# Patient Record
Sex: FEMALE | Race: WHITE | Employment: UNEMPLOYED | URBAN - METROPOLITAN AREA
[De-identification: names, ages, dates, MRNs, and addresses within clinical notes are randomized per-mention and may not be internally consistent; named-entity substitution may affect disease eponyms.]

---

## 2017-01-03 ENCOUNTER — HOSPITAL ENCOUNTER (OUTPATIENT)
Age: 57
Discharge: HOME | End: 2017-01-03
Attending: FAMILY MEDICINE

## 2017-01-03 VITALS
DIASTOLIC BLOOD PRESSURE: 83 MMHG | OXYGEN SATURATION: 98 % | TEMPERATURE: 97 F | HEIGHT: 65 IN | WEIGHT: 293 LBS | BODY MASS INDEX: 48.82 KG/M2 | SYSTOLIC BLOOD PRESSURE: 143 MMHG | HEART RATE: 92 BPM | RESPIRATION RATE: 16 BRPM

## 2017-01-03 DIAGNOSIS — M23.92 INTERNAL DERANGEMENT OF LEFT KNEE: Primary | ICD-10-CM

## 2017-01-03 PROCEDURE — 99213 OFFICE O/P EST LOW 20 MIN: CPT

## 2017-01-03 PROCEDURE — 99203 OFFICE O/P NEW LOW 30 MIN: CPT

## 2017-01-03 RX ORDER — TRAMADOL HYDROCHLORIDE 50 MG/1
TABLET ORAL EVERY 4 HOURS PRN
Qty: 20 TABLET | Refills: 0 | Status: SHIPPED | OUTPATIENT
Start: 2017-01-03 | End: 2017-01-10

## 2017-01-04 NOTE — ED NOTES
Patient given discharge instructionsand prescriptions, verbalizes understanding. Patient instructed not to drive, drink alcohol, or participate in activities requiring full attention while taking narcotic containing medication, verbalizes understanding.  Pa

## 2017-01-04 NOTE — ED PROVIDER NOTES
Patient Seen in: [address]    History   Patient presents with:  Knee Pain    Stated Complaint: LT. KNEE PAIN    HPI Comments: Pt c/o left knee pain after bending over yesterday.   Pain is worse when trying to straighten t Alcohol Use: No                Review of Systems   Constitutional: Negative for fever and chills. HENT: Negative for rhinorrhea. Cardiovascular: Negative for palpitations. Musculoskeletal: Positive for joint swelling. Negative for back pain.    All o diagnosis)    Disposition:  Discharge    Follow-up:  Dioni Wen MD  [address] [facility] [address] [phone]    In 3 days        Medications Prescribed:  Current Discharge Medication List    START taking these medications    Susan

## 2017-01-04 NOTE — ED INITIAL ASSESSMENT (HPI)
Patient reports she was getting up from couch yesterday and began to have left anterior knee pain, slightly inferior to patella. Denies hearing pop. Patient reports staying in one position too long results in stiffness. Able to bear weight.  Reports long hi

## 2017-01-05 ENCOUNTER — OFFICE VISIT (OUTPATIENT)
Dept: FAMILY MEDICINE CLINIC | Facility: CLINIC | Age: 57
End: 2017-01-05

## 2017-01-05 ENCOUNTER — HOSPITAL ENCOUNTER (OUTPATIENT)
Dept: GENERAL RADIOLOGY | Age: 57
Discharge: HOME | End: 2017-01-05
Attending: FAMILY MEDICINE
Payer: COMMERCIAL

## 2017-01-05 ENCOUNTER — TELEPHONE (OUTPATIENT)
Dept: FAMILY MEDICINE CLINIC | Facility: CLINIC | Age: 57
End: 2017-01-05

## 2017-01-05 VITALS
DIASTOLIC BLOOD PRESSURE: 79 MMHG | HEART RATE: 76 BPM | BODY MASS INDEX: 48.23 KG/M2 | TEMPERATURE: 99 F | RESPIRATION RATE: 17 BRPM | WEIGHT: 293 LBS | HEIGHT: 65.51 IN | SYSTOLIC BLOOD PRESSURE: 131 MMHG

## 2017-01-05 DIAGNOSIS — M25.562 ACUTE PAIN OF LEFT KNEE: ICD-10-CM

## 2017-01-05 DIAGNOSIS — M25.562 ACUTE PAIN OF LEFT KNEE: Primary | ICD-10-CM

## 2017-01-05 PROCEDURE — 73562 X-RAY EXAM OF KNEE 3: CPT

## 2017-01-05 PROCEDURE — 99214 OFFICE O/P EST MOD 30 MIN: CPT | Performed by: FAMILY MEDICINE

## 2017-01-05 PROCEDURE — 99212 OFFICE O/P EST SF 10 MIN: CPT | Performed by: FAMILY MEDICINE

## 2017-01-05 NOTE — PROGRESS NOTES
HPI:    Patient ID: Deandre Tran is a 64year old female. HPI    Review of Systems         Current Outpatient Prescriptions:  TraMADol HCl 50 MG Oral Tab Take 1-2 tablets ( mg total) by mouth every 4 (four) hours as needed for Pain.  Disp: 20 t week.    No orders of the defined types were placed in this encounter.        Meds This Visit:  No prescriptions requested or ordered in this encounter    Imaging & Referrals:  ORTHOPEDIC - INTERNAL  MRI KNEE, LEFT (BSG=[id number])       GP#[id number]

## 2017-01-05 NOTE — TELEPHONE ENCOUNTER
Spoke to pt, she was in the IC on Tuesday and was told to schedule f/u with PCP and was also advised to have an MRI of her left knee to rule out a torn meniscus.  Pt has been using ice, elevation and tramadol for pain, states that swelling is pretty much go

## 2017-01-05 NOTE — TELEPHONE ENCOUNTER
Patient seen in urgent care. She was given pain medication. She was to call PCP to get an order for MRI of knee. They thinks it is a torn Mensicus. Patient hoping to have test done today.    Also she would like to know if she needs apt today with Dr Brionna Arita

## 2017-01-10 ENCOUNTER — OFFICE VISIT (OUTPATIENT)
Dept: FAMILY MEDICINE CLINIC | Facility: CLINIC | Age: 57
End: 2017-01-10

## 2017-01-10 VITALS
WEIGHT: 293 LBS | SYSTOLIC BLOOD PRESSURE: 126 MMHG | HEART RATE: 76 BPM | DIASTOLIC BLOOD PRESSURE: 84 MMHG | BODY MASS INDEX: 48.23 KG/M2 | TEMPERATURE: 99 F | RESPIRATION RATE: 17 BRPM | HEIGHT: 65.51 IN

## 2017-01-10 DIAGNOSIS — M79.662 PAIN OF LEFT CALF: ICD-10-CM

## 2017-01-10 DIAGNOSIS — M25.562 ACUTE PAIN OF LEFT KNEE: Primary | ICD-10-CM

## 2017-01-10 PROCEDURE — 99213 OFFICE O/P EST LOW 20 MIN: CPT | Performed by: FAMILY MEDICINE

## 2017-01-10 PROCEDURE — 99212 OFFICE O/P EST SF 10 MIN: CPT | Performed by: FAMILY MEDICINE

## 2017-01-10 NOTE — PROGRESS NOTES
HPI:    Patient ID: Phillip Waldron is a 64year old female. Knee Pain   The pain is present in the left knee. The current episode started 1 to 4 weeks ago. The problem occurs daily. The problem has been gradually improving. The pain is moderate.  Assoc knee.  On exam knee with decreased extension and flexion with pain at extremes. No focal tenderness. Stable ×4. Posterior calf pain but no ankle edema. X-ray showed effusion, mild degenerative changes.     Plan to check Doppler with calf tenderness to r

## 2017-01-11 ENCOUNTER — HOSPITAL ENCOUNTER (OUTPATIENT)
Dept: ULTRASOUND IMAGING | Facility: HOSPITAL | Age: 57
Discharge: HOME | End: 2017-01-11
Attending: FAMILY MEDICINE
Payer: COMMERCIAL

## 2017-01-11 DIAGNOSIS — M25.562 ACUTE PAIN OF LEFT KNEE: ICD-10-CM

## 2017-01-11 PROCEDURE — 93971 EXTREMITY STUDY: CPT

## 2017-01-18 ENCOUNTER — HOSPITAL ENCOUNTER (OUTPATIENT)
Dept: MRI IMAGING | Age: 57
Discharge: HOME | End: 2017-01-18
Attending: FAMILY MEDICINE
Payer: COMMERCIAL

## 2017-01-18 ENCOUNTER — HOSPITAL ENCOUNTER (OUTPATIENT)
Dept: MRI IMAGING | Facility: HOSPITAL | Age: 57
Discharge: HOME | End: 2017-01-18
Attending: FAMILY MEDICINE
Payer: COMMERCIAL

## 2017-01-18 ENCOUNTER — TELEPHONE (OUTPATIENT)
Dept: FAMILY MEDICINE CLINIC | Facility: CLINIC | Age: 57
End: 2017-01-18

## 2017-01-18 DIAGNOSIS — M25.562 ACUTE PAIN OF LEFT KNEE: ICD-10-CM

## 2017-01-18 PROCEDURE — 73721 MRI JNT OF LWR EXTRE W/O DYE: CPT

## 2017-01-18 NOTE — TELEPHONE ENCOUNTER
Short term disability forms faxed to Dr. Morgan Valentin at the [facility] office 1/17/17. Forms faxed & originals sent through inter-office mail to [employer].

## 2017-01-20 ENCOUNTER — OFFICE VISIT (OUTPATIENT)
Dept: ORTHOPEDICS CLINIC | Facility: CLINIC | Age: 57
End: 2017-01-20

## 2017-01-20 DIAGNOSIS — M17.12 PRIMARY OSTEOARTHRITIS OF LEFT KNEE: Primary | ICD-10-CM

## 2017-01-20 PROCEDURE — 99243 OFF/OP CNSLTJ NEW/EST LOW 30: CPT | Performed by: ORTHOPAEDIC SURGERY

## 2017-01-20 PROCEDURE — 99212 OFFICE O/P EST SF 10 MIN: CPT | Performed by: ORTHOPAEDIC SURGERY

## 2017-01-20 RX ORDER — DOXEPIN HYDROCHLORIDE 50 MG/1
1 CAPSULE ORAL DAILY
COMMUNITY
End: 2020-06-17 | Stop reason: ALTCHOICE

## 2017-01-20 NOTE — PROGRESS NOTES
1/20/2017  Snow Yadi  66/1960  64year old   female  Katerin Dugan MD    HPI:   Patient presents with:  Consult: Left knee pain -- Onset 01/02/17 and states she \"got up wrong\" from sitting down. Rates pain 1/10.  Denies numbness and tingling Subclinical hypothyroidism 2010     subclinical hypothyroidism, primary.   3/2010 trial of Synthroid, no improvement sx, d/c'd   • Back problem      previous disc problem   • Essential hypertension    • Diabetes [facility])           Past Surgical History    TUBA to this visit and reveals tricompartmental articular cartilage narrowing, and ACL tear, and medial lateral meniscus tear. There is an anterior medial ganglion cyst about the proximal tibia and a posterior medial ganglion cyst about the distal femur.     AS

## 2017-01-24 ENCOUNTER — OFFICE VISIT (OUTPATIENT)
Dept: FAMILY MEDICINE CLINIC | Facility: CLINIC | Age: 57
End: 2017-01-24

## 2017-01-24 VITALS
DIASTOLIC BLOOD PRESSURE: 69 MMHG | TEMPERATURE: 99 F | WEIGHT: 293 LBS | BODY MASS INDEX: 48.23 KG/M2 | SYSTOLIC BLOOD PRESSURE: 106 MMHG | RESPIRATION RATE: 17 BRPM | HEIGHT: 65.51 IN | HEART RATE: 84 BPM

## 2017-01-24 DIAGNOSIS — M25.562 ACUTE PAIN OF LEFT KNEE: Primary | ICD-10-CM

## 2017-01-24 PROCEDURE — 99212 OFFICE O/P EST SF 10 MIN: CPT | Performed by: FAMILY MEDICINE

## 2017-01-24 PROCEDURE — 99213 OFFICE O/P EST LOW 20 MIN: CPT | Performed by: FAMILY MEDICINE

## 2017-01-24 NOTE — PROGRESS NOTES
HPI:    Patient ID: Shahida Webb is a 64year old female. Leg Pain   The pain is present in the left knee. The current episode started more than 1 month ago. The problem has been gradually improving.  Associated symptoms include a limited range of mo placed in this encounter.        Meds This Visit:  No prescriptions requested or ordered in this encounter    Imaging & Referrals:  None       DX#[id number]

## 2017-01-25 ENCOUNTER — TELEPHONE (OUTPATIENT)
Dept: FAMILY MEDICINE CLINIC | Facility: CLINIC | Age: 57
End: 2017-01-25

## 2017-01-26 ENCOUNTER — OFFICE VISIT (OUTPATIENT)
Dept: PHYSICAL THERAPY | Age: 57
End: 2017-01-26
Attending: ORTHOPAEDIC SURGERY
Payer: COMMERCIAL

## 2017-01-26 NOTE — TELEPHONE ENCOUNTER
Pt calling back, states forms are due today 1/26/2017- Requesting a call back to advise if they are ready .

## 2017-01-26 NOTE — TELEPHONE ENCOUNTER
Spoke with pt today and informed her that we only received [facility] paperwork and a note to return to work. Pt said she will verify with her job and call back. Pt voiced understanding.

## 2017-01-31 ENCOUNTER — APPOINTMENT (OUTPATIENT)
Dept: PHYSICAL THERAPY | Age: 57
End: 2017-01-31
Attending: ORTHOPAEDIC SURGERY
Payer: COMMERCIAL

## 2017-02-07 ENCOUNTER — OFFICE VISIT (OUTPATIENT)
Dept: PHYSICAL THERAPY | Age: 57
End: 2017-02-07
Attending: ORTHOPAEDIC SURGERY
Payer: COMMERCIAL

## 2017-02-07 DIAGNOSIS — M17.12 PRIMARY OSTEOARTHRITIS OF LEFT KNEE: Primary | ICD-10-CM

## 2017-02-07 PROCEDURE — 97110 THERAPEUTIC EXERCISES: CPT

## 2017-02-07 PROCEDURE — 97161 PT EVAL LOW COMPLEX 20 MIN: CPT

## 2017-02-08 NOTE — PROGRESS NOTES
LOWER EXTREMITY EVALUATION:   Referring Physician: Dr. Marcus Shin  Diagnosis: Primary osteoarthritis of left knee (M17.12)  Date of Onset: January 2, 2017 Date of Service: 2/7/2017     PATIENT SUMMARY   The patient is a 65 y/o female who presented with an a OBJECTIVE:     Gait: antalgic pattern with a decrease in step length and WB through LLE  Palpation: TTP medial joint line as well as superior and inferior patellar region (quadricep tendon)  Sensation: WNL    Accessory motion: hypo 2/6 sup/inf patella mobi patient was advised of these findings, precautions, and treatment options and has agreed to actively participate in planning and for this course of care.     Thank you for your referral. Please co-sign or sign and return this letter via fax as soon as possi

## 2017-02-09 ENCOUNTER — OFFICE VISIT (OUTPATIENT)
Dept: PHYSICAL THERAPY | Age: 57
End: 2017-02-09
Attending: ORTHOPAEDIC SURGERY
Payer: COMMERCIAL

## 2017-02-09 DIAGNOSIS — M17.12 PRIMARY OSTEOARTHRITIS OF LEFT KNEE: Primary | ICD-10-CM

## 2017-02-09 PROCEDURE — 97110 THERAPEUTIC EXERCISES: CPT

## 2017-02-10 NOTE — PROGRESS NOTES
DX: Primary osteoarthritis of left knee (M17.12)  Authorized # of Visits:  2/8         Next MD visit: none scheduled  Fall Risk: standard         Precautions: n/a           Medication Changes since last visit?: No    Subjective: Pt reports an improvement i

## 2017-02-14 ENCOUNTER — OFFICE VISIT (OUTPATIENT)
Dept: PHYSICAL THERAPY | Age: 57
End: 2017-02-14
Attending: ORTHOPAEDIC SURGERY
Payer: COMMERCIAL

## 2017-02-14 DIAGNOSIS — M17.12 PRIMARY OSTEOARTHRITIS OF LEFT KNEE: Primary | ICD-10-CM

## 2017-02-14 PROCEDURE — 97110 THERAPEUTIC EXERCISES: CPT

## 2017-02-15 NOTE — PROGRESS NOTES
DX: Primary osteoarthritis of left knee (M17.12)  Authorized # of Visits:  3/8         Next MD visit: none scheduled  Fall Risk: standard         Precautions: n/a           Medication Changes since last visit?: No    Subjective: Pt denies knee pain.  Notes

## 2017-02-16 ENCOUNTER — OFFICE VISIT (OUTPATIENT)
Dept: PHYSICAL THERAPY | Age: 57
End: 2017-02-16
Attending: ORTHOPAEDIC SURGERY
Payer: COMMERCIAL

## 2017-02-16 DIAGNOSIS — M17.12 PRIMARY OSTEOARTHRITIS OF LEFT KNEE: Primary | ICD-10-CM

## 2017-02-16 PROCEDURE — 97110 THERAPEUTIC EXERCISES: CPT

## 2017-02-17 NOTE — PROGRESS NOTES
DX: Primary osteoarthritis of left knee (M17.12)  Authorized # of Visits:  4/8         Next MD visit: none scheduled  Fall Risk: standard         Precautions: n/a           Medication Changes since last visit?: No    Subjective: Pt reports having a sharp p stability, proprioception, and functional mobility including gait     Skilled Services: manual therapy; HEP progression ; PREs     Charges: there ex 3       Total Timed Treatment: 42 min  Total Treatment Time: 45 min

## 2017-02-21 ENCOUNTER — APPOINTMENT (OUTPATIENT)
Dept: PHYSICAL THERAPY | Age: 57
End: 2017-02-21
Attending: ORTHOPAEDIC SURGERY
Payer: COMMERCIAL

## 2017-02-23 ENCOUNTER — OFFICE VISIT (OUTPATIENT)
Dept: PHYSICAL THERAPY | Age: 57
End: 2017-02-23
Attending: ORTHOPAEDIC SURGERY
Payer: COMMERCIAL

## 2017-02-23 DIAGNOSIS — M17.12 PRIMARY OSTEOARTHRITIS OF LEFT KNEE: Primary | ICD-10-CM

## 2017-02-23 PROCEDURE — 97110 THERAPEUTIC EXERCISES: CPT

## 2017-02-24 NOTE — PROGRESS NOTES
DX: Primary osteoarthritis of left knee (M17.12)  Authorized # of Visits:  5/8         Next MD visit: none scheduled  Fall Risk: standard         Precautions: n/a           Medication Changes since last visit?: No    Subjective: Pt reports feeling better. progression ; PREs     Charges: there ex 3       Total Timed Treatment: 42 min  Total Treatment Time: 45 min

## 2017-02-25 ENCOUNTER — LAB ENCOUNTER (OUTPATIENT)
Dept: LAB | Facility: HOSPITAL | Age: 57
End: 2017-02-25
Attending: FAMILY MEDICINE
Payer: COMMERCIAL

## 2017-02-25 ENCOUNTER — PATIENT MESSAGE (OUTPATIENT)
Dept: FAMILY MEDICINE CLINIC | Facility: CLINIC | Age: 57
End: 2017-02-25

## 2017-02-25 DIAGNOSIS — R79.9 ABNORMAL BLOOD CHEMISTRY: Primary | ICD-10-CM

## 2017-02-25 LAB — HBA1C MFR BLD: 7.4 % (ref 4–6)

## 2017-02-25 PROCEDURE — 36415 COLL VENOUS BLD VENIPUNCTURE: CPT

## 2017-02-25 PROCEDURE — 83036 HEMOGLOBIN GLYCOSYLATED A1C: CPT

## 2017-02-27 NOTE — TELEPHONE ENCOUNTER
From: Eriberto Frausto  To: Naomy Bender MD  Sent: 2/25/2017 9:26 AM CST  Subject: Other    Hi, I need the paperwork to take the A1C test today. Hope take is possible.  Thanks, Sandee Calvillo

## 2017-02-28 ENCOUNTER — OFFICE VISIT (OUTPATIENT)
Dept: FAMILY MEDICINE CLINIC | Facility: CLINIC | Age: 57
End: 2017-02-28

## 2017-02-28 VITALS
WEIGHT: 293 LBS | DIASTOLIC BLOOD PRESSURE: 86 MMHG | TEMPERATURE: 98 F | BODY MASS INDEX: 48.23 KG/M2 | SYSTOLIC BLOOD PRESSURE: 128 MMHG | HEART RATE: 85 BPM | RESPIRATION RATE: 17 BRPM | HEIGHT: 65.51 IN

## 2017-02-28 DIAGNOSIS — Z01.419 ENCOUNTER FOR GYNECOLOGICAL EXAMINATION WITHOUT ABNORMAL FINDING: Primary | ICD-10-CM

## 2017-02-28 DIAGNOSIS — E11.9 TYPE 2 DIABETES MELLITUS WITHOUT COMPLICATION, WITHOUT LONG-TERM CURRENT USE OF INSULIN (HCC): ICD-10-CM

## 2017-02-28 DIAGNOSIS — Z86.010 HISTORY OF COLON POLYPS: ICD-10-CM

## 2017-02-28 DIAGNOSIS — Z12.31 ENCOUNTER FOR SCREENING MAMMOGRAM FOR BREAST CANCER: ICD-10-CM

## 2017-02-28 DIAGNOSIS — E66.01 MORBID OBESITY DUE TO EXCESS CALORIES (HCC): ICD-10-CM

## 2017-02-28 PROCEDURE — 99213 OFFICE O/P EST LOW 20 MIN: CPT | Performed by: FAMILY MEDICINE

## 2017-02-28 PROCEDURE — 99396 PREV VISIT EST AGE 40-64: CPT | Performed by: FAMILY MEDICINE

## 2017-02-28 RX ORDER — GLIMEPIRIDE 2 MG/1
2 TABLET ORAL
Qty: 90 TABLET | Refills: 3 | Status: SHIPPED | OUTPATIENT
Start: 2017-02-28 | End: 2018-02-13

## 2017-03-01 NOTE — PROGRESS NOTES
HPI:    Patient ID: Phillip Waldron is a 64year old female. Diabetes  She presents for her follow-up diabetic visit. She has type 2 diabetes mellitus. There are no hypoglycemic associated symptoms.  Pertinent negatives for diabetes include no blurred v well-nourished. Neck: No thyromegaly present. Cardiovascular: Normal rate, regular rhythm and normal heart sounds. No murmur heard. Pulmonary/Chest: Effort normal and breath sounds normal.   Breast exam normal   Abdominal: Soft.  She exhibits no mas NEEDLE MINI U/F) 31G X 5 MM Does not apply Misc 100 each 2      Sig: Use with Victoza as dir daily           Imaging & Referrals:  [facility] SCREENING BILAT (IJM=[id number])       XM#[id number]

## 2017-03-02 ENCOUNTER — APPOINTMENT (OUTPATIENT)
Dept: PHYSICAL THERAPY | Age: 57
End: 2017-03-02
Attending: ORTHOPAEDIC SURGERY
Payer: COMMERCIAL

## 2017-03-06 LAB — HPV I/H RISK 1 DNA SPEC QL NAA+PROBE: NEGATIVE

## 2017-03-07 ENCOUNTER — OFFICE VISIT (OUTPATIENT)
Dept: PHYSICAL THERAPY | Age: 57
End: 2017-03-07
Attending: ORTHOPAEDIC SURGERY
Payer: COMMERCIAL

## 2017-03-07 PROCEDURE — 97110 THERAPEUTIC EXERCISES: CPT

## 2017-03-08 NOTE — PROGRESS NOTES
DX: Primary osteoarthritis of left knee (M17.12)  Authorized # of Visits:  6/8         Next MD visit: none scheduled  Fall Risk: standard         Precautions: n/a           Medication Changes since last visit?: No    Subjective: Pt reports overall feeling sit<>stand test within normal for age/gender without UE assist necessary to ease transitions at her work station     Plan: progress LLE flexibility, strength, stability, proprioception, and functional mobility including gait     Skilled Services: manual th

## 2017-03-14 ENCOUNTER — OFFICE VISIT (OUTPATIENT)
Dept: PHYSICAL THERAPY | Age: 57
End: 2017-03-14
Attending: ORTHOPAEDIC SURGERY
Payer: COMMERCIAL

## 2017-03-14 PROCEDURE — 97110 THERAPEUTIC EXERCISES: CPT

## 2017-03-16 ENCOUNTER — APPOINTMENT (OUTPATIENT)
Dept: PHYSICAL THERAPY | Age: 57
End: 2017-03-16
Attending: ORTHOPAEDIC SURGERY
Payer: COMMERCIAL

## 2017-03-21 ENCOUNTER — OFFICE VISIT (OUTPATIENT)
Dept: PHYSICAL THERAPY | Age: 57
End: 2017-03-21
Attending: ORTHOPAEDIC SURGERY
Payer: COMMERCIAL

## 2017-03-21 PROCEDURE — 97110 THERAPEUTIC EXERCISES: CPT

## 2017-03-25 ENCOUNTER — HOSPITAL ENCOUNTER (OUTPATIENT)
Dept: MAMMOGRAPHY | Facility: HOSPITAL | Age: 57
Discharge: HOME | End: 2017-03-25
Attending: FAMILY MEDICINE
Payer: COMMERCIAL

## 2017-03-25 DIAGNOSIS — Z12.31 ENCOUNTER FOR SCREENING MAMMOGRAM FOR BREAST CANCER: ICD-10-CM

## 2017-03-25 PROCEDURE — 77067 SCR MAMMO BI INCL CAD: CPT

## 2017-04-17 ENCOUNTER — TELEPHONE (OUTPATIENT)
Dept: GASTROENTEROLOGY | Facility: CLINIC | Age: 57
End: 2017-04-17

## 2017-06-02 ENCOUNTER — LAB ENCOUNTER (OUTPATIENT)
Dept: LAB | Facility: HOSPITAL | Age: 57
End: 2017-06-02
Attending: FAMILY MEDICINE
Payer: COMMERCIAL

## 2017-06-02 DIAGNOSIS — E11.9 DIABETES MELLITUS (HCC): Primary | ICD-10-CM

## 2017-06-02 PROCEDURE — 36415 COLL VENOUS BLD VENIPUNCTURE: CPT

## 2017-06-02 PROCEDURE — 83036 HEMOGLOBIN GLYCOSYLATED A1C: CPT

## 2017-06-06 ENCOUNTER — OFFICE VISIT (OUTPATIENT)
Dept: FAMILY MEDICINE CLINIC | Facility: CLINIC | Age: 57
End: 2017-06-06

## 2017-06-06 VITALS
HEART RATE: 76 BPM | TEMPERATURE: 99 F | SYSTOLIC BLOOD PRESSURE: 118 MMHG | BODY MASS INDEX: 48.23 KG/M2 | DIASTOLIC BLOOD PRESSURE: 79 MMHG | HEIGHT: 65.51 IN | RESPIRATION RATE: 18 BRPM | WEIGHT: 293 LBS

## 2017-06-06 DIAGNOSIS — E11.9 TYPE 2 DIABETES MELLITUS WITHOUT COMPLICATION, WITHOUT LONG-TERM CURRENT USE OF INSULIN (HCC): Primary | ICD-10-CM

## 2017-06-06 DIAGNOSIS — S89.92XD LEFT KNEE INJURY, SUBSEQUENT ENCOUNTER: ICD-10-CM

## 2017-06-06 DIAGNOSIS — E66.01 MORBID OBESITY DUE TO EXCESS CALORIES (HCC): ICD-10-CM

## 2017-06-06 DIAGNOSIS — Z86.010 HISTORY OF COLON POLYPS: ICD-10-CM

## 2017-06-06 PROCEDURE — 99212 OFFICE O/P EST SF 10 MIN: CPT | Performed by: FAMILY MEDICINE

## 2017-06-06 PROCEDURE — 99214 OFFICE O/P EST MOD 30 MIN: CPT | Performed by: FAMILY MEDICINE

## 2017-06-06 RX ORDER — LISINOPRIL AND HYDROCHLOROTHIAZIDE 12.5; 1 MG/1; MG/1
1 TABLET ORAL
Qty: 90 TABLET | Refills: 1 | Status: SHIPPED | OUTPATIENT
Start: 2017-06-06 | End: 2018-02-06

## 2017-06-06 RX ORDER — METFORMIN HYDROCHLORIDE 750 MG/1
TABLET, EXTENDED RELEASE ORAL
Qty: 180 TABLET | Refills: 1 | Status: SHIPPED | OUTPATIENT
Start: 2017-06-06 | End: 2018-02-06

## 2017-06-07 ENCOUNTER — TELEPHONE (OUTPATIENT)
Dept: FAMILY MEDICINE CLINIC | Facility: CLINIC | Age: 57
End: 2017-06-07

## 2017-06-07 NOTE — TELEPHONE ENCOUNTER
Caryn vazquez requesting clarification on quantity is the Dr requesting boxes of pens to be dispensed, is this of 1 month supply or 3 months.        Current Outpatient Prescriptions:  Exenatide ER (BYDUREON) 2 MG Subcutaneous Pen-injector Inject

## 2017-06-07 NOTE — PROGRESS NOTES
HPI:    Patient ID: Snow Grullon is a 64year old female. Diabetes  She presents for her follow-up diabetic visit. She has type 2 diabetes mellitus. There are no hypoglycemic associated symptoms.  Pertinent negatives for diabetes include no blurred v equal, round, and reactive to light. Neck: Neck supple. No JVD present. Cardiovascular: Normal rate, regular rhythm and normal heart sounds. No murmur heard.   Pulmonary/Chest: Effort normal and breath sounds normal.   Lymphadenopathy:     She has no Imaging & Referrals:  ORTHOPEDIC - INTERNAL  EVALUATE & TREAT, GASTRO (INTERNAL)       PJ#[id number]

## 2017-06-07 NOTE — TELEPHONE ENCOUNTER
Spoke with Lincoln Aj pharmacist at [facility] and clarified quantity per Dr Brenna Carlin message below.

## 2017-06-14 ENCOUNTER — TELEPHONE (OUTPATIENT)
Dept: GASTROENTEROLOGY | Facility: CLINIC | Age: 57
End: 2017-06-14

## 2017-06-14 NOTE — TELEPHONE ENCOUNTER
Last Procedure:   5/17/2016  Last Diagnosis:  History of multiple colon adenomas  Recalled for (years):  1 year  Sedation used previously:  MAC  Last Prep Used (if known):   Suprep  Quality of prep (if known):  Prep was good  Anticoagulants/Diabetic Meds:

## 2017-06-16 NOTE — TELEPHONE ENCOUNTER
Dr. Moe Morales,     Please advise on the message below regarding orders on how to hold/take Bydureon and DM oral medications prior to pt's c-scope, thank you.

## 2017-06-16 NOTE — TELEPHONE ENCOUNTER
I have reviewed the 5/17/16 c-scope performed by Dr. Cathrine Cranker. There were 3 polyps were retrieved during this procedure.   Fragments of hyperplastic polyp with focal features of sessile serrated adenoma were seen into and hyperplastic polyp of the transverse

## 2017-06-16 NOTE — TELEPHONE ENCOUNTER
Please let patient know she should continue metformin and Bydureon. Recommend hold glimepiride the day before and the day of appointment.

## 2017-06-20 NOTE — TELEPHONE ENCOUNTER
OK to change to Colyte split dose. I believe the patient was RXed Suprep last year, but I do not mind changing the prep as long as it is split dose.

## 2017-06-20 NOTE — TELEPHONE ENCOUNTER
Khalif Roberts called [address] and they state the copay for the suprep is $74.65 with the pt's insurance.  I also provided them with the savings voucher to see if it would bring down her cost, but they were unable to combine it with any other discount

## 2017-06-26 NOTE — TELEPHONE ENCOUNTER
[facility], please call at: [phone] between 8:30am-5:00pm, thank you.

## 2017-06-27 NOTE — TELEPHONE ENCOUNTER
Scheduled for:  Colonoscopy 34514  Provider Name:  Dr. Travis Olivo  Date:  8/10/17  Location:  [facility]  Sedation:  MAC  Time:  0900 (pt is aware that [facility] will call the day before to confirm arrival time)   Prep:  Suprep, mailed 6/27/17 with codes  Meds/Allergies Re

## 2017-06-30 ENCOUNTER — HOSPITAL ENCOUNTER (OUTPATIENT)
Dept: ULTRASOUND IMAGING | Facility: HOSPITAL | Age: 57
Discharge: HOME | End: 2017-06-30
Attending: ORTHOPAEDIC SURGERY
Payer: COMMERCIAL

## 2017-06-30 ENCOUNTER — OFFICE VISIT (OUTPATIENT)
Dept: ORTHOPEDICS CLINIC | Facility: CLINIC | Age: 57
End: 2017-06-30

## 2017-06-30 DIAGNOSIS — M79.662 PAIN OF LEFT CALF: ICD-10-CM

## 2017-06-30 DIAGNOSIS — S86.112A GASTROCNEMIUS MUSCLE STRAIN, LEFT, INITIAL ENCOUNTER: Primary | ICD-10-CM

## 2017-06-30 PROCEDURE — 93971 EXTREMITY STUDY: CPT | Performed by: ORTHOPAEDIC SURGERY

## 2017-06-30 PROCEDURE — 99214 OFFICE O/P EST MOD 30 MIN: CPT | Performed by: ORTHOPAEDIC SURGERY

## 2017-06-30 PROCEDURE — 99212 OFFICE O/P EST SF 10 MIN: CPT | Performed by: ORTHOPAEDIC SURGERY

## 2017-06-30 NOTE — PROGRESS NOTES
6/30/2017  Lilly Rosenberg  66/1960  64year old   female  Dwight Sniclair MD    HPI:   Patient presents with:  Knee Pain: left -- Finished PT in March 2017. States pain is worse now and has left calf pain. States calf pain began 03/2017.  States left Therefore, the patient would benefit from obtaining an ultrasound of the left calf to rule out a deep vein thrombosis.   If this test is negative, the patient will be perform a course of physical therapy for the left calf and follow-up in 4 weeks for repeat

## 2017-07-03 ENCOUNTER — TELEPHONE (OUTPATIENT)
Dept: ORTHOPEDICS CLINIC | Facility: CLINIC | Age: 57
End: 2017-07-03

## 2017-07-03 NOTE — TELEPHONE ENCOUNTER
Pt stts that insurance is asking for 5 digit code to authorize PT visits - please advise - thank you.

## 2017-07-03 NOTE — TELEPHONE ENCOUNTER
Called [facility] and spoke to Kacie who states they probably want the cpt 20875 code and the initial visit cpt code 06817.

## 2017-08-03 ENCOUNTER — OFFICE VISIT (OUTPATIENT)
Dept: PHYSICAL THERAPY | Age: 57
End: 2017-08-03
Attending: ORTHOPAEDIC SURGERY
Payer: COMMERCIAL

## 2017-08-03 DIAGNOSIS — S86.112A GASTROCNEMIUS MUSCLE STRAIN, LEFT, INITIAL ENCOUNTER: ICD-10-CM

## 2017-08-03 PROCEDURE — 97162 PT EVAL MOD COMPLEX 30 MIN: CPT

## 2017-08-03 PROCEDURE — 97110 THERAPEUTIC EXERCISES: CPT

## 2017-08-03 NOTE — PROGRESS NOTES
LOWER EXTREMITY EVALUATION:   Referring Physician: Dr. Yadi Mederos  Diagnosis: Gastrocnemius muscle strain, left, initial encounter (W92.064X)      Date of Onset: Jan 2, 2017; March 2017 Date of Service: 8/3/2017     PATIENT SUMMARY   The patient is a 63 y/o Observation: Increase swelling L LLE  Gait: antalgic pattern- bilateral hip ER with loss of terminal knee extension; minimal L hip circumduction to compensate loss L knee flexion     AROM:  L knee ROM -8 deg to 97 deg  R knee ROM -5 deg 119 deg      Acce participate in planning and for this course of care. Thank you for your referral. Please co-sign or sign and return this letter via fax as soon as possible to [phone].  If you have any questions, please contact me at Dept: [phone]    Sincerely,

## 2017-08-08 ENCOUNTER — OFFICE VISIT (OUTPATIENT)
Dept: PHYSICAL THERAPY | Age: 57
End: 2017-08-08
Attending: ORTHOPAEDIC SURGERY
Payer: COMMERCIAL

## 2017-08-08 DIAGNOSIS — S86.112A GASTROCNEMIUS MUSCLE STRAIN, LEFT, INITIAL ENCOUNTER: ICD-10-CM

## 2017-08-08 PROCEDURE — 97110 THERAPEUTIC EXERCISES: CPT

## 2017-08-08 NOTE — PROGRESS NOTES
Diagnosis:  Gastrocnemius muscle strain, left, initial encounter (D87.364Y)      Authorized # of Visits:  2         Next MD visit: none scheduled  Fall Risk: standard         Precautions: n/a           Medication Changes since last visit?: No  Subjective:

## 2017-08-09 ENCOUNTER — TELEPHONE (OUTPATIENT)
Dept: FAMILY MEDICINE CLINIC | Facility: CLINIC | Age: 57
End: 2017-08-09

## 2017-08-09 ENCOUNTER — TELEPHONE (OUTPATIENT)
Dept: GASTROENTEROLOGY | Facility: CLINIC | Age: 57
End: 2017-08-09

## 2017-08-09 NOTE — TELEPHONE ENCOUNTER
Pt transferred from phone room . Has colonoscopy with Dr Anish Jones tomorrow and did not receive instructions. Per Gladis's documentation, these were mailed June 27 on day she was scheduled (see 6/14 encounter). Pt is on clear liquids.  We reviewed the entire

## 2017-08-09 NOTE — TELEPHONE ENCOUNTER
Please inform patient I discussed with endocrinology. These labs are common after using Bydureon. It is not a reason to stop the medication. They do resolve eventually, sometimes 2-3 months.   I recommend she continue the Bydureon if it is not causing th

## 2017-08-09 NOTE — TELEPHONE ENCOUNTER
Spoke to pt. Relayed Dr. Anish Young message as shown below. Pt verbalized understanding of whole message with no further questions or concerns at this time.

## 2017-08-09 NOTE — TELEPHONE ENCOUNTER
Patient Bydureon once a week and is causing little hard  lumps in her abdomin which are not going away. She continues to have the lumps from 2 months ago. She is asking is she can go back to the [facility] for she did not get lumps.

## 2017-08-09 NOTE — TELEPHONE ENCOUNTER
Pt is calling state that she stop taking medication Bydureon pt state that she is have big lump on her stomach that's not going away requesting to speak with a RN

## 2017-08-10 ENCOUNTER — LAB REQUISITION (OUTPATIENT)
Dept: LAB | Facility: HOSPITAL | Age: 57
End: 2017-08-10
Payer: COMMERCIAL

## 2017-08-10 DIAGNOSIS — Z01.89 ENCOUNTER FOR OTHER SPECIFIED SPECIAL EXAMINATIONS: ICD-10-CM

## 2017-08-10 PROCEDURE — 88305 TISSUE EXAM BY PATHOLOGIST: CPT | Performed by: INTERNAL MEDICINE

## 2017-08-17 ENCOUNTER — OFFICE VISIT (OUTPATIENT)
Dept: PHYSICAL THERAPY | Age: 57
End: 2017-08-17
Attending: ORTHOPAEDIC SURGERY
Payer: COMMERCIAL

## 2017-08-17 DIAGNOSIS — S86.112A GASTROCNEMIUS MUSCLE STRAIN, LEFT, INITIAL ENCOUNTER: ICD-10-CM

## 2017-08-17 PROCEDURE — 97110 THERAPEUTIC EXERCISES: CPT

## 2017-08-17 NOTE — PROGRESS NOTES
Diagnosis:  Gastrocnemius muscle strain, left, initial encounter (X78.453R)      Authorized # of Visits:  3         Next MD visit: none scheduled  Fall Risk: standard         Precautions: n/a           Medication Changes since last visit?: No  Subjective:

## 2017-08-22 ENCOUNTER — OFFICE VISIT (OUTPATIENT)
Dept: PHYSICAL THERAPY | Age: 57
End: 2017-08-22
Attending: ORTHOPAEDIC SURGERY
Payer: COMMERCIAL

## 2017-08-22 DIAGNOSIS — S86.112A GASTROCNEMIUS MUSCLE STRAIN, LEFT, INITIAL ENCOUNTER: ICD-10-CM

## 2017-08-22 PROCEDURE — 97110 THERAPEUTIC EXERCISES: CPT

## 2017-08-22 NOTE — PROGRESS NOTES
Diagnosis:  Gastrocnemius muscle strain, left, initial encounter (E24.944R)      Authorized # of Visits: 4         Next MD visit: none scheduled  Fall Risk: standard         Precautions: n/a           Medication Changes since last visit?: No  Subjective: P

## 2017-08-24 ENCOUNTER — OFFICE VISIT (OUTPATIENT)
Dept: PHYSICAL THERAPY | Age: 57
End: 2017-08-24
Attending: ORTHOPAEDIC SURGERY
Payer: COMMERCIAL

## 2017-08-24 DIAGNOSIS — S86.112A GASTROCNEMIUS MUSCLE STRAIN, LEFT, INITIAL ENCOUNTER: ICD-10-CM

## 2017-08-24 PROCEDURE — 97110 THERAPEUTIC EXERCISES: CPT

## 2017-08-24 NOTE — PROGRESS NOTES
Diagnosis:  Gastrocnemius muscle strain, left, initial encounter (D82.086D)      Authorized # of Visits: 5         Next MD visit: none scheduled  Fall Risk: standard         Precautions: n/a           Medication Changes since last visit?: No  Subjective: P

## 2017-08-29 ENCOUNTER — OFFICE VISIT (OUTPATIENT)
Dept: PHYSICAL THERAPY | Age: 57
End: 2017-08-29
Attending: ORTHOPAEDIC SURGERY
Payer: COMMERCIAL

## 2017-08-29 DIAGNOSIS — S86.112A GASTROCNEMIUS MUSCLE STRAIN, LEFT, INITIAL ENCOUNTER: ICD-10-CM

## 2017-08-29 PROCEDURE — 97110 THERAPEUTIC EXERCISES: CPT

## 2017-08-29 NOTE — PROGRESS NOTES
Diagnosis:  Gastrocnemius muscle strain, left, initial encounter (W28.941I)      Authorized # of Visits: 6         Next MD visit: none scheduled  Fall Risk: standard         Precautions: n/a           Medication Changes since last visit?: No  Subjective: P

## 2017-08-31 ENCOUNTER — OFFICE VISIT (OUTPATIENT)
Dept: PHYSICAL THERAPY | Age: 57
End: 2017-08-31
Attending: ORTHOPAEDIC SURGERY
Payer: COMMERCIAL

## 2017-08-31 DIAGNOSIS — S86.112A GASTROCNEMIUS MUSCLE STRAIN, LEFT, INITIAL ENCOUNTER: ICD-10-CM

## 2017-08-31 PROCEDURE — 97110 THERAPEUTIC EXERCISES: CPT

## 2017-08-31 NOTE — PROGRESS NOTES
Diagnosis:  Gastrocnemius muscle strain, left, initial encounter (X09.901I)      Authorized # of Visits: 7         Next MD visit: none scheduled  Fall Risk: standard         Precautions: n/a           Medication Changes since last visit?: No  Subjective: P Time: 32 min

## 2017-09-05 ENCOUNTER — TELEPHONE (OUTPATIENT)
Dept: GASTROENTEROLOGY | Facility: CLINIC | Age: 57
End: 2017-09-05

## 2017-09-05 ENCOUNTER — OFFICE VISIT (OUTPATIENT)
Dept: PHYSICAL THERAPY | Age: 57
End: 2017-09-05
Attending: ORTHOPAEDIC SURGERY
Payer: COMMERCIAL

## 2017-09-05 PROCEDURE — 97110 THERAPEUTIC EXERCISES: CPT

## 2017-09-05 NOTE — PROGRESS NOTES
Diagnosis:  Gastrocnemius muscle strain, left, initial encounter (M18.779D)      Authorized # of Visits: 8         Next MD visit: (as needed)  Fall Risk: standard         Precautions: n/a           Medication Changes since last visit?: No  Subjective: Pt r progressive HEP necessary to manage symptoms during ADLs (i.e. don/dof shoes)--1/2 MET    2) The patient will demonstrate 2/3 to 1 grade increase in L hip flexion strength necessary to negotiate stairs with 1 UE for support, PAS <2/10-MET     3) The patien

## 2017-09-06 NOTE — TELEPHONE ENCOUNTER
Message   Received: Today   Message Contents   Delma Angelucci, MD  [facility] Clinical Staff             GI RNs - 1.  Please print and mail this letter to patient; 2. Recall for colonoscopy exam in 2 years      Results letter mailed to patient and co

## 2017-11-10 ENCOUNTER — LAB ENCOUNTER (OUTPATIENT)
Dept: LAB | Facility: HOSPITAL | Age: 57
End: 2017-11-10
Attending: FAMILY MEDICINE
Payer: COMMERCIAL

## 2017-11-10 DIAGNOSIS — E11.9 DIABETES MELLITUS (HCC): Primary | ICD-10-CM

## 2017-11-10 PROCEDURE — 83036 HEMOGLOBIN GLYCOSYLATED A1C: CPT

## 2017-11-10 PROCEDURE — 36415 COLL VENOUS BLD VENIPUNCTURE: CPT

## 2018-02-06 RX ORDER — LISINOPRIL AND HYDROCHLOROTHIAZIDE 12.5; 1 MG/1; MG/1
TABLET ORAL
Qty: 90 TABLET | Refills: 1 | Status: SHIPPED | OUTPATIENT
Start: 2018-02-06 | End: 2018-10-27

## 2018-02-06 RX ORDER — METFORMIN HYDROCHLORIDE 750 MG/1
TABLET, EXTENDED RELEASE ORAL
Qty: 180 TABLET | Refills: 1 | Status: SHIPPED | OUTPATIENT
Start: 2018-02-06 | End: 2018-10-27

## 2018-02-07 NOTE — TELEPHONE ENCOUNTER
Failed protocol, please advise. Medications pended for approval  LOV:6/6/17 Last RX refill:6/6/17  Last bllod works was done 11/12/16.   Hypertensive Medications  Protocol Criteria:  · Appointment scheduled in the past 6 months or in the next 3 months  · B months  · Creatinine in the past 12 months  · Creatinine result < 1.5   Recent Outpatient Visits            5 months ago     [address], [address] [facility]    Office Visit    5 months ago Gastrocnemius muscle strain, left, initi

## 2018-02-13 ENCOUNTER — OFFICE VISIT (OUTPATIENT)
Dept: FAMILY MEDICINE CLINIC | Facility: CLINIC | Age: 58
End: 2018-02-13

## 2018-02-13 VITALS
SYSTOLIC BLOOD PRESSURE: 115 MMHG | BODY MASS INDEX: 48.23 KG/M2 | RESPIRATION RATE: 16 BRPM | DIASTOLIC BLOOD PRESSURE: 79 MMHG | TEMPERATURE: 98 F | HEART RATE: 68 BPM | WEIGHT: 293 LBS | HEIGHT: 65.51 IN

## 2018-02-13 DIAGNOSIS — I10 ESSENTIAL HYPERTENSION WITH GOAL BLOOD PRESSURE LESS THAN 140/90: ICD-10-CM

## 2018-02-13 DIAGNOSIS — E66.01 MORBID OBESITY DUE TO EXCESS CALORIES (HCC): ICD-10-CM

## 2018-02-13 DIAGNOSIS — E11.9 TYPE 2 DIABETES MELLITUS WITHOUT COMPLICATION, WITHOUT LONG-TERM CURRENT USE OF INSULIN (HCC): Primary | ICD-10-CM

## 2018-02-13 DIAGNOSIS — L65.9 ALOPECIA: ICD-10-CM

## 2018-02-13 PROCEDURE — 99214 OFFICE O/P EST MOD 30 MIN: CPT | Performed by: FAMILY MEDICINE

## 2018-02-13 PROCEDURE — 99212 OFFICE O/P EST SF 10 MIN: CPT | Performed by: FAMILY MEDICINE

## 2018-02-13 RX ORDER — GLIMEPIRIDE 2 MG/1
2 TABLET ORAL
Qty: 90 TABLET | Refills: 3 | Status: SHIPPED | OUTPATIENT
Start: 2018-02-13 | End: 2018-02-15 | Stop reason: RX

## 2018-02-14 ENCOUNTER — TELEPHONE (OUTPATIENT)
Dept: FAMILY MEDICINE CLINIC | Facility: CLINIC | Age: 58
End: 2018-02-14

## 2018-02-14 LAB
CREATININE, RANDOM URINE: 155 MG/DL (ref 20–320)
MICROALBUMIN/CREATININE RATIO, RANDOM URINE: 2 MCG/MG CREAT
MICROALBUMIN: 0.3 MG/DL

## 2018-02-14 NOTE — TELEPHONE ENCOUNTER
Pharmacy stts Rx Glimepiride 2 MG which is on back order for 3 weeks, pharmacy wanted to know if we can offer 4MG and pt can break in half? Pt is at pharmacy now.  Please advise         Current Outpatient Prescriptions:  glimepiride 2 MG Oral Tab Take 1 tab

## 2018-02-14 NOTE — PROGRESS NOTES
HPI:    Patient ID: Truman Cadena is a 62year old female. Diabetes   She presents for her follow-up diabetic visit. She has type 2 diabetes mellitus. Her disease course has been stable. There are no hypoglycemic associated symptoms.  Pertinent negati apply Kit test T. I.D Disp:  Rfl:      Allergies:No Known Allergies   PHYSICAL EXAM:   Physical Exam   Constitutional: She is oriented to person, place, and time. She appears well-developed and well-nourished.    HENT:   Mouth/Throat: Oropharynx is clear and

## 2018-02-15 RX ORDER — GLIMEPIRIDE 4 MG/1
2 TABLET ORAL
Qty: 90 TABLET | Refills: 0 | OUTPATIENT
Start: 2018-02-15 | End: 2018-10-30 | Stop reason: ALTCHOICE

## 2018-02-15 NOTE — TELEPHONE ENCOUNTER
Spoke with pharmacist and Dr Felix Munoz message given. Pharmacist asking for new order Glimepiride 4 mg pt to take half for total 2 mg daily. Order sent.

## 2018-02-15 NOTE — TELEPHONE ENCOUNTER
Dr. Leyla Yadav, please advise if you approve glimepiride 4mg tablets, take 1/2 tab daily before breakfast.     Please reply to faith: JEREMIAS Valencia

## 2018-02-17 ENCOUNTER — APPOINTMENT (OUTPATIENT)
Dept: LAB | Facility: HOSPITAL | Age: 58
End: 2018-02-17
Attending: FAMILY MEDICINE
Payer: COMMERCIAL

## 2018-02-17 LAB
ALBUMIN SERPL BCP-MCNC: 3.3 G/DL (ref 3.5–4.8)
ALBUMIN/GLOB SERPL: 0.9 {RATIO} (ref 1–2)
ALP SERPL-CCNC: 35 U/L (ref 32–100)
ALT SERPL-CCNC: 26 U/L (ref 14–54)
ANION GAP SERPL CALC-SCNC: 7 MMOL/L (ref 0–18)
AST SERPL-CCNC: 21 U/L (ref 15–41)
BILIRUB SERPL-MCNC: 0.6 MG/DL (ref 0.3–1.2)
BUN SERPL-MCNC: 12 MG/DL (ref 8–20)
BUN/CREAT SERPL: 20 (ref 10–20)
CALCIUM SERPL-MCNC: 8.7 MG/DL (ref 8.5–10.5)
CHLORIDE SERPL-SCNC: 106 MMOL/L (ref 95–110)
CHOLEST SERPL-MCNC: 161 MG/DL (ref 110–200)
CO2 SERPL-SCNC: 27 MMOL/L (ref 22–32)
CREAT SERPL-MCNC: 0.6 MG/DL (ref 0.5–1.5)
FERRITIN SERPL IA-MCNC: 160 NG/ML (ref 11–307)
GLOBULIN PLAS-MCNC: 3.5 G/DL (ref 2.5–3.7)
GLUCOSE SERPL-MCNC: 149 MG/DL (ref 70–99)
HBA1C MFR BLD: 6.7 % (ref 4–6)
HDLC SERPL-MCNC: 45 MG/DL
LDLC SERPL CALC-MCNC: 100 MG/DL (ref 0–99)
NONHDLC SERPL-MCNC: 116 MG/DL
OSMOLALITY UR CALC.SUM OF ELEC: 293 MOSM/KG (ref 275–295)
PATIENT FASTING: YES
POTASSIUM SERPL-SCNC: 3.7 MMOL/L (ref 3.3–5.1)
PROT SERPL-MCNC: 6.8 G/DL (ref 5.9–8.4)
SODIUM SERPL-SCNC: 140 MMOL/L (ref 136–144)
TRIGL SERPL-MCNC: 80 MG/DL (ref 1–149)
TSH SERPL-ACNC: 3.46 UIU/ML (ref 0.45–5.33)

## 2018-02-17 PROCEDURE — 82728 ASSAY OF FERRITIN: CPT | Performed by: FAMILY MEDICINE

## 2018-02-17 PROCEDURE — 36415 COLL VENOUS BLD VENIPUNCTURE: CPT | Performed by: FAMILY MEDICINE

## 2018-02-17 PROCEDURE — 80061 LIPID PANEL: CPT | Performed by: FAMILY MEDICINE

## 2018-02-17 PROCEDURE — 84443 ASSAY THYROID STIM HORMONE: CPT | Performed by: FAMILY MEDICINE

## 2018-02-17 PROCEDURE — 83036 HEMOGLOBIN GLYCOSYLATED A1C: CPT | Performed by: FAMILY MEDICINE

## 2018-02-17 PROCEDURE — 86803 HEPATITIS C AB TEST: CPT | Performed by: FAMILY MEDICINE

## 2018-02-17 PROCEDURE — 80053 COMPREHEN METABOLIC PANEL: CPT | Performed by: FAMILY MEDICINE

## 2018-02-19 LAB — HCV AB SERPL QL IA: NONREACTIVE

## 2018-02-20 RX ORDER — ATORVASTATIN CALCIUM 10 MG/1
10 TABLET, FILM COATED ORAL NIGHTLY
Qty: 90 TABLET | Refills: 1 | Status: SHIPPED | OUTPATIENT
Start: 2018-02-20 | End: 2018-10-30

## 2018-02-24 ENCOUNTER — TELEPHONE (OUTPATIENT)
Dept: FAMILY MEDICINE CLINIC | Facility: CLINIC | Age: 58
End: 2018-02-24

## 2018-02-24 DIAGNOSIS — E11.9 DIABETES MELLITUS WITHOUT COMPLICATION (HCC): Primary | ICD-10-CM

## 2018-02-24 NOTE — TELEPHONE ENCOUNTER
Pt called back & was informed of lab result & MD recommendation, pt stated understanding.   Pt will f/u with [facility] regarding her meds & she will f/u with Dr Kevin Guidry in 6 mos & she will repeat labs then prior ov.   future lab order placed        No fu

## 2018-02-24 NOTE — PROGRESS NOTES
Pt called back & was informed of lab result & MD recommendation, pt stated understanding. Pt will f/u with [facility] regarding her meds & she will f/u with Dr Shahzad Santana in 6 mos & she will repeat labs then prior ov.   future lab order placed.   See TE 2-2

## 2018-02-24 NOTE — TELEPHONE ENCOUNTER
My chart message  Notes Recorded by Shireen Phalen, RN on 2/24/2018 at 9:56 AM CST  Your cholesterol is at the upper end of the goal for diabetes.  We could consider starting medication for cholesterol at this time.  I would recommend Lipitor 10 mg daily.

## 2018-02-24 NOTE — TELEPHONE ENCOUNTER
----- Message from Omkar Montgomery MD sent at 2/20/2018  9:18 AM CST -----  Nurses–please see my chart message regarding starting atorvastatin. Please confirm patient received message.   Check with patient, let her know at low dose side effects are unlikely

## 2018-10-29 RX ORDER — METFORMIN HYDROCHLORIDE 750 MG/1
TABLET, EXTENDED RELEASE ORAL
Qty: 180 TABLET | Refills: 3 | Status: SHIPPED | OUTPATIENT
Start: 2018-10-29 | End: 2019-03-19

## 2018-10-29 RX ORDER — LISINOPRIL AND HYDROCHLOROTHIAZIDE 12.5; 1 MG/1; MG/1
TABLET ORAL
Qty: 90 TABLET | Refills: 3 | Status: SHIPPED | OUTPATIENT
Start: 2018-10-29 | End: 2019-03-19

## 2018-10-30 ENCOUNTER — OFFICE VISIT (OUTPATIENT)
Dept: FAMILY MEDICINE CLINIC | Facility: CLINIC | Age: 58
End: 2018-10-30
Payer: COMMERCIAL

## 2018-10-30 VITALS
RESPIRATION RATE: 18 BRPM | TEMPERATURE: 98 F | HEIGHT: 65.5 IN | WEIGHT: 293 LBS | DIASTOLIC BLOOD PRESSURE: 78 MMHG | SYSTOLIC BLOOD PRESSURE: 120 MMHG | BODY MASS INDEX: 48.23 KG/M2 | HEART RATE: 82 BPM

## 2018-10-30 DIAGNOSIS — E78.5 HYPERLIPIDEMIA, UNSPECIFIED HYPERLIPIDEMIA TYPE: ICD-10-CM

## 2018-10-30 DIAGNOSIS — I10 ESSENTIAL HYPERTENSION: ICD-10-CM

## 2018-10-30 DIAGNOSIS — R19.5 LOOSE STOOLS: ICD-10-CM

## 2018-10-30 DIAGNOSIS — E66.01 MORBID OBESITY DUE TO EXCESS CALORIES (HCC): ICD-10-CM

## 2018-10-30 DIAGNOSIS — E11.9 TYPE 2 DIABETES MELLITUS WITHOUT COMPLICATION, WITHOUT LONG-TERM CURRENT USE OF INSULIN (HCC): Primary | ICD-10-CM

## 2018-10-30 PROCEDURE — 90686 IIV4 VACC NO PRSV 0.5 ML IM: CPT | Performed by: FAMILY MEDICINE

## 2018-10-30 PROCEDURE — 99214 OFFICE O/P EST MOD 30 MIN: CPT | Performed by: FAMILY MEDICINE

## 2018-10-30 PROCEDURE — 99212 OFFICE O/P EST SF 10 MIN: CPT | Performed by: FAMILY MEDICINE

## 2018-10-30 PROCEDURE — 90471 IMMUNIZATION ADMIN: CPT | Performed by: FAMILY MEDICINE

## 2018-10-30 RX ORDER — ATORVASTATIN CALCIUM 10 MG/1
10 TABLET, FILM COATED ORAL NIGHTLY
Qty: 90 TABLET | Refills: 3 | Status: SHIPPED | OUTPATIENT
Start: 2018-10-30 | End: 2019-03-19

## 2018-10-31 NOTE — PROGRESS NOTES
HPI:    Patient ID: George Peraza is a 62year old female. Diarrhea    This is a recurrent problem. The current episode started 1 to 4 weeks ago. The problem occurs 2 to 4 times per day. The problem has been waxing and waning.  The patient states that Glucose Monitoring Suppl ([address]   [address]) W/DEVICE Does not apply Kit test T. I.D Disp:  Rfl:      Allergies:No Known Allergies   PHYSICAL EXAM:   Physical Exam   Constitutional: She is oriented to person, place, and time.  She appears well-developed metformin.     Orders Placed This Encounter      Flulaval 0.5 ml 6 mon and older Quad single dose PF (56150)      Meds This Visit:  Requested Prescriptions     Signed Prescriptions Disp Refills   • atorvastatin 10 MG Oral Tab 90 tablet 3     Sig: Take 1 tab

## 2018-11-11 ENCOUNTER — APPOINTMENT (OUTPATIENT)
Dept: LAB | Facility: HOSPITAL | Age: 58
End: 2018-11-11
Attending: FAMILY MEDICINE
Payer: COMMERCIAL

## 2018-11-11 DIAGNOSIS — E11.9 DIABETES MELLITUS WITHOUT COMPLICATION (HCC): ICD-10-CM

## 2018-11-11 PROCEDURE — 36415 COLL VENOUS BLD VENIPUNCTURE: CPT

## 2018-11-11 PROCEDURE — 83036 HEMOGLOBIN GLYCOSYLATED A1C: CPT

## 2018-11-11 PROCEDURE — 80061 LIPID PANEL: CPT

## 2018-11-11 PROCEDURE — 80053 COMPREHEN METABOLIC PANEL: CPT

## 2019-01-29 ENCOUNTER — TELEPHONE (OUTPATIENT)
Dept: FAMILY MEDICINE CLINIC | Facility: CLINIC | Age: 59
End: 2019-01-29

## 2019-01-29 NOTE — TELEPHONE ENCOUNTER
Called [facility] Shell to verify she was still taking Lisinopril. Received a letter from [employer].  Pt verified she is still taking Lisinopril

## 2019-02-21 ENCOUNTER — NURSE TRIAGE (OUTPATIENT)
Dept: OTHER | Age: 59
End: 2019-02-21

## 2019-02-21 NOTE — TELEPHONE ENCOUNTER
Action Requested: Summary for Provider     []  Critical Lab, Recommendations Needed  [] Need Additional Advice  []   FYI    []   Need Orders  [] Need Medications Sent to Pharmacy  []  Other     SUMMARY: Per pt sore throat, fever high 101, sneezing, and whi

## 2019-02-22 ENCOUNTER — OFFICE VISIT (OUTPATIENT)
Dept: FAMILY MEDICINE CLINIC | Facility: CLINIC | Age: 59
End: 2019-02-22
Payer: COMMERCIAL

## 2019-02-22 VITALS
RESPIRATION RATE: 18 BRPM | DIASTOLIC BLOOD PRESSURE: 84 MMHG | SYSTOLIC BLOOD PRESSURE: 142 MMHG | HEART RATE: 81 BPM | TEMPERATURE: 98 F | BODY MASS INDEX: 51 KG/M2 | WEIGHT: 293 LBS

## 2019-02-22 DIAGNOSIS — J02.9 PHARYNGITIS, UNSPECIFIED ETIOLOGY: Primary | ICD-10-CM

## 2019-02-22 LAB
CONTROL LINE PRESENT WITH A CLEAR BACKGROUND (YES/NO): YES YES/NO
KIT LOT #: NORMAL NUMERIC
STREP GRP A CUL-SCR: NEGATIVE

## 2019-02-22 PROCEDURE — 99212 OFFICE O/P EST SF 10 MIN: CPT | Performed by: FAMILY MEDICINE

## 2019-02-22 PROCEDURE — 99213 OFFICE O/P EST LOW 20 MIN: CPT | Performed by: FAMILY MEDICINE

## 2019-02-22 PROCEDURE — 87880 STREP A ASSAY W/OPTIC: CPT | Performed by: FAMILY MEDICINE

## 2019-02-22 RX ORDER — PENICILLIN V POTASSIUM 500 MG/1
500 TABLET ORAL 3 TIMES DAILY
Qty: 30 TABLET | Refills: 0 | Status: SHIPPED | OUTPATIENT
Start: 2019-02-22 | End: 2019-02-26

## 2019-02-22 NOTE — PROGRESS NOTES
HPI:    Patient ID: Rafiq Tello is a 62year old female. Sore Throat    This is a new problem. The current episode started in the past 7 days. The problem has been waxing and waning. Neither side of throat is experiencing more pain than the other. Rfl:      Allergies:No Known Allergies   PHYSICAL EXAM:   Physical Exam   Constitutional: She appears well-developed and well-nourished. HENT:   Right Ear: External ear normal.   Left Ear: External ear normal.   Pharynx with tonsils 1+ enlarged, exudate.

## 2019-02-25 ENCOUNTER — TELEPHONE (OUTPATIENT)
Dept: OTHER | Age: 59
End: 2019-02-25

## 2019-02-25 ENCOUNTER — NURSE TRIAGE (OUTPATIENT)
Dept: OTHER | Age: 59
End: 2019-02-25

## 2019-02-25 NOTE — TELEPHONE ENCOUNTER
Result Notes for GRP A STREP CULT, THROAT     Notes recorded by Benny Traore MD on 2/25/2019 at 10:59 AM CST  I am not certain the patient checks Mychart, so please let her know strep positive, complete antibiotic. Patient notified of lab results.  P

## 2019-02-25 NOTE — TELEPHONE ENCOUNTER
Action Requested: Summary for Provider     []  Critical Lab, Recommendations Needed  [x] Need Additional Advice  []   FYI    []   Need Orders  [] Need Medications Sent to Pharmacy  []  Other     SUMMARY: Patient calling with complaint of vomiting that is t

## 2019-02-25 NOTE — TELEPHONE ENCOUNTER
Advised patient of Dr. Andie Moya note. Patient verbalized understanding and declined appointment for today because does not have a ride. Spouse does not get out of work till 3pm, so would not be able to get to office till 4pm today.  Appointment made for jeff

## 2019-02-25 NOTE — TELEPHONE ENCOUNTER
She should be better with penicillin. Recommend making acute visit for follow-up.   Okay to add on at 3 PM.

## 2019-02-26 ENCOUNTER — OFFICE VISIT (OUTPATIENT)
Dept: FAMILY MEDICINE CLINIC | Facility: CLINIC | Age: 59
End: 2019-02-26
Payer: COMMERCIAL

## 2019-02-26 VITALS
BODY MASS INDEX: 51 KG/M2 | DIASTOLIC BLOOD PRESSURE: 78 MMHG | TEMPERATURE: 98 F | WEIGHT: 293 LBS | RESPIRATION RATE: 18 BRPM | SYSTOLIC BLOOD PRESSURE: 122 MMHG | HEART RATE: 80 BPM

## 2019-02-26 DIAGNOSIS — J01.90 ACUTE SINUSITIS, RECURRENCE NOT SPECIFIED, UNSPECIFIED LOCATION: Primary | ICD-10-CM

## 2019-02-26 PROCEDURE — 99212 OFFICE O/P EST SF 10 MIN: CPT | Performed by: FAMILY MEDICINE

## 2019-02-26 PROCEDURE — 99213 OFFICE O/P EST LOW 20 MIN: CPT | Performed by: FAMILY MEDICINE

## 2019-02-26 RX ORDER — AMOXICILLIN AND CLAVULANATE POTASSIUM 875; 125 MG/1; MG/1
1 TABLET, FILM COATED ORAL 2 TIMES DAILY
Qty: 20 TABLET | Refills: 0 | Status: SHIPPED | OUTPATIENT
Start: 2019-02-26 | End: 2019-03-08

## 2019-02-27 NOTE — PROGRESS NOTES
HPI:    Patient ID: Renetta Bryant is a 62year old female. Vomiting    This is a recurrent problem. The current episode started in the past 7 days. The problem occurs 2 to 4 times per day. The problem has been resolved. There has been no fever.  Assoc normal.   Left Ear: External ear normal.   Nose: Mucosal edema and rhinorrhea present. Right sinus exhibits no maxillary sinus tenderness and no frontal sinus tenderness. Left sinus exhibits no maxillary sinus tenderness and no frontal sinus tenderness.

## 2019-03-09 ENCOUNTER — APPOINTMENT (OUTPATIENT)
Dept: LAB | Facility: HOSPITAL | Age: 59
End: 2019-03-09
Attending: FAMILY MEDICINE
Payer: COMMERCIAL

## 2019-03-09 PROCEDURE — 83036 HEMOGLOBIN GLYCOSYLATED A1C: CPT | Performed by: FAMILY MEDICINE

## 2019-03-09 PROCEDURE — 36415 COLL VENOUS BLD VENIPUNCTURE: CPT | Performed by: FAMILY MEDICINE

## 2019-03-19 ENCOUNTER — OFFICE VISIT (OUTPATIENT)
Dept: FAMILY MEDICINE CLINIC | Facility: CLINIC | Age: 59
End: 2019-03-19
Payer: COMMERCIAL

## 2019-03-19 VITALS
WEIGHT: 293 LBS | BODY MASS INDEX: 51 KG/M2 | SYSTOLIC BLOOD PRESSURE: 132 MMHG | HEART RATE: 80 BPM | TEMPERATURE: 97 F | DIASTOLIC BLOOD PRESSURE: 84 MMHG | RESPIRATION RATE: 18 BRPM

## 2019-03-19 DIAGNOSIS — E11.9 TYPE 2 DIABETES MELLITUS WITHOUT COMPLICATION, WITHOUT LONG-TERM CURRENT USE OF INSULIN (HCC): Primary | ICD-10-CM

## 2019-03-19 PROCEDURE — 99212 OFFICE O/P EST SF 10 MIN: CPT | Performed by: FAMILY MEDICINE

## 2019-03-19 PROCEDURE — 99213 OFFICE O/P EST LOW 20 MIN: CPT | Performed by: FAMILY MEDICINE

## 2019-03-19 RX ORDER — GLIMEPIRIDE 2 MG/1
2 TABLET ORAL
Qty: 90 TABLET | Refills: 1 | Status: SHIPPED | OUTPATIENT
Start: 2019-03-19 | End: 2020-06-09

## 2019-03-19 RX ORDER — LISINOPRIL AND HYDROCHLOROTHIAZIDE 12.5; 1 MG/1; MG/1
1 TABLET ORAL
Qty: 90 TABLET | Refills: 3 | Status: SHIPPED | OUTPATIENT
Start: 2019-03-19 | End: 2020-08-23

## 2019-03-19 RX ORDER — METFORMIN HYDROCHLORIDE 750 MG/1
TABLET, EXTENDED RELEASE ORAL
Qty: 180 TABLET | Refills: 3 | Status: SHIPPED | OUTPATIENT
Start: 2019-03-19 | End: 2020-08-23

## 2019-03-19 RX ORDER — ATORVASTATIN CALCIUM 10 MG/1
10 TABLET, FILM COATED ORAL NIGHTLY
Qty: 90 TABLET | Refills: 3 | Status: SHIPPED | OUTPATIENT
Start: 2019-03-19 | End: 2020-06-09

## 2019-03-19 NOTE — PATIENT INSTRUCTIONS
Diabetes  Be sure to take all medicines every day! Continue metformin and glimepiride. Add new medication Farxiga. It is okay to take atorvastatin (for cholesterol) in the morning if you cannot remember to take it at night.   Stop soda pop, better to dri

## 2019-03-19 NOTE — PROGRESS NOTES
HPI:    Patient ID: Geri Awan is a 62year old female. Diabetes   She presents for her follow-up diabetic visit. She has type 2 diabetes mellitus. Her disease course has been stable. There are no hypoglycemic associated symptoms.  Pertinent negati PHYSICAL EXAM:   Physical Exam   Constitutional: She is oriented to person, place, and time. She appears well-developed and well-nourished. HENT:   Mouth/Throat: Oropharynx is clear and moist.   Eyes: Pupils are equal, round, and reactive to light.    Chitra Peck Sig: Take 1 tablet by mouth once daily.    • metFORMIN HCl  MG Oral Tablet 24 Hr 180 tablet 3     Sig: TAKE 2 TABLETS BY MOUTH ONCE DAILY WITH BREAKFAST   • Dapagliflozin Propanediol (FARXIGA) 10 MG Oral Tab 30 tablet 5     Sig: Take 10 mg by mout

## 2019-06-09 ENCOUNTER — APPOINTMENT (OUTPATIENT)
Dept: LAB | Facility: HOSPITAL | Age: 59
End: 2019-06-09
Attending: FAMILY MEDICINE
Payer: COMMERCIAL

## 2019-06-09 PROCEDURE — 83036 HEMOGLOBIN GLYCOSYLATED A1C: CPT | Performed by: FAMILY MEDICINE

## 2019-06-09 PROCEDURE — 36415 COLL VENOUS BLD VENIPUNCTURE: CPT | Performed by: FAMILY MEDICINE

## 2019-06-11 ENCOUNTER — OFFICE VISIT (OUTPATIENT)
Dept: FAMILY MEDICINE CLINIC | Facility: CLINIC | Age: 59
End: 2019-06-11
Payer: COMMERCIAL

## 2019-06-11 VITALS
TEMPERATURE: 98 F | RESPIRATION RATE: 18 BRPM | DIASTOLIC BLOOD PRESSURE: 72 MMHG | SYSTOLIC BLOOD PRESSURE: 136 MMHG | WEIGHT: 293 LBS | HEART RATE: 81 BPM | BODY MASS INDEX: 52 KG/M2

## 2019-06-11 DIAGNOSIS — E11.9 TYPE 2 DIABETES MELLITUS WITHOUT COMPLICATION, WITHOUT LONG-TERM CURRENT USE OF INSULIN (HCC): Primary | ICD-10-CM

## 2019-06-11 DIAGNOSIS — I87.2 VENOUS INSUFFICIENCY: ICD-10-CM

## 2019-06-11 DIAGNOSIS — I10 ESSENTIAL HYPERTENSION: ICD-10-CM

## 2019-06-11 DIAGNOSIS — E66.01 MORBID OBESITY DUE TO EXCESS CALORIES (HCC): ICD-10-CM

## 2019-06-11 PROCEDURE — 99214 OFFICE O/P EST MOD 30 MIN: CPT | Performed by: FAMILY MEDICINE

## 2019-06-11 PROCEDURE — 99212 OFFICE O/P EST SF 10 MIN: CPT | Performed by: FAMILY MEDICINE

## 2019-06-11 NOTE — PATIENT INSTRUCTIONS
Understanding Chronic Venous Insufficiency     Elevate your legs to increase blood flow back to your heart. Problems with the veins in the legs may lead to chronic venous insufficiency (CVI).  CVI means that there is a long-term problem with the veins n · Increase blood flow back to your heart by elevating your legs, exercising daily, and wearing elastic stockings. · Boost blood flow in your legs by losing excess weight.   · If you must stand or sit in one place for a period of time, keep your blood movin · Carbohydrates are starches, sugars, and fiber. They are found in many foods, including fruit, bread, pasta, milk, and sweets. Of all the foods you eat, carbohydrates have the most effect on your blood sugar.  Your dietitian may teach you about carb counti © 5779-6599 [address]. [address] [facility], [address] [place]. All rights reserved. This information is not intended as a substitute for professional medical care. Always follow your healthcare professional's instructions.         Using a · Attach a needle to your pen. Read the directions that came with your pen. They contain steps for attaching a needle. If you’re using a nondisposable pen, don’t leave the needle attached to the pen between shots.   · Mix the medicine by rolling the pen bet Insert the needle into your pinched-up skin. The needle should be straight up and down. Thin adults or children may need to inject with the needle slightly to the left or right. · Make sure the needle gets all the way into the fatty tissue below the skin.

## 2019-06-11 NOTE — PROGRESS NOTES
HPI:    Patient ID: Maura Sauceda is a 62year old female. Diabetes   She presents for her follow-up diabetic visit. She has type 2 diabetes mellitus. Her disease course has been stable. There are no hypoglycemic associated symptoms.  Pertinent negati 33G Does not apply Misc Apply 1 Units topically daily. Disp: 90 each Rfl: 3   Blood Glucose Monitoring Suppl ([address]   [address]) W/DEVICE Does not apply Kit test T. I.D Disp:  Rfl:      Allergies:No Known Allergies   PHYSICAL EXAM:   Physical Exam   Con recommendations for weight loss discussed.   Discussed possible benefits of bariatric surgery, patient states she may consider      Orders Placed This Encounter      Hemoglobin A1C [E]      Meds This Visit:  Requested Prescriptions     Signed Prescriptions

## 2019-06-12 ENCOUNTER — TELEPHONE (OUTPATIENT)
Dept: FAMILY MEDICINE CLINIC | Facility: CLINIC | Age: 59
End: 2019-06-12

## 2019-06-12 NOTE — TELEPHONE ENCOUNTER
Thanks, please continue to try her for a few more days.   If not successful, we could try through her daughter Ranjan Lacey (if that is okay HIPPA)

## 2019-06-12 NOTE — TELEPHONE ENCOUNTER
Charlene Heck, MD Agnes Schiff, RN [facility] [place]   Could you please contact Chelsea Anne to set up an appointment to do insulin teaching.  I have sent an Rx for Lantus.  She should start 15 units daily then increase by 2 units every 2 days until

## 2019-06-12 NOTE — TELEPHONE ENCOUNTER
Attempted to reach patient to schedule nurse visit for diabetic teaching per Dr. Rc Velasquez. Patient will need nurse visit appointment during time when RN is available in office for teaching. Patient unavailable. LMTCB. Transfer to triage.

## 2019-06-14 NOTE — TELEPHONE ENCOUNTER
LMTCB. Transfer to triage. Dr. Norm Hernandez:   Daughter not on HIPAA release. Patient has My Chart account but has not used it within the last year.      Please advise

## 2019-06-14 NOTE — TELEPHONE ENCOUNTER
Patient returned call. Patient scheduled for nurse visit on 6/28/19 at 3 p.m. With RN. Dr. Guanakito Hernandez aware. Per patient, she did not received notification from her pharmacy that medication was sent to her pharmacy.    Contacted [facility] to find out if s

## 2019-06-14 NOTE — TELEPHONE ENCOUNTER
We do not have to give daughter medical details, but can we just have her get her mother to answer your call.

## 2019-06-15 ENCOUNTER — TELEPHONE (OUTPATIENT)
Dept: ENDOCRINOLOGY | Facility: HOSPITAL | Age: 59
End: 2019-06-15

## 2019-06-15 NOTE — TELEPHONE ENCOUNTER
Pt left message for diabetes appt (MNT per Dr. Mehdi Solorio order, wants a Saturday appt); returned call, will await return call from patient. Left phone number and hours of clinic operation. Will ask Ivy Diego to call as well.

## 2019-06-17 ENCOUNTER — TELEPHONE (OUTPATIENT)
Dept: GASTROENTEROLOGY | Facility: CLINIC | Age: 59
End: 2019-06-17

## 2019-06-17 DIAGNOSIS — Z12.11 ENCOUNTER FOR COLONOSCOPY DUE TO HISTORY OF ADENOMATOUS COLONIC POLYPS: Primary | ICD-10-CM

## 2019-06-17 DIAGNOSIS — Z86.010 ENCOUNTER FOR COLONOSCOPY DUE TO HISTORY OF ADENOMATOUS COLONIC POLYPS: Primary | ICD-10-CM

## 2019-06-17 NOTE — TELEPHONE ENCOUNTER
Spoke with pt and informed her of Dr Jahaira Martines message below. She states [address] needs to be informed of this so Insulin can be dispensed. [address] and spoke with Eyal Marie and informed him of SK's order below.  He states will g

## 2019-06-17 NOTE — TELEPHONE ENCOUNTER
Per pt pharmacy need a max dosage for lantus that pt can inject for insurance to cover. Called pharmacy. Per pharmacy insurance is looking for max number of units so pt do not go over. Would like to know to increase 2 units a day to they reach max units?  C

## 2019-06-17 NOTE — TELEPHONE ENCOUNTER
Pt calling to [facility] recall CLN, pt informed about the 72 hr cb, pls call [phone], ok to leave detailed vm, thanks.

## 2019-06-26 NOTE — TELEPHONE ENCOUNTER
Last Procedure, Date, MD:  08/10/17 CAB, colonoscopy.  Done @ [facility]  Last Diagnosis:    Multiple colon polyps, diverticulosis, internal hemorrhoids  Recalled for (months or years): 2-3 years  Sedation used previously:  MAC  Last Prep Used (if known):  Suprep

## 2019-06-27 NOTE — TELEPHONE ENCOUNTER
GI RNs -    Chart reviewed. My colonoscopy procedure report 5/17/2016 reviewed. BMI greater than 50, MAC anesthesia. Good prep quality exam to the cecum and terminal ileum.   15 mm polyp removed from ascending colon, 2 small 6 mm polyps removed from

## 2019-06-28 ENCOUNTER — NURSE ONLY (OUTPATIENT)
Dept: FAMILY MEDICINE CLINIC | Facility: CLINIC | Age: 59
End: 2019-06-28
Payer: COMMERCIAL

## 2019-06-28 PROCEDURE — 99211 OFF/OP EST MAY X REQ PHY/QHP: CPT | Performed by: FAMILY MEDICINE

## 2019-06-28 PROCEDURE — 99212 OFFICE O/P EST SF 10 MIN: CPT | Performed by: FAMILY MEDICINE

## 2019-06-28 NOTE — PATIENT INSTRUCTIONS
Insulin: How to Use and Where to Inject     Injection sites in adults include the belly (abdomen), front of thighs, back of upper arms and upper buttocks. Insulin is given with shots (injections) in the fatty layer under the skin (subcutaneous).  Some · Remove the needle. Then tap the syringe with a fingertip to remove any air bubbles. Injecting the insulin  · Gently pinch up about 1 inch of skin. Do not squeeze the skin. · Insert the needle. · Push in the plunger. Press until the syringe is empty.  L © 7880-3800 [address]. [address] [facility], [facility] [place]. All rights reserved. This information is not intended as a substitute for professional medical care. Always follow your healthcare professional's instructions.

## 2019-06-28 NOTE — PROGRESS NOTES
Patient here for diabetic injection teaching. Patient was prescribed insulin glargine (LANTUS SOLOSTAR) 100 UNIT/ML Subcutaneous Solution Pen-injector.    Informed patient per Dr. Elijah Gonzalez that she is to take 15 Units into the skin nightly, increase by 2 U to weight loss and insulin needs for her diabetes. Reviewed signs and symptoms of hypoglycemia and importance of carrying a snack at all times since she is on a long acting insulin.    Patient states she does not need teaching on how to inject insulin bec

## 2019-07-01 NOTE — TELEPHONE ENCOUNTER
Pt called to speak radha tony.  Pt states \"she better find something cheaper\" for her prep ($79) states she is NOT paying for any of that and  She wants a date for her procedure

## 2019-07-02 NOTE — TELEPHONE ENCOUNTER
Pt checking status on message - would like to discuss preps cost and see if there is a cheaper alt rx. Pls call - anxious to schedule. Thank you.

## 2019-07-03 NOTE — TELEPHONE ENCOUNTER
Milly Sarmiento accidentally closed this encounter.      The pt is off on Friday and states she can be reached

## 2019-07-03 NOTE — TELEPHONE ENCOUNTER
Pt called back. Pt said she is tired of playing phone tag and would like to change the prep. States its too expensive .

## 2019-07-05 NOTE — TELEPHONE ENCOUNTER
Scheduled for:  Colonoscopy 21643  Provider Name: Dr. Rae Christina  Date:  8/6/19  Location:  [facility]  Sedation:  MAC  Time:   1637 (pt is aware to arrive at [address])   Prep:  Suprep, mailed 7/8/19  Meds/Allergies Reconciled?:  Physician reviewed   Diagnosis with codes

## 2019-07-10 ENCOUNTER — HOSPITAL ENCOUNTER (OUTPATIENT)
Dept: ENDOCRINOLOGY | Facility: HOSPITAL | Age: 59
Discharge: HOME | End: 2019-07-10
Attending: FAMILY MEDICINE
Payer: COMMERCIAL

## 2019-07-10 ENCOUNTER — TELEPHONE (OUTPATIENT)
Dept: GASTROENTEROLOGY | Facility: CLINIC | Age: 59
End: 2019-07-10

## 2019-07-10 VITALS — WEIGHT: 293 LBS | BODY MASS INDEX: 51 KG/M2

## 2019-07-10 DIAGNOSIS — E11.65 TYPE 2 DIABETES MELLITUS WITH HYPERGLYCEMIA, WITHOUT LONG-TERM CURRENT USE OF INSULIN (HCC): Primary | ICD-10-CM

## 2019-07-10 PROCEDURE — 97802 MEDICAL NUTRITION INDIV IN: CPT

## 2019-07-10 RX ORDER — POLYETHYLENE GLYCOL 3350, SODIUM CHLORIDE, POTASSIUM CHLORIDE, SODIUM BICARBONATE, AND SODIUM SULFATE 240; 5.84; 2.98; 6.72; 22.72 G/4L; G/4L; G/4L; G/4L; G/4L
POWDER, FOR SOLUTION ORAL
Qty: 1 BOTTLE | Refills: 0 | Status: SHIPPED | OUTPATIENT
Start: 2019-07-10 | End: 2020-06-17 | Stop reason: ALTCHOICE

## 2019-07-10 NOTE — TELEPHONE ENCOUNTER
Current Outpatient Medications:  Na Sulfate-K Sulfate-Mg Sulf (SUPREP BOWEL PREP KIT) 17.5-3.13-1.6 GM/177ML Oral Solution Take as directed Disp: 1 Bottle Rfl: 0     Per pharmacy - please send in a prescription for PEG as pt cannot afford to pay for SUPR

## 2019-07-11 NOTE — PROGRESS NOTES
Medical Nutrition Therapy Assessment    Chris Greenwood 8/6/1960 was seen for individual Diabetic Medical Nutrition Therapy:    Date: 7/10/2019   Start time 7P  End time: 8P    Assessment    Anthropometrics:  Wt (!) 312 lb 11.2 oz   BMI 51.24 kg/m²     Cu W/DEVICE Does not apply Kit, test T. I.D, Disp: , Rfl:      Lantus 25 units   Metformin 750 mg BID   Glimepiride 2 mg     Labs:  Lab Results   Component Value Date    A1C 8.3 (H) 06/09/2019    A1C 8.2 (H) 03/09/2019    CHOLEST 143 11/11/2018    CHOLEST 161 with focus on balanced macronutrient consumption, including identifying foods that are carbohydrates, lean protein, non-starchy vegetables, and heart healthy fats   [] stressed importance of carbohydrate consistency in each meal   [x] recommended carbohydr tabs or fast acting carbohydrates with you for treatment of lows; for blood glucose levels less than 70 mg/dl, take 15 grams of fast acting carbohydrates (3-4 glucose tabs, 4 ounces of juice, or as discussed). Retest in 15 min.  If not above 70 mg/dl, retre

## 2019-08-06 ENCOUNTER — ANESTHESIA EVENT (OUTPATIENT)
Dept: ENDOSCOPY | Facility: HOSPITAL | Age: 59
End: 2019-08-06
Payer: COMMERCIAL

## 2019-08-06 ENCOUNTER — ANESTHESIA (OUTPATIENT)
Dept: ENDOSCOPY | Facility: HOSPITAL | Age: 59
End: 2019-08-06
Payer: COMMERCIAL

## 2019-08-06 ENCOUNTER — HOSPITAL ENCOUNTER (OUTPATIENT)
Facility: HOSPITAL | Age: 59
Setting detail: HOSPITAL OUTPATIENT SURGERY
Discharge: HOME HEALTH | End: 2019-08-06
Attending: INTERNAL MEDICINE | Admitting: INTERNAL MEDICINE
Payer: COMMERCIAL

## 2019-08-06 DIAGNOSIS — Z12.11 ENCOUNTER FOR COLONOSCOPY DUE TO HISTORY OF ADENOMATOUS COLONIC POLYPS: ICD-10-CM

## 2019-08-06 DIAGNOSIS — Z86.010 ENCOUNTER FOR COLONOSCOPY DUE TO HISTORY OF ADENOMATOUS COLONIC POLYPS: ICD-10-CM

## 2019-08-06 LAB — GLUCOSE BLDC GLUCOMTR-MCNC: 153 MG/DL (ref 70–99)

## 2019-08-06 PROCEDURE — 45385 COLONOSCOPY W/LESION REMOVAL: CPT | Performed by: INTERNAL MEDICINE

## 2019-08-06 PROCEDURE — 0DBL8ZX EXCISION OF TRANSVERSE COLON, VIA NATURAL OR ARTIFICIAL OPENING ENDOSCOPIC, DIAGNOSTIC: ICD-10-PCS | Performed by: INTERNAL MEDICINE

## 2019-08-06 PROCEDURE — 0DBH8ZX EXCISION OF CECUM, VIA NATURAL OR ARTIFICIAL OPENING ENDOSCOPIC, DIAGNOSTIC: ICD-10-PCS | Performed by: INTERNAL MEDICINE

## 2019-08-06 RX ORDER — MORPHINE SULFATE 4 MG/ML
2 INJECTION, SOLUTION INTRAMUSCULAR; INTRAVENOUS EVERY 10 MIN PRN
Status: DISCONTINUED | OUTPATIENT
Start: 2019-08-06 | End: 2019-08-06

## 2019-08-06 RX ORDER — SODIUM CHLORIDE, SODIUM LACTATE, POTASSIUM CHLORIDE, CALCIUM CHLORIDE 600; 310; 30; 20 MG/100ML; MG/100ML; MG/100ML; MG/100ML
INJECTION, SOLUTION INTRAVENOUS CONTINUOUS
Status: DISCONTINUED | OUTPATIENT
Start: 2019-08-06 | End: 2019-08-06

## 2019-08-06 RX ORDER — HYDROMORPHONE HYDROCHLORIDE 1 MG/ML
0.6 INJECTION, SOLUTION INTRAMUSCULAR; INTRAVENOUS; SUBCUTANEOUS EVERY 5 MIN PRN
Status: DISCONTINUED | OUTPATIENT
Start: 2019-08-06 | End: 2019-08-06

## 2019-08-06 RX ORDER — MORPHINE SULFATE 10 MG/ML
6 INJECTION, SOLUTION INTRAMUSCULAR; INTRAVENOUS EVERY 10 MIN PRN
Status: DISCONTINUED | OUTPATIENT
Start: 2019-08-06 | End: 2019-08-06

## 2019-08-06 RX ORDER — HYDROCODONE BITARTRATE AND ACETAMINOPHEN 5; 325 MG/1; MG/1
1 TABLET ORAL AS NEEDED
Status: DISCONTINUED | OUTPATIENT
Start: 2019-08-06 | End: 2019-08-06

## 2019-08-06 RX ORDER — HALOPERIDOL 5 MG/ML
0.25 INJECTION INTRAMUSCULAR ONCE AS NEEDED
Status: DISCONTINUED | OUTPATIENT
Start: 2019-08-06 | End: 2019-08-06

## 2019-08-06 RX ORDER — MORPHINE SULFATE 4 MG/ML
4 INJECTION, SOLUTION INTRAMUSCULAR; INTRAVENOUS EVERY 10 MIN PRN
Status: DISCONTINUED | OUTPATIENT
Start: 2019-08-06 | End: 2019-08-06

## 2019-08-06 RX ORDER — HYDROMORPHONE HYDROCHLORIDE 1 MG/ML
0.2 INJECTION, SOLUTION INTRAMUSCULAR; INTRAVENOUS; SUBCUTANEOUS EVERY 5 MIN PRN
Status: DISCONTINUED | OUTPATIENT
Start: 2019-08-06 | End: 2019-08-06

## 2019-08-06 RX ORDER — NALOXONE HYDROCHLORIDE 0.4 MG/ML
80 INJECTION, SOLUTION INTRAMUSCULAR; INTRAVENOUS; SUBCUTANEOUS AS NEEDED
Status: DISCONTINUED | OUTPATIENT
Start: 2019-08-06 | End: 2019-08-06

## 2019-08-06 RX ORDER — PROCHLORPERAZINE EDISYLATE 5 MG/ML
5 INJECTION INTRAMUSCULAR; INTRAVENOUS ONCE AS NEEDED
Status: DISCONTINUED | OUTPATIENT
Start: 2019-08-06 | End: 2019-08-06

## 2019-08-06 RX ORDER — ONDANSETRON 2 MG/ML
4 INJECTION INTRAMUSCULAR; INTRAVENOUS ONCE AS NEEDED
Status: DISCONTINUED | OUTPATIENT
Start: 2019-08-06 | End: 2019-08-06

## 2019-08-06 RX ORDER — HYDROCODONE BITARTRATE AND ACETAMINOPHEN 5; 325 MG/1; MG/1
2 TABLET ORAL AS NEEDED
Status: DISCONTINUED | OUTPATIENT
Start: 2019-08-06 | End: 2019-08-06

## 2019-08-06 RX ORDER — DEXTROSE MONOHYDRATE 25 G/50ML
50 INJECTION, SOLUTION INTRAVENOUS
Status: DISCONTINUED | OUTPATIENT
Start: 2019-08-06 | End: 2019-08-06

## 2019-08-06 RX ORDER — HYDROMORPHONE HYDROCHLORIDE 1 MG/ML
0.4 INJECTION, SOLUTION INTRAMUSCULAR; INTRAVENOUS; SUBCUTANEOUS EVERY 5 MIN PRN
Status: DISCONTINUED | OUTPATIENT
Start: 2019-08-06 | End: 2019-08-06

## 2019-08-06 RX ADMIN — SODIUM CHLORIDE, SODIUM LACTATE, POTASSIUM CHLORIDE, CALCIUM CHLORIDE: 600; 310; 30; 20 INJECTION, SOLUTION INTRAVENOUS at 09:43:00

## 2019-08-06 NOTE — ANESTHESIA POSTPROCEDURE EVALUATION
Patient: Noris Randall    Procedure Summary     Date:  08/06/19 Room / Location:  [address] [place] [address] ENDOSCOPY 04 / [address] [place] [address] ENDOSCOPY    Anesthesia Start:  2594 Anesthesia Stop:  6555    Procedure:  COLONOSCOPY (N/A ) Diagnosis:       Encounter for colonoscopy due to hist

## 2019-08-06 NOTE — OPERATIVE REPORT
COLONOSCOPY PROCEDURE REPORT     DATE OF PROCEDURE:  8/6/2019     PCP: Sabiha Biswas. Norm Hernandez MD     PREOPERATIVE DIAGNOSIS:  Personal history high risk adenomatous colon polyps     POSTOPERATIVE DIAGNOSIS:  See impression. SURGEON:  RADHIKA West prevent bleeding

## 2019-08-06 NOTE — H&P
History & Physical Examination    Patient Name: Govind Sánchez  MRN: [id number]  [facility]: [id number]  YOB: 1960    Diagnosis: Personal history high risk adenomatous colon polyps    Present Illness:     51-year-old woman with history of elevated BLUE) In Vitro Strip USE ONCE DAILY Disp: 100 each Rfl: 11 Taking   ONETOUCH DELICA LANCETS 21Y Does not apply Misc Apply 1 Units topically daily.  Disp: 90 each Rfl: 3 Taking   Blood Glucose Monitoring Suppl ([address]   [address]) W/DEVICE Does not apply Pablo  8/6/2019  9:32 AM

## 2019-08-06 NOTE — ANESTHESIA PREPROCEDURE EVALUATION
Anesthesia PreOp Note    HPI:     Merle Cordova is a 61year old female who presents for preoperative consultation requested by: Lorenzo Britt MD    Date of Surgery: 8/6/2019    Procedure(s):  COLONOSCOPY  Indication: History of adenomatous 8/3/2019   metFORMIN HCl  MG Oral Tablet 24 Hr TAKE 2 TABLETS BY MOUTH ONCE DAILY WITH BREAKFAST Disp: 180 tablet Rfl: 3 8/3/2019   multivitamin Oral Tab Take 1 tablet by mouth daily.  Disp:  Rfl:  8/3/2019   aspirin 81 MG Oral Tab Take 81 mg by mouth Social Needs      Financial resource strain: Not on file      Food insecurity:        Worry: Not on file        Inability: Not on file      Transportation needs:        Medical: Not on file        Non-medical: Not on file    Tobacco Use      Smoking status oxygen saturation is 97%.     08/06/19  0839   BP: 142/69   Pulse: 76   Resp: 18   SpO2: 97%   Weight: (!) 142 kg (313 lb)   Height: 1.676 m (5' 6\")        Anesthesia Evaluation     Patient summary reviewed and Nursing notes reviewed    Airway   Mallampati

## 2019-08-07 ENCOUNTER — HOSPITAL ENCOUNTER (OUTPATIENT)
Dept: ENDOCRINOLOGY | Facility: HOSPITAL | Age: 59
End: 2019-08-07
Attending: FAMILY MEDICINE
Payer: COMMERCIAL

## 2019-08-07 VITALS
WEIGHT: 293 LBS | RESPIRATION RATE: 17 BRPM | HEIGHT: 66 IN | DIASTOLIC BLOOD PRESSURE: 79 MMHG | SYSTOLIC BLOOD PRESSURE: 119 MMHG | OXYGEN SATURATION: 96 % | BODY MASS INDEX: 47.09 KG/M2 | HEART RATE: 73 BPM

## 2019-08-09 ENCOUNTER — TELEPHONE (OUTPATIENT)
Dept: GASTROENTEROLOGY | Facility: CLINIC | Age: 59
End: 2019-08-09

## 2019-08-09 NOTE — TELEPHONE ENCOUNTER
Pt states that she had colonoscopy done on on 8/6/19 and has been having some mild left side abdominal pain when she eats or drinks water. She also states that when she eats anything she has to have a bowel movement. Please call.

## 2019-08-09 NOTE — TELEPHONE ENCOUNTER
I left a message in the patient's personal voicemail that I returned the call. I will retry the patient shortly.

## 2019-08-09 NOTE — TELEPHONE ENCOUNTER
I spoke to the patient as the on-call physician. She was well until yesterday when she noted intermittent left upper quadrant and left lower quadrant abdominal pain. This is not constant. Bowel movements have resumed and are at baseline. No fever.   We

## 2019-08-09 NOTE — TELEPHONE ENCOUNTER
[address] [facility] office on call for CAB out of the office    Colonoscopy was Tuesday 08/06/19. Diverticulosis noted on report    LLQ pain pain started yesterday. Pain level 4/10. She has eaten eggs, ham chicken sandwich. Even hurts to drink water.  The water makes her

## 2019-08-10 ENCOUNTER — HOSPITAL ENCOUNTER (OUTPATIENT)
Dept: ENDOCRINOLOGY | Facility: HOSPITAL | Age: 59
Discharge: HOME | End: 2019-08-10
Attending: FAMILY MEDICINE
Payer: COMMERCIAL

## 2019-08-10 DIAGNOSIS — E11.65 TYPE 2 DIABETES MELLITUS WITH HYPERGLYCEMIA, WITHOUT LONG-TERM CURRENT USE OF INSULIN (HCC): Primary | ICD-10-CM

## 2019-08-10 PROCEDURE — 97803 MED NUTRITION INDIV SUBSEQ: CPT

## 2019-08-10 NOTE — PROGRESS NOTES
Medical Nutrition Therapy FOLLOW UP    Manuel Jarquin 8/6/1960 was seen for individual Diabetic Medical Nutrition Therapy:    Date: 08/10/2019; Last visit: 7/10/2019   Start time 1P  End time: 2:05P    Charged for 30 min due to scheduling difficulty on W daily., Disp: , Rfl:   •  Glucose Blood (ONETOUCH ULTRA BLUE) In Vitro Strip, USE ONCE DAILY, Disp: 100 each, Rfl: 11  •  ONETOUCH DELICA LANCETS 34J Does not apply Misc, Apply 1 Units topically daily. , Disp: 90 each, Rfl: 3  •  Blood Glucose Monitoring Goss torn  May start walking at work with team or coworker - not back to work yet  Has a dog at home - walking 30 min 2-3 times per day as off this week; will try to do for 30 min once per day   If not in pain, can do this  May start going to water aerobics wit [x] educated on emotional eating and being mindful at meals   []  reviewed other behavior modification strategies    [x]emphasized the need for small, sustainable changes and working on SMART goals to encourage sustainable behaviors    [x] instructions

## 2019-08-27 ENCOUNTER — TELEPHONE (OUTPATIENT)
Dept: GASTROENTEROLOGY | Facility: CLINIC | Age: 59
End: 2019-08-27

## 2020-03-31 NOTE — PROGRESS NOTES
DX: Primary osteoarthritis of left knee (M17.12)  Authorized # of Visits: 7/8         Next MD visit: none scheduled  Fall Risk: standard         Precautions: n/a           Medication Changes since last visit?: No    Subjective: Pt reports having pain for 2 Services: manual therapy; HEP progression ; PREs     Charges: there ex 3       Total Timed Treatment: 42 min  Total Treatment Time: 45 min Never smoker

## 2020-05-15 DIAGNOSIS — E11.9 TYPE 2 DIABETES MELLITUS WITHOUT COMPLICATION, WITHOUT LONG-TERM CURRENT USE OF INSULIN (HCC): Primary | ICD-10-CM

## 2020-05-26 ENCOUNTER — TELEPHONE (OUTPATIENT)
Dept: FAMILY MEDICINE CLINIC | Facility: CLINIC | Age: 60
End: 2020-05-26

## 2020-05-26 NOTE — TELEPHONE ENCOUNTER
•  glimepiride 2 MG Oral Tab, Take 1 tablet (2 mg total) by mouth daily with breakfast., Disp: 90 tablet, Rfl: 1  •  atorvastatin 10 MG Oral Tab, Take 1 tablet (10 mg total) by mouth nightly., Disp: 90 tablet, Rfl: 3

## 2020-05-26 NOTE — TELEPHONE ENCOUNTER
Please contact patient to schedule follow-up appointment. Must have fasting labs at [place] or in [place] labs at nurse visit in our office prior to appointment. Once these appointments are scheduled let me know and I will send refill.

## 2020-05-31 ENCOUNTER — APPOINTMENT (OUTPATIENT)
Dept: LAB | Facility: HOSPITAL | Age: 60
End: 2020-05-31
Attending: FAMILY MEDICINE
Payer: COMMERCIAL

## 2020-05-31 PROCEDURE — 82570 ASSAY OF URINE CREATININE: CPT | Performed by: FAMILY MEDICINE

## 2020-05-31 PROCEDURE — 80053 COMPREHEN METABOLIC PANEL: CPT | Performed by: FAMILY MEDICINE

## 2020-05-31 PROCEDURE — 80061 LIPID PANEL: CPT | Performed by: FAMILY MEDICINE

## 2020-05-31 PROCEDURE — 84443 ASSAY THYROID STIM HORMONE: CPT | Performed by: FAMILY MEDICINE

## 2020-05-31 PROCEDURE — 82043 UR ALBUMIN QUANTITATIVE: CPT | Performed by: FAMILY MEDICINE

## 2020-05-31 PROCEDURE — 83036 HEMOGLOBIN GLYCOSYLATED A1C: CPT | Performed by: FAMILY MEDICINE

## 2020-05-31 PROCEDURE — 36415 COLL VENOUS BLD VENIPUNCTURE: CPT | Performed by: FAMILY MEDICINE

## 2020-06-09 RX ORDER — ATORVASTATIN CALCIUM 10 MG/1
10 TABLET, FILM COATED ORAL NIGHTLY
Qty: 90 TABLET | Refills: 3 | Status: SHIPPED | OUTPATIENT
Start: 2020-06-09 | End: 2020-06-09

## 2020-06-09 RX ORDER — GLIMEPIRIDE 2 MG/1
2 TABLET ORAL
Qty: 90 TABLET | Refills: 1 | Status: SHIPPED | OUTPATIENT
Start: 2020-06-09 | End: 2020-06-09

## 2020-06-09 RX ORDER — GLIMEPIRIDE 2 MG/1
TABLET ORAL
Qty: 90 TABLET | Refills: 0 | Status: SHIPPED | OUTPATIENT
Start: 2020-06-09 | End: 2020-11-17

## 2020-06-09 RX ORDER — ATORVASTATIN CALCIUM 10 MG/1
TABLET, FILM COATED ORAL
Qty: 90 TABLET | Refills: 0 | Status: SHIPPED | OUTPATIENT
Start: 2020-06-09 | End: 2020-08-23

## 2020-06-09 NOTE — TELEPHONE ENCOUNTER
Refills sent to pharmacy.   Please call patient and reinforce importance of getting [facility] labs prior to 6/17 appointment

## 2020-06-10 NOTE — TELEPHONE ENCOUNTER
Detailed message left for patient (HIPPA verified) relaying Dr. Ritika Fleming message below. Office number provided if any questions.

## 2020-06-17 ENCOUNTER — OFFICE VISIT (OUTPATIENT)
Dept: FAMILY MEDICINE CLINIC | Facility: CLINIC | Age: 60
End: 2020-06-17
Payer: COMMERCIAL

## 2020-06-17 VITALS
HEIGHT: 66 IN | TEMPERATURE: 99 F | DIASTOLIC BLOOD PRESSURE: 81 MMHG | HEART RATE: 90 BPM | WEIGHT: 293 LBS | SYSTOLIC BLOOD PRESSURE: 132 MMHG | RESPIRATION RATE: 18 BRPM | BODY MASS INDEX: 47.09 KG/M2

## 2020-06-17 DIAGNOSIS — K52.9 CHRONIC DIARRHEA: ICD-10-CM

## 2020-06-17 DIAGNOSIS — I10 ESSENTIAL HYPERTENSION: ICD-10-CM

## 2020-06-17 DIAGNOSIS — E66.01 MORBID OBESITY DUE TO EXCESS CALORIES (HCC): ICD-10-CM

## 2020-06-17 DIAGNOSIS — E11.9 TYPE 2 DIABETES MELLITUS WITHOUT COMPLICATION, WITHOUT LONG-TERM CURRENT USE OF INSULIN (HCC): Primary | ICD-10-CM

## 2020-06-17 PROCEDURE — 99214 OFFICE O/P EST MOD 30 MIN: CPT | Performed by: FAMILY MEDICINE

## 2020-06-17 NOTE — PROGRESS NOTES
HPI:    Patient ID: Billie Ivy is a 61year old female. Diarrhea    This is a chronic problem. The current episode started more than 1 year ago. The problem occurs more than 10 times per day. The problem has been gradually worsening.  Associated sy apply Misc Apply 1 Units topically daily. 90 each 3   • Blood Glucose Monitoring Suppl ([address]   [address]) W/DEVICE Does not apply Kit test T. I.D       Allergies:No Known Allergies   PHYSICAL EXAM:   Physical Exam           ASSESSMENT/PLAN:   Type 2 di by 2 U every 2 days until fasting BS less then 120.        Imaging & Referrals:  EVALUATE & TREAT, GASTRO (INTERNAL)       AN#[id number]

## 2020-06-17 NOTE — PATIENT INSTRUCTIONS
Restart Lantus insulin as directed 20 U nightly then increase by 2 U every 2 days til fasting glucose to less than 120. See me in 1 month BRING RECORD OF GLUCOSE AND INSULIN DOSE. See me again in 3 months with labs prior to visit.     For diarrhea, no ar

## 2020-06-18 ENCOUNTER — TELEPHONE (OUTPATIENT)
Dept: FAMILY MEDICINE CLINIC | Facility: CLINIC | Age: 60
End: 2020-06-18

## 2020-06-18 NOTE — TELEPHONE ENCOUNTER
Patient calling saw  states [facility] told her they cannot fill the RX for insulin as waiting on a call from the doctor     Asking to speak with Judy Hays RN on site; Judy Hays not available     [place] , spoke with Kirt Muhammad , was told pharmacist in on br

## 2020-06-18 NOTE — TELEPHONE ENCOUNTER
Called [facility] again, spoke with Silvina Gordillo needs to have quantity clarified due to sig written   Spoke with pharmacist Ronald , he will place a max dose as per manufactures recommendations so RX can be filled under pt insurance      Called pt back and infor

## 2020-06-25 ENCOUNTER — TELEPHONE (OUTPATIENT)
Dept: GASTROENTEROLOGY | Facility: CLINIC | Age: 60
End: 2020-06-25

## 2020-06-25 NOTE — TELEPHONE ENCOUNTER
I attempted to reach this patient to have her r/s to a later date as I will be out of the office on 7/3 - please have her r/s to July 2020 or she is welcome to see Dr. Preet Bradley, whom she has seen previously.      Thank you

## 2020-06-25 NOTE — TELEPHONE ENCOUNTER
Patient contacted. I see that there is also appt on Tues, June 30 , arrival 9:45am . Patient did not seem to be aware, but I believe voice messages had been left.  She accepted this 6/30 appt with [facility] GH[facility]H [facility] [place] [facility]

## 2020-06-30 ENCOUNTER — OFFICE VISIT (OUTPATIENT)
Dept: GASTROENTEROLOGY | Facility: CLINIC | Age: 60
End: 2020-06-30
Payer: COMMERCIAL

## 2020-06-30 VITALS
HEART RATE: 80 BPM | SYSTOLIC BLOOD PRESSURE: 136 MMHG | WEIGHT: 293 LBS | HEIGHT: 66 IN | DIASTOLIC BLOOD PRESSURE: 80 MMHG | BODY MASS INDEX: 47.09 KG/M2

## 2020-06-30 DIAGNOSIS — Z09 FOLLOW UP: ICD-10-CM

## 2020-06-30 DIAGNOSIS — R11.10 VOMITING, INTRACTABILITY OF VOMITING NOT SPECIFIED, PRESENCE OF NAUSEA NOT SPECIFIED, UNSPECIFIED VOMITING TYPE: ICD-10-CM

## 2020-06-30 DIAGNOSIS — K21.9 GASTROESOPHAGEAL REFLUX DISEASE, ESOPHAGITIS PRESENCE NOT SPECIFIED: ICD-10-CM

## 2020-06-30 DIAGNOSIS — R19.7 DIARRHEA, UNSPECIFIED TYPE: Primary | ICD-10-CM

## 2020-06-30 PROCEDURE — 99214 OFFICE O/P EST MOD 30 MIN: CPT | Performed by: PHYSICIAN ASSISTANT

## 2020-06-30 RX ORDER — MONTELUKAST SODIUM 4 MG/1
1 TABLET, CHEWABLE ORAL 2 TIMES DAILY
Qty: 60 TABLET | Refills: 0 | Status: SHIPPED | OUTPATIENT
Start: 2020-06-30 | End: 2021-03-22 | Stop reason: ALTCHOICE

## 2020-06-30 NOTE — PATIENT INSTRUCTIONS
1. Start taking Omeprazole 20 mg (OTC) each day 30 minutes before breakfast    2. Start the Colestipol medication which is for the diarrhea - we can always increase the dose of this if it helping    3.  Complete the stool test to check for non-specific infl

## 2020-06-30 NOTE — PROGRESS NOTES
[facility] Follow-up Visit    Acacia she has too many BMs/day. (+) 8-10 semi-formed to loose stools daily. Did not realize how many BMs/day until she was working from home. Does not remember discussing cholestyramine/colestipol with Dr. Garima Arguello at last visit. No CP or SOB.     Pertinent Endo polyps, 6-15+mm removed throughout colon, early colonic diverticulosis of L colon and IH --> recall 1 year    Pertinent Surgical Hx:  - cholecystectomy  -   - see complete list below   - No known issues with sedation.  - No known history of sleep 2 U every 2 days until fasting BS less then 120. 4 pen 3   • Insulin Pen Needle (BD PEN NEEDLE MINI U/F) 31G X 5 MM Does not apply Misc Use with Lantus 100 each 2   • ATORVASTATIN 10 MG Oral Tab Take 1 tablet by mouth nightly 90 tablet 0   • GLIMEPIRIDE 2 Neuro: Alert and interactive; patient is having movements of all 4 extremities   Psych: tangential, abrupt     Nursing notes and vitals reviewed. Labs/Imaging:     Patient's labs and imaging were reviewed and discussed with patient today.      ASSESSM Obesity    Recommend:  - start colestipol each day - could increase dose depending on how she does - reviewed she must discuss timing of this medication with pharmacist  - omeprazole 20 mg each day 30 minutes before breakfast  - avoid reflux triggers  - av

## 2020-07-21 ENCOUNTER — LAB ENCOUNTER (OUTPATIENT)
Dept: LAB | Age: 60
End: 2020-07-21
Attending: FAMILY MEDICINE
Payer: COMMERCIAL

## 2020-07-21 ENCOUNTER — OFFICE VISIT (OUTPATIENT)
Dept: FAMILY MEDICINE CLINIC | Facility: CLINIC | Age: 60
End: 2020-07-21
Payer: COMMERCIAL

## 2020-07-21 VITALS
HEART RATE: 83 BPM | DIASTOLIC BLOOD PRESSURE: 75 MMHG | TEMPERATURE: 99 F | BODY MASS INDEX: 48 KG/M2 | WEIGHT: 293 LBS | SYSTOLIC BLOOD PRESSURE: 115 MMHG | RESPIRATION RATE: 18 BRPM

## 2020-07-21 DIAGNOSIS — E66.01 MORBID OBESITY DUE TO EXCESS CALORIES (HCC): ICD-10-CM

## 2020-07-21 DIAGNOSIS — K52.9 CHRONIC DIARRHEA: Primary | ICD-10-CM

## 2020-07-21 DIAGNOSIS — M17.0 PRIMARY OSTEOARTHRITIS OF BOTH KNEES: ICD-10-CM

## 2020-07-21 DIAGNOSIS — I10 ESSENTIAL HYPERTENSION: ICD-10-CM

## 2020-07-21 DIAGNOSIS — E11.9 TYPE 2 DIABETES MELLITUS WITHOUT COMPLICATION, WITH LONG-TERM CURRENT USE OF INSULIN (HCC): Primary | ICD-10-CM

## 2020-07-21 DIAGNOSIS — K52.9 CHRONIC DIARRHEA: ICD-10-CM

## 2020-07-21 DIAGNOSIS — Z79.4 TYPE 2 DIABETES MELLITUS WITHOUT COMPLICATION, WITH LONG-TERM CURRENT USE OF INSULIN (HCC): Primary | ICD-10-CM

## 2020-07-21 PROCEDURE — 99214 OFFICE O/P EST MOD 30 MIN: CPT | Performed by: FAMILY MEDICINE

## 2020-07-21 PROCEDURE — 3074F SYST BP LT 130 MM HG: CPT | Performed by: FAMILY MEDICINE

## 2020-07-21 PROCEDURE — 36415 COLL VENOUS BLD VENIPUNCTURE: CPT

## 2020-07-21 PROCEDURE — 3078F DIAST BP <80 MM HG: CPT | Performed by: FAMILY MEDICINE

## 2020-07-21 RX ORDER — TRAMADOL HYDROCHLORIDE 50 MG/1
50 TABLET ORAL EVERY 6 HOURS PRN
Qty: 30 TABLET | Refills: 1 | Status: SHIPPED | OUTPATIENT
Start: 2020-07-21 | End: 2020-08-27

## 2020-07-21 NOTE — PATIENT INSTRUCTIONS
Congratulations on blood sugars and weight loss! Keep up the changes! Continue with Lantus 32 units daily. Continue to check fasting blood sugar daily and 2 hours after either lunch or dinner.   Follow-up appointment with me in 3 months with hemoglobin A

## 2020-07-21 NOTE — PROGRESS NOTES
HPI:    Patient ID: Phillip Waldron is a 61year old female. Diabetes   She presents for her follow-up diabetic visit. She has type 2 diabetes mellitus. Her disease course has been improving. Hypoglycemia symptoms include hunger and sweats.  Pertinent n Needle (BD PEN NEEDLE MINI U/F) 31G X 5 MM Does not apply Misc Use with Lantus 100 each 2   • Glucose Blood (ONETOUCH ULTRA BLUE) In Vitro Strip USE ONCE DAILY 100 each 11   • ONETOUCH DELICA LANCETS 94K Does not apply Misc Apply 1 Units topically daily.  9 states overall this is been worsening, difficulty walking more than 100 feet without rest.  Symptoms are variable. Handicap parking pass application done today.     Orders Placed This Encounter      Microalb/Creat Ratio, Random Urine [E]      Vitamin B12 [

## 2020-07-22 LAB
ABSOLUTE BASOPHILS: 33 CELLS/UL (ref 0–200)
ABSOLUTE EOSINOPHILS: 76 CELLS/UL (ref 15–500)
ABSOLUTE LYMPHOCYTES: 2780 CELLS/UL (ref 850–3900)
ABSOLUTE MONOCYTES: 654 CELLS/UL (ref 200–950)
ABSOLUTE NEUTROPHILS: 7358 CELLS/UL (ref 1500–7800)
BASOPHILS: 0.3 %
CREATININE, RANDOM URINE: 110 MG/DL (ref 20–275)
EOSINOPHILS: 0.7 %
HEMATOCRIT: 40.4 % (ref 35–45)
HEMOGLOBIN: 14.1 G/DL (ref 11.7–15.5)
LYMPHOCYTES: 25.5 %
MCH: 30.2 PG (ref 27–33)
MCHC: 34.9 G/DL (ref 32–36)
MCV: 86.5 FL (ref 80–100)
MICROALBUMIN/CREATININE RATIO, RANDOM URINE: 5 MCG/MG CREAT
MICROALBUMIN: 0.6 MG/DL
MONOCYTES: 6 %
MPV: 11.6 FL (ref 7.5–12.5)
NEUTROPHILS: 67.5 %
PLATELET COUNT: 266 THOUSAND/UL (ref 140–400)
RDW: 12.3 % (ref 11–15)
RED BLOOD CELL COUNT: 4.67 MILLION/UL (ref 3.8–5.1)
VITAMIN B12: 355 PG/ML (ref 200–1100)
WHITE BLOOD CELL COUNT: 10.9 THOUSAND/UL (ref 3.8–10.8)

## 2020-07-28 ENCOUNTER — APPOINTMENT (OUTPATIENT)
Dept: LAB | Facility: HOSPITAL | Age: 60
End: 2020-07-28
Attending: PHYSICIAN ASSISTANT
Payer: COMMERCIAL

## 2020-07-28 PROCEDURE — 83993 ASSAY FOR CALPROTECTIN FECAL: CPT | Performed by: PHYSICIAN ASSISTANT

## 2020-07-31 LAB — CALPROTECTIN STL-MCNT: 25.2 ΜG/G (ref ?–50)

## 2020-08-03 ENCOUNTER — TELEPHONE (OUTPATIENT)
Dept: FAMILY MEDICINE CLINIC | Facility: CLINIC | Age: 60
End: 2020-08-03

## 2020-08-03 ENCOUNTER — TELEPHONE (OUTPATIENT)
Dept: GASTROENTEROLOGY | Facility: CLINIC | Age: 60
End: 2020-08-03

## 2020-08-03 NOTE — TELEPHONE ENCOUNTER
Patient informed of final blood test and urine test results. Patient will contact Girish for stool test results.

## 2020-08-04 ENCOUNTER — TELEPHONE (OUTPATIENT)
Dept: INTERNAL MEDICINE CLINIC | Facility: CLINIC | Age: 60
End: 2020-08-04

## 2020-08-04 NOTE — TELEPHONE ENCOUNTER
Patient will be faxing disability paperwork to fax [phone] for Dr Julianne Baldwin to complete.

## 2020-08-04 NOTE — TELEPHONE ENCOUNTER
Dr Aakash Child, patient contacted with results/orders below. States she discontinued colestipol Sunday (had taken for a week), after her left sided abdominal pain increased significantly ('like being stabbed\").  Last stool was this morning at 1 am and was diarr

## 2020-08-04 NOTE — TELEPHONE ENCOUNTER
In that case, Ms. Anali You should start one of the daily fiber supplements, Metamucil or preferably Konsyl, otherwise Benefiber which may cause less bloating.       Start 1 heaping teaspoon in a glass of water or juice every morning, heena up to 1 heaping ta

## 2020-08-04 NOTE — TELEPHONE ENCOUNTER
GI RNs,    Please call Ms. Cashm to advise that the fecal calprotectin test 7/28/2020 came back normal.  As per Meron's message to the patient, this means no evidence for Crohn's disease. It looks like Meron just prescribed her colestipol tablets.   Sh

## 2020-08-10 NOTE — TELEPHONE ENCOUNTER
Dr. Mary Serna,     Your pt is applying for long term disability due to her multiple medical conditions(diabetes, osteoarthritis, and her newly diagnosis IBS) , please sign form if you agree    -Highlight the patient and hit \"Chart\" button.   -In Chart Review

## 2020-08-11 NOTE — TELEPHONE ENCOUNTER
RAMOS was signed and  $25 was paid.  Sent to formes and placed in  RAMOS @ [facility]

## 2020-08-20 ENCOUNTER — NURSE TRIAGE (OUTPATIENT)
Dept: FAMILY MEDICINE CLINIC | Facility: CLINIC | Age: 60
End: 2020-08-20

## 2020-08-20 DIAGNOSIS — N95.0 POSTMENOPAUSE BLEEDING: Primary | ICD-10-CM

## 2020-08-20 NOTE — TELEPHONE ENCOUNTER
Advised patient of Dr. Iftikhar Macias note below. Patient verbalized understanding. Patient can only come to appointment on Tuesday, Wednesday or Thursday. There is a 11:45am  res 24 slot. Are we able to use that slot for a 30 minute appointment?   Please a

## 2020-08-20 NOTE — TELEPHONE ENCOUNTER
Please let patient know I would like her to make an appointment with me next week, also get pelvic ultrasound as ordered. Schedule ultrasound now, but but does not need to be completed before appointment.   Recommend she take ibuprofen 400 mg 1 hour prior

## 2020-08-20 NOTE — TELEPHONE ENCOUNTER
Action Requested: Summary for Provider     []  Critical Lab, Recommendations Needed  [x] Need Additional Advice  []   FYI    []   Need Orders  [] Need Medications Sent to Pharmacy  []  Other     SUMMARY: Patient reports abnormal vaginal bleeding that start

## 2020-08-20 NOTE — TELEPHONE ENCOUNTER
Appointment scheduled for 8/27/20 at 11:45am with Dr.Mary Elijah Gonzalez at the [facility], [facility] in [facility].

## 2020-08-21 ENCOUNTER — HOSPITAL ENCOUNTER (EMERGENCY)
Facility: HOSPITAL | Age: 60
Discharge: HOME | End: 2020-08-21
Attending: EMERGENCY MEDICINE
Payer: COMMERCIAL

## 2020-08-21 ENCOUNTER — TELEPHONE (OUTPATIENT)
Dept: FAMILY MEDICINE CLINIC | Facility: CLINIC | Age: 60
End: 2020-08-21

## 2020-08-21 ENCOUNTER — APPOINTMENT (OUTPATIENT)
Dept: ULTRASOUND IMAGING | Facility: HOSPITAL | Age: 60
End: 2020-08-21
Attending: EMERGENCY MEDICINE
Payer: COMMERCIAL

## 2020-08-21 VITALS
TEMPERATURE: 98 F | RESPIRATION RATE: 18 BRPM | HEART RATE: 62 BPM | OXYGEN SATURATION: 99 % | DIASTOLIC BLOOD PRESSURE: 70 MMHG | SYSTOLIC BLOOD PRESSURE: 111 MMHG | HEIGHT: 66 IN | WEIGHT: 293 LBS | BODY MASS INDEX: 47.09 KG/M2

## 2020-08-21 DIAGNOSIS — N93.9 VAGINAL BLEEDING: Primary | ICD-10-CM

## 2020-08-21 LAB
ANION GAP SERPL CALC-SCNC: 10 MMOL/L (ref 0–18)
BACTERIA UR QL AUTO: NEGATIVE /HPF
BASOPHILS # BLD AUTO: 0.06 X10(3) UL (ref 0–0.2)
BASOPHILS NFR BLD AUTO: 0.5 %
BILIRUB UR QL: NEGATIVE
BUN BLD-MCNC: 11 MG/DL (ref 7–18)
BUN/CREAT SERPL: 15.9 (ref 10–20)
CALCIUM BLD-MCNC: 9.6 MG/DL (ref 8.5–10.1)
CHLORIDE SERPL-SCNC: 104 MMOL/L (ref 98–112)
CO2 SERPL-SCNC: 23 MMOL/L (ref 21–32)
COLOR UR: YELLOW
CREAT BLD-MCNC: 0.69 MG/DL (ref 0.55–1.02)
DEPRECATED RDW RBC AUTO: 40.7 FL (ref 35.1–46.3)
EOSINOPHIL # BLD AUTO: 0.06 X10(3) UL (ref 0–0.7)
EOSINOPHIL NFR BLD AUTO: 0.5 %
ERYTHROCYTE [DISTWIDTH] IN BLOOD BY AUTOMATED COUNT: 12.5 % (ref 11–15)
GLUCOSE BLD-MCNC: 134 MG/DL (ref 70–99)
GLUCOSE UR-MCNC: NEGATIVE MG/DL
HCT VFR BLD AUTO: 42.3 % (ref 35–48)
HGB BLD-MCNC: 14.1 G/DL (ref 12–16)
HYALINE CASTS #/AREA URNS AUTO: 4 /LPF
IMM GRANULOCYTES # BLD AUTO: 0.06 X10(3) UL (ref 0–1)
IMM GRANULOCYTES NFR BLD: 0.5 %
KETONES UR-MCNC: 20 MG/DL
LYMPHOCYTES # BLD AUTO: 2.26 X10(3) UL (ref 1–4)
LYMPHOCYTES NFR BLD AUTO: 17.9 %
MCH RBC QN AUTO: 29.6 PG (ref 26–34)
MCHC RBC AUTO-ENTMCNC: 33.3 G/DL (ref 31–37)
MCV RBC AUTO: 88.7 FL (ref 80–100)
MONOCYTES # BLD AUTO: 0.52 X10(3) UL (ref 0.1–1)
MONOCYTES NFR BLD AUTO: 4.1 %
NEUTROPHILS # BLD AUTO: 9.7 X10 (3) UL (ref 1.5–7.7)
NEUTROPHILS # BLD AUTO: 9.7 X10(3) UL (ref 1.5–7.7)
NEUTROPHILS NFR BLD AUTO: 76.5 %
NITRITE UR QL STRIP.AUTO: NEGATIVE
OSMOLALITY SERPL CALC.SUM OF ELEC: 285 MOSM/KG (ref 275–295)
PH UR: 5 [PH] (ref 5–8)
PLATELET # BLD AUTO: 299 10(3)UL (ref 150–450)
POTASSIUM SERPL-SCNC: 3.7 MMOL/L (ref 3.5–5.1)
PROT UR-MCNC: 30 MG/DL
RBC # BLD AUTO: 4.77 X10(6)UL (ref 3.8–5.3)
RBC #/AREA URNS AUTO: 2065 /HPF
SODIUM SERPL-SCNC: 137 MMOL/L (ref 136–145)
SP GR UR STRIP: 1.03 (ref 1–1.03)
UROBILINOGEN UR STRIP-ACNC: <2
WBC # BLD AUTO: 12.7 X10(3) UL (ref 4–11)
WBC #/AREA URNS AUTO: 8 /HPF

## 2020-08-21 PROCEDURE — 85025 COMPLETE CBC W/AUTO DIFF WBC: CPT | Performed by: EMERGENCY MEDICINE

## 2020-08-21 PROCEDURE — 96374 THER/PROPH/DIAG INJ IV PUSH: CPT

## 2020-08-21 PROCEDURE — 81001 URINALYSIS AUTO W/SCOPE: CPT | Performed by: EMERGENCY MEDICINE

## 2020-08-21 PROCEDURE — 80048 BASIC METABOLIC PNL TOTAL CA: CPT | Performed by: EMERGENCY MEDICINE

## 2020-08-21 PROCEDURE — 87086 URINE CULTURE/COLONY COUNT: CPT | Performed by: EMERGENCY MEDICINE

## 2020-08-21 PROCEDURE — 76830 TRANSVAGINAL US NON-OB: CPT | Performed by: EMERGENCY MEDICINE

## 2020-08-21 PROCEDURE — 99284 EMERGENCY DEPT VISIT MOD MDM: CPT

## 2020-08-21 PROCEDURE — 96375 TX/PRO/DX INJ NEW DRUG ADDON: CPT

## 2020-08-21 PROCEDURE — 76856 US EXAM PELVIC COMPLETE: CPT | Performed by: EMERGENCY MEDICINE

## 2020-08-21 RX ORDER — KETOROLAC TROMETHAMINE 15 MG/ML
15 INJECTION, SOLUTION INTRAMUSCULAR; INTRAVENOUS ONCE
Status: COMPLETED | OUTPATIENT
Start: 2020-08-21 | End: 2020-08-21

## 2020-08-21 RX ORDER — MORPHINE SULFATE 4 MG/ML
4 INJECTION, SOLUTION INTRAMUSCULAR; INTRAVENOUS ONCE
Status: COMPLETED | OUTPATIENT
Start: 2020-08-21 | End: 2020-08-21

## 2020-08-21 RX ORDER — ONDANSETRON 2 MG/ML
4 INJECTION INTRAMUSCULAR; INTRAVENOUS ONCE
Status: COMPLETED | OUTPATIENT
Start: 2020-08-21 | End: 2020-08-21

## 2020-08-21 RX ORDER — HYDROCODONE BITARTRATE AND ACETAMINOPHEN 5; 325 MG/1; MG/1
1-2 TABLET ORAL EVERY 6 HOURS PRN
Qty: 10 TABLET | Refills: 0 | Status: SHIPPED | OUTPATIENT
Start: 2020-08-21 | End: 2020-08-28

## 2020-08-21 NOTE — TELEPHONE ENCOUNTER
Spoke with pt,  verified, she stated she called yesterday and she can't tolerate the pain any more. Pt c/o lower abdominal pain from right to left x 2 days with rate 10/10, she stated she is not bleeding that much  Pt tried Tylenol its not helping. Guadalupe Riedel

## 2020-08-21 NOTE — ED INITIAL ASSESSMENT (HPI)
Patient complains of abd pain and vaginal bleeding with clots since two nights ago, also complains of nausea

## 2020-08-21 NOTE — ED NOTES
Pt understands AVS. No acute distress. Verbalizes pain improved.  Daughter and patient understand plan to follow up and results of ultrasound

## 2020-08-21 NOTE — ED PROVIDER NOTES
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 The cervix was closed with a small amount of blood and clot coming from the os. The uterus was enlarged with a palpable mass and non tender. The adnexa had no masses and no tenderness. The exam was performed with a chaperone.   Ne follow-up in the office next week for endometrial biopsy.               Disposition and Plan     Clinical Impression:  Vaginal bleeding  (primary encounter diagnosis)    Disposition:  Discharge  8/21/2020  4:46 pm    Follow-up:  Vivien Holloway MD  [address]

## 2020-08-22 NOTE — TELEPHONE ENCOUNTER
Patient called back to let  know she went to the ER and was discharged home.   Patient will keep appointment for Thursday 8/27/20 at 11:45 am.

## 2020-08-23 RX ORDER — ATORVASTATIN CALCIUM 10 MG/1
10 TABLET, FILM COATED ORAL NIGHTLY
Qty: 30 TABLET | Refills: 5 | Status: SHIPPED | OUTPATIENT
Start: 2020-08-23 | End: 2021-02-22

## 2020-08-23 RX ORDER — LISINOPRIL AND HYDROCHLOROTHIAZIDE 12.5; 1 MG/1; MG/1
1 TABLET ORAL
Qty: 30 TABLET | Refills: 5 | Status: SHIPPED | OUTPATIENT
Start: 2020-08-23 | End: 2021-02-22

## 2020-08-23 RX ORDER — ATORVASTATIN CALCIUM 10 MG/1
TABLET, FILM COATED ORAL
Qty: 30 TABLET | Refills: 0 | Status: SHIPPED | OUTPATIENT
Start: 2020-08-23 | End: 2020-08-23

## 2020-08-23 RX ORDER — METFORMIN HYDROCHLORIDE 750 MG/1
1500 TABLET, EXTENDED RELEASE ORAL
Qty: 60 TABLET | Refills: 5 | Status: SHIPPED | OUTPATIENT
Start: 2020-08-23 | End: 2021-02-22

## 2020-08-23 RX ORDER — METFORMIN HYDROCHLORIDE 750 MG/1
TABLET, EXTENDED RELEASE ORAL
Qty: 60 TABLET | Refills: 0 | Status: SHIPPED | OUTPATIENT
Start: 2020-08-23 | End: 2020-08-23

## 2020-08-27 ENCOUNTER — OFFICE VISIT (OUTPATIENT)
Dept: FAMILY MEDICINE CLINIC | Facility: CLINIC | Age: 60
End: 2020-08-27
Payer: COMMERCIAL

## 2020-08-27 VITALS
WEIGHT: 293 LBS | TEMPERATURE: 99 F | SYSTOLIC BLOOD PRESSURE: 110 MMHG | DIASTOLIC BLOOD PRESSURE: 78 MMHG | HEIGHT: 65.5 IN | HEART RATE: 84 BPM | BODY MASS INDEX: 48.23 KG/M2

## 2020-08-27 DIAGNOSIS — N95.0 POSTMENOPAUSAL BLEEDING: Primary | ICD-10-CM

## 2020-08-27 DIAGNOSIS — Z79.4 TYPE 2 DIABETES MELLITUS WITHOUT COMPLICATION, WITH LONG-TERM CURRENT USE OF INSULIN (HCC): ICD-10-CM

## 2020-08-27 DIAGNOSIS — R93.89 ENDOMETRIAL THICKENING ON ULTRASOUND: ICD-10-CM

## 2020-08-27 DIAGNOSIS — E11.9 TYPE 2 DIABETES MELLITUS WITHOUT COMPLICATION, WITH LONG-TERM CURRENT USE OF INSULIN (HCC): ICD-10-CM

## 2020-08-27 PROCEDURE — 58100 BIOPSY OF UTERUS LINING: CPT | Performed by: FAMILY MEDICINE

## 2020-08-27 PROCEDURE — 99213 OFFICE O/P EST LOW 20 MIN: CPT | Performed by: FAMILY MEDICINE

## 2020-08-27 PROCEDURE — 3078F DIAST BP <80 MM HG: CPT | Performed by: FAMILY MEDICINE

## 2020-08-27 PROCEDURE — 3008F BODY MASS INDEX DOCD: CPT | Performed by: FAMILY MEDICINE

## 2020-08-27 PROCEDURE — 3074F SYST BP LT 130 MM HG: CPT | Performed by: FAMILY MEDICINE

## 2020-08-27 NOTE — PROGRESS NOTES
HPI:    Patient ID: Alonso Tracey is a 61year old female. Patient presents for evaluation postmenopausal bleeding. Review of Systems   Genitourinary: Positive for pelvic pain and vaginal bleeding. Negative for vaginal discharge.             Wilfredo Hoyos well-nourished. Cardiovascular: Normal rate and regular rhythm. Abdominal: Soft. Bowel sounds are normal.   Genitourinary:    Genitourinary Comments: Vagina normal, dark blood present. Cervix with small clot present.                 ASSESSMENT/PLAN:

## 2020-08-27 NOTE — PROCEDURES
Discussed indications and risks. Informed consent obtained. Speculum placed, cervix cleaned with iodine. Aseptic technique Pipelle introduced through the cervix using countertraction with tenaculum. Endometrial sampling with aspiration.   Pathology sent

## 2020-08-31 ENCOUNTER — PATIENT MESSAGE (OUTPATIENT)
Dept: FAMILY MEDICINE CLINIC | Facility: CLINIC | Age: 60
End: 2020-08-31

## 2020-08-31 DIAGNOSIS — N95.0 POSTMENOPAUSAL BLEEDING: Primary | ICD-10-CM

## 2020-08-31 DIAGNOSIS — R93.89 ENDOMETRIAL THICKENING ON ULTRASOUND: ICD-10-CM

## 2020-09-02 ENCOUNTER — PATIENT MESSAGE (OUTPATIENT)
Dept: FAMILY MEDICINE CLINIC | Facility: CLINIC | Age: 60
End: 2020-09-02

## 2020-09-02 ENCOUNTER — OFFICE VISIT (OUTPATIENT)
Dept: OBGYN CLINIC | Facility: CLINIC | Age: 60
End: 2020-09-02
Payer: COMMERCIAL

## 2020-09-02 VITALS
HEIGHT: 65.5 IN | BODY MASS INDEX: 48.23 KG/M2 | WEIGHT: 293 LBS | SYSTOLIC BLOOD PRESSURE: 124 MMHG | DIASTOLIC BLOOD PRESSURE: 74 MMHG

## 2020-09-02 DIAGNOSIS — N95.0 PMB (POSTMENOPAUSAL BLEEDING): Primary | ICD-10-CM

## 2020-09-02 PROCEDURE — 3078F DIAST BP <80 MM HG: CPT | Performed by: OBSTETRICS & GYNECOLOGY

## 2020-09-02 PROCEDURE — 3074F SYST BP LT 130 MM HG: CPT | Performed by: OBSTETRICS & GYNECOLOGY

## 2020-09-02 PROCEDURE — 3008F BODY MASS INDEX DOCD: CPT | Performed by: OBSTETRICS & GYNECOLOGY

## 2020-09-02 NOTE — TELEPHONE ENCOUNTER
From: Noris Randall  To: Niki Cordova MD  Sent: 9/2/2020 8:59 AM CDT  Subject: Non-Urgent Medical Question    Hi, I have an appointment with Efrain Dumont Sept 2 at the [facility] at 3:40. What is this follow up visit for?  Is it for an exam or w

## 2020-09-02 NOTE — PROGRESS NOTES
HPI:    Patient ID: Renetta Bryant is a 61year old female. Patient referred by PCP due to PMB. Patient had EMBx but only endocervical tissue obtained. Reviewed pelvic U/S with patient. Patient states she is not prepared for an EMBx today.   Prefers Vitro Strip USE ONCE DAILY 100 each 11   • ONETOUCH DELICA LANCETS 61A Does not apply Misc Apply 1 Units topically daily. 90 each 3   • Blood Glucose Monitoring Suppl ([address]   [address]) W/DEVICE Does not apply Kit test T. I.D       Allergies:No Known A

## 2020-09-06 NOTE — TELEPHONE ENCOUNTER
pls advise, thanks in advance. See mychart 9-2-2.       Requested Prescriptions     Pending Prescriptions Disp Refills   • insulin glargine (LANTUS SOLOSTAR) 100 UNIT/ML Subcutaneous Solution Pen-injector 3 pen 3     Si units at night       Last [facility]

## 2020-09-10 ENCOUNTER — OFFICE VISIT (OUTPATIENT)
Dept: OBGYN CLINIC | Facility: CLINIC | Age: 60
End: 2020-09-10
Payer: COMMERCIAL

## 2020-09-10 VITALS — DIASTOLIC BLOOD PRESSURE: 78 MMHG | WEIGHT: 293 LBS | SYSTOLIC BLOOD PRESSURE: 140 MMHG | BODY MASS INDEX: 49 KG/M2

## 2020-09-10 DIAGNOSIS — N95.0 POSTMENOPAUSAL BLEEDING: Primary | ICD-10-CM

## 2020-09-10 PROCEDURE — 58100 BIOPSY OF UTERUS LINING: CPT | Performed by: OBSTETRICS & GYNECOLOGY

## 2020-09-10 PROCEDURE — 3078F DIAST BP <80 MM HG: CPT | Performed by: OBSTETRICS & GYNECOLOGY

## 2020-09-10 PROCEDURE — 3077F SYST BP >= 140 MM HG: CPT | Performed by: OBSTETRICS & GYNECOLOGY

## 2020-09-11 ENCOUNTER — PATIENT MESSAGE (OUTPATIENT)
Dept: FAMILY MEDICINE CLINIC | Facility: CLINIC | Age: 60
End: 2020-09-11

## 2020-09-12 ENCOUNTER — PATIENT MESSAGE (OUTPATIENT)
Dept: FAMILY MEDICINE CLINIC | Facility: CLINIC | Age: 60
End: 2020-09-12

## 2020-09-12 NOTE — TELEPHONE ENCOUNTER
Antenna message sent. From: Tom Virgen  To: Niki Morales MD  Sent: 9/11/2020  5:48 PM CDT  Subject: Test Results Question    Hi Dr Moe Morales what does this result mean?

## 2020-09-12 NOTE — TELEPHONE ENCOUNTER
Dr Pozo=requesting for your opinion. See below and advise. From: Zuleima Baldwin  To: Niki Tierney MD  Sent: 9/12/2020 12:36 PM CDT  Subject: Test Results Question    Dr Isreal Tierney please let me know what Dr Jagruti Zarate test results mean.  I know Ahsan Clayton

## 2020-10-05 ENCOUNTER — PATIENT MESSAGE (OUTPATIENT)
Dept: FAMILY MEDICINE CLINIC | Facility: CLINIC | Age: 60
End: 2020-10-05

## 2020-10-05 NOTE — TELEPHONE ENCOUNTER
From: Lilly Rosenberg  To: Niki Blanchard MD  Sent: 10/5/2020 12:09 PM CDT  Subject: Referral Request    Hi Dr Shadi Blanchard, did the fewest for the [facility] test go to the hospital? Need to know so I can go this weekend for the test. Thanks, Jossy Cuevas

## 2020-10-06 NOTE — TELEPHONE ENCOUNTER
[employer] message sent. CSS=please call and assist to update insurance information and transfer back to triage.   Per Insurance tab=none but when you look at MEDIA under scanned insurance card 8/27/20=there is different insurance card and NOT sure if this is

## 2020-10-06 NOTE — TELEPHONE ENCOUNTER
[facility] lab and spoke with Rajesh Topete, this nurse verified if the blood test ordered keep showing QUEST even though this nurse choose [place] lab=where does the patient  go? States that patient can go to [place] lab and they can use the existing order e

## 2020-10-07 NOTE — TELEPHONE ENCOUNTER
From   Sivakumar Sommer RN To   [facility] and Delivered   10/6/2020  8:55 PM   Last Read in [address]   10/7/2020  9:26 AM by Yeyo Rob

## 2020-10-07 NOTE — TELEPHONE ENCOUNTER
Pt sent a message seeking a new order, informed pt of previous nurse's message below, this RN tried to pend a new order as well, quest is stated on the [place] lab order for A1c pended today (same as existing order so RN did not place new order), pt advis

## 2020-10-09 ENCOUNTER — PATIENT MESSAGE (OUTPATIENT)
Dept: FAMILY MEDICINE CLINIC | Facility: CLINIC | Age: 60
End: 2020-10-09

## 2020-10-09 NOTE — TELEPHONE ENCOUNTER
From: Manuel Jarquin  To: Niki Fish MD  Sent: 10/9/2020 9:39 AM CDT  Subject: Non-Urgent Medical Question    Hi, I am just wondering if you received the insulin from [employer] yet?  I was approved for the insulin and said it would ship to your

## 2020-10-09 NOTE — TELEPHONE ENCOUNTER
Patient calling checking on the status of her insulin that was supposed to be delivered to [facility] office     Called [facility] ,spoke with Susana Reilly , insulin has been received , pt informed of office hours, pt states will  insulin  on Saturday

## 2020-10-14 ENCOUNTER — APPOINTMENT (OUTPATIENT)
Dept: LAB | Facility: HOSPITAL | Age: 60
End: 2020-10-14
Attending: FAMILY MEDICINE
Payer: COMMERCIAL

## 2020-10-14 PROCEDURE — 83036 HEMOGLOBIN GLYCOSYLATED A1C: CPT | Performed by: FAMILY MEDICINE

## 2020-10-14 PROCEDURE — 36415 COLL VENOUS BLD VENIPUNCTURE: CPT | Performed by: FAMILY MEDICINE

## 2020-10-20 ENCOUNTER — OFFICE VISIT (OUTPATIENT)
Dept: FAMILY MEDICINE CLINIC | Facility: CLINIC | Age: 60
End: 2020-10-20
Payer: COMMERCIAL

## 2020-10-20 VITALS
WEIGHT: 293 LBS | BODY MASS INDEX: 48.23 KG/M2 | RESPIRATION RATE: 18 BRPM | TEMPERATURE: 98 F | HEART RATE: 76 BPM | SYSTOLIC BLOOD PRESSURE: 124 MMHG | DIASTOLIC BLOOD PRESSURE: 75 MMHG | HEIGHT: 65.5 IN

## 2020-10-20 DIAGNOSIS — E66.01 MORBID OBESITY (HCC): ICD-10-CM

## 2020-10-20 DIAGNOSIS — I10 ESSENTIAL HYPERTENSION: ICD-10-CM

## 2020-10-20 DIAGNOSIS — K52.9 CHRONIC DIARRHEA: ICD-10-CM

## 2020-10-20 DIAGNOSIS — E11.9 TYPE 2 DIABETES MELLITUS WITHOUT COMPLICATION, WITH LONG-TERM CURRENT USE OF INSULIN (HCC): Primary | ICD-10-CM

## 2020-10-20 DIAGNOSIS — Z12.31 ENCOUNTER FOR SCREENING MAMMOGRAM FOR BREAST CANCER: ICD-10-CM

## 2020-10-20 DIAGNOSIS — M17.0 BILATERAL PRIMARY OSTEOARTHRITIS OF KNEE: ICD-10-CM

## 2020-10-20 DIAGNOSIS — Z79.4 TYPE 2 DIABETES MELLITUS WITHOUT COMPLICATION, WITH LONG-TERM CURRENT USE OF INSULIN (HCC): Primary | ICD-10-CM

## 2020-10-20 PROCEDURE — 3078F DIAST BP <80 MM HG: CPT | Performed by: FAMILY MEDICINE

## 2020-10-20 PROCEDURE — 3074F SYST BP LT 130 MM HG: CPT | Performed by: FAMILY MEDICINE

## 2020-10-20 PROCEDURE — 3008F BODY MASS INDEX DOCD: CPT | Performed by: FAMILY MEDICINE

## 2020-10-20 PROCEDURE — 90686 IIV4 VACC NO PRSV 0.5 ML IM: CPT | Performed by: FAMILY MEDICINE

## 2020-10-20 PROCEDURE — 99214 OFFICE O/P EST MOD 30 MIN: CPT | Performed by: FAMILY MEDICINE

## 2020-10-20 PROCEDURE — 90471 IMMUNIZATION ADMIN: CPT | Performed by: FAMILY MEDICINE

## 2020-10-20 RX ORDER — PEPPERMINT OIL 90 MG
2 CAPSULE, DELAYED, AND EXTENDED RELEASE ORAL DAILY
Qty: 60 CAPSULE | Refills: 2 | Status: SHIPPED | OUTPATIENT
Start: 2020-10-20 | End: 2021-03-22 | Stop reason: ALTCHOICE

## 2020-10-20 NOTE — PROGRESS NOTES
HPI:    Patient ID: Molina Manriquez is a 61year old female. Diabetes  She presents for her follow-up diabetic visit. She has type 2 diabetes mellitus. Her disease course has been improving. Hypoglycemia symptoms include hunger and sweats.  Pertinent ne Does not apply Misc Apply 1 Units topically daily. 90 each 3   • Blood Glucose Monitoring Suppl ([address]   [address]) W/DEVICE Does not apply Kit test T. I.D       Allergies:No Known Allergies   PHYSICAL EXAM:   Physical Exam   Constitutional: She is orie negative. No recurrence of bleeding.     Orders Placed This Encounter      Comp Metabolic Panel (14)      Hemoglobin A1C      Lipid Panel      Microalb/Creat Ratio, Random Urine      Assay, Thyroid Stim Hormone      A flu <65 Flulaval 0.5 ml 6 mon and olde

## 2020-11-17 RX ORDER — GLIMEPIRIDE 2 MG/1
TABLET ORAL
Qty: 90 TABLET | Refills: 0 | Status: SHIPPED | OUTPATIENT
Start: 2020-11-17 | End: 2021-02-22

## 2020-12-04 ENCOUNTER — PATIENT MESSAGE (OUTPATIENT)
Dept: FAMILY MEDICINE CLINIC | Facility: CLINIC | Age: 60
End: 2020-12-04

## 2020-12-04 RX ORDER — INSULIN GLARGINE 100 [IU]/ML
INJECTION, SOLUTION SUBCUTANEOUS
Qty: 3 PEN | Refills: 3 | Status: SHIPPED | OUTPATIENT
Start: 2020-12-04 | End: 2020-12-08

## 2020-12-04 NOTE — TELEPHONE ENCOUNTER
Please look into this. For other patients with drug assistance programs I have done 3-month supply with 3 refills (as I did for this patient), and just refill it once a year. There is another printed Rx on my desk in case needed.

## 2020-12-08 RX ORDER — INSULIN GLARGINE 100 [IU]/ML
INJECTION, SOLUTION SUBCUTANEOUS
Qty: 3 PEN | Refills: 3 | Status: SHIPPED | OUTPATIENT
Start: 2020-12-08 | End: 2021-03-23

## 2021-02-05 NOTE — TELEPHONE ENCOUNTER
Please respond to pool: JEREMIAS PANTOJA OPO LPN/CMA--see message below, and other messages, thank you      To: JEREMIAS Ventura      From: Eriberto Frausto      Created: 2/5/2021 9:15 AM        *-*-*This message has not been handled. *-*-*     Here is [employer] fa

## 2021-02-05 NOTE — TELEPHONE ENCOUNTER
Patient calling and wants to talk to Rolando, informed that this nurse can assists her , states to fax the documents/forma at [phone]. Office staff=please fax the needed document and call patient.

## 2021-02-05 NOTE — TELEPHONE ENCOUNTER
Reviewed notes below, send formation as requested as long as we have appropriate consent which may already have been obtained. Let patient know.

## 2021-02-05 NOTE — TELEPHONE ENCOUNTER
Spoke with [facility]. Notified her that we cannot fax anything since it;s not secure.  She will get us a fax number or  the results

## 2021-02-22 RX ORDER — LISINOPRIL AND HYDROCHLOROTHIAZIDE 12.5; 1 MG/1; MG/1
1 TABLET ORAL
Qty: 30 TABLET | Refills: 0 | Status: SHIPPED | OUTPATIENT
Start: 2021-02-22 | End: 2021-03-22

## 2021-02-22 RX ORDER — METFORMIN HYDROCHLORIDE 750 MG/1
TABLET, EXTENDED RELEASE ORAL
Qty: 60 TABLET | Refills: 0 | Status: SHIPPED | OUTPATIENT
Start: 2021-02-22 | End: 2021-03-23

## 2021-02-22 RX ORDER — ATORVASTATIN CALCIUM 10 MG/1
TABLET, FILM COATED ORAL
Qty: 30 TABLET | Refills: 0 | Status: SHIPPED | OUTPATIENT
Start: 2021-02-22 | End: 2021-03-23

## 2021-02-22 RX ORDER — GLIMEPIRIDE 2 MG/1
TABLET ORAL
Qty: 90 TABLET | Refills: 0 | Status: SHIPPED | OUTPATIENT
Start: 2021-02-22 | End: 2021-03-23

## 2021-03-06 ENCOUNTER — NURSE TRIAGE (OUTPATIENT)
Dept: FAMILY MEDICINE CLINIC | Facility: CLINIC | Age: 61
End: 2021-03-06

## 2021-03-06 NOTE — TELEPHONE ENCOUNTER
Action Requested: Summary for Provider     []  Critical Lab, Recommendations Needed  [] Need Additional Advice  []   FYI    []   Need Orders  [] Need Medications Sent to Pharmacy  []  Other     SUMMARY: patient sent my chart message copied below.  She's bee

## 2021-03-06 NOTE — TELEPHONE ENCOUNTER
RN pls call pt and triage, thanks. Signed           From: Shahida Webb  To: Niki Reeder MD  Sent: 3/6/2021  9:05 AM CST  Subject: Non-Urgent Medical Question    Hi Dr Birgit Reeder I have a boil on my fat roll by my pubic hair on the right side.  It

## 2021-03-08 ENCOUNTER — OFFICE VISIT (OUTPATIENT)
Dept: FAMILY MEDICINE CLINIC | Facility: CLINIC | Age: 61
End: 2021-03-08
Payer: MEDICAID

## 2021-03-08 VITALS
BODY MASS INDEX: 48.23 KG/M2 | DIASTOLIC BLOOD PRESSURE: 79 MMHG | TEMPERATURE: 98 F | SYSTOLIC BLOOD PRESSURE: 114 MMHG | RESPIRATION RATE: 18 BRPM | WEIGHT: 293 LBS | HEIGHT: 65.5 IN | HEART RATE: 74 BPM

## 2021-03-08 DIAGNOSIS — L03.311 CELLULITIS OF ABDOMINAL WALL: Primary | ICD-10-CM

## 2021-03-08 PROCEDURE — 3008F BODY MASS INDEX DOCD: CPT | Performed by: FAMILY MEDICINE

## 2021-03-08 PROCEDURE — 99213 OFFICE O/P EST LOW 20 MIN: CPT | Performed by: FAMILY MEDICINE

## 2021-03-08 PROCEDURE — 3078F DIAST BP <80 MM HG: CPT | Performed by: FAMILY MEDICINE

## 2021-03-08 PROCEDURE — 3074F SYST BP LT 130 MM HG: CPT | Performed by: FAMILY MEDICINE

## 2021-03-08 RX ORDER — SULFAMETHOXAZOLE AND TRIMETHOPRIM 800; 160 MG/1; MG/1
1 TABLET ORAL 2 TIMES DAILY
Qty: 14 TABLET | Refills: 0 | Status: SHIPPED | OUTPATIENT
Start: 2021-03-08 | End: 2021-03-15

## 2021-03-08 NOTE — PROGRESS NOTES
HPI/Subjective:   Patient ID: Manuel Jarquin is a 61year old female. Skin  This is a new problem. The current episode started in the past 7 days. The problem has been gradually worsening. Pertinent negatives include no fever.  Nothing aggravates the s Allergies    Objective:   Physical Exam  Constitutional:       General: She is not in acute distress. Appearance: Normal appearance. Pulmonary:      Effort: Pulmonary effort is normal. No respiratory distress.    Abdominal:      Comments: Right lower

## 2021-03-18 ENCOUNTER — LAB ENCOUNTER (OUTPATIENT)
Dept: LAB | Facility: HOSPITAL | Age: 61
End: 2021-03-18
Attending: FAMILY MEDICINE
Payer: MEDICAID

## 2021-03-18 DIAGNOSIS — R79.89 ELEVATED TSH: Primary | ICD-10-CM

## 2021-03-18 LAB
ALBUMIN SERPL-MCNC: 3.3 G/DL (ref 3.4–5)
ALBUMIN/GLOB SERPL: 0.8 {RATIO} (ref 1–2)
ALP LIVER SERPL-CCNC: 54 U/L
ALT SERPL-CCNC: 19 U/L
ANION GAP SERPL CALC-SCNC: 6 MMOL/L (ref 0–18)
AST SERPL-CCNC: 12 U/L (ref 15–37)
BILIRUB SERPL-MCNC: 0.4 MG/DL (ref 0.1–2)
BUN BLD-MCNC: 15 MG/DL (ref 7–18)
BUN/CREAT SERPL: 22.1 (ref 10–20)
CALCIUM BLD-MCNC: 8.8 MG/DL (ref 8.5–10.1)
CHLORIDE SERPL-SCNC: 107 MMOL/L (ref 98–112)
CHOLEST SMN-MCNC: 142 MG/DL (ref ?–200)
CO2 SERPL-SCNC: 28 MMOL/L (ref 21–32)
CREAT BLD-MCNC: 0.68 MG/DL
CREAT UR-SCNC: 157 MG/DL
EST. AVERAGE GLUCOSE BLD GHB EST-MCNC: 131 MG/DL (ref 68–126)
GLOBULIN PLAS-MCNC: 4.2 G/DL (ref 2.8–4.4)
GLUCOSE BLD-MCNC: 106 MG/DL (ref 70–99)
HBA1C MFR BLD HPLC: 6.2 % (ref ?–5.7)
HDLC SERPL-MCNC: 59 MG/DL (ref 40–59)
LDLC SERPL CALC-MCNC: 67 MG/DL (ref ?–100)
M PROTEIN MFR SERPL ELPH: 7.5 G/DL (ref 6.4–8.2)
MICROALBUMIN UR-MCNC: 1.13 MG/DL
MICROALBUMIN/CREAT 24H UR-RTO: 7.2 UG/MG (ref ?–30)
NONHDLC SERPL-MCNC: 83 MG/DL (ref ?–130)
OSMOLALITY SERPL CALC.SUM OF ELEC: 293 MOSM/KG (ref 275–295)
PATIENT FASTING Y/N/NP: YES
PATIENT FASTING Y/N/NP: YES
POTASSIUM SERPL-SCNC: 4.2 MMOL/L (ref 3.5–5.1)
SODIUM SERPL-SCNC: 141 MMOL/L (ref 136–145)
TRIGL SERPL-MCNC: 80 MG/DL (ref 30–149)
TSI SER-ACNC: 4.01 MIU/ML (ref 0.36–3.74)
VLDLC SERPL CALC-MCNC: 16 MG/DL (ref 0–30)

## 2021-03-18 PROCEDURE — 80061 LIPID PANEL: CPT | Performed by: FAMILY MEDICINE

## 2021-03-18 PROCEDURE — 82570 ASSAY OF URINE CREATININE: CPT | Performed by: FAMILY MEDICINE

## 2021-03-18 PROCEDURE — 80053 COMPREHEN METABOLIC PANEL: CPT | Performed by: FAMILY MEDICINE

## 2021-03-18 PROCEDURE — 83036 HEMOGLOBIN GLYCOSYLATED A1C: CPT | Performed by: FAMILY MEDICINE

## 2021-03-18 PROCEDURE — 36415 COLL VENOUS BLD VENIPUNCTURE: CPT | Performed by: FAMILY MEDICINE

## 2021-03-18 PROCEDURE — 3061F NEG MICROALBUMINURIA REV: CPT | Performed by: FAMILY MEDICINE

## 2021-03-18 PROCEDURE — 3044F HG A1C LEVEL LT 7.0%: CPT | Performed by: FAMILY MEDICINE

## 2021-03-18 PROCEDURE — 82043 UR ALBUMIN QUANTITATIVE: CPT | Performed by: FAMILY MEDICINE

## 2021-03-18 PROCEDURE — 84443 ASSAY THYROID STIM HORMONE: CPT | Performed by: FAMILY MEDICINE

## 2021-03-20 DIAGNOSIS — Z23 NEED FOR VACCINATION: ICD-10-CM

## 2021-03-22 ENCOUNTER — OFFICE VISIT (OUTPATIENT)
Dept: FAMILY MEDICINE CLINIC | Facility: CLINIC | Age: 61
End: 2021-03-22
Payer: MEDICAID

## 2021-03-22 ENCOUNTER — LAB ENCOUNTER (OUTPATIENT)
Dept: LAB | Age: 61
End: 2021-03-22
Attending: FAMILY MEDICINE
Payer: MEDICAID

## 2021-03-22 VITALS
HEART RATE: 77 BPM | TEMPERATURE: 98 F | SYSTOLIC BLOOD PRESSURE: 122 MMHG | RESPIRATION RATE: 18 BRPM | DIASTOLIC BLOOD PRESSURE: 66 MMHG | HEIGHT: 65.5 IN | WEIGHT: 293 LBS | BODY MASS INDEX: 48.23 KG/M2

## 2021-03-22 DIAGNOSIS — Z01.419 ENCOUNTER FOR GYNECOLOGICAL EXAMINATION WITHOUT ABNORMAL FINDING: Primary | ICD-10-CM

## 2021-03-22 DIAGNOSIS — E11.9 TYPE 2 DIABETES MELLITUS WITHOUT COMPLICATION, WITH LONG-TERM CURRENT USE OF INSULIN (HCC): ICD-10-CM

## 2021-03-22 DIAGNOSIS — E66.01 MORBID OBESITY (HCC): ICD-10-CM

## 2021-03-22 DIAGNOSIS — I10 ESSENTIAL HYPERTENSION: ICD-10-CM

## 2021-03-22 DIAGNOSIS — K52.9 CHRONIC DIARRHEA: ICD-10-CM

## 2021-03-22 DIAGNOSIS — R79.89 ABNORMAL TSH: ICD-10-CM

## 2021-03-22 DIAGNOSIS — I87.2 VENOUS STASIS DERMATITIS OF BOTH LOWER EXTREMITIES: ICD-10-CM

## 2021-03-22 DIAGNOSIS — Z79.4 TYPE 2 DIABETES MELLITUS WITHOUT COMPLICATION, WITH LONG-TERM CURRENT USE OF INSULIN (HCC): ICD-10-CM

## 2021-03-22 LAB
T4 FREE SERPL-MCNC: 0.8 NG/DL (ref 0.8–1.7)
TSI SER-ACNC: 3.93 MIU/ML (ref 0.36–3.74)

## 2021-03-22 PROCEDURE — 99396 PREV VISIT EST AGE 40-64: CPT | Performed by: FAMILY MEDICINE

## 2021-03-22 PROCEDURE — 3074F SYST BP LT 130 MM HG: CPT | Performed by: FAMILY MEDICINE

## 2021-03-22 PROCEDURE — 84439 ASSAY OF FREE THYROXINE: CPT

## 2021-03-22 PROCEDURE — 36415 COLL VENOUS BLD VENIPUNCTURE: CPT

## 2021-03-22 PROCEDURE — 3078F DIAST BP <80 MM HG: CPT | Performed by: FAMILY MEDICINE

## 2021-03-22 PROCEDURE — 3008F BODY MASS INDEX DOCD: CPT | Performed by: FAMILY MEDICINE

## 2021-03-22 PROCEDURE — 84443 ASSAY THYROID STIM HORMONE: CPT

## 2021-03-22 RX ORDER — ZOSTER VACCINE RECOMBINANT, ADJUVANTED 50 MCG/0.5
1 KIT INTRAMUSCULAR ONCE
Qty: 1 EACH | Refills: 1 | Status: SHIPPED | OUTPATIENT
Start: 2021-03-22 | End: 2021-03-22

## 2021-03-22 RX ORDER — LISINOPRIL AND HYDROCHLOROTHIAZIDE 12.5; 1 MG/1; MG/1
1 TABLET ORAL
Qty: 30 TABLET | Refills: 11 | Status: SHIPPED | OUTPATIENT
Start: 2021-03-22 | End: 2022-03-27

## 2021-03-22 NOTE — PROGRESS NOTES
HPI/Subjective:   Patient ID: Blilie Ivy is a 61year old female. HPI    History/Other:   Review of Systems   Constitutional: Positive for fatigue. Respiratory: Negative. Cardiovascular: Negative. Gastrointestinal: Positive for diarrhea. Effort: Pulmonary effort is normal.      Breath sounds: Normal breath sounds. Comments: Breast–normal appearance, no masses  Abdominal:      Palpations: Abdomen is soft. There is no mass. Tenderness: There is no abdominal tenderness.    Raoul Brumfield ThinPrep PAP with HPV Reflex Request      Meds This Visit:  Requested Prescriptions     Signed Prescriptions Disp Refills   • Zoster Vac Recomb Adjuvanted (SHINGRIX) 50 MCG/0.5ML Intramuscular Recon Susp 1 each 1     Sig: Inject 1 Dose into the muscle one

## 2021-03-23 PROBLEM — E03.8 SUBCLINICAL HYPOTHYROIDISM: Status: ACTIVE | Noted: 2021-03-23

## 2021-03-23 RX ORDER — LEVOTHYROXINE SODIUM 0.07 MG/1
75 TABLET ORAL
Qty: 90 TABLET | Refills: 0 | Status: SHIPPED | OUTPATIENT
Start: 2021-03-23 | End: 2021-07-07

## 2021-03-26 LAB — HPV I/H RISK 1 DNA SPEC QL NAA+PROBE: NEGATIVE

## 2021-03-29 ENCOUNTER — HOSPITAL ENCOUNTER (OUTPATIENT)
Dept: MAMMOGRAPHY | Facility: HOSPITAL | Age: 61
Discharge: HOME | End: 2021-03-29
Attending: FAMILY MEDICINE
Payer: MEDICAID

## 2021-03-29 DIAGNOSIS — Z12.31 ENCOUNTER FOR SCREENING MAMMOGRAM FOR BREAST CANCER: ICD-10-CM

## 2021-03-29 PROCEDURE — 77063 BREAST TOMOSYNTHESIS BI: CPT | Performed by: FAMILY MEDICINE

## 2021-03-29 PROCEDURE — 77067 SCR MAMMO BI INCL CAD: CPT | Performed by: FAMILY MEDICINE

## 2021-03-31 ENCOUNTER — IMMUNIZATION (OUTPATIENT)
Dept: LAB | Facility: HOSPITAL | Age: 61
End: 2021-03-31
Attending: HOSPITALIST
Payer: MEDICAID

## 2021-03-31 DIAGNOSIS — Z23 NEED FOR VACCINATION: Primary | ICD-10-CM

## 2021-03-31 PROCEDURE — 0011A SARSCOV2 VAC 100MCG/0.5ML IM: CPT

## 2021-04-02 ENCOUNTER — TELEPHONE (OUTPATIENT)
Dept: FAMILY MEDICINE CLINIC | Facility: CLINIC | Age: 61
End: 2021-04-02

## 2021-04-02 NOTE — TELEPHONE ENCOUNTER
Patient states already had her diabetic eye exam on 3/27 at [address] [facility] in [place], [place]

## 2021-04-07 ENCOUNTER — PATIENT MESSAGE (OUTPATIENT)
Dept: GASTROENTEROLOGY | Facility: CLINIC | Age: 61
End: 2021-04-07

## 2021-04-07 NOTE — TELEPHONE ENCOUNTER
I spoke to the pt and I made her an earlier appt.  She now has an appt in May    Date, time and location verified    Future Appointments   Date Time Provider Fara Pino   4/28/2021 12:10 PM Rob Vazquez

## 2021-04-07 NOTE — TELEPHONE ENCOUNTER
From: Rebeca Lacey  To: Anders Scott MD  Sent: 4/7/2021 8:36 AM CDT  Subject: Non-Urgent Medical Question    Hi Dr. Saucedo Listen I have an appointment with you on August 16th. This is the earliest appointment I could get.  This is for my stomach/

## 2021-04-15 ENCOUNTER — PATIENT MESSAGE (OUTPATIENT)
Dept: FAMILY MEDICINE CLINIC | Facility: CLINIC | Age: 61
End: 2021-04-15

## 2021-04-15 NOTE — TELEPHONE ENCOUNTER
From: Oscar Clay  To: Niki Hammond MD  Sent: 4/15/2021 2:16 PM CDT  Subject: Non-Urgent Medical Question    Hi Dr Jacqueline Hammond, I longoria e the letter from the eye doctor for you. What us your fax number so I can send it to you or do you want me to mail it?

## 2021-04-28 ENCOUNTER — IMMUNIZATION (OUTPATIENT)
Dept: LAB | Facility: HOSPITAL | Age: 61
End: 2021-04-28
Attending: EMERGENCY MEDICINE
Payer: MEDICAID

## 2021-04-28 DIAGNOSIS — Z23 NEED FOR VACCINATION: Primary | ICD-10-CM

## 2021-04-28 PROCEDURE — 0012A SARSCOV2 VAC 100MCG/0.5ML IM: CPT

## 2021-04-29 ENCOUNTER — NURSE TRIAGE (OUTPATIENT)
Dept: FAMILY MEDICINE CLINIC | Facility: CLINIC | Age: 61
End: 2021-04-29

## 2021-04-29 RX ORDER — AMOXICILLIN 875 MG/1
875 TABLET, COATED ORAL 2 TIMES DAILY
Qty: 20 TABLET | Refills: 0 | Status: SHIPPED | OUTPATIENT
Start: 2021-04-29 | End: 2021-05-09

## 2021-04-29 NOTE — TELEPHONE ENCOUNTER
Action Requested: Summary for Provider     []  Critical Lab, Recommendations Needed  [] Need Additional Advice  []   FYI    []   Need Orders  [] Need Medications Sent to Pharmacy  []  Other     SUMMARY: sore throat 3 days, today she began to experience fev

## 2021-04-29 NOTE — TELEPHONE ENCOUNTER
Please let patient know we cannot order strep screen to be done at the lab. However if she did have close contact with strep and now his symptoms, it is reasonable to go ahead and treat without testing. Rx sent to pharmacy.   If she would prefer testing l

## 2021-04-29 NOTE — TELEPHONE ENCOUNTER
1st call attempt. Left Voicemail to call back our office. Phone number provided with office hours. Doctor kinetic Message sent with details below.

## 2021-05-07 ENCOUNTER — OFFICE VISIT (OUTPATIENT)
Dept: GASTROENTEROLOGY | Facility: CLINIC | Age: 61
End: 2021-05-07
Payer: MEDICAID

## 2021-05-07 VITALS
BODY MASS INDEX: 48.23 KG/M2 | HEIGHT: 65.5 IN | SYSTOLIC BLOOD PRESSURE: 121 MMHG | DIASTOLIC BLOOD PRESSURE: 75 MMHG | HEART RATE: 80 BPM | WEIGHT: 293 LBS

## 2021-05-07 DIAGNOSIS — R15.9 FULL INCONTINENCE OF FECES: ICD-10-CM

## 2021-05-07 DIAGNOSIS — R19.7 DIARRHEA, UNSPECIFIED TYPE: Primary | ICD-10-CM

## 2021-05-07 DIAGNOSIS — R10.30 LOWER ABDOMINAL PAIN: ICD-10-CM

## 2021-05-07 DIAGNOSIS — Z86.010 HISTORY OF ADENOMATOUS POLYP OF COLON: ICD-10-CM

## 2021-05-07 DIAGNOSIS — K52.9 CHRONIC DIARRHEA: ICD-10-CM

## 2021-05-07 PROCEDURE — 3008F BODY MASS INDEX DOCD: CPT | Performed by: INTERNAL MEDICINE

## 2021-05-07 PROCEDURE — 3078F DIAST BP <80 MM HG: CPT | Performed by: INTERNAL MEDICINE

## 2021-05-07 PROCEDURE — 99214 OFFICE O/P EST MOD 30 MIN: CPT | Performed by: INTERNAL MEDICINE

## 2021-05-07 PROCEDURE — 3074F SYST BP LT 130 MM HG: CPT | Performed by: INTERNAL MEDICINE

## 2021-05-07 NOTE — PROGRESS NOTES
HPI:    Patient ID: Merle Cordova returns today for follow-up. She has received both Covid vaccination doses, second dose last week.     Though we have previously discussed long history of postprandial urgency, loose stools since her 1986 cholecystect bathroom access even with showing that card. As this diarrhea has worsened, she is applying for disability now.     Ms. Toy Perez carries a bag with her everywhere she goes of Depend undergarments, change of clothes, wipes to clean herself up if she suffers dine out, eat out of the home. No blood with stools or diarrhea. She denies trials of previous diarrhea medications including evaluation with EGD examination and lower GI enema x-ray series. Lower GI x-ray was at [facility] in the early 1990s.   This was a ileocecal   valve. The quality of the prep was good. Retroflexion was performed in   the rectum.      COLONOSCOPY FINDINGS:  The cecum, ileocecal valve, terminal ileum   were entirely normal.  In the mid ascending colon, a large sessile   polyp measuring a year, or as per   colon-polyp pathology results. D:    05/17/2016 9:58 A  T:    05/17/2016 10:54 A  ATTENDING:  Tayler Rodriguez MD    ======================    Colonoscopy report of August 2017 is scanned in under the Media tab    ===== colon.  · Small internal hemorrhoids.     RECOMMENDATIONS:  · High fiber diet.   · Follow-up above colon polyp pathology results  · Repeat colonoscopy examination in 3 years  · All siblings to undergo screening colonoscopy examination  · No aspirin or NSAID Pupils are equal, round, and reactive to light. Cardiovascular:      Rate and Rhythm: Normal rate. Pulmonary:      Effort: Pulmonary effort is normal.   Abdominal:      General: There is no distension. Palpations: Abdomen is soft.       Tenderness: or if we repeat exam sooner. Recent fecal calprotectin was normal on 7/28/2020 assay.   · Controlled only by fasting  · Some relief from Pepto Bismol tablets or liquid  · Today I recommended further work-up with stool studies: Repeat fecal calprotectin, pa

## 2021-05-12 ENCOUNTER — TELEPHONE (OUTPATIENT)
Dept: GASTROENTEROLOGY | Facility: CLINIC | Age: 61
End: 2021-05-12

## 2021-05-12 NOTE — TELEPHONE ENCOUNTER
Angela Quintana (Alfred: [id number])    Your information has been submitted to [employer]. [employer] is reviewing the PA request and you will receive an electronic response.  You may check for the updated outcome later by reopening this request. The standard

## 2021-05-12 NOTE — TELEPHONE ENCOUNTER
Current Outpatient Medications   Medication Sig Dispense Refill   • rifaximin 550 MG Oral Tab Take 1 tablet (550 mg total) by mouth 3 (three) times daily for 14 days.  42 tablet 3

## 2021-05-13 ENCOUNTER — PATIENT MESSAGE (OUTPATIENT)
Dept: GASTROENTEROLOGY | Facility: CLINIC | Age: 61
End: 2021-05-13

## 2021-05-13 NOTE — TELEPHONE ENCOUNTER
Received approval from [address] [place] [address] for pt's Xifaxan.     Approved 02/21/2021-05/12/2022    Case Number : [id number]    Letter sent to scanning

## 2021-05-13 NOTE — TELEPHONE ENCOUNTER
I called [facility] and there is a \"0\" co-pay for the pt.  They will let her know she can  the medication

## 2021-05-14 ENCOUNTER — PATIENT MESSAGE (OUTPATIENT)
Dept: GASTROENTEROLOGY | Facility: CLINIC | Age: 61
End: 2021-05-14

## 2021-05-14 ENCOUNTER — TELEPHONE (OUTPATIENT)
Dept: CASE MANAGEMENT | Age: 61
End: 2021-05-14

## 2021-05-14 DIAGNOSIS — K52.9 CHRONIC DIARRHEA: Primary | ICD-10-CM

## 2021-05-14 DIAGNOSIS — R15.9 FULL INCONTINENCE OF FECES: ICD-10-CM

## 2021-05-14 NOTE — TELEPHONE ENCOUNTER
GI RNs–    Please call Aristides Yuan and advise that her insurance has denied the CT scan. I have no control over that. Her insurance states that they need more reason for us to do a CT looking for Crohn's disease which was my question.   They are requiring eith

## 2021-05-14 NOTE — TELEPHONE ENCOUNTER
Patient contacted,  verified and message from Dr. Camila Pittman given. Patient states she will get stool test done next week. Patient voiced understanding.

## 2021-05-14 NOTE — TELEPHONE ENCOUNTER
Patient's [employer] message changed to a telephone encounter and sent to Dr. Anish Jones. CT was cancelled per patient. This encounter closed.

## 2021-05-14 NOTE — TELEPHONE ENCOUNTER
From: Trang Mendez  To: Zen Romero MD  Sent: 5/13/2021 6:59 PM CDT  Subject: Non-Urgent Medical Question    Hi, I just wanted to let you know the prescription for sent to [facility] is on backorder, refaxinim.  They are saying for 1 to 2. bus

## 2021-05-14 NOTE — TELEPHONE ENCOUNTER
Pt. States that she was told that she is not able to get CT Scan done, as pts insur. Has denied it, and find out if the dr office will appeal decision? . Pt. Wants to know what should she do? Pt wants to know if she should cancel Monday's appt. For CT Scan?

## 2021-05-14 NOTE — TELEPHONE ENCOUNTER
Hi Dr. Anish Jones,    The CT you ordered for Torsten Wilson has been denied by her insurance. Below is the denial determination.     Thank you  Jimmy Mercer

## 2021-05-15 ENCOUNTER — LAB ENCOUNTER (OUTPATIENT)
Dept: LAB | Facility: HOSPITAL | Age: 61
End: 2021-05-15
Attending: INTERNAL MEDICINE
Payer: MEDICAID

## 2021-05-15 DIAGNOSIS — R19.7 DIARRHEA, UNSPECIFIED TYPE: ICD-10-CM

## 2021-05-15 PROCEDURE — 83993 ASSAY FOR CALPROTECTIN FECAL: CPT

## 2021-05-15 PROCEDURE — 82656 EL-1 FECAL QUAL/SEMIQ: CPT

## 2021-05-15 PROCEDURE — 87329 GIARDIA AG IA: CPT

## 2021-05-15 PROCEDURE — 87493 C DIFF AMPLIFIED PROBE: CPT

## 2021-05-15 PROCEDURE — 87272 CRYPTOSPORIDIUM AG IF: CPT

## 2021-05-16 ENCOUNTER — PATIENT MESSAGE (OUTPATIENT)
Dept: GASTROENTEROLOGY | Facility: CLINIC | Age: 61
End: 2021-05-16

## 2021-05-17 ENCOUNTER — TELEPHONE (OUTPATIENT)
Dept: GASTROENTEROLOGY | Facility: CLINIC | Age: 61
End: 2021-05-17

## 2021-05-17 ENCOUNTER — PATIENT MESSAGE (OUTPATIENT)
Dept: GASTROENTEROLOGY | Facility: CLINIC | Age: 61
End: 2021-05-17

## 2021-05-17 NOTE — TELEPHONE ENCOUNTER
From: Truman Cadena  To: Vicki Mcallister MD  Sent: 5/17/2021 10:37 AM CDT  Subject: Prescription Question    Hi, how far apart do I need to take the xifaxan and my diabetes medicines?  Thanks, Tari Casas

## 2021-05-17 NOTE — TELEPHONE ENCOUNTER
From: Bear Li  To: Garfield Cruz MD  Sent: 5/16/2021 5:49 PM CDT  Subject: Test Results Question    I have a question about CALPROTECTIN, FECAL resulted on 5/16/21 at 4:51 PM. What does this mean actually?  Thanks, Selene Saunders

## 2021-05-17 NOTE — TELEPHONE ENCOUNTER
See TE from 05/17/21 I will SWITCH the dose or number of times a day I take the medications listed below when I get home from the hospital:  None

## 2021-05-17 NOTE — TELEPHONE ENCOUNTER
Pt inquiring about test results    I did let her know her stool samples were normal.    I let her know the pancreatic elastase was still in process    She was wondering if you wanted her to get the CBC & C-reactive protein now or if she can wait until she

## 2021-05-18 NOTE — TELEPHONE ENCOUNTER
Thanks Ling Knutson. CBC and CRP can wait for her next blood draw. I do not think there is any need to separate the rifaximin from her other medications.

## 2021-05-19 ENCOUNTER — PATIENT MESSAGE (OUTPATIENT)
Dept: GASTROENTEROLOGY | Facility: CLINIC | Age: 61
End: 2021-05-19

## 2021-05-19 NOTE — TELEPHONE ENCOUNTER
From: Oscar Clay  To: Royal Ras MD  Sent: 5/19/2021 11:10 AM CDT  Subject: Test Results Question    I have a question about PANCREATIC ELASTASE, FECAL resulted on 5/19/21 at 9:49 AM. What does this mean normal not normal what? ? Thanks

## 2021-05-31 ENCOUNTER — TELEPHONE (OUTPATIENT)
Dept: GASTROENTEROLOGY | Facility: CLINIC | Age: 61
End: 2021-05-31

## 2021-06-01 ENCOUNTER — PATIENT MESSAGE (OUTPATIENT)
Dept: FAMILY MEDICINE CLINIC | Facility: CLINIC | Age: 61
End: 2021-06-01

## 2021-06-01 ENCOUNTER — TELEPHONE (OUTPATIENT)
Dept: FAMILY MEDICINE CLINIC | Facility: CLINIC | Age: 61
End: 2021-06-01

## 2021-06-01 DIAGNOSIS — N95.0 POSTMENOPAUSAL BLEEDING: Primary | ICD-10-CM

## 2021-06-01 NOTE — TELEPHONE ENCOUNTER
Please let patient know definitely should make follow-up appointment with Dr. Rocio Love, she saw him 9/2020 for postmenopausal bleeding. Referral done.

## 2021-06-01 NOTE — TELEPHONE ENCOUNTER
From: Apiaryt Query  To: Niki Serna MD  Sent: 6/1/2021 8:56 AM CDT  Subject: Other    Hi Dr. Mary Serna, On May 30th I had vaginal bleeding. It was only 1 genevieve with mucous and blood. I also had spotting once before this.  Do I need to take estrogen for

## 2021-06-01 NOTE — TELEPHONE ENCOUNTER
I spoke to Yahir Dale again and we scheduled a f/u appt.     Date time and location verified with the pt    Future Appointments   Date Time Provider Fara Odette   8/6/2021 10:00 AM Orest Habermann, MD [facility]ERR[facility]

## 2021-06-01 NOTE — TELEPHONE ENCOUNTER
Pt informed of Dr. Seamus Alcantara message. Pt states she is not comfortable seeing Dr. Tyo Valdivia again, and doesn't understand why she needs to see him. Per Pt, Dr. Toy Valdivia only did one procedure and took samples.  Per Pt, he wouldn't know what a women's body is

## 2021-06-01 NOTE — TELEPHONE ENCOUNTER
Thank you Huyen Chanel, this is great news. Could you schedule her a follow-up with me maybe 2 months?

## 2021-06-01 NOTE — TELEPHONE ENCOUNTER
Patient requesting recommendation, reports over the weekend had an episode of bleeding and mucous when she went to the bathroom. Reports more mucous than blood. Also had occasional spotting before this.  Believes this to be related to menopause but is unsur

## 2021-06-01 NOTE — TELEPHONE ENCOUNTER
Please call patient regarding MyChart message copied here. [employer] message response sent in original MyChart encounter, per department process.  Of note, per chart review, patient had [employer] vaccinations on 4/28/21 & 3/31/21.   ----- Message from Eliot

## 2021-06-01 NOTE — TELEPHONE ENCOUNTER
GI RNs,    Please call Ms. Chapis to advise that the recent stool samples of 5/15/2021 checked out really well. There was no sign of abnormal inflammation. She tested negative for the C. difficile and Giardia infections.     A test for pancreas insuf 90

## 2021-06-01 NOTE — TELEPHONE ENCOUNTER
Dr Simon Dimas,    I spoke to Yahir Dale and I went over the stool test results with her. She verbalizes understanding. She states she already has cut back on sugar, sweeteners and fruit.  She agrees that it does aggravate her chronic diarrhea    See TE from 05/1

## 2021-06-01 NOTE — TELEPHONE ENCOUNTER
Patient advised of new referral info, agreed to schedule follow up.  Info sent to patient's mychart per request.

## 2021-07-01 ENCOUNTER — OFFICE VISIT (OUTPATIENT)
Dept: OBGYN CLINIC | Facility: CLINIC | Age: 61
End: 2021-07-01
Payer: MEDICAID

## 2021-07-01 VITALS
WEIGHT: 293 LBS | HEIGHT: 65.5 IN | SYSTOLIC BLOOD PRESSURE: 138 MMHG | BODY MASS INDEX: 48.23 KG/M2 | DIASTOLIC BLOOD PRESSURE: 80 MMHG

## 2021-07-01 DIAGNOSIS — N84.1 CERVICAL POLYP: Primary | ICD-10-CM

## 2021-07-01 DIAGNOSIS — N95.0 POSTMENOPAUSAL BLEEDING: ICD-10-CM

## 2021-07-01 PROCEDURE — 3079F DIAST BP 80-89 MM HG: CPT | Performed by: OBSTETRICS & GYNECOLOGY

## 2021-07-01 PROCEDURE — 3008F BODY MASS INDEX DOCD: CPT | Performed by: OBSTETRICS & GYNECOLOGY

## 2021-07-01 PROCEDURE — 3075F SYST BP GE 130 - 139MM HG: CPT | Performed by: OBSTETRICS & GYNECOLOGY

## 2021-07-01 PROCEDURE — 99205 OFFICE O/P NEW HI 60 MIN: CPT | Performed by: OBSTETRICS & GYNECOLOGY

## 2021-07-01 NOTE — PROGRESS NOTES
NEW GYN H&P     2021  10:11 AM    Patient presents with:  New Patient: new pt here for PMB   . HPI: Patient is a 61year old  LMP age 48-48 referred for evaluation of persistent episodes of PMB that began 2020.  Had EMB at that time showing Lisinopril-hydroCHLOROthiazide 10-12.5 MG Oral Tab Take 1 tablet by mouth once daily. 30 tablet 11   • mupirocin (BACTROBAN) 2 % External Ointment Apply to affected area TID 22 g 0   • Multiple Vitamins-Minerals (EQ VISION FORMULA 50+ OR) Take by mouth.       Spouse name: Not on file      Number of children: Not on file      Years of education: Not on file      Highest education level: Not on file    Tobacco Use      Smoking status: Former Smoker        Packs/day: 0.50        Years: 2.00        Pa

## 2021-07-06 DIAGNOSIS — N95.0 POSTMENOPAUSAL BLEEDING: Primary | ICD-10-CM

## 2021-07-06 DIAGNOSIS — N84.1 CERVICAL POLYP: ICD-10-CM

## 2021-07-07 ENCOUNTER — NURSE TRIAGE (OUTPATIENT)
Dept: FAMILY MEDICINE CLINIC | Facility: CLINIC | Age: 61
End: 2021-07-07

## 2021-07-07 ENCOUNTER — PATIENT MESSAGE (OUTPATIENT)
Dept: FAMILY MEDICINE CLINIC | Facility: CLINIC | Age: 61
End: 2021-07-07

## 2021-07-07 DIAGNOSIS — E03.9 HYPOTHYROIDISM, UNSPECIFIED TYPE: Primary | ICD-10-CM

## 2021-07-07 RX ORDER — LEVOTHYROXINE SODIUM 0.07 MG/1
75 TABLET ORAL
Qty: 90 TABLET | Refills: 0 | Status: SHIPPED | OUTPATIENT
Start: 2021-07-07 | End: 2021-07-08

## 2021-07-07 NOTE — TELEPHONE ENCOUNTER
Called patient in regards to MyChart message below   Reports for the past week her left wrist is \" sore\"  Middle finger, thumb are a bit tender to touch, feel stiff  at times numbness and they are small \"cysts\"   Discomfort increases in her thumb towar

## 2021-07-07 NOTE — TELEPHONE ENCOUNTER
From: Shahida Webb  To: Niki Reeder MD  Sent: 7/7/2021 10:51 AM CDT  Subject: Prescription Question    Hi Dr. Birgit Reeder I am almost finished with the levothyroxin. Do I need to make an appointment? What do I need to do next?  Thanks, Merline Evangelist

## 2021-07-07 NOTE — TELEPHONE ENCOUNTER
----- Message from [place] Brendan Nation sent at 7/7/2021  2:38 PM CDT -----  Regarding: Other  Contact: [phone]  Hi Dr Moe Morales I am having trouble with my left hand. Middle finger, index and thumb are numb and the waist hurts under my thumb.  Thanks Elizabeth Castellanos

## 2021-07-08 ENCOUNTER — ULTRASOUND ENCOUNTER (OUTPATIENT)
Dept: OBGYN CLINIC | Facility: CLINIC | Age: 61
End: 2021-07-08
Payer: MEDICAID

## 2021-07-08 DIAGNOSIS — N84.1 CERVICAL POLYP: ICD-10-CM

## 2021-07-08 DIAGNOSIS — N95.0 POSTMENOPAUSAL BLEEDING: ICD-10-CM

## 2021-07-08 PROCEDURE — 76856 US EXAM PELVIC COMPLETE: CPT | Performed by: OBSTETRICS & GYNECOLOGY

## 2021-07-08 PROCEDURE — 76830 TRANSVAGINAL US NON-OB: CPT | Performed by: OBSTETRICS & GYNECOLOGY

## 2021-07-08 RX ORDER — LEVOTHYROXINE SODIUM 0.07 MG/1
75 TABLET ORAL
Qty: 90 TABLET | Refills: 0 | Status: SHIPPED | OUTPATIENT
Start: 2021-07-08 | End: 2021-07-10

## 2021-07-09 ENCOUNTER — OFFICE VISIT (OUTPATIENT)
Dept: FAMILY MEDICINE CLINIC | Facility: CLINIC | Age: 61
End: 2021-07-09
Payer: MEDICAID

## 2021-07-09 ENCOUNTER — PATIENT MESSAGE (OUTPATIENT)
Dept: OBGYN CLINIC | Facility: CLINIC | Age: 61
End: 2021-07-09

## 2021-07-09 ENCOUNTER — LAB ENCOUNTER (OUTPATIENT)
Dept: LAB | Age: 61
End: 2021-07-09
Attending: FAMILY MEDICINE
Payer: MEDICAID

## 2021-07-09 VITALS
WEIGHT: 293 LBS | SYSTOLIC BLOOD PRESSURE: 132 MMHG | DIASTOLIC BLOOD PRESSURE: 79 MMHG | BODY MASS INDEX: 48.23 KG/M2 | HEART RATE: 87 BPM | HEIGHT: 65.5 IN | RESPIRATION RATE: 18 BRPM | TEMPERATURE: 98 F

## 2021-07-09 DIAGNOSIS — E03.8 SUBCLINICAL HYPOTHYROIDISM: ICD-10-CM

## 2021-07-09 DIAGNOSIS — G56.02 CARPAL TUNNEL SYNDROME OF LEFT WRIST: Primary | ICD-10-CM

## 2021-07-09 DIAGNOSIS — E03.9 HYPOTHYROIDISM, UNSPECIFIED TYPE: ICD-10-CM

## 2021-07-09 LAB — TSI SER-ACNC: 1.62 MIU/ML (ref 0.36–3.74)

## 2021-07-09 PROCEDURE — 99213 OFFICE O/P EST LOW 20 MIN: CPT | Performed by: FAMILY MEDICINE

## 2021-07-09 PROCEDURE — 36415 COLL VENOUS BLD VENIPUNCTURE: CPT

## 2021-07-09 PROCEDURE — 3075F SYST BP GE 130 - 139MM HG: CPT | Performed by: FAMILY MEDICINE

## 2021-07-09 PROCEDURE — 3078F DIAST BP <80 MM HG: CPT | Performed by: FAMILY MEDICINE

## 2021-07-09 PROCEDURE — 3008F BODY MASS INDEX DOCD: CPT | Performed by: FAMILY MEDICINE

## 2021-07-09 PROCEDURE — 84443 ASSAY THYROID STIM HORMONE: CPT

## 2021-07-09 RX ORDER — COVID-19 ANTIGEN TEST
220 KIT MISCELLANEOUS 2 TIMES DAILY PRN
Qty: 60 CAPSULE | Refills: 0 | COMMUNITY
Start: 2021-07-09 | End: 2021-09-07

## 2021-07-09 NOTE — TELEPHONE ENCOUNTER
Donato Page MD  to Yashmadelinus Clayton 1:43 PM  Ms. Janette Hiro Andrew is currently out of town. I am an OB/Gyn who works with her. The ultrasound that was performed on 7/8 shows that the lining of your uterus is thickened.  The l

## 2021-07-09 NOTE — PROGRESS NOTES
HPI/Subjective:   Patient ID: Maura Sauceda is a 61year old female. Patient presents for numbness as below. Numbness  Associated symptoms include numbness. History/Other:   Review of Systems   Neurological: Positive for numbness.      Onel Natarajan alert and oriented to person, place, and time. Assessment & Plan:   Carpal tunnel syndrome of left wrist  (primary encounter diagnosis)–patient presents with numbness and tingling of left hand first, second and third fingers.   Some discomfort anter

## 2021-07-09 NOTE — PATIENT INSTRUCTIONS
Understanding Carpal Tunnel Syndrome    The carpal tunnel is a narrow space inside the wrist. It is ringed by bone and a band of tough tissue called the transverse carpal ligament.  A major nerve called the median nerve runs from the forearm into the hand wrist. It helps shrink tissues inside the carpal tunnel. This relieves symptoms for a time. · Pain medicines. You may take over-the-counter or prescription medicines to help reduce swelling and relieve symptoms. · Surgery.  If the condition doesn’t respon

## 2021-07-10 ENCOUNTER — PATIENT MESSAGE (OUTPATIENT)
Dept: FAMILY MEDICINE CLINIC | Facility: CLINIC | Age: 61
End: 2021-07-10

## 2021-07-10 RX ORDER — LEVOTHYROXINE SODIUM 0.07 MG/1
75 TABLET ORAL
Qty: 90 TABLET | Refills: 3 | Status: SHIPPED | OUTPATIENT
Start: 2021-07-10

## 2021-07-10 RX ORDER — LEVOTHYROXINE SODIUM 0.07 MG/1
75 TABLET ORAL
Qty: 90 TABLET | Refills: 3 | Status: SHIPPED | OUTPATIENT
Start: 2021-07-10 | End: 2021-07-10

## 2021-07-10 NOTE — TELEPHONE ENCOUNTER
From: Zuleima Baldwin  To: Niki Tierney MD  Sent: 7/10/2021 9:04 AM CDT  Subject: Test Results Question    What does this mean Thanks Ely

## 2021-07-11 NOTE — TELEPHONE ENCOUNTER
Please see other email encounter. Rx transferred to [facility]                From: Maura Sauceda  To: Niki Moore MD  Sent: 7/10/2021  4:33 PM CDT  Subject: Non-Urgent Medical Question    I do not have a mail in pharmacy.  I use [facility] in [address] [place] [address]

## 2021-07-11 NOTE — TELEPHONE ENCOUNTER
Patient Communication  Seen by patient Trang Mendez on 7/10/2021  4:31 PM CDT  Back to Top    Your thyroid hormone level is now normal.  I am glad you have been feeling a little less fatigued.  Lets continue current dose of thyroid medicine EVERY morni

## 2021-07-23 ENCOUNTER — TELEPHONE (OUTPATIENT)
Dept: FAMILY MEDICINE CLINIC | Facility: CLINIC | Age: 61
End: 2021-07-23

## 2021-07-23 NOTE — TELEPHONE ENCOUNTER
----- Message from Elaine Nation sent at 7/23/2021  8:14 AM CDT -----  Regarding: Other  Contact: [phone]  I just wanted you to know, yesterday I had a diabetic seizure. I started out sweating and clammy, shaky a little dizzy.  I drank some orange

## 2021-07-23 NOTE — TELEPHONE ENCOUNTER
Reviewed doctor's message as noted below. Pt state she ate a little later yesterday but didn't skip any meals. Pt states she is taking Lantus 32 units along with Metformin and Glimepiride.  Pt states she will reduce Lantus insulin by 6 units to 26 units, ni

## 2021-07-23 NOTE — TELEPHONE ENCOUNTER
Please find out if patient had skipped a meal when this occurred. Also confirm current dose of Lantus (32 units on record). If she had skipped a meal then recommend maintain regular mealtimes.   If she had not skipped a meal then recommend reducing dose o

## 2021-07-23 NOTE — TELEPHONE ENCOUNTER
----- Message from Silvano Nation sent at 7/23/2021  8:14 AM CDT -----  Regarding: Other  Contact: [phone]  I just wanted you to know, yesterday I had a diabetic seizure. I started out sweating and clammy, shaky a little dizzy.  I drank some orange

## 2021-07-27 ENCOUNTER — TELEPHONE (OUTPATIENT)
Dept: OBGYN CLINIC | Facility: CLINIC | Age: 61
End: 2021-07-27

## 2021-07-27 NOTE — PROGRESS NOTES
Released to [employer] and results viewed by pt. Next Appt:    With  OBSTETRICS/GYNECOLOGY Eleazar Yeboah MD)  08/04/2021 at 11:30 AM

## 2021-07-27 NOTE — TELEPHONE ENCOUNTER
No answer. LM on VM for patient to call back and discuss proceeding with scheduling HSC/D&C surgery for abnormal endometrial findings on USN.

## 2021-07-27 NOTE — TELEPHONE ENCOUNTER
Spoke with patient and clarified questions about ultrasound findings and treatment plan. Has appointment with me on 8/4/21 to discuss further and to schedule D&C. All questions answered and understanding expressed.

## 2021-08-04 ENCOUNTER — OFFICE VISIT (OUTPATIENT)
Dept: OBGYN CLINIC | Facility: CLINIC | Age: 61
End: 2021-08-04
Payer: MEDICAID

## 2021-08-04 VITALS
SYSTOLIC BLOOD PRESSURE: 134 MMHG | WEIGHT: 293 LBS | DIASTOLIC BLOOD PRESSURE: 74 MMHG | BODY MASS INDEX: 48.23 KG/M2 | HEIGHT: 65.5 IN

## 2021-08-04 DIAGNOSIS — N95.0 POSTMENOPAUSAL BLEEDING: ICD-10-CM

## 2021-08-04 DIAGNOSIS — N84.1 CERVICAL POLYP: Primary | ICD-10-CM

## 2021-08-04 PROCEDURE — 3078F DIAST BP <80 MM HG: CPT | Performed by: OBSTETRICS & GYNECOLOGY

## 2021-08-04 PROCEDURE — 3075F SYST BP GE 130 - 139MM HG: CPT | Performed by: OBSTETRICS & GYNECOLOGY

## 2021-08-04 PROCEDURE — 3008F BODY MASS INDEX DOCD: CPT | Performed by: OBSTETRICS & GYNECOLOGY

## 2021-08-04 PROCEDURE — 99213 OFFICE O/P EST LOW 20 MIN: CPT | Performed by: OBSTETRICS & GYNECOLOGY

## 2021-08-04 NOTE — PROGRESS NOTES
Michelle Bonilla is a 61year old female. HPI:   Patient presents with:   Follow - Up: pt here to discuss 07/08/2021 ultrasound results   Patient Question: pt has question about cervical polyp removal     Here to discuss pre-op planning for HSC/D&C for a Glucose Blood (ONETOUCH ULTRA BLUE) In Vitro Strip USE ONCE DAILY 100 each 11   • ONETOUCH DELICA LANCETS 91G Does not apply Misc Apply 1 Units topically daily.  90 each 3   • Blood Glucose Monitoring Suppl ([address]   [address]) W/DEVICE Does not apply Ki [phone]

## 2021-08-05 ENCOUNTER — TELEPHONE (OUTPATIENT)
Dept: OBGYN CLINIC | Facility: CLINIC | Age: 61
End: 2021-08-05

## 2021-08-05 PROBLEM — G56.00 CARPAL TUNNEL SYNDROME: Status: ACTIVE | Noted: 2021-08-05

## 2021-08-06 ENCOUNTER — OFFICE VISIT (OUTPATIENT)
Dept: GASTROENTEROLOGY | Facility: CLINIC | Age: 61
End: 2021-08-06
Payer: MEDICAID

## 2021-08-06 VITALS
WEIGHT: 293 LBS | HEIGHT: 65 IN | SYSTOLIC BLOOD PRESSURE: 122 MMHG | BODY MASS INDEX: 48.82 KG/M2 | HEART RATE: 73 BPM | DIASTOLIC BLOOD PRESSURE: 77 MMHG

## 2021-08-06 DIAGNOSIS — K21.9 GASTROESOPHAGEAL REFLUX DISEASE, UNSPECIFIED WHETHER ESOPHAGITIS PRESENT: ICD-10-CM

## 2021-08-06 DIAGNOSIS — R10.31 RLQ ABDOMINAL PAIN: ICD-10-CM

## 2021-08-06 DIAGNOSIS — K52.9 CHRONIC DIARRHEA: Primary | ICD-10-CM

## 2021-08-06 PROCEDURE — 3074F SYST BP LT 130 MM HG: CPT | Performed by: INTERNAL MEDICINE

## 2021-08-06 PROCEDURE — 99214 OFFICE O/P EST MOD 30 MIN: CPT | Performed by: INTERNAL MEDICINE

## 2021-08-06 PROCEDURE — 3078F DIAST BP <80 MM HG: CPT | Performed by: INTERNAL MEDICINE

## 2021-08-06 PROCEDURE — 3008F BODY MASS INDEX DOCD: CPT | Performed by: INTERNAL MEDICINE

## 2021-08-06 NOTE — PROGRESS NOTES
HPI:    Patient ID: Oscar Clay returns today for follow-up. Overall she is doing about the same. Today is her birthday! We did her previous colonoscopy examination in 2019 on her birthday as well.     Chronic diarrhea concern has been repeatedly vegetables, tomato sauces, oily, greasy meals or spicy meals.    ======================  Previous visit 5/7/2021:     Maura Sauceda returns today for follow-up. She has received both Covid vaccination doses, second dose last week.     Though we have p use the bathroom. She has the emergency bathroom access card in her wallet. She has been refused bathroom access even with showing that card. As this diarrhea has worsened, she is applying for disability now.     Ms. Izzy Tamayo carries a bag with her everyw pain or cramping with this postprandial urgency. Urgency is significant enough that she is afraid to dine out, eat out of the home. No blood with stools or diarrhea.     She denies trials of previous diarrhea medications including evaluation with EGD exam the terminal ileum. Cecum was confirmed   by landmarks, including appendiceal orifice, cecal strap, ileocecal   valve. The quality of the prep was good. Retroflexion was performed in   the rectum.      COLONOSCOPY FINDINGS:  The cecum, ileocecal valve, te bleeding. 2. Followup above colon polyp pathology results. 3. Repeat colonoscopy examination in one year, or as per   colon-polyp pathology results. D:    05/17/2016 9:58 A  T:    05/17/2016 10:54 A  ATTENDING:  Regino Gore MD polypectomy from the mid transverse colon, suctioned out. · Mild diverticulosis sigmoid colon. · Small internal hemorrhoids.     RECOMMENDATIONS:  · High fiber diet.   · Follow-up above colon polyp pathology results  · Repeat colonoscopy examination in 3 Exam           ASSESSMENT/PLAN:   No diagnosis found. CMP May 2, 2015 showed elevated blood glucose 130 and ALT 31. Hemoglobin A1c 6.4% October 17, 215      64year old woman with history of morbid obesity, hypertension, type 2 diabetes.   She was in · Controlled only by fasting  · Some relief from Pepto Bismol tablets or liquid  · Last time, I prescribed a round of rifaximin antibiotic on 5/7/2021. That did give partial relief from the chronic diarrhea.   She states that diarrhea decreased from 14 e types were placed in this encounter.       Meds This Visit:  Requested Prescriptions      No prescriptions requested or ordered in this encounter       Imaging & Referrals:  None     AUGUSTO#[id number]

## 2021-08-10 ENCOUNTER — TELEPHONE (OUTPATIENT)
Dept: OBGYN CLINIC | Facility: CLINIC | Age: 61
End: 2021-08-10

## 2021-08-10 NOTE — TELEPHONE ENCOUNTER
RN contacted pt. Pt reports first day of cycle today. Pt scheduled for sx 8/2421. RN reminded pt to avoid NSAID for 1 week prior to scheduled surgery. RN to route to EB. Pt denies any further concerns.

## 2021-08-10 NOTE — TELEPHONE ENCOUNTER
Patient said that she is 64 and just got her period today. She was told my Dr. Marques Robles to inform her when she had a period.

## 2021-08-15 PROBLEM — K21.9 GASTROESOPHAGEAL REFLUX DISEASE: Status: ACTIVE | Noted: 2021-08-15

## 2021-08-20 ENCOUNTER — OFFICE VISIT (OUTPATIENT)
Dept: FAMILY MEDICINE CLINIC | Facility: CLINIC | Age: 61
End: 2021-08-20
Payer: MEDICAID

## 2021-08-20 ENCOUNTER — PATIENT MESSAGE (OUTPATIENT)
Dept: GASTROENTEROLOGY | Facility: CLINIC | Age: 61
End: 2021-08-20

## 2021-08-20 ENCOUNTER — LAB ENCOUNTER (OUTPATIENT)
Dept: LAB | Age: 61
End: 2021-08-20
Attending: FAMILY MEDICINE
Payer: MEDICAID

## 2021-08-20 VITALS
HEIGHT: 65 IN | SYSTOLIC BLOOD PRESSURE: 132 MMHG | WEIGHT: 293 LBS | RESPIRATION RATE: 18 BRPM | DIASTOLIC BLOOD PRESSURE: 77 MMHG | HEART RATE: 111 BPM | BODY MASS INDEX: 48.82 KG/M2 | TEMPERATURE: 98 F

## 2021-08-20 DIAGNOSIS — Z01.818 PRE-OP EXAMINATION: Primary | ICD-10-CM

## 2021-08-20 DIAGNOSIS — N95.0 POSTMENOPAUSAL BLEEDING: ICD-10-CM

## 2021-08-20 DIAGNOSIS — K52.9 CHRONIC DIARRHEA: ICD-10-CM

## 2021-08-20 DIAGNOSIS — Z79.4 TYPE 2 DIABETES MELLITUS WITHOUT COMPLICATION, WITH LONG-TERM CURRENT USE OF INSULIN (HCC): ICD-10-CM

## 2021-08-20 DIAGNOSIS — Z01.818 PRE-OP EXAMINATION: ICD-10-CM

## 2021-08-20 DIAGNOSIS — E11.9 TYPE 2 DIABETES MELLITUS WITHOUT COMPLICATION, WITH LONG-TERM CURRENT USE OF INSULIN (HCC): ICD-10-CM

## 2021-08-20 DIAGNOSIS — R15.9 FULL INCONTINENCE OF FECES: ICD-10-CM

## 2021-08-20 DIAGNOSIS — I10 ESSENTIAL HYPERTENSION: ICD-10-CM

## 2021-08-20 DIAGNOSIS — Z01.818 PREOP TESTING: ICD-10-CM

## 2021-08-20 LAB
ALBUMIN SERPL-MCNC: 3.4 G/DL (ref 3.4–5)
ALBUMIN/GLOB SERPL: 0.7 {RATIO} (ref 1–2)
ALP LIVER SERPL-CCNC: 48 U/L
ALT SERPL-CCNC: 25 U/L
ANION GAP SERPL CALC-SCNC: 2 MMOL/L (ref 0–18)
AST SERPL-CCNC: 13 U/L (ref 15–37)
BASOPHILS # BLD AUTO: 0.06 X10(3) UL (ref 0–0.2)
BASOPHILS NFR BLD AUTO: 0.6 %
BILIRUB SERPL-MCNC: 0.4 MG/DL (ref 0.1–2)
BILIRUB UR QL: NEGATIVE
BUN BLD-MCNC: 13 MG/DL (ref 7–18)
BUN/CREAT SERPL: 17.8 (ref 10–20)
CALCIUM BLD-MCNC: 9 MG/DL (ref 8.5–10.1)
CHLORIDE SERPL-SCNC: 104 MMOL/L (ref 98–112)
CO2 SERPL-SCNC: 32 MMOL/L (ref 21–32)
COLOR UR: YELLOW
CREAT BLD-MCNC: 0.73 MG/DL
CRP SERPL-MCNC: 5.09 MG/DL (ref ?–0.3)
DEPRECATED RDW RBC AUTO: 44.1 FL (ref 35.1–46.3)
EOSINOPHIL # BLD AUTO: 0.11 X10(3) UL (ref 0–0.7)
EOSINOPHIL NFR BLD AUTO: 1.1 %
ERYTHROCYTE [DISTWIDTH] IN BLOOD BY AUTOMATED COUNT: 13.3 % (ref 11–15)
EST. AVERAGE GLUCOSE BLD GHB EST-MCNC: 137 MG/DL (ref 68–126)
GLOBULIN PLAS-MCNC: 4.6 G/DL (ref 2.8–4.4)
GLUCOSE BLD-MCNC: 132 MG/DL (ref 70–99)
GLUCOSE UR-MCNC: NEGATIVE MG/DL
HBA1C MFR BLD HPLC: 6.4 % (ref ?–5.7)
HCT VFR BLD AUTO: 41.4 %
HGB BLD-MCNC: 13.1 G/DL
HGB UR QL STRIP.AUTO: NEGATIVE
IMM GRANULOCYTES # BLD AUTO: 0.04 X10(3) UL (ref 0–1)
IMM GRANULOCYTES NFR BLD: 0.4 %
KETONES UR-MCNC: NEGATIVE MG/DL
LYMPHOCYTES # BLD AUTO: 2.72 X10(3) UL (ref 1–4)
LYMPHOCYTES NFR BLD AUTO: 27.5 %
M PROTEIN MFR SERPL ELPH: 8 G/DL (ref 6.4–8.2)
MCH RBC QN AUTO: 28.7 PG (ref 26–34)
MCHC RBC AUTO-ENTMCNC: 31.6 G/DL (ref 31–37)
MCV RBC AUTO: 90.6 FL
MONOCYTES # BLD AUTO: 0.66 X10(3) UL (ref 0.1–1)
MONOCYTES NFR BLD AUTO: 6.7 %
NEUTROPHILS # BLD AUTO: 6.29 X10 (3) UL (ref 1.5–7.7)
NEUTROPHILS # BLD AUTO: 6.29 X10(3) UL (ref 1.5–7.7)
NEUTROPHILS NFR BLD AUTO: 63.7 %
NITRITE UR QL STRIP.AUTO: NEGATIVE
OSMOLALITY SERPL CALC.SUM OF ELEC: 288 MOSM/KG (ref 275–295)
PH UR: 6 [PH] (ref 5–8)
PLATELET # BLD AUTO: 271 10(3)UL (ref 150–450)
POTASSIUM SERPL-SCNC: 3.8 MMOL/L (ref 3.5–5.1)
PROT UR-MCNC: NEGATIVE MG/DL
RBC # BLD AUTO: 4.57 X10(6)UL
SODIUM SERPL-SCNC: 138 MMOL/L (ref 136–145)
SP GR UR STRIP: 1.02 (ref 1–1.03)
UROBILINOGEN UR STRIP-ACNC: <2
WBC # BLD AUTO: 9.9 X10(3) UL (ref 4–11)

## 2021-08-20 PROCEDURE — 83036 HEMOGLOBIN GLYCOSYLATED A1C: CPT

## 2021-08-20 PROCEDURE — 85025 COMPLETE CBC W/AUTO DIFF WBC: CPT

## 2021-08-20 PROCEDURE — 81001 URINALYSIS AUTO W/SCOPE: CPT

## 2021-08-20 PROCEDURE — 3075F SYST BP GE 130 - 139MM HG: CPT | Performed by: FAMILY MEDICINE

## 2021-08-20 PROCEDURE — 3078F DIAST BP <80 MM HG: CPT | Performed by: FAMILY MEDICINE

## 2021-08-20 PROCEDURE — 99214 OFFICE O/P EST MOD 30 MIN: CPT | Performed by: FAMILY MEDICINE

## 2021-08-20 PROCEDURE — 86140 C-REACTIVE PROTEIN: CPT

## 2021-08-20 PROCEDURE — 80053 COMPREHEN METABOLIC PANEL: CPT

## 2021-08-20 PROCEDURE — 3008F BODY MASS INDEX DOCD: CPT | Performed by: FAMILY MEDICINE

## 2021-08-20 PROCEDURE — 3044F HG A1C LEVEL LT 7.0%: CPT | Performed by: FAMILY MEDICINE

## 2021-08-20 PROCEDURE — 93000 ELECTROCARDIOGRAM COMPLETE: CPT | Performed by: FAMILY MEDICINE

## 2021-08-20 PROCEDURE — 36415 COLL VENOUS BLD VENIPUNCTURE: CPT

## 2021-08-20 NOTE — TELEPHONE ENCOUNTER
From: Chris Greenwood  To: Edwige Wolfe MD  Sent: 8/20/2021 4:14 PM CDT  Subject: Other    Hi  today I had the blood test you requested back in May.  Thanks, Stacey Patel

## 2021-08-20 NOTE — PROGRESS NOTES
HPI/Subjective:   Patient ID: Molina Manriquez is a 64year old female. Patient presents for preoperative physical as below.     Patient Active Problem List:     Diabetes mellitus, type 2 ([address]     Hypertension     Obesity     Drusen     Myopia of both e ONCE DAILY 100 each 11   • ONETOUCH DELICA LANCETS 92V Does not apply Misc Apply 1 Units topically daily. 90 each 3   • Blood Glucose Monitoring Suppl ([address]   [address]) W/DEVICE Does not apply Kit test T. I.D       Allergies:No Known Allergies    Obje Visit:  Requested Prescriptions      No prescriptions requested or ordered in this encounter       Imaging & Referrals:  ELECTROCARDIOGRAM, COMPLETE

## 2021-08-20 NOTE — PATIENT INSTRUCTIONS
Do not take Lantus on the night before surgery. On the day before surgery take glimepiride and Metformin as usual.  Do not take glimepiride or Metformin the morning of surgery.   Can resume Metformin, glimepiride, and Lantus on the evening after surgery if

## 2021-08-21 ENCOUNTER — LAB ENCOUNTER (OUTPATIENT)
Dept: LAB | Facility: HOSPITAL | Age: 61
End: 2021-08-21
Attending: OBSTETRICS & GYNECOLOGY
Payer: MEDICAID

## 2021-08-21 DIAGNOSIS — Z01.818 PREOP TESTING: ICD-10-CM

## 2021-08-21 LAB — SARS-COV-2 RNA RESP QL NAA+PROBE: NOT DETECTED

## 2021-08-23 ENCOUNTER — ORDERS FOR ADMISSION (OUTPATIENT)
Dept: OBGYN CLINIC | Facility: CLINIC | Age: 61
End: 2021-08-23

## 2021-08-23 ENCOUNTER — TELEPHONE (OUTPATIENT)
Dept: OBGYN CLINIC | Facility: CLINIC | Age: 61
End: 2021-08-23

## 2021-08-23 NOTE — PAT NURSING NOTE
Lunette Alpers from Dr. Lalitha Ness office returned call. MD aware of UA results and ordered antibiotics for surgical procedure 8/24.

## 2021-08-23 NOTE — TELEPHONE ENCOUNTER
Call PreAdmission testing and informed it would be up to Dr Prince Porras if want culture urine. Informed EB and ordered Ancef in the OR to cover. Renzo Lorenzo at [facility] and informed. Verbalized understanding.

## 2021-08-24 ENCOUNTER — HOSPITAL ENCOUNTER (OUTPATIENT)
Facility: HOSPITAL | Age: 61
Setting detail: HOSPITAL OUTPATIENT SURGERY
Discharge: HOME | End: 2021-08-24
Attending: OBSTETRICS & GYNECOLOGY | Admitting: OBSTETRICS & GYNECOLOGY
Payer: MEDICAID

## 2021-08-24 ENCOUNTER — ANESTHESIA (OUTPATIENT)
Dept: SURGERY | Facility: HOSPITAL | Age: 61
End: 2021-08-24
Payer: MEDICAID

## 2021-08-24 ENCOUNTER — ANESTHESIA EVENT (OUTPATIENT)
Dept: SURGERY | Facility: HOSPITAL | Age: 61
End: 2021-08-24
Payer: MEDICAID

## 2021-08-24 VITALS
BODY MASS INDEX: 48.23 KG/M2 | RESPIRATION RATE: 16 BRPM | HEART RATE: 66 BPM | OXYGEN SATURATION: 95 % | HEIGHT: 65.5 IN | SYSTOLIC BLOOD PRESSURE: 123 MMHG | WEIGHT: 293 LBS | TEMPERATURE: 98 F | DIASTOLIC BLOOD PRESSURE: 64 MMHG

## 2021-08-24 DIAGNOSIS — Z01.818 PREOP TESTING: Primary | ICD-10-CM

## 2021-08-24 LAB
GLUCOSE BLDC GLUCOMTR-MCNC: 106 MG/DL (ref 70–99)
GLUCOSE BLDC GLUCOMTR-MCNC: 127 MG/DL (ref 70–99)

## 2021-08-24 PROCEDURE — 0UBC8ZX EXCISION OF CERVIX, VIA NATURAL OR ARTIFICIAL OPENING ENDOSCOPIC, DIAGNOSTIC: ICD-10-PCS | Performed by: OBSTETRICS & GYNECOLOGY

## 2021-08-24 PROCEDURE — 57500 BIOPSY OF CERVIX: CPT | Performed by: OBSTETRICS & GYNECOLOGY

## 2021-08-24 PROCEDURE — 58558 HYSTEROSCOPY BIOPSY: CPT | Performed by: OBSTETRICS & GYNECOLOGY

## 2021-08-24 PROCEDURE — 0UDB7ZX EXTRACTION OF ENDOMETRIUM, VIA NATURAL OR ARTIFICIAL OPENING, DIAGNOSTIC: ICD-10-PCS | Performed by: OBSTETRICS & GYNECOLOGY

## 2021-08-24 RX ORDER — MORPHINE SULFATE 4 MG/ML
4 INJECTION, SOLUTION INTRAMUSCULAR; INTRAVENOUS EVERY 10 MIN PRN
Status: DISCONTINUED | OUTPATIENT
Start: 2021-08-24 | End: 2021-08-24

## 2021-08-24 RX ORDER — ONDANSETRON 4 MG/1
4 TABLET, FILM COATED ORAL EVERY 8 HOURS PRN
Status: CANCELLED | OUTPATIENT
Start: 2021-08-24

## 2021-08-24 RX ORDER — ONDANSETRON 2 MG/ML
4 INJECTION INTRAMUSCULAR; INTRAVENOUS ONCE AS NEEDED
Status: DISCONTINUED | OUTPATIENT
Start: 2021-08-24 | End: 2021-08-24

## 2021-08-24 RX ORDER — HYDROMORPHONE HYDROCHLORIDE 1 MG/ML
0.6 INJECTION, SOLUTION INTRAMUSCULAR; INTRAVENOUS; SUBCUTANEOUS EVERY 5 MIN PRN
Status: DISCONTINUED | OUTPATIENT
Start: 2021-08-24 | End: 2021-08-24

## 2021-08-24 RX ORDER — NALOXONE HYDROCHLORIDE 0.4 MG/ML
80 INJECTION, SOLUTION INTRAMUSCULAR; INTRAVENOUS; SUBCUTANEOUS AS NEEDED
Status: DISCONTINUED | OUTPATIENT
Start: 2021-08-24 | End: 2021-08-24

## 2021-08-24 RX ORDER — SODIUM CHLORIDE, SODIUM LACTATE, POTASSIUM CHLORIDE, CALCIUM CHLORIDE 600; 310; 30; 20 MG/100ML; MG/100ML; MG/100ML; MG/100ML
INJECTION, SOLUTION INTRAVENOUS CONTINUOUS
Status: DISCONTINUED | OUTPATIENT
Start: 2021-08-24 | End: 2021-08-24

## 2021-08-24 RX ORDER — METOCLOPRAMIDE 10 MG/1
10 TABLET ORAL ONCE
Status: COMPLETED | OUTPATIENT
Start: 2021-08-24 | End: 2021-08-24

## 2021-08-24 RX ORDER — PROCHLORPERAZINE EDISYLATE 5 MG/ML
5 INJECTION INTRAMUSCULAR; INTRAVENOUS ONCE AS NEEDED
Status: DISCONTINUED | OUTPATIENT
Start: 2021-08-24 | End: 2021-08-24

## 2021-08-24 RX ORDER — FERRIC SUBSULFATE 20-22G/100
SOLUTION, NON-ORAL MISCELLANEOUS AS NEEDED
Status: DISCONTINUED | OUTPATIENT
Start: 2021-08-24 | End: 2021-08-24 | Stop reason: HOSPADM

## 2021-08-24 RX ORDER — ONDANSETRON 2 MG/ML
INJECTION INTRAMUSCULAR; INTRAVENOUS AS NEEDED
Status: DISCONTINUED | OUTPATIENT
Start: 2021-08-24 | End: 2021-08-24 | Stop reason: SURG

## 2021-08-24 RX ORDER — ONDANSETRON 2 MG/ML
4 INJECTION INTRAMUSCULAR; INTRAVENOUS EVERY 8 HOURS PRN
Status: CANCELLED | OUTPATIENT
Start: 2021-08-24

## 2021-08-24 RX ORDER — LIDOCAINE HYDROCHLORIDE 10 MG/ML
INJECTION, SOLUTION EPIDURAL; INFILTRATION; INTRACAUDAL; PERINEURAL AS NEEDED
Status: DISCONTINUED | OUTPATIENT
Start: 2021-08-24 | End: 2021-08-24 | Stop reason: SURG

## 2021-08-24 RX ORDER — MIDAZOLAM HYDROCHLORIDE 1 MG/ML
INJECTION INTRAMUSCULAR; INTRAVENOUS AS NEEDED
Status: DISCONTINUED | OUTPATIENT
Start: 2021-08-24 | End: 2021-08-24 | Stop reason: SURG

## 2021-08-24 RX ORDER — MORPHINE SULFATE 10 MG/ML
6 INJECTION, SOLUTION INTRAMUSCULAR; INTRAVENOUS EVERY 10 MIN PRN
Status: DISCONTINUED | OUTPATIENT
Start: 2021-08-24 | End: 2021-08-24

## 2021-08-24 RX ORDER — ACETAMINOPHEN 500 MG
1000 TABLET ORAL ONCE
Status: COMPLETED | OUTPATIENT
Start: 2021-08-24 | End: 2021-08-24

## 2021-08-24 RX ORDER — HYDROCODONE BITARTRATE AND ACETAMINOPHEN 5; 325 MG/1; MG/1
1 TABLET ORAL AS NEEDED
Status: DISCONTINUED | OUTPATIENT
Start: 2021-08-24 | End: 2021-08-24

## 2021-08-24 RX ORDER — HYDROMORPHONE HYDROCHLORIDE 1 MG/ML
0.2 INJECTION, SOLUTION INTRAMUSCULAR; INTRAVENOUS; SUBCUTANEOUS EVERY 5 MIN PRN
Status: DISCONTINUED | OUTPATIENT
Start: 2021-08-24 | End: 2021-08-24

## 2021-08-24 RX ORDER — HYDROMORPHONE HYDROCHLORIDE 1 MG/ML
0.4 INJECTION, SOLUTION INTRAMUSCULAR; INTRAVENOUS; SUBCUTANEOUS EVERY 5 MIN PRN
Status: DISCONTINUED | OUTPATIENT
Start: 2021-08-24 | End: 2021-08-24

## 2021-08-24 RX ORDER — KETOROLAC TROMETHAMINE 30 MG/ML
INJECTION, SOLUTION INTRAMUSCULAR; INTRAVENOUS AS NEEDED
Status: DISCONTINUED | OUTPATIENT
Start: 2021-08-24 | End: 2021-08-24 | Stop reason: SURG

## 2021-08-24 RX ORDER — FAMOTIDINE 20 MG/1
20 TABLET ORAL ONCE
Status: COMPLETED | OUTPATIENT
Start: 2021-08-24 | End: 2021-08-24

## 2021-08-24 RX ORDER — ROCURONIUM BROMIDE 10 MG/ML
INJECTION, SOLUTION INTRAVENOUS AS NEEDED
Status: DISCONTINUED | OUTPATIENT
Start: 2021-08-24 | End: 2021-08-24 | Stop reason: SURG

## 2021-08-24 RX ORDER — HYDROCODONE BITARTRATE AND ACETAMINOPHEN 5; 325 MG/1; MG/1
2 TABLET ORAL AS NEEDED
Status: DISCONTINUED | OUTPATIENT
Start: 2021-08-24 | End: 2021-08-24

## 2021-08-24 RX ORDER — DEXTROSE MONOHYDRATE 25 G/50ML
50 INJECTION, SOLUTION INTRAVENOUS
Status: DISCONTINUED | OUTPATIENT
Start: 2021-08-24 | End: 2021-08-24

## 2021-08-24 RX ORDER — HALOPERIDOL 5 MG/ML
0.25 INJECTION INTRAMUSCULAR ONCE AS NEEDED
Status: DISCONTINUED | OUTPATIENT
Start: 2021-08-24 | End: 2021-08-24

## 2021-08-24 RX ORDER — MORPHINE SULFATE 4 MG/ML
2 INJECTION, SOLUTION INTRAMUSCULAR; INTRAVENOUS EVERY 10 MIN PRN
Status: DISCONTINUED | OUTPATIENT
Start: 2021-08-24 | End: 2021-08-24

## 2021-08-24 RX ADMIN — KETOROLAC TROMETHAMINE 30 MG: 30 INJECTION, SOLUTION INTRAMUSCULAR; INTRAVENOUS at 11:50:00

## 2021-08-24 RX ADMIN — ONDANSETRON 4 MG: 2 INJECTION INTRAMUSCULAR; INTRAVENOUS at 11:28:00

## 2021-08-24 RX ADMIN — LIDOCAINE HYDROCHLORIDE 50 MG: 10 INJECTION, SOLUTION EPIDURAL; INFILTRATION; INTRACAUDAL; PERINEURAL at 11:23:00

## 2021-08-24 RX ADMIN — SODIUM CHLORIDE, SODIUM LACTATE, POTASSIUM CHLORIDE, CALCIUM CHLORIDE: 600; 310; 30; 20 INJECTION, SOLUTION INTRAVENOUS at 12:10:00

## 2021-08-24 RX ADMIN — MIDAZOLAM HYDROCHLORIDE 2 MG: 1 INJECTION INTRAMUSCULAR; INTRAVENOUS at 11:18:00

## 2021-08-24 RX ADMIN — ROCURONIUM BROMIDE 10 MG: 10 INJECTION, SOLUTION INTRAVENOUS at 11:23:00

## 2021-08-24 NOTE — ANESTHESIA POSTPROCEDURE EVALUATION
Patient: Phillip Rom    Procedure Summary     Date: 08/24/21 Room / Location: [address] [place] [address] MAIN OR 06 / [address] [place] [address] MAIN OR    Anesthesia Start: 1118 Anesthesia Stop: 1210    Procedure: cervical polypectomy; hysteroscopy, myosure, endometrial polypectomy,  dilation an

## 2021-08-24 NOTE — ANESTHESIA PROCEDURE NOTES
Airway  Date/Time: 8/24/2021 11:25 AM  Urgency: elective    Airway not difficult    General Information and Staff    Patient location during procedure: OR  Anesthesiologist: Bijan Smith MD  Resident/CRNA: Adela Barrett CRNA  Performed: CRNA

## 2021-08-24 NOTE — ANESTHESIA PREPROCEDURE EVALUATION
Anesthesia PreOp Note    HPI:     Maura Sauceda is a 64year old female who presents for preoperative consultation requested by: Gillian May MD    Date of Surgery: 8/24/2021    Procedure(s):  hysteroscopy, myosure, dilation and curettage, polypec impairment     glasses       Past Surgical History:   Procedure Laterality Date   •      • CHOLECYSTECTOMY     • COLONOSCOPY N/A 2019    Procedure: COLONOSCOPY;  Surgeon: Jaimie Khalil MD;  Location: [address] [place] [address] ENDOSCOPY   • ENDOMETRI USE ONCE DAILY, Disp: 100 each, Rfl: 11  ONETOUCH DELICA LANCETS 79P Does not apply Misc, Apply 1 Units topically daily. , Disp: 90 each, Rfl: 3  Blood Glucose Monitoring Suppl ([address]   [address]) W/DEVICE Does not apply Kit, test T. I.D, Disp: , Rfl: Asked        Exercise: Not Asked        Bike Helmet: Not Asked        Seat Belt: Not Asked        Self-Exams: Not Asked    Social History Narrative      Not on file    Social Determinants of Health  Financial Resource Strain:       Difficulty of Paying Erin TempSrc:  Oral   SpO2:  96%   Weight: (!) 142 kg (313 lb)    Height: 1.664 m (5' 5.5\")         Anesthesia Evaluation     Patient summary reviewed and Nursing notes reviewed    No history of anesthetic complications   Airway   Mallampati: I  TM distance:

## 2021-08-24 NOTE — OPERATIVE REPORT
[facility] POST ANESTHESIA CARE UNIT  Operative Note     Chris Greenwood Location: OR   [facility] [id number] N [id number]   Admission Date 8/24/2021 Operation Date 8/24/2021   Attending Physician Brigida Calixto MD Operating Physician Adeola Torres

## 2021-08-24 NOTE — H&P
Kayla Nation Patient Status:  [facility] Out[facility]    1960 MRN [id number]   Location [facility] PRE OP RECOVERY Attending Artemio Ching MD   Hosp Day # 0 PCP Blondie Romberg.  Danyell Madison, daily., Disp: , Rfl: , Past Week at Unknown time  Insulin Pen Needle (BD PEN NEEDLE MINI U/F) 31G X 5 MM Does not apply Misc, Use with Lantus, Disp: 100 each, Rfl: 2  Glucose Blood (ONETOUCH ULTRA BLUE) In Vitro Strip, USE ONCE DAILY, Disp: 100 each, Rfl: 1985        Years since quittin.6      Smokeless tobacco: Never Used    Alcohol use: No      Alcohol/week: 0.0 standard drinks       Review of Systems:     ROS:   A comprehensive 10 point ROS was completed.  All pertinent positives and negatives no

## 2021-08-25 ENCOUNTER — TELEPHONE (OUTPATIENT)
Dept: OBGYN CLINIC | Facility: CLINIC | Age: 61
End: 2021-08-25

## 2021-08-25 ENCOUNTER — PATIENT MESSAGE (OUTPATIENT)
Dept: OBGYN CLINIC | Facility: CLINIC | Age: 61
End: 2021-08-25

## 2021-08-25 NOTE — TELEPHONE ENCOUNTER
Talked to patient and discussed abnormal endometrial cells seen from [facility]  yesterday.  Requested patient to come and see me this week on 8/27/21 to discuss next step and recommended treatment and encouraged patient to bring family member to assist in [address]

## 2021-08-25 NOTE — TELEPHONE ENCOUNTER
Patient is calling for a 2 week follow-up appointment from a [facility] that was done yesterday.

## 2021-08-25 NOTE — TELEPHONE ENCOUNTER
Called pt and provided post-op visit for 09/07/21 with EB.  pt verbalized understanding and agrees with POC.

## 2021-08-25 NOTE — OPERATIVE REPORT
[facility]    PATIENT'S NAME: Mateo CHING   ATTENDING PHYSICIAN: Victor Hugo Sanchez MD   OPERATING PHYSICIAN: Victor Hugo Sanchez MD   PATIENT ACCOUNT#:   [id number]    LOCATION:  [address]  MEDICAL RECORD #:   [id number]       DATE OF device was placed and resection of the endometrial polypoid tissue, as well as the remainder of the large amount of endometrial tissue, was performed.   There was good visualization at the end of resection of tissue of both tubal ostia with minimal tissue r

## 2021-08-25 NOTE — TELEPHONE ENCOUNTER
This RN spoke with pt and pt ok to come in on 8/27/21 at [facility], supervisor will add pt. Pt verbalized understanding and agrees with POC.

## 2021-08-25 NOTE — TELEPHONE ENCOUNTER
Jimbo Connor RN     PJ    8/25/21 3:44 PM  Note     This RN spoke with pt and pt ok to come in on 8/27/21 at [facility], supervisor will add pt. Pt verbalized understanding and agrees with POC.           Pt had called today therefore encounter clos

## 2021-08-26 RX ORDER — GLIMEPIRIDE 2 MG/1
2 TABLET ORAL
Qty: 90 TABLET | Refills: 1 | Status: SHIPPED | OUTPATIENT
Start: 2021-08-26

## 2021-08-26 NOTE — TELEPHONE ENCOUNTER
Refill passed per [facility], [facility] protocol.   Requested Prescriptions   Pending Prescriptions Disp Refills    GLIMEPIRIDE 2 MG Oral Tab [Pharmacy Med Name: Glimepiride 2 MG Oral Tablet] 90 tablet 0     Sig: Take 1 tablet by mouth once daily with breakfast

## 2021-08-27 ENCOUNTER — OFFICE VISIT (OUTPATIENT)
Dept: OBGYN CLINIC | Facility: CLINIC | Age: 61
End: 2021-08-27
Payer: MEDICAID

## 2021-08-27 DIAGNOSIS — N95.0 POSTMENOPAUSAL BLEEDING: Primary | ICD-10-CM

## 2021-08-27 PROBLEM — N85.02 COMPLEX ENDOMETRIAL HYPERPLASIA WITH ATYPIA: Status: ACTIVE | Noted: 2021-08-27

## 2021-08-27 PROCEDURE — 99214 OFFICE O/P EST MOD 30 MIN: CPT | Performed by: OBSTETRICS & GYNECOLOGY

## 2021-08-27 NOTE — PROGRESS NOTES
Maura Sauceda is a 64year old female. HPI:   Patient presents with:  Surgical Followup: PROCEDURE:  Cervical polypectomy, hysteroscopy, MyoSure resection of endometrial polyp, and dilation and curettage.      Here with family to discuss surgical Path • ONETOUCH DELICA LANCETS 76F Does not apply Misc Apply 1 Units topically daily. 90 each 3   • Blood Glucose Monitoring Suppl ([address]   [address]) W/DEVICE Does not apply Kit test T. I.D         Allergies:  No Known Allergies    LMP 08/10/2021

## 2021-09-07 ENCOUNTER — OFFICE VISIT (OUTPATIENT)
Dept: OBGYN CLINIC | Facility: CLINIC | Age: 61
End: 2021-09-07
Payer: MEDICAID

## 2021-09-07 VITALS
BODY MASS INDEX: 48.82 KG/M2 | DIASTOLIC BLOOD PRESSURE: 72 MMHG | SYSTOLIC BLOOD PRESSURE: 124 MMHG | WEIGHT: 293 LBS | HEIGHT: 65 IN

## 2021-09-07 DIAGNOSIS — N85.02 COMPLEX ENDOMETRIAL HYPERPLASIA WITH ATYPIA: Primary | ICD-10-CM

## 2021-09-07 PROCEDURE — 3008F BODY MASS INDEX DOCD: CPT | Performed by: OBSTETRICS & GYNECOLOGY

## 2021-09-07 PROCEDURE — 99212 OFFICE O/P EST SF 10 MIN: CPT | Performed by: OBSTETRICS & GYNECOLOGY

## 2021-09-07 PROCEDURE — 3074F SYST BP LT 130 MM HG: CPT | Performed by: OBSTETRICS & GYNECOLOGY

## 2021-09-07 PROCEDURE — 3078F DIAST BP <80 MM HG: CPT | Performed by: OBSTETRICS & GYNECOLOGY

## 2021-09-07 NOTE — PROGRESS NOTES
Rafiq Tello is a 64year old female. HPI:   Patient presents with:  Surgical Followup: 8/24/2021 Cervical polypectomy, hysteroscopy by EB    Had surgical follow-up visit 8/27/21 currently without complaint.  Has Gyn/Onc appointment with Dr. Sharif CHING 65\"   Wt (!) 315 lb (142.9 kg)   LMP 08/10/2021   Breastfeeding No   BMI 52.42 kg/m²      ASSESSMENT/PLAN:   Assessment       1.  Complex endometrial hyperplasia with atypia  - Has 9/28/21 appointment with Leno Pena    Total time spent = 15 minut

## 2021-09-21 ENCOUNTER — PATIENT MESSAGE (OUTPATIENT)
Dept: FAMILY MEDICINE CLINIC | Facility: CLINIC | Age: 61
End: 2021-09-21

## 2021-09-21 DIAGNOSIS — R20.2 NUMBNESS AND TINGLING IN LEFT HAND: Primary | ICD-10-CM

## 2021-09-21 DIAGNOSIS — R20.0 NUMBNESS AND TINGLING IN LEFT HAND: Primary | ICD-10-CM

## 2021-09-21 NOTE — TELEPHONE ENCOUNTER
From: Eriberto Frausto  To: Niki Hernandez MD  Sent: 9/21/2021 3:35 PM CDT  Subject: Kylahkel Hernandez I have been wearing the hand brace for awhile an my left hand still tingles, and feels numb.  I have an appointment scheduled with arleen

## 2021-09-21 NOTE — TELEPHONE ENCOUNTER
Please see patient's message below and advise--pt schedule appt earlier today for 10/28/2021    Do you need to see her sooner?     Appointment Information   Name: Lachelle Johnson MRN: [id number]   Date: 10/28/2021 Status: Kervin   Appt Time: 10:30 AM Length:

## 2021-09-23 ENCOUNTER — PATIENT MESSAGE (OUTPATIENT)
Dept: GASTROENTEROLOGY | Facility: CLINIC | Age: 61
End: 2021-09-23

## 2021-09-23 ENCOUNTER — PATIENT MESSAGE (OUTPATIENT)
Dept: FAMILY MEDICINE CLINIC | Facility: CLINIC | Age: 61
End: 2021-09-23

## 2021-09-23 NOTE — TELEPHONE ENCOUNTER
Patient read Justinmind message on 9/23/21. Last read by Melita Aleman at 4:36 PM on 9/23/2021. This is a second request from patient and instructed  for a second time to call Hospital medical record department . From: Melita Aleman  To:  Mar

## 2021-09-23 NOTE — TELEPHONE ENCOUNTER
From: Molina Manriquez  To: Don Lombard, MD  Sent: 9/23/2021 9:23 AM CDT  Subject: Sridevi Child, is a copy of my medical records in the medical records office?  My disability  are requesting my records and so far have

## 2021-09-29 ENCOUNTER — TELEPHONE (OUTPATIENT)
Dept: FAMILY MEDICINE CLINIC | Facility: CLINIC | Age: 61
End: 2021-09-29

## 2021-09-29 ENCOUNTER — MED REC SCAN ONLY (OUTPATIENT)
Dept: OBGYN CLINIC | Facility: CLINIC | Age: 61
End: 2021-09-29

## 2021-09-29 NOTE — TELEPHONE ENCOUNTER
Patient called and advised she needs an appointment for Pre-Surgical Clearance. She is having a Hysterectomy with Dr. Johanna Alston out of [facility], on Thursday 10/21. Soonest available on 's schedule is 11/03.      Is there any way we can squeeze her into t

## 2021-10-02 ENCOUNTER — PATIENT MESSAGE (OUTPATIENT)
Dept: FAMILY MEDICINE CLINIC | Facility: CLINIC | Age: 61
End: 2021-10-02

## 2021-10-02 NOTE — TELEPHONE ENCOUNTER
From: Oscar Clay  To: Niki Hammond MD  Sent: 10/2/2021 10:30 AM CDT  Subject: Correne Bolus Dr. Jacqueline Hammond, I am almost out of Levothyroxin. Do I need this refilled?  Thanks Adarsh Horton

## 2021-10-07 ENCOUNTER — OFFICE VISIT (OUTPATIENT)
Dept: FAMILY MEDICINE CLINIC | Facility: CLINIC | Age: 61
End: 2021-10-07
Payer: MEDICAID

## 2021-10-07 ENCOUNTER — PATIENT MESSAGE (OUTPATIENT)
Dept: FAMILY MEDICINE CLINIC | Facility: CLINIC | Age: 61
End: 2021-10-07

## 2021-10-07 VITALS
WEIGHT: 293 LBS | DIASTOLIC BLOOD PRESSURE: 76 MMHG | HEIGHT: 65 IN | BODY MASS INDEX: 48.82 KG/M2 | RESPIRATION RATE: 18 BRPM | TEMPERATURE: 98 F | SYSTOLIC BLOOD PRESSURE: 117 MMHG | HEART RATE: 74 BPM

## 2021-10-07 DIAGNOSIS — E11.9 TYPE 2 DIABETES MELLITUS WITHOUT COMPLICATION, WITHOUT LONG-TERM CURRENT USE OF INSULIN (HCC): ICD-10-CM

## 2021-10-07 DIAGNOSIS — R10.13 EPIGASTRIC PAIN: ICD-10-CM

## 2021-10-07 DIAGNOSIS — E66.01 CLASS 3 SEVERE OBESITY DUE TO EXCESS CALORIES WITH SERIOUS COMORBIDITY AND BODY MASS INDEX (BMI) OF 50.0 TO 59.9 IN ADULT (HCC): ICD-10-CM

## 2021-10-07 DIAGNOSIS — I10 PRIMARY HYPERTENSION: ICD-10-CM

## 2021-10-07 DIAGNOSIS — Z01.818 PRE-OP EXAMINATION: Primary | ICD-10-CM

## 2021-10-07 DIAGNOSIS — N85.02 COMPLEX ENDOMETRIAL HYPERPLASIA WITH ATYPIA: ICD-10-CM

## 2021-10-07 PROCEDURE — 3078F DIAST BP <80 MM HG: CPT | Performed by: FAMILY MEDICINE

## 2021-10-07 PROCEDURE — 3008F BODY MASS INDEX DOCD: CPT | Performed by: FAMILY MEDICINE

## 2021-10-07 PROCEDURE — 93000 ELECTROCARDIOGRAM COMPLETE: CPT | Performed by: FAMILY MEDICINE

## 2021-10-07 PROCEDURE — 99214 OFFICE O/P EST MOD 30 MIN: CPT | Performed by: FAMILY MEDICINE

## 2021-10-07 PROCEDURE — 3074F SYST BP LT 130 MM HG: CPT | Performed by: FAMILY MEDICINE

## 2021-10-07 RX ORDER — PANTOPRAZOLE SODIUM 40 MG/1
40 TABLET, DELAYED RELEASE ORAL
Qty: 30 TABLET | Refills: 2 | Status: SHIPPED | OUTPATIENT
Start: 2021-10-07

## 2021-10-07 NOTE — PROGRESS NOTES
Subjective:   Patient ID: Melita Aleman is a 64year old female. Patient presents for preoperative physical as below.     Patient Active Problem List:     Diabetes mellitus, type 2 ([address]     Hypertension     Obesity     Drusen     Myopia of both eyes (EQ VISION FORMULA 50+ OR) Take by mouth. • aspirin 81 MG Oral Tab Take 81 mg by mouth daily.      • Insulin Pen Needle (BD PEN NEEDLE MINI U/F) 31G X 5 MM Does not apply Misc Use with Lantus 100 each 2   • Glucose Blood (ONETOUCH ULTRA BLUE) In Vitro S before surgery and Metformin and glimepiride morning of surgery. Primary hypertension–blood pressure controlled on current medication    Epigastric pain–patient complains of intermittent epigastric discomfort.   Exam benign, recent hemoglobin normal.  Goss

## 2021-10-08 ENCOUNTER — TELEPHONE (OUTPATIENT)
Dept: FAMILY MEDICINE CLINIC | Facility: CLINIC | Age: 61
End: 2021-10-08

## 2021-10-08 NOTE — TELEPHONE ENCOUNTER
Herberth from Dr. Ritter Kind office is requesting medical clearance and EKG tracing faxed over.     Fax # [phone]

## 2021-10-08 NOTE — TELEPHONE ENCOUNTER
From: Govind Sánchez  To: Niki Chaves MD  Sent: 10/7/2021 4:08 PM CDT  Subject: Question regarding Electrocardiogram complete    What does except for left axis deviation mean

## 2021-10-12 ENCOUNTER — ORDER TRANSCRIPTION (OUTPATIENT)
Dept: ADMINISTRATIVE | Facility: HOSPITAL | Age: 61
End: 2021-10-12

## 2021-10-12 DIAGNOSIS — R20.0 NUMBNESS AND TINGLING IN LEFT HAND: Primary | ICD-10-CM

## 2021-10-12 DIAGNOSIS — R20.2 NUMBNESS AND TINGLING IN LEFT HAND: Primary | ICD-10-CM

## 2021-11-04 ENCOUNTER — PATIENT MESSAGE (OUTPATIENT)
Dept: GASTROENTEROLOGY | Facility: CLINIC | Age: 61
End: 2021-11-04

## 2021-11-05 NOTE — TELEPHONE ENCOUNTER
From: Govind Sánchez  To: Blossom Crigler, MD  Sent: 11/4/2021 7:50 PM CDT  Subject: Colonoscopy    Hi Dr. Rosalinda Casillas I can now schedule the colonscope after Dec 13th. I will be cleared from the hysterectomy surgent then. The surgery was on Oct 21.  Elvia Mckeon

## 2021-11-05 NOTE — TELEPHONE ENCOUNTER
Dr. Nuno Aj is providing update below from previous discussion. Last seen in clinic 08/06/2021. crp elevation collected 08/20/2021 warranted planning for colonoscopy sooner than August 2022.      Please provide orders or advise on timeframe o

## 2021-11-07 RX ORDER — INSULIN GLARGINE 100 [IU]/ML
INJECTION, SOLUTION SUBCUTANEOUS
Qty: 15 ML | Refills: 2 | Status: SHIPPED | OUTPATIENT
Start: 2021-11-07

## 2021-11-07 RX ORDER — METFORMIN HYDROCHLORIDE 750 MG/1
1500 TABLET, EXTENDED RELEASE ORAL
Qty: 180 TABLET | Refills: 1 | Status: SHIPPED | OUTPATIENT
Start: 2021-11-07 | End: 2022-05-04

## 2021-11-07 NOTE — TELEPHONE ENCOUNTER
Refill passed per [place] [employer], [facility] protocol.      Requested Prescriptions   Pending Prescriptions Disp Refills    METFORMIN HCL  MG Oral Tablet 24 Hr [Pharmacy Med Name: metFORMIN HCl  MG Oral Tablet Extended Release 24 Hour] 180 tablet 0     Sig: TAKE 2 TABLETS BY MOUTH ONCE DAILY WITH BREAKFAST        Diabetes Medication Protocol Passed - 11/7/2021  3:06 PM        Passed - Last A1C < 7.5 and within past 6 months     Lab Results   Component Value Date    A1C 6.4 (H) 08/20/2021               Passed - Appointment in past 6 or next 3 months        Passed - GFR Non- > 50     Lab Results   Component Value Date    GFRNAA 80 08/20/2021                 Passed - GFR in the past 12 months                Recent Outpatient Visits              1 month ago Pre-op examination    Sherri Workman MD    Office Visit    2 months ago Complex endometrial hyperplasia with atypia    Craig Camacho MD    Office Visit    2 months ago Postmenopausal bleeding    [address] [place] [address], Compa Doan MD    Office Visit    2 months ago Pre-op examination    Sherri Workman MD    Office Visit    3 months ago Chronic diarrhea    Priya Mercado MD    Office Visit             Future Appointments         Provider Department Appt Notes    In 1 month Oh Ott MD [facility]Medicare

## 2021-11-07 NOTE — TELEPHONE ENCOUNTER
Refill passed per [facility], [facility] protocol    Requested Prescriptions   Pending Prescriptions Disp Refills    LANTUS SOLOSTAR 100 UNIT/ML Subcutaneous Solution Pen-injector [Pharmacy Med Name: Lantus SoloStar 100 UNIT/ML Subcutaneous Solution Pen-injector]

## 2021-11-13 ENCOUNTER — TELEPHONE (OUTPATIENT)
Dept: GASTROENTEROLOGY | Facility: CLINIC | Age: 61
End: 2021-11-13

## 2021-11-13 DIAGNOSIS — R79.82 ELEVATED C-REACTIVE PROTEIN (CRP): ICD-10-CM

## 2021-11-13 DIAGNOSIS — K52.9 CHRONIC DIARRHEA: Primary | ICD-10-CM

## 2021-11-13 RX ORDER — POLYETHYLENE GLYCOL 3350, SODIUM SULFATE ANHYDROUS, SODIUM BICARBONATE, SODIUM CHLORIDE, POTASSIUM CHLORIDE 236; 22.74; 6.74; 5.86; 2.97 G/4L; G/4L; G/4L; G/4L; G/4L
4 POWDER, FOR SOLUTION ORAL ONCE
Qty: 1 EACH | Refills: 0 | Status: SHIPPED | OUTPATIENT
Start: 2021-11-13 | End: 2021-11-13

## 2021-11-13 NOTE — TELEPHONE ENCOUNTER
Bobby Sinha MD routed conversation to Marisa Mayorga RN; Em Gi Clinical Staff; Twilla Bence Surg/Proc Scheduling 4 hours ago (3:37 AM)     Bobby Sinha MD 4 hours ago (3:29 AM)          See most recent office visit 8/6/2021 and me Sheyla Flores MD 9 days ago     [facility] Dr. Aakash Hitchcock I can now schedule the colonscope after Dec 13th. I will be cleared from the hysterectomy surgent then. The surgery was on Oct 21.  Thanks, Ely

## 2021-11-13 NOTE — TELEPHONE ENCOUNTER
GI Schedulers-    Please schedule the patient for procedure as per Dr. Shakeel Lundberg orders below, thank you!

## 2021-11-13 NOTE — TELEPHONE ENCOUNTER
See most recent office visit 8/6/2021 and messaging encounter of 8/21/2021. Recent endometrial cancer, elevated CRP 5.09 August 2021. Yes absolutely, let's get a colonoscopy exam scheduled. Urgent but not an emergency.     GI schedulers –    Please mary

## 2021-11-13 NOTE — TELEPHONE ENCOUNTER
Refer to TE from 11/13/2021 routed HP to GI Schedulers to contact patient to scheduled, pt informed via [email or web address].

## 2021-11-16 NOTE — TELEPHONE ENCOUNTER
Dr. Simon Powell, you have MAC available for this pt on 11/29/21 at 2:00 pm OR 12/13/21 at 1:00 pm at [address] [place] [address]. Please advise if either of these dates work & I will call the pt to get her scheduled.     Thank you! :)

## 2021-11-16 NOTE — TELEPHONE ENCOUNTER
11/29/2021 would be perfect with me. Otherwise if she cannot do that day, 12/13 would be fine too.   Thank you so much Della

## 2021-11-17 ENCOUNTER — TELEPHONE (OUTPATIENT)
Dept: GASTROENTEROLOGY | Facility: CLINIC | Age: 61
End: 2021-11-17

## 2021-11-17 NOTE — TELEPHONE ENCOUNTER
Not on Med List:    PEG-3350 Electrol Sol    Per pharmacy please approve for  GM or  Gavilyte-G (Brand for  GM).

## 2021-11-19 NOTE — TELEPHONE ENCOUNTER
Scheduled for:  Colonoscopy - [address]  Provider Name:  Dr. Guillermo Taylor  Date:  11/29/21  Location:  [facility]  Sedation:  MAC  Time:  2:00 pm (pt is aware to arrive at 1:00 pm)  Prep:  Golytely, Prep instructions were given to pt over the phone, pt verbalized [place]

## 2021-11-26 ENCOUNTER — LAB ENCOUNTER (OUTPATIENT)
Dept: LAB | Facility: HOSPITAL | Age: 61
End: 2021-11-26
Attending: INTERNAL MEDICINE
Payer: MEDICAID

## 2021-11-26 DIAGNOSIS — Z01.818 PRE-OP TESTING: ICD-10-CM

## 2021-11-29 ENCOUNTER — HOSPITAL ENCOUNTER (OUTPATIENT)
Facility: HOSPITAL | Age: 61
Setting detail: HOSPITAL OUTPATIENT SURGERY
Discharge: HOME | End: 2021-11-29
Attending: INTERNAL MEDICINE | Admitting: INTERNAL MEDICINE
Payer: MEDICAID

## 2021-11-29 ENCOUNTER — ANESTHESIA EVENT (OUTPATIENT)
Dept: ENDOSCOPY | Facility: HOSPITAL | Age: 61
End: 2021-11-29
Payer: MEDICAID

## 2021-11-29 ENCOUNTER — ANESTHESIA (OUTPATIENT)
Dept: ENDOSCOPY | Facility: HOSPITAL | Age: 61
End: 2021-11-29
Payer: MEDICAID

## 2021-11-29 VITALS
BODY MASS INDEX: 47.09 KG/M2 | HEART RATE: 71 BPM | RESPIRATION RATE: 16 BRPM | SYSTOLIC BLOOD PRESSURE: 123 MMHG | OXYGEN SATURATION: 99 % | TEMPERATURE: 97 F | DIASTOLIC BLOOD PRESSURE: 59 MMHG | WEIGHT: 293 LBS | HEIGHT: 66 IN

## 2021-11-29 DIAGNOSIS — R79.82 ELEVATED C-REACTIVE PROTEIN (CRP): ICD-10-CM

## 2021-11-29 DIAGNOSIS — K52.9 CHRONIC DIARRHEA: ICD-10-CM

## 2021-11-29 DIAGNOSIS — Z01.818 PRE-OP TESTING: Primary | ICD-10-CM

## 2021-11-29 PROCEDURE — 45380 COLONOSCOPY AND BIOPSY: CPT | Performed by: INTERNAL MEDICINE

## 2021-11-29 PROCEDURE — 0DBL8ZX EXCISION OF TRANSVERSE COLON, VIA NATURAL OR ARTIFICIAL OPENING ENDOSCOPIC, DIAGNOSTIC: ICD-10-PCS | Performed by: INTERNAL MEDICINE

## 2021-11-29 PROCEDURE — 45385 COLONOSCOPY W/LESION REMOVAL: CPT | Performed by: INTERNAL MEDICINE

## 2021-11-29 PROCEDURE — 0DBE8ZX EXCISION OF LARGE INTESTINE, VIA NATURAL OR ARTIFICIAL OPENING ENDOSCOPIC, DIAGNOSTIC: ICD-10-PCS | Performed by: INTERNAL MEDICINE

## 2021-11-29 RX ORDER — LIDOCAINE HYDROCHLORIDE 10 MG/ML
INJECTION, SOLUTION EPIDURAL; INFILTRATION; INTRACAUDAL; PERINEURAL AS NEEDED
Status: DISCONTINUED | OUTPATIENT
Start: 2021-11-29 | End: 2021-11-29 | Stop reason: SURG

## 2021-11-29 RX ORDER — MIDAZOLAM HYDROCHLORIDE 1 MG/ML
INJECTION INTRAMUSCULAR; INTRAVENOUS AS NEEDED
Status: DISCONTINUED | OUTPATIENT
Start: 2021-11-29 | End: 2021-11-29 | Stop reason: SURG

## 2021-11-29 RX ORDER — SODIUM CHLORIDE, SODIUM LACTATE, POTASSIUM CHLORIDE, CALCIUM CHLORIDE 600; 310; 30; 20 MG/100ML; MG/100ML; MG/100ML; MG/100ML
INJECTION, SOLUTION INTRAVENOUS CONTINUOUS
Status: DISCONTINUED | OUTPATIENT
Start: 2021-11-29 | End: 2021-11-29

## 2021-11-29 RX ADMIN — SODIUM CHLORIDE, SODIUM LACTATE, POTASSIUM CHLORIDE, CALCIUM CHLORIDE: 600; 310; 30; 20 INJECTION, SOLUTION INTRAVENOUS at 14:51:00

## 2021-11-29 RX ADMIN — MIDAZOLAM HYDROCHLORIDE 2 MG: 1 INJECTION INTRAMUSCULAR; INTRAVENOUS at 15:06:00

## 2021-11-29 RX ADMIN — LIDOCAINE HYDROCHLORIDE 50 MG: 10 INJECTION, SOLUTION EPIDURAL; INFILTRATION; INTRACAUDAL; PERINEURAL at 14:53:00

## 2021-11-29 RX ADMIN — SODIUM CHLORIDE, SODIUM LACTATE, POTASSIUM CHLORIDE, CALCIUM CHLORIDE: 600; 310; 30; 20 INJECTION, SOLUTION INTRAVENOUS at 15:41:00

## 2021-11-29 NOTE — OPERATIVE REPORT
COLONOSCOPY PROCEDURE REPORT     DATE OF PROCEDURE:  11/29/2021     PCP: Emery Dubois. Yonatan Carballo MD     PREOPERATIVE DIAGNOSIS:  Chronic diarrhea, abnormal CRP inflammatory marker     POSTOPERATIVE DIAGNOSIS:  See impression. SURGEON:  Marcus Belle diet.  · Follow-up above colon polyp pathology results. · Follow-up above random colonic mucosal biopsy results. · Repeat colonoscopy examination in 5 years.   · No aspirin or NSAID medications for next 5 days to prevent bleeding

## 2021-11-29 NOTE — H&P
History & Physical Examination    Patient Name: Truman Cadena  MRN: [id number]  [facility]: [id number]  YOB: 1960    Diagnosis: Chronic diarrhea, abnormal CRP inflammatory marker    Present Illness: 15-year-old woman with history of morbid obesity mouth daily. , Disp: , Rfl: , 11/23/2021  Glucose Blood (ONETOUCH ULTRA BLUE) In Vitro Strip, USE ONCE DAILY, Disp: 100 each, Rfl: 11  ONETOUCH DELICA LANCETS 50C Does not apply Misc, Apply 1 Units topically daily. , Disp: 90 each, Rfl: 3  Blood Glucose Rhianna 1985        Years since quittin.9      Smokeless tobacco: Never Used    Alcohol use: No      Alcohol/week: 0.0 standard drinks      SYSTEM Check if Review is Normal Check if Physical Exam is Normal If not normal, please explain:   MARIIA [ ] Tan Dominguez

## 2021-11-29 NOTE — ANESTHESIA PREPROCEDURE EVALUATION
Anesthesia PreOp Note    HPI:     Noris Randall is a 64year old female who presents for preoperative consultation requested by: Theola Krabbe, MD    Date of Surgery: 11/29/2021    Procedure(s):  COLONOSCOPY  Indication: Chronic diarrhea, Christelle • Visual impairment     glasses       Past Surgical History:   Procedure Laterality Date   •      • CHOLECYSTECTOMY     • COLONOSCOPY N/A 2019    Procedure: COLONOSCOPY;  Surgeon: Alisson Govea MD;  Location: [address] [place] [address] daily., Disp: , Rfl: , 11/23/2021  Glucose Blood (ONETOUCH ULTRA BLUE) In Vitro Strip, USE ONCE DAILY, Disp: 100 each, Rfl: 11  ONETOUCH DELICA LANCETS 67A Does not apply Misc, Apply 1 Units topically daily. , Disp: 90 each, Rfl: 3  Blood Glucose Monitoring Back Care: Not Asked        Exercise: Not Asked        Bike Helmet: Not Asked        Seat Belt: Not Asked        Self-Exams: Not Asked    Social History Narrative      Not on file    Social Determinants of Health  Financial Resource Strain: Not on file  Fo CRNA  11/29/2021 2:13 PM

## 2021-11-29 NOTE — ANESTHESIA POSTPROCEDURE EVALUATION
Patient: Bryson Segura    Procedure Summary     Date: 11/29/21 Room / Location: [facility] ENDOSCOPY 04 / [facility] ENDOSCOPY    Anesthesia Start: 0826 Anesthesia Stop: 1257    Procedure: COLONOSCOPY (N/A ) Diagnosis:       Chronic diarrhea      Elevated C-reactive

## 2021-12-05 ENCOUNTER — PATIENT MESSAGE (OUTPATIENT)
Dept: GASTROENTEROLOGY | Facility: CLINIC | Age: 61
End: 2021-12-05

## 2021-12-06 ENCOUNTER — TELEPHONE (OUTPATIENT)
Dept: GASTROENTEROLOGY | Facility: CLINIC | Age: 61
End: 2021-12-06

## 2021-12-06 NOTE — TELEPHONE ENCOUNTER
Patient indicates she scheduled CT on 12/16/2021 2:30 and requires new order since last order was declined. Please call at [phone],SAUL.

## 2021-12-06 NOTE — TELEPHONE ENCOUNTER
I spoke to [facility]. She is going to resubmit the PA for the CT Enterography and see if we can get it approved now.       Pt's phone went to a busy signal

## 2021-12-07 NOTE — TELEPHONE ENCOUNTER
From: Mauricio Lorenz  To: Jo Martinez MD  Sent: 12/5/2021 7:17 PM CST  Subject: Colonscope    All the diarrhea always hurts my left side. If my left side hurts it is run for a bathroom.  I start to feel warm and start sweating, if I have to w

## 2021-12-07 NOTE — TELEPHONE ENCOUNTER
Twila Mayer sent this message in response to your email    She does have her CT scan scheduled and Christal Matson in [facility] is trying to get it approved

## 2021-12-08 NOTE — TELEPHONE ENCOUNTER
CT scan was previously denied because of inadequate suspicion for abnormalities such as Crohn's disease. We then checked blood tests and Ms. Nation has a very high C-reactive protein which was one of their criteria. She also has low albumin on her labs.

## 2021-12-10 NOTE — TELEPHONE ENCOUNTER
Quang King,    I resent the CT to [employer]. It is pending authorization. I will let you know the determination.     Thanks  Tammy Vergara

## 2021-12-13 ENCOUNTER — PROCEDURE VISIT (OUTPATIENT)
Dept: NEUROLOGY | Facility: CLINIC | Age: 61
End: 2021-12-13
Payer: MEDICAID

## 2021-12-13 PROCEDURE — 95886 MUSC TEST DONE W/N TEST COMP: CPT | Performed by: OTHER

## 2021-12-13 PROCEDURE — 95910 NRV CNDJ TEST 7-8 STUDIES: CPT | Performed by: OTHER

## 2021-12-13 NOTE — PROCEDURES
[address]  Phone: [phone]  Fax: [phone] REPORT          Patient: Ana Haley Hand Dominance:  Ambidextrous   Patient ID: [id number] Referring  Triceps brachii, L. Biceps brachii, L. Abductor pollicis brevis. The study was abnormal in 1 muscle(s), with the following distribution:  • The L. Flexor carpi radialis had abnormal spontaneous activity. Conclusion: This is a normal study. palm Wrist 2.50 9.1 18.4        Ulnar palm Wrist 2.45 3.5 6.0        CSI     CSI 0.36       EMG Summary Table     Spontaneous MUAP Recruitment   Muscle Nerve Roots IA Fib PSW Fasc H.F. Comments Amp Dur. PPP Pattern   L.  Deltoid Axillary C5-C6 N None None N

## 2021-12-14 NOTE — TELEPHONE ENCOUNTER
Marvin Solorio called me back from [facility] and the CT scan scheduled for 12/16/21 is approved    I called the pt and she is aware she can proceed with her scheduled CT scan

## 2021-12-14 NOTE — TELEPHONE ENCOUNTER
I spoke to [facility]    She is going to see if the CT has been approved yet and call me back

## 2021-12-14 NOTE — TELEPHONE ENCOUNTER
CT scan is approved.     Future Appointments   Date Time Provider Fara Pino   12/16/2021  2:30  [facility]   4/11/2022 10:00 AM Nesha Shah MD [facility]

## 2021-12-16 ENCOUNTER — HOSPITAL ENCOUNTER (OUTPATIENT)
Dept: CT IMAGING | Facility: HOSPITAL | Age: 61
Discharge: HOME | End: 2021-12-16
Attending: INTERNAL MEDICINE
Payer: MEDICAID

## 2021-12-16 DIAGNOSIS — R10.30 LOWER ABDOMINAL PAIN: ICD-10-CM

## 2021-12-16 DIAGNOSIS — R19.7 DIARRHEA, UNSPECIFIED TYPE: ICD-10-CM

## 2021-12-16 PROCEDURE — 74177 CT ABD & PELVIS W/CONTRAST: CPT | Performed by: INTERNAL MEDICINE

## 2021-12-16 PROCEDURE — 82565 ASSAY OF CREATININE: CPT

## 2021-12-22 ENCOUNTER — TELEPHONE (OUTPATIENT)
Dept: GASTROENTEROLOGY | Facility: CLINIC | Age: 61
End: 2021-12-22

## 2021-12-30 ENCOUNTER — IMMUNIZATION (OUTPATIENT)
Dept: LAB | Facility: HOSPITAL | Age: 61
End: 2021-12-30
Attending: EMERGENCY MEDICINE
Payer: MEDICAID

## 2021-12-30 DIAGNOSIS — Z23 NEED FOR VACCINATION: Primary | ICD-10-CM

## 2021-12-30 PROCEDURE — 0064A SARSCOV2 VAC 50MCG/0.25ML IM: CPT | Performed by: NURSE PRACTITIONER

## 2022-01-07 ENCOUNTER — PATIENT MESSAGE (OUTPATIENT)
Dept: GASTROENTEROLOGY | Facility: CLINIC | Age: 62
End: 2022-01-07

## 2022-01-07 ENCOUNTER — LAB ENCOUNTER (OUTPATIENT)
Dept: LAB | Facility: HOSPITAL | Age: 62
End: 2022-01-07
Attending: INTERNAL MEDICINE
Payer: MEDICAID

## 2022-01-07 PROCEDURE — 86364 TISS TRNSGLTMNASE EA IG CLAS: CPT | Performed by: INTERNAL MEDICINE

## 2022-01-07 PROCEDURE — 83540 ASSAY OF IRON: CPT | Performed by: INTERNAL MEDICINE

## 2022-01-07 PROCEDURE — 82784 ASSAY IGA/IGD/IGG/IGM EACH: CPT | Performed by: INTERNAL MEDICINE

## 2022-01-07 PROCEDURE — 82040 ASSAY OF SERUM ALBUMIN: CPT | Performed by: INTERNAL MEDICINE

## 2022-01-07 PROCEDURE — 84466 ASSAY OF TRANSFERRIN: CPT | Performed by: INTERNAL MEDICINE

## 2022-01-07 PROCEDURE — 84443 ASSAY THYROID STIM HORMONE: CPT | Performed by: INTERNAL MEDICINE

## 2022-01-07 PROCEDURE — 86140 C-REACTIVE PROTEIN: CPT | Performed by: INTERNAL MEDICINE

## 2022-01-07 PROCEDURE — 82728 ASSAY OF FERRITIN: CPT | Performed by: INTERNAL MEDICINE

## 2022-01-07 PROCEDURE — 36415 COLL VENOUS BLD VENIPUNCTURE: CPT | Performed by: INTERNAL MEDICINE

## 2022-01-07 NOTE — TELEPHONE ENCOUNTER
Dr. Doug Hdz--    Patient is requesting your input on final lab results from today. Attached below for further advisement. Tissue Transglutaminase AB IGA is still in process. Thank you!      Component      Latest Ref Rng & Units 1/7/2022   Iron, S

## 2022-01-07 NOTE — TELEPHONE ENCOUNTER
From: Debbie Ash  To: Angelia Morrison MD  Sent: 1/7/2022 2:58 PM CST  Subject: Question regarding TSH W REFLEX TO FREE T4    Hi , what do these test results mean. Just took the test today.  Thanks Vielka Kohli

## 2022-01-11 ENCOUNTER — PATIENT MESSAGE (OUTPATIENT)
Dept: GASTROENTEROLOGY | Facility: CLINIC | Age: 62
End: 2022-01-11

## 2022-01-12 NOTE — TELEPHONE ENCOUNTER
From: Nba Melara  To: Krista Fuentes MD  Sent: 1/11/2022 8:43 PM CST  Subject: Blood Test    Hi  what does the Tissue Transglutaminase AB IGA test results mean.  Thanks, Prakash Waldron

## 2022-01-19 ENCOUNTER — HOSPITAL ENCOUNTER (OUTPATIENT)
Dept: MRI IMAGING | Facility: HOSPITAL | Age: 62
Discharge: HOME | End: 2022-01-19
Attending: INTERNAL MEDICINE
Payer: MEDICAID

## 2022-01-19 DIAGNOSIS — N28.9 RENAL LESION: ICD-10-CM

## 2022-01-19 PROCEDURE — A9575 INJ GADOTERATE MEGLUMI 0.1ML: HCPCS | Performed by: INTERNAL MEDICINE

## 2022-01-19 PROCEDURE — 74183 MRI ABD W/O CNTR FLWD CNTR: CPT | Performed by: INTERNAL MEDICINE

## 2022-01-27 ENCOUNTER — OFFICE VISIT (OUTPATIENT)
Dept: NEUROLOGY | Facility: CLINIC | Age: 62
End: 2022-01-27
Payer: MEDICAID

## 2022-01-27 ENCOUNTER — PATIENT MESSAGE (OUTPATIENT)
Dept: FAMILY MEDICINE CLINIC | Facility: CLINIC | Age: 62
End: 2022-01-27

## 2022-01-27 ENCOUNTER — LAB ENCOUNTER (OUTPATIENT)
Dept: LAB | Facility: HOSPITAL | Age: 62
End: 2022-01-27
Attending: Other
Payer: MEDICAID

## 2022-01-27 VITALS
BODY MASS INDEX: 48.23 KG/M2 | WEIGHT: 293 LBS | DIASTOLIC BLOOD PRESSURE: 78 MMHG | HEART RATE: 72 BPM | SYSTOLIC BLOOD PRESSURE: 128 MMHG | HEIGHT: 65.5 IN

## 2022-01-27 DIAGNOSIS — R20.0 NUMBNESS AND TINGLING IN BOTH HANDS: Primary | ICD-10-CM

## 2022-01-27 DIAGNOSIS — R20.2 NUMBNESS AND TINGLING IN BOTH HANDS: Primary | ICD-10-CM

## 2022-01-27 DIAGNOSIS — R79.89 ELEVATED TSH: ICD-10-CM

## 2022-01-27 DIAGNOSIS — R20.2 NUMBNESS AND TINGLING IN BOTH HANDS: ICD-10-CM

## 2022-01-27 DIAGNOSIS — G56.03 BILATERAL CARPAL TUNNEL SYNDROME: ICD-10-CM

## 2022-01-27 DIAGNOSIS — R20.0 NUMBNESS AND TINGLING IN BOTH HANDS: ICD-10-CM

## 2022-01-27 DIAGNOSIS — R20.0 BILATERAL HAND NUMBNESS: ICD-10-CM

## 2022-01-27 LAB
ALBUMIN SERPL-MCNC: 3.5 G/DL (ref 3.4–5)
ALBUMIN/GLOB SERPL: 0.9 {RATIO} (ref 1–2)
ALP LIVER SERPL-CCNC: 52 U/L
ALT SERPL-CCNC: 23 U/L
ANION GAP SERPL CALC-SCNC: 6 MMOL/L (ref 0–18)
AST SERPL-CCNC: 14 U/L (ref 15–37)
BILIRUB SERPL-MCNC: 0.5 MG/DL (ref 0.1–2)
BUN BLD-MCNC: 14 MG/DL (ref 7–18)
BUN/CREAT SERPL: 23.7 (ref 10–20)
CALCIUM BLD-MCNC: 9.3 MG/DL (ref 8.5–10.1)
CHLORIDE SERPL-SCNC: 105 MMOL/L (ref 98–112)
CHOLEST SERPL-MCNC: 135 MG/DL (ref ?–200)
CO2 SERPL-SCNC: 28 MMOL/L (ref 21–32)
CREAT BLD-MCNC: 0.59 MG/DL
CREAT UR-SCNC: 154 MG/DL
EST. AVERAGE GLUCOSE BLD GHB EST-MCNC: 128 MG/DL (ref 68–126)
FASTING PATIENT LIPID ANSWER: YES
FASTING STATUS PATIENT QL REPORTED: YES
GLOBULIN PLAS-MCNC: 3.8 G/DL (ref 2.8–4.4)
GLUCOSE BLD-MCNC: 82 MG/DL (ref 70–99)
HBA1C MFR BLD: 6.1 % (ref ?–5.7)
HDLC SERPL-MCNC: 54 MG/DL (ref 40–59)
LDLC SERPL CALC-MCNC: 64 MG/DL (ref ?–100)
MICROALBUMIN UR-MCNC: 0.66 MG/DL
MICROALBUMIN/CREAT 24H UR-RTO: 4.3 UG/MG (ref ?–30)
NONHDLC SERPL-MCNC: 81 MG/DL (ref ?–130)
OSMOLALITY SERPL CALC.SUM OF ELEC: 288 MOSM/KG (ref 275–295)
POTASSIUM SERPL-SCNC: 4.1 MMOL/L (ref 3.5–5.1)
PROT SERPL-MCNC: 7.3 G/DL (ref 6.4–8.2)
PROT UR-MCNC: 14.9 MG/DL
SODIUM SERPL-SCNC: 139 MMOL/L (ref 136–145)
T4 FREE SERPL-MCNC: 1.2 NG/DL (ref 0.8–1.7)
TRIGL SERPL-MCNC: 90 MG/DL (ref 30–149)
TSI SER-ACNC: 1.56 MIU/ML (ref 0.36–3.74)
VLDLC SERPL CALC-MCNC: 13 MG/DL (ref 0–30)

## 2022-01-27 PROCEDURE — 82570 ASSAY OF URINE CREATININE: CPT

## 2022-01-27 PROCEDURE — 83521 IG LIGHT CHAINS FREE EACH: CPT

## 2022-01-27 PROCEDURE — 3044F HG A1C LEVEL LT 7.0%: CPT | Performed by: FAMILY MEDICINE

## 2022-01-27 PROCEDURE — 86334 IMMUNOFIX E-PHORESIS SERUM: CPT

## 2022-01-27 PROCEDURE — 82043 UR ALBUMIN QUANTITATIVE: CPT

## 2022-01-27 PROCEDURE — 3061F NEG MICROALBUMINURIA REV: CPT | Performed by: FAMILY MEDICINE

## 2022-01-27 PROCEDURE — 84439 ASSAY OF FREE THYROXINE: CPT

## 2022-01-27 PROCEDURE — 83036 HEMOGLOBIN GLYCOSYLATED A1C: CPT

## 2022-01-27 PROCEDURE — 84443 ASSAY THYROID STIM HORMONE: CPT

## 2022-01-27 PROCEDURE — 3078F DIAST BP <80 MM HG: CPT | Performed by: OTHER

## 2022-01-27 PROCEDURE — 84166 PROTEIN E-PHORESIS/URINE/CSF: CPT

## 2022-01-27 PROCEDURE — 80061 LIPID PANEL: CPT

## 2022-01-27 PROCEDURE — 80053 COMPREHEN METABOLIC PANEL: CPT

## 2022-01-27 PROCEDURE — 99215 OFFICE O/P EST HI 40 MIN: CPT | Performed by: OTHER

## 2022-01-27 PROCEDURE — 3074F SYST BP LT 130 MM HG: CPT | Performed by: OTHER

## 2022-01-27 PROCEDURE — 36415 COLL VENOUS BLD VENIPUNCTURE: CPT

## 2022-01-27 PROCEDURE — 86335 IMMUNFIX E-PHORSIS/URINE/CSF: CPT

## 2022-01-27 PROCEDURE — 84165 PROTEIN E-PHORESIS SERUM: CPT

## 2022-01-27 PROCEDURE — 3008F BODY MASS INDEX DOCD: CPT | Performed by: OTHER

## 2022-01-27 RX ORDER — MELATONIN
325
COMMUNITY

## 2022-01-27 NOTE — PATIENT INSTRUCTIONS
Understanding Carpal Tunnel Syndrome  The carpal tunnel is a narrow space inside the wrist. It is ringed by bone and a band of tough tissue called the transverse carpal ligament.  A major nerve called the median nerve runs from the forearm into the hand medicine that helps reduce swelling. It is injected directly into the wrist. It helps shrink tissues inside the carpal tunnel. This relieves symptoms for a time. · Pain medicines.  You may take over-the-counter or prescription medicines to help reduce swel Keep your wrists straight when using a computer for a prolonged time. Try to take frequent breaks. When you sleep, don't have your wrist flexed. This means don't sleep all curled up on your side.  And don't put extra pressure on your wrist for long period

## 2022-01-27 NOTE — PROGRESS NOTES
Neurology Note    1/27/2022  8:49 AM    Felisha   64year old, female  8/6/1960  Chief Complaint: numbness in the fingertips  Patient presents today c/o numbness and tingling in bilateral hands, left > right.  Patient states that it has been going o systems reviewed and are negative.            Current Outpatient Medications:   •  ferrous sulfate 325 (65 FE) MG Oral Tab EC, Take 325 mg by mouth daily with breakfast., Disp: , Rfl:   •  insulin glargine (LANTUS SOLOSTAR) 100 UNIT/ML Subcutaneous Solution History of colon polyps     Chronic diarrhea     Postmenopausal bleeding     Venous stasis dermatitis of both lower extremities     Subclinical hypothyroidism     Cervical polyp     Carpal tunnel syndrome     Gastroesophageal reflux disease     Complex end side and 2+ on the left side.   Psychiatric:         Mood and Affect: Mood normal.         Speech: Speech normal.         Behavior: Behavior normal.         Neurologic Exam     Mental Status   Attention: normal.   Speech: speech is normal   Level of conscio it's cervical spondylosis with radiculopathy, however doubtful based on symptoms and exam. There is slight reduction in vibratory sensation in the toes, but intact pinprick.   Lastly, could consider MRI brain however very hard-pressed to attribute symptoms

## 2022-01-28 NOTE — TELEPHONE ENCOUNTER
Ivelisse Estrada RN 1/27/2022 5:56 PM CST        ----- Message -----  From: Nelda Salas RN  Sent: 1/27/2022 1:02 PM CST  To: Sandeep Hernandes RN  Subject: FW: Diabetes         ----- Message -----  From: Felisha Quezada  Sent: 1/27/2022 12:35 PM CST  T

## 2022-01-31 ENCOUNTER — PATIENT MESSAGE (OUTPATIENT)
Dept: NEUROLOGY | Facility: CLINIC | Age: 62
End: 2022-01-31

## 2022-01-31 LAB
ALBUMIN SERPL ELPH-MCNC: 3.8 G/DL (ref 3.75–5.21)
ALBUMIN/GLOB SERPL: 1.05 {RATIO} (ref 1–2)
ALPHA1 GLOB SERPL ELPH-MCNC: 0.36 G/DL (ref 0.19–0.46)
ALPHA2 GLOB SERPL ELPH-MCNC: 1.1 G/DL (ref 0.48–1.05)
B-GLOBULIN SERPL ELPH-MCNC: 1.03 G/DL (ref 0.68–1.23)
GAMMA GLOB SERPL ELPH-MCNC: 1.12 G/DL (ref 0.62–1.7)
KAPPA LC FREE SER-MCNC: 2.35 MG/DL (ref 0.33–1.94)
KAPPA LC FREE/LAMBDA FREE SER NEPH: 1.75 {RATIO} (ref 0.26–1.65)
LAMBDA LC FREE SERPL-MCNC: 1.34 MG/DL (ref 0.57–2.63)
PROT SERPL-MCNC: 7.4 G/DL (ref 6.4–8.2)

## 2022-01-31 NOTE — TELEPHONE ENCOUNTER
From: Little Nissen  To: Carlos Fonseca MD  Sent: 1/31/2022 4:14 PM CST  Subject: Question regarding SERUM MONOCLONAL PROTEIN STUDY    What does this actually mean.  Thanks Kandis Navarrete

## 2022-02-04 ENCOUNTER — OFFICE VISIT (OUTPATIENT)
Dept: HEMATOLOGY/ONCOLOGY | Facility: HOSPITAL | Age: 62
End: 2022-02-04
Attending: Other
Payer: MEDICAID

## 2022-02-04 VITALS
BODY MASS INDEX: 48.23 KG/M2 | WEIGHT: 293 LBS | DIASTOLIC BLOOD PRESSURE: 71 MMHG | HEIGHT: 65.5 IN | SYSTOLIC BLOOD PRESSURE: 138 MMHG | HEART RATE: 71 BPM | RESPIRATION RATE: 18 BRPM

## 2022-02-04 DIAGNOSIS — R76.8 ELEVATED SERUM IMMUNOGLOBULIN FREE LIGHT CHAIN LEVEL: ICD-10-CM

## 2022-02-04 LAB
BASOPHILS # BLD AUTO: 0.05 X10(3) UL (ref 0–0.2)
BASOPHILS NFR BLD AUTO: 0.6 %
DEPRECATED RDW RBC AUTO: 42.6 FL (ref 35.1–46.3)
EOSINOPHIL # BLD AUTO: 0.09 X10(3) UL (ref 0–0.7)
EOSINOPHIL NFR BLD AUTO: 1 %
ERYTHROCYTE [DISTWIDTH] IN BLOOD BY AUTOMATED COUNT: 13.1 % (ref 11–15)
HCT VFR BLD AUTO: 39.5 %
HGB BLD-MCNC: 12.6 G/DL
IMM GRANULOCYTES # BLD AUTO: 0.04 X10(3) UL (ref 0–1)
IMM GRANULOCYTES NFR BLD: 0.5 %
LYMPHOCYTES # BLD AUTO: 2.16 X10(3) UL (ref 1–4)
LYMPHOCYTES NFR BLD AUTO: 24.9 %
MCH RBC QN AUTO: 28.3 PG (ref 26–34)
MCHC RBC AUTO-ENTMCNC: 31.9 G/DL (ref 31–37)
MCV RBC AUTO: 88.8 FL
MONOCYTES # BLD AUTO: 0.52 X10(3) UL (ref 0.1–1)
MONOCYTES NFR BLD AUTO: 6 %
NEUTROPHILS # BLD AUTO: 5.82 X10 (3) UL (ref 1.5–7.7)
NEUTROPHILS # BLD AUTO: 5.82 X10(3) UL (ref 1.5–7.7)
NEUTROPHILS NFR BLD AUTO: 67 %
PLATELET # BLD AUTO: 252 10(3)UL (ref 150–450)
RBC # BLD AUTO: 4.45 X10(6)UL
WBC # BLD AUTO: 8.7 X10(3) UL (ref 4–11)

## 2022-02-04 PROCEDURE — 99244 OFF/OP CNSLTJ NEW/EST MOD 40: CPT | Performed by: INTERNAL MEDICINE

## 2022-02-04 RX ORDER — DIAPER,BRIEF,YOUTH,DISPOSABLE
EACH MISCELLANEOUS
COMMUNITY

## 2022-02-11 ENCOUNTER — HOSPITAL ENCOUNTER (OUTPATIENT)
Dept: GENERAL RADIOLOGY | Facility: HOSPITAL | Age: 62
Discharge: HOME | End: 2022-02-11
Attending: INTERNAL MEDICINE
Payer: MEDICAID

## 2022-02-11 DIAGNOSIS — R76.8 ELEVATED SERUM IMMUNOGLOBULIN FREE LIGHT CHAIN LEVEL: ICD-10-CM

## 2022-02-11 PROCEDURE — 77075 RADEX OSSEOUS SURVEY COMPL: CPT | Performed by: INTERNAL MEDICINE

## 2022-03-03 RX ORDER — GLIMEPIRIDE 2 MG/1
2 TABLET ORAL
Qty: 90 TABLET | Refills: 1 | Status: SHIPPED | OUTPATIENT
Start: 2022-03-03 | End: 2022-09-24

## 2022-03-03 RX ORDER — PANTOPRAZOLE SODIUM 40 MG/1
40 TABLET, DELAYED RELEASE ORAL
Qty: 90 TABLET | Refills: 1 | Status: SHIPPED | OUTPATIENT
Start: 2022-03-03 | End: 2023-01-10

## 2022-03-03 NOTE — TELEPHONE ENCOUNTER
Refill passed per [facility] protocol. Requested Prescriptions   Pending Prescriptions Disp Refills    GLIMEPIRIDE 2 MG Oral Tab [Pharmacy Med Name: Glimepiride 2 MG Oral Tablet] 90 tablet 0     Sig: Take 1 tablet by mouth once daily with breakfast        Diabetes Medication Protocol Passed - 3/3/2022 11:37 AM        Passed - Last A1C < 7.5 and within past 6 months     Lab Results   Component Value Date    A1C 6.1 (H) 01/27/2022               Passed - Appointment in past 6 or next 3 months        Passed - GFR Non- > 50     Lab Results   Component Value Date    GFRNAA 99 01/27/2022                 Passed - GFR in the past 12 months           PANTOPRAZOLE 40 MG Oral Tab EC [Pharmacy Med Name: Pantoprazole Sodium 40 MG Oral Tablet Delayed Release] 30 tablet 0     Sig: TAKE 1 TABLET BY MOUTH IN THE MORNING BEFORE BREAKFAST        Gastrointestional Medication Protocol Passed - 3/3/2022 11:37 AM        Passed - Appointment in past 12 or next 3 months            Recent Outpatient Visits              3 weeks ago Elevated serum immunoglobulin free light chain level    [facility] Hematology Oncology Rajendra Garces MD    Office Visit    1 month ago Numbness and tingling in both hands    [facility] Beto Milton MD    Office Visit    4 months ago Pre-op examination    [facility], [address], [facility] MD Imani    Office Visit    5 months ago Complex endometrial hyperplasia with atypia    Franklin Hernandez MD    Office Visit    6 months ago Postmenopausal bleeding    Franklin Hernandez MD    Office Visit          Future Appointments         Provider Department Appt Notes    In 4 weeks Devan Torres MD [facility], [address], [place] Diabetes    In 5 months [address] Hematology Oncology follow up visit. cl  6m

## 2022-03-16 ENCOUNTER — TELEPHONE (OUTPATIENT)
Dept: FAMILY MEDICINE CLINIC | Facility: CLINIC | Age: 62
End: 2022-03-16

## 2022-03-24 ENCOUNTER — TELEPHONE (OUTPATIENT)
Dept: PHYSICAL MEDICINE AND REHAB | Facility: CLINIC | Age: 62
End: 2022-03-24

## 2022-03-24 ENCOUNTER — OFFICE VISIT (OUTPATIENT)
Dept: NEUROLOGY | Facility: CLINIC | Age: 62
End: 2022-03-24
Payer: MEDICAID

## 2022-03-24 VITALS
HEIGHT: 65.5 IN | HEART RATE: 72 BPM | OXYGEN SATURATION: 98 % | BODY MASS INDEX: 48.23 KG/M2 | SYSTOLIC BLOOD PRESSURE: 132 MMHG | WEIGHT: 293 LBS | DIASTOLIC BLOOD PRESSURE: 86 MMHG | RESPIRATION RATE: 18 BRPM

## 2022-03-24 DIAGNOSIS — M54.12 CERVICAL RADICULITIS: ICD-10-CM

## 2022-03-24 DIAGNOSIS — G56.03 BILATERAL CARPAL TUNNEL SYNDROME: ICD-10-CM

## 2022-03-24 DIAGNOSIS — M50.30 DDD (DEGENERATIVE DISC DISEASE), CERVICAL: Primary | ICD-10-CM

## 2022-03-24 PROCEDURE — 3075F SYST BP GE 130 - 139MM HG: CPT | Performed by: OTHER

## 2022-03-24 PROCEDURE — 99214 OFFICE O/P EST MOD 30 MIN: CPT | Performed by: OTHER

## 2022-03-24 PROCEDURE — 3008F BODY MASS INDEX DOCD: CPT | Performed by: OTHER

## 2022-03-24 PROCEDURE — 3079F DIAST BP 80-89 MM HG: CPT | Performed by: OTHER

## 2022-03-24 NOTE — TELEPHONE ENCOUNTER
Initiated authorization for C-Spine MRI CPT [id number] to be done at [facility] with [address] online  Case #[id number].   Evpatriciare requested clinicals-clinicals faxed to [phone] once note is completed  Status: pending

## 2022-03-27 RX ORDER — LISINOPRIL AND HYDROCHLOROTHIAZIDE 12.5; 1 MG/1; MG/1
1 TABLET ORAL
Qty: 90 TABLET | Refills: 1 | Status: SHIPPED | OUTPATIENT
Start: 2022-03-27 | End: 2022-09-24

## 2022-03-27 NOTE — TELEPHONE ENCOUNTER
Refill passed per [facility] protocol. Requested Prescriptions   Pending Prescriptions Disp Refills    LISINOPRIL-HYDROCHLOROTHIAZIDE 10-12.5 MG Oral Tab [Pharmacy Med Name: Lisinopril-hydroCHLOROthiazide 10-12.5 MG Oral Tablet] 30 tablet 0     Sig: Take 1 tablet by mouth once daily. Hypertensive Medications Protocol Passed - 3/27/2022  2:33 PM        Passed - CMP or BMP in past 12 months        Passed - Appointment in past 6 or next 3 months        Passed - GFR Non- > 50     Lab Results   Component Value Date    GFRNAA 99 01/27/2022                     Recent Outpatient Visits              3 days ago DDD (degenerative disc disease), cervical    [facility] Bonifacio Fabian MD    Office Visit    1 month ago Elevated serum immunoglobulin free light chain level    [facility] Hematology Oncology Adamaris Alcantar MD    Office Visit    1 month ago Numbness and tingling in both hands    [facility] Bonifacio Fabian MD    Office Visit    5 months ago Pre-op examination    [facility], [address], Melanie Ramos MD    Office Visit    6 months ago Complex endometrial hyperplasia with atypia    [facility] brenton [facility]    Office Visit          Future Appointments         Provider Department Appt Notes    In 4 days Justice Ramos MD [facility], [address], [place] [facility]    In 1 week [facility] SYSTEM OF THE [address] for Health Everything    In 2 weeks Sherly Peters, PT [facility]    In 3 weeks Sherly Peters, [address] [facility]?   [employer] FHP    In 3 weeks Sherly Peters,  [facility]    In 1 month Sherly Peters,  [facility]    In 1 month Sherly Peters,  [facility]    In 1 month [facility] MRI RM2 (3T WIDE) [facility] AND [facility] MRI     [facility] [address] [facility]    In 1 month [facility]    In 1 month [facility]    In 4 months [address] Hematology Oncology follow up visit. cl  6m

## 2022-03-29 NOTE — TELEPHONE ENCOUNTER
Received Denial from [employer] for C-Spine MRI  Denial reason  Imaging requires six weeks of provider directed treatment to be completed. Supported  treatments include (but are not limited to) drugs for swelling or pain, an in office workout  (physical therapy), and/or oral or injected steroids. This must have been completed in  the past three months without improved symptoms. Contact (via office visit, phone,  email, or messaging) must occur after the treatment is completed. This has not been  met because: You have not completed six weeks of provider directed treatment. The provider directed treatment did not occur within the last three months. Symptoms must be the same or worse after treatment to support imaging. There was no contact with your provider after completing treatment. You are allotted an additional seven calendar days from the notification of adverse  determination to send one set of additional clinical or complete a [id number]  discussion with a Medical Director (by calling [phone], select Option 4). Reference number [id number] will need to be entered or provided.  This must be  completed within 7 calendar days from the date of this letter    Patient notified via [email or web address]

## 2022-03-31 ENCOUNTER — OFFICE VISIT (OUTPATIENT)
Dept: FAMILY MEDICINE CLINIC | Facility: CLINIC | Age: 62
End: 2022-03-31
Payer: MEDICAID

## 2022-03-31 VITALS
RESPIRATION RATE: 15 BRPM | HEIGHT: 65.5 IN | WEIGHT: 293 LBS | HEART RATE: 75 BPM | DIASTOLIC BLOOD PRESSURE: 76 MMHG | BODY MASS INDEX: 48.23 KG/M2 | SYSTOLIC BLOOD PRESSURE: 117 MMHG

## 2022-03-31 DIAGNOSIS — I10 PRIMARY HYPERTENSION: ICD-10-CM

## 2022-03-31 DIAGNOSIS — L82.1 SEBORRHEIC KERATOSIS: ICD-10-CM

## 2022-03-31 DIAGNOSIS — M50.90 CERVICAL DISC DISEASE: ICD-10-CM

## 2022-03-31 DIAGNOSIS — K58.0 IRRITABLE BOWEL SYNDROME WITH DIARRHEA: ICD-10-CM

## 2022-03-31 DIAGNOSIS — E11.9 TYPE 2 DIABETES MELLITUS WITHOUT COMPLICATION, WITHOUT LONG-TERM CURRENT USE OF INSULIN (HCC): ICD-10-CM

## 2022-03-31 DIAGNOSIS — Z00.00 ROUTINE PHYSICAL EXAMINATION: Primary | ICD-10-CM

## 2022-03-31 PROBLEM — N84.1 CERVICAL POLYP: Status: RESOLVED | Noted: 2021-07-01 | Resolved: 2022-03-31

## 2022-03-31 PROBLEM — N95.0 POSTMENOPAUSAL BLEEDING: Status: RESOLVED | Noted: 2020-08-27 | Resolved: 2022-03-31

## 2022-03-31 PROCEDURE — 3008F BODY MASS INDEX DOCD: CPT | Performed by: FAMILY MEDICINE

## 2022-03-31 PROCEDURE — 99396 PREV VISIT EST AGE 40-64: CPT | Performed by: FAMILY MEDICINE

## 2022-03-31 PROCEDURE — 3074F SYST BP LT 130 MM HG: CPT | Performed by: FAMILY MEDICINE

## 2022-03-31 PROCEDURE — 3078F DIAST BP <80 MM HG: CPT | Performed by: FAMILY MEDICINE

## 2022-03-31 RX ORDER — ZOSTER VACCINE RECOMBINANT, ADJUVANTED 50 MCG/0.5
1 KIT INTRAMUSCULAR ONCE
Qty: 1 EACH | Refills: 1 | Status: SHIPPED | OUTPATIENT
Start: 2022-03-31 | End: 2022-03-31

## 2022-04-06 ENCOUNTER — HOSPITAL ENCOUNTER (OUTPATIENT)
Dept: MAMMOGRAPHY | Facility: HOSPITAL | Age: 62
Discharge: HOME | End: 2022-04-06
Attending: FAMILY MEDICINE
Payer: MEDICAID

## 2022-04-06 DIAGNOSIS — Z12.31 SCREENING MAMMOGRAM FOR BREAST CANCER: ICD-10-CM

## 2022-04-06 PROCEDURE — 77063 BREAST TOMOSYNTHESIS BI: CPT | Performed by: FAMILY MEDICINE

## 2022-04-06 PROCEDURE — 77067 SCR MAMMO BI INCL CAD: CPT | Performed by: FAMILY MEDICINE

## 2022-04-06 RX ORDER — INSULIN GLARGINE 100 [IU]/ML
INJECTION, SOLUTION SUBCUTANEOUS
Qty: 15 ML | Refills: 2 | Status: SHIPPED | OUTPATIENT
Start: 2022-04-06

## 2022-04-06 NOTE — TELEPHONE ENCOUNTER
Refill passed per [facility] protocol. Requested Prescriptions   Pending Prescriptions Disp Refills    insulin glargine (LANTUS SOLOSTAR) 100 UNIT/ML Subcutaneous Solution Pen-injector 15 mL 2     Sig: INJECT 32 UNITS SUBCUTANEOUSLY AT NIGHT        Diabetes Medication Protocol Passed - 4/6/2022  9:19 AM        Passed - Last A1C < 7.5 and within past 6 months     Lab Results   Component Value Date    A1C 6.1 (H) 01/27/2022               Passed - Appointment in past 6 or next 3 months        Passed - GFR Non- > 50     Lab Results   Component Value Date    GFRNAA 99 01/27/2022                 Passed - GFR in the past 12 months            Recent Outpatient Visits              6 days ago Routine physical examination    [address] Prince Vazquez MD    Office Visit    1 week ago DDD (degenerative disc disease), cervical    [facility] Katharina Cruz MD    Office Visit    2 months ago Elevated serum immunoglobulin free light chain level    [facility] Hematology Oncology Refugio Wong MD    Office Visit    2 months ago Numbness and tingling in both hands    MEDICAL [facility] Katharina Cruz MD    Office Visit    6 months ago Pre-op examination    [facility], [email or web address], [address] Prince Vazquez MD    Office Visit          Future Appointments         Provider Department Appt Notes    Today [facility] THE [facility] [address] [facility] for Health qa jg Everything    In 1 week Sherly Peters, PT [facility]    In 1 week Sherly Peters, [address] [facility]?   [employer] FHP    In 2 weeks Sherly Peters,  [facility]    In 2 weeks Sherly Peters,  [facility]    In 3 weeks Sherly Peters,  [facility]    In 3 weeks Sherly Peters, [facility]    In 4 weeks Sherly Peters, [address] [facility]    In 1 month Sherly Peters, [address]    In 4 months [address] Hematology Oncology follow up visit. cl  6m

## 2022-04-06 NOTE — TELEPHONE ENCOUNTER
----- Message from Pat Nation sent at 4/5/2022 11:48 PM CDT -----  Regarding: Insulin  Hi Dr. Abdirahman Flores, I need a new prescription for the insulin.  Thanks, Lizeth Pinzon

## 2022-04-15 ENCOUNTER — OFFICE VISIT (OUTPATIENT)
Dept: PHYSICAL THERAPY | Facility: HOSPITAL | Age: 62
End: 2022-04-15
Attending: Other
Payer: MEDICAID

## 2022-04-18 PROCEDURE — 97162 PT EVAL MOD COMPLEX 30 MIN: CPT

## 2022-04-19 ENCOUNTER — OFFICE VISIT (OUTPATIENT)
Dept: PHYSICAL THERAPY | Facility: HOSPITAL | Age: 62
End: 2022-04-19
Attending: Other
Payer: MEDICAID

## 2022-04-19 NOTE — PROGRESS NOTES
Dx: DDD, Cervical radiculitis (M54.12)             Insurance (Authorized # of Visits):  9           Authorizing Physician: Dr. Namita Rubi  Next MD visit: none scheduled  Fall Risk: standard         Precautions: n/a             Subjective: Pt feels that everything she does with the L hand is in slow motion. She has noticed that the L pisiform area is sore to touch. Fingertips on the R hand are stiff with constant numbness in L hand that goes a few inches up L forearm. She was sore in L upper trap after rotating head to left during eval with this going away by the next day. Pain: No pain involved in this POC;     Objective: Negative Antonio bilaterally, increased tone bilaterally in SCM   Left UE symptoms: 10/10 beginning of session down to 7-8/10 following manual traction;   Right UE symptoms: 5/10 beginning of session down to a 3/10 following manual traction    Assessment: Lowered hand placement for manual cervical traction after pt reported pain at SP's with original hand placement. Pt found significant relief bilaterally in UE symptoms with manual cervical traction as noted with decreased stiffness and tingling. Included a number of interventions today to start addressing UE stiffness and decreased UE strength. Added stretches to HEP with plans to add strengthening interventions after next visit. Further assessment to be done next session to see if any postural dysfunction is contributing to current pt presentation. This treatment was provided by EMMA Govea under the direct and constant direction and supervision of a licensed therapist, who provided consultation regarding skilled judgements, treatment, and assessment of patient care.      Goals: (to be met in 8-12 visits)   -Pt will see an increase in R rotation cervical ROM to allow for increased function when turning over the shoulder to check traffic when switching lanes  -Pt will see a decrease in neurologic symptoms (shocking, electrical) as reported by the pt to show decreased nerve sensitivity/impingement  -Pt will see an increase in their QuickDash score by at least 11 points to show meaningful improvement and positive response to therapy  -Pt will demonstrate increased  strength to within 5 lbs of the R side to help pt keep from dropping items and to use that hand functionally  -Pt will show an overall improvement globally with strength bilaterally to increase function using UEs   -Pt will show compliance with HEP to allow for better overall recovery and increased independence with managing symptoms on their own    Plan: Assess lower cervical/upper thoracic joint mobility and DNF endurance. Progress strengthening. Incorporate stretching interventions for ant. shoulder/pec. Frequency / Duration: Patient will be seen for 1-2 x/week or a total of 8-12 visits over a 90 day period. Treatment will include: Manual Therapy, Mechanical Traction, Neuromuscular Re-education, Therapeutic Activities, Therapeutic Exercise and Home Exercise Program instruction  Date: 4/19/2022  TX#: 2/8 Date:                 TX#: 3/ Date:                 TX#: 4/ Date:                 TX#: 5/ Date:    Tx#: 6/   MT:  Cervical manual traction       TE:  Wrist flexion stretch x30 sec bilat  Wrist ext stretch x30 sec bilat  Wrist ext YTB x15 bilat  Wrist flex 2# x15 bilat  Shoulder ext RTB 2x15  Shoulder flex RTB 2x15  Rows RTB 2x15                     HEP:   4/19/2022: Wrist flexion and extension stretches  Charges: MT-1, TE-2       Total Timed Treatment: 45 min  Total Treatment Time: 45 min

## 2022-04-20 PROCEDURE — 97110 THERAPEUTIC EXERCISES: CPT

## 2022-04-20 PROCEDURE — 97140 MANUAL THERAPY 1/> REGIONS: CPT

## 2022-04-21 ENCOUNTER — OFFICE VISIT (OUTPATIENT)
Dept: PHYSICAL THERAPY | Facility: HOSPITAL | Age: 62
End: 2022-04-21
Attending: Other
Payer: MEDICAID

## 2022-04-21 PROCEDURE — 97110 THERAPEUTIC EXERCISES: CPT

## 2022-04-21 PROCEDURE — 97140 MANUAL THERAPY 1/> REGIONS: CPT

## 2022-04-21 NOTE — PROGRESS NOTES
Dx: DDD, Cervical radiculitis (M54.12)             Insurance (Authorized # of Visits):  9           Authorizing Physician: Dr. Beth Wilson MD visit: none scheduled  Fall Risk: standard         Precautions: n/a             Subjective: Pt reports a new onset of pain on the R side in upper trap area when rotating to the R but that the L side does not hurt anymore when rotatingto the L. The index finger and thumb on the R hand did not tingle for about a day following last session but do tingle a little now. Pt was able to buy a 3# weight and has been doing wrist flexion/xtension with the weight and stretches. She has noticed a little stiffness on the L hand just proximal to pisiform. She only dropped the weight one time on L when doing the exs. Pain: No pain involved in this POC     Objective: Tender SP's of hypomobile segments C5-C7 , C5 more than C6 more than C7    Left UE symptoms: 10/10 beginning of session down to 6/10 following manual traction   Right UE symptoms: 5-6/10 beginning of session down to a 4/10 following manual traction  Pt reports decreased stiffness on both hands but especially the R, with mainly pain still in 2nd and 3rd digits on the L hand. Tingling decreased as well, still present more on the L, with the R 4th/5th digits tingling more than the others. Mild lateral elbow pain on R with bicep curls, soreness in bicep on L following exercise, R shoulder AC joint pn on R with OH press but felt like it was loosening up as she did more reps. Assessment: Pt continues to respond well to manual traction with decreased stiffness/tingling in bilateral hands. Additional interventions included to strengthen UE musculature and utilize weights that pt has access to at home. HEP expanded to include these. Implement further intervention to address upper spine joint hypomobility and progress current POC as indicated.      This treatment was provided by EMMA Damian under the direct and constant direction and supervision of a licensed therapist, who provided consultation regarding skilled judgements, treatment, and assessment of patient care. Goals: (to be met in 8-12 visits)   -Pt will see an increase in R rotation cervical ROM to allow for increased function when turning over the shoulder to check traffic when switching lanes  -Pt will see a decrease in neurologic symptoms (shocking, electrical) as reported by the pt to show decreased nerve sensitivity/impingement  -Pt will see an increase in their QuickDash score by at least 11 points to show meaningful improvement and positive response to therapy  -Pt will demonstrate increased  strength to within 5 lbs of the R side to help pt keep from dropping items and to use that hand functionally  -Pt will show an overall improvement globally with strength bilaterally to increase function using UEs   -Pt will show compliance with HEP to allow for better overall recovery and increased independence with managing symptoms on their own    Plan: DNF endurance ULTT assessment. Progress strengthening. Incorporate stretching interventions for ant. shoulder/pec. Thoracic ext over chair? Frequency / Duration: Patient will be seen for 1-2 x/week or a total of 8-12 visits over a 90 day period. Treatment will include: Manual Therapy, Mechanical Traction, Neuromuscular Re-education, Therapeutic Activities, Therapeutic Exercise and Home Exercise Program instruction  Date: 4/19/2022  TX#: 2/8 Date: 4/21/2022                TX#: 3/8 Date:                 TX#: 4/ Date:                 TX#: 5/ Date:    Tx#: 6/   MT:  Cervical manual traction MT:  C-spine/T-spine joint mobility assessment  C6-T1 gr II CPA mobs and rotational oscillations  Cervical manual traction      TE:  Wrist flexion stretch x30 sec bilat  Wrist ext stretch x30 sec bilat  Wrist ext YTB x15 bilat  Wrist flex 2# x15 bilat  Shoulder ext RTB 2x15  Shoulder flex RTB 2x15  Rows RTB 2x15 TE:  Rows RTB 2x15  Shoulder ext RTB 2x15  Bicep curls 3# 2x10 bilat  OH shoulder press 3# 2x10 bilat                      HEP:   4/19/2022: Wrist flexion and extension stretches  4/21/2022: OH shoulder press and bicep curls w/ DB's  Charges: MT-2, TE-1       Total Timed Treatment: 45 min  Total Treatment Time: 45 min

## 2022-04-26 ENCOUNTER — OFFICE VISIT (OUTPATIENT)
Dept: PHYSICAL THERAPY | Facility: HOSPITAL | Age: 62
End: 2022-04-26
Attending: Other
Payer: MEDICAID

## 2022-04-26 PROCEDURE — 97140 MANUAL THERAPY 1/> REGIONS: CPT

## 2022-04-26 PROCEDURE — 97110 THERAPEUTIC EXERCISES: CPT

## 2022-04-26 RX ORDER — ATORVASTATIN CALCIUM 10 MG/1
10 TABLET, FILM COATED ORAL NIGHTLY
Qty: 90 TABLET | Refills: 1 | Status: SHIPPED | OUTPATIENT
Start: 2022-04-26 | End: 2022-11-08

## 2022-04-26 NOTE — PROGRESS NOTES
Dx: DDD, Cervical radiculitis (M54.12)             Insurance (Authorized # of Visits):  9           Authorizing Physician: Dr. David Wilson MD visit: none scheduled  Fall Risk: standard         Precautions: n/a             Subjective: Pt reports that the L hand tingles more now since starting therapy with the symptoms going higher up into the forearm. She dropped the 3# dumb bell when in the L hand twice when doing wrist flexion curls due to sharp pain in wrist with every repetition. She did not have the wrist pain the next day. She is doing her HEP 4x a day and has noticed less tingling in the R thumb/index finger. Pain: No pain involved in this POC     Objective:   Left UE symptoms: 10/10 beginning of session down to 6/10 following manual traction   Right UE symptoms: 5/10 beginning of session down to a 5/10 following manual traction  Still feeling tingling in the hands but they are not as stiff. Assessment: Pt found significant relief in the LUE today following traction but not with the RUE, though the RUE did come in with less symptoms today than last session. Incorporated additional shoulder strengthening interventions and anterior stretching. AC joint pain with pec doorway stretch more on R relieved by lowering hands. Pt shows increased function reporting they were able to carry a couple of grocery bags in the L hand that they had been unable to do prior to therapy. Pt educated on decreasing frequency of HEP and stopping exs that increase pain. Sent theraband home with pt and showed how to set up/use properly. This treatment was provided by EMMA Wong under the direct and constant direction and supervision of a licensed therapist, who provided consultation regarding skilled judgements, treatment, and assessment of patient care.      Goals: (to be met in 8-12 visits)   -Pt will see an increase in R rotation cervical ROM to allow for increased function when turning over the shoulder to check traffic when switching lanes  -Pt will see a decrease in neurologic symptoms (shocking, electrical) as reported by the pt to show decreased nerve sensitivity/impingement  -Pt will see an increase in their QuickDash score by at least 11 points to show meaningful improvement and positive response to therapy  -Pt will demonstrate increased  strength to within 5 lbs of the R side to help pt keep from dropping items and to use that hand functionally  -Pt will show an overall improvement globally with strength bilaterally to increase function using UEs   -Pt will show compliance with HEP to allow for better overall recovery and increased independence with managing symptoms on their own    Plan: ULTT assessment. Progress strengthening. Thoracic ext over chair? Frequency / Duration: Patient will be seen for 1-2 x/week or a total of 8-12 visits over a 90 day period. Treatment will include: Manual Therapy, Mechanical Traction, Neuromuscular Re-education, Therapeutic Activities, Therapeutic Exercise and Home Exercise Program instruction  Date: 4/19/2022  TX#: 2/7 Date: 4/21/2022                TX#: 3/7 Date: 4/26/2022                TX#: 4/7 Date:                 TX#: 5/ Date:    Tx#: 6/   MT:  Cervical manual traction MT:  C-spine/T-spine joint mobility assessment  C6-T1 gr II CPA mobs and rotational oscillations  Cervical manual traction MT:  Cervical manual traction       TE:  Wrist flexion stretch x30 sec bilat  Wrist ext stretch x30 sec bilat  Wrist ext YTB x15 bilat  Wrist flex 2# x15 bilat  Shoulder ext RTB 2x15  Shoulder flex RTB 2x15  Rows RTB 2x15 TE:  Rows RTB 2x15  Shoulder ext RTB 2x15  Bicep curls 3# 2x10 bilat  OH shoulder press 3# 2x10 bilat   TE:  Rows blue TB 2x15  Shoulder ext RTB 2x15    Shoulder flexion 3# 2x10    Pec doorway stretch 15x3\"  Shoulder horizontal ADD green TB x15    HEP review and theraband instruction                   HEP:   4/19/2022: Wrist flexion and extension stretches  4/21/2022: OH shoulder press and bicep curls w/ DB's  4/26/2022: Shoulder ext and horizontal ADD w/ TB  Charges: MT-1, TE-2       Total Timed Treatment: 45 min  Total Treatment Time: 45 min

## 2022-04-26 NOTE — TELEPHONE ENCOUNTER
Refill passed per [facility] protocol. Requested Prescriptions   Pending Prescriptions Disp Refills    ATORVASTATIN 10 MG Oral Tab [Pharmacy Med Name: Atorvastatin Calcium 10 MG Oral Tablet] 90 tablet 0     Sig: Take 1 tablet by mouth nightly        Cholesterol Medication Protocol Passed - 4/26/2022 12:55 PM        Passed - ALT in past 12 months        Passed - LDL in past 12 months        Passed - Last ALT < 80       Lab Results   Component Value Date    ALT 23 01/27/2022             Passed - Last LDL < 130     Lab Results   Component Value Date    LDL 64 01/27/2022               Passed - Appointment in past 12 or next 3 months                Recent Outpatient Visits              Today     [facility] Sherly Peters, PT    Office Visit    5 days ago     [address], PT    Office Visit    1 week ago     [address], PT    Office Visit    1 week ago     [address], PT    Office Visit    3 weeks ago Routine physical examination    [facility], [address], [place], Enrique Donaldson MD    Office Visit             Future Appointments         Provider Department [facility]t Notes    [facility] 7v Rosa Benitez 04/14/2022-06/13/2022 ([id number]); [facility]    [facility] 7v Rosa Benitez 04/14/2022-06/13/2022 ([id number]); [facility]    In 1 week Sherly Peters, [address] visit; Katerina Reid auth 04/14/2022-06/13/2022 ([id number]); [facility]    In 2 weeks Sherly Peters, [address]???; [facility] [address] [facility]    In 3 months Deep Fishman 19 Hematology Oncology follow up visit. cl  6m

## 2022-04-28 ENCOUNTER — OFFICE VISIT (OUTPATIENT)
Dept: PHYSICAL THERAPY | Facility: HOSPITAL | Age: 62
End: 2022-04-28
Attending: Other
Payer: MEDICAID

## 2022-04-28 PROCEDURE — 97110 THERAPEUTIC EXERCISES: CPT

## 2022-04-28 PROCEDURE — 97140 MANUAL THERAPY 1/> REGIONS: CPT

## 2022-04-28 NOTE — PROGRESS NOTES
Dx: DDD, Cervical radiculitis (M54.12)             Insurance (Authorized # of Visits):  9           Authorizing Physician: Dr. Mikie Chase  Next MD visit: none scheduled  Fall Risk: standard         Precautions: n/a             Subjective: Patient reports less tingling in both hands after the new exercises last visit, but it intensified after doing the exercises with the weights at home. Pain: No pain involved in this POC     Objective:     L cervical side bend at rest in supine     Assessment: L cervical side bend in resting posture may be a contributor to paresthesias reported in the L UE; initiated side glides to restore normal mobility to decrease compression in the L cervical spine. Added cervicothoracic mobility exercise with open books to improve rotation/extension ROM. Modified HEP, holding DB resisted exercises and adding towel traction to focus on improving neural mobility and decreasing compression. Goals: (to be met in 8-12 visits)   -Pt will see an increase in R rotation cervical ROM to allow for increased function when turning over the shoulder to check traffic when switching lanes  -Pt will see a decrease in neurologic symptoms (shocking, electrical) as reported by the pt to show decreased nerve sensitivity/impingement  -Pt will see an increase in their QuickDash score by at least 11 points to show meaningful improvement and positive response to therapy  -Pt will demonstrate increased  strength to within 5 lbs of the R side to help pt keep from dropping items and to use that hand functionally  -Pt will show an overall improvement globally with strength bilaterally to increase function using UEs   -Pt will show compliance with HEP to allow for better overall recovery and increased independence with managing symptoms on their own    Plan: Continue cervical mobility work; perform UAP assessment.   Frequency / Duration: Patient will be seen for 1-2 x/week or a total of 8-12 visits over a 90 day period. Treatment will include: Manual Therapy, Mechanical Traction, Neuromuscular Re-education, Therapeutic Activities, Therapeutic Exercise and Home Exercise Program instruction  Date: 4/19/2022  TX#: 2/7 Date: 4/21/2022                TX#: 3/7 Date: 4/26/2022                TX#: 4/7 Date: 4/28/2022                TX#: 5/7 Date:    Tx#: 6/   MT:  Cervical manual traction MT:  C-spine/T-spine joint mobility assessment  C6-T1 gr II CPA mobs and rotational oscillations  Cervical manual traction MT:  Cervical manual traction   MT:  Cervical manual traction  R cervical side glides Gr III    TE:  Wrist flexion stretch x30 sec bilat  Wrist ext stretch x30 sec bilat  Wrist ext YTB x15 bilat  Wrist flex 2# x15 bilat  Shoulder ext RTB 2x15  Shoulder flex RTB 2x15  Rows RTB 2x15 TE:  Rows RTB 2x15  Shoulder ext RTB 2x15  Bicep curls 3# 2x10 bilat  OH shoulder press 3# 2x10 bilat   TE:  Rows blue TB 2x15  Shoulder ext RTB 2x15    Shoulder flexion 3# 2x10    Pec doorway stretch 15x3\"  Shoulder horizontal ADD green TB x15    HEP review and theraband instruction TE:  Supine pec arsenio/min stretch x1 min ea  Open book 10s x 10  Rows blue TB 2x15  TB bilat ER w/scap retraction 2 x 15 red   Instruction: self cervical traction                  HEP:   4/19/2022: Wrist flexion and extension stretches  4/21/2022: OH shoulder press and bicep curls w/ DB's  4/26/2022: Shoulder ext and horizontal ADD w/ TB  Charges: MT-1, TE-2       Total Timed Treatment: 45 min  Total Treatment Time: 45 min

## 2022-05-03 ENCOUNTER — OFFICE VISIT (OUTPATIENT)
Dept: PHYSICAL THERAPY | Facility: HOSPITAL | Age: 62
End: 2022-05-03
Attending: Other
Payer: MEDICAID

## 2022-05-03 PROCEDURE — 97110 THERAPEUTIC EXERCISES: CPT

## 2022-05-03 PROCEDURE — 97140 MANUAL THERAPY 1/> REGIONS: CPT

## 2022-05-03 NOTE — PROGRESS NOTES
Dx: DDD, Cervical radiculitis (M54.12)             Insurance (Authorized # of Visits):  9           Authorizing Physician: Dr. Fan Be  Next MD visit: none scheduled  Fall Risk: standard         Precautions: n/a             Subjective: Patient reports she is holding off on doing exercises with weights with her L hand because it seems to make the numbness/tingling worse. She reports overall the symptoms are less constant than before starting therapy, but the intensity is the same. She doesn't find herself dropping things in her L hand as often. Pain: No pain involved in this POC     Objective:     Neutral cervical alignment at rest in supine   Decreases stiffness, numbness during UAP C6-7    Assessment: Good within-session response to UAP mobilization and STM intended to decrease compression of L sided neural structures. Initiated cardiovascular exercise to increase blood flow to enhance neural recovery. Relief from B UE symptoms during supine chin tucks; added this to HEP for additional distraction technique and advised patient to defer DB resisted exercise temporarily.     Goals: (to be met in 8-12 visits)   -Pt will see an increase in R rotation cervical ROM to allow for increased function when turning over the shoulder to check traffic when switching lanes  -Pt will see a decrease in neurologic symptoms (shocking, electrical) as reported by the pt to show decreased nerve sensitivity/impingement  -Pt will see an increase in their QuickDash score by at least 11 points to show meaningful improvement and positive response to therapy  -Pt will demonstrate increased  strength to within 5 lbs of the R side to help pt keep from dropping items and to use that hand functionally  -Pt will show an overall improvement globally with strength bilaterally to increase function using UEs   -Pt will show compliance with HEP to allow for better overall recovery and increased independence with managing symptoms on their own    Plan: Reassessment next visit. Frequency / Duration: Patient will be seen for 1-2 x/week or a total of 8-12 visits over a 90 day period.  Treatment will include: Manual Therapy, Mechanical Traction, Neuromuscular Re-education, Therapeutic Activities, Therapeutic Exercise and Home Exercise Program instruction  Date: 4/19/2022  TX#: 2/7 Date: 4/21/2022                TX#: 3/7 Date: 4/26/2022                TX#: 4/7 Date: 4/28/2022                TX#: 5/7 Date: 5/3/2022   Tx#: 6/7   MT:  Cervical manual traction MT:  C-spine/T-spine joint mobility assessment  C6-T1 gr II CPA mobs and rotational oscillations  Cervical manual traction MT:  Cervical manual traction   MT:  Cervical manual traction  R cervical side glides Gr III MT:  Cervical manual traction  STM L UT, scalenes  UAP C6-7 Gr II   TE:  Wrist flexion stretch x30 sec bilat  Wrist ext stretch x30 sec bilat  Wrist ext YTB x15 bilat  Wrist flex 2# x15 bilat  Shoulder ext RTB 2x15  Shoulder flex RTB 2x15  Rows RTB 2x15 TE:  Rows RTB 2x15  Shoulder ext RTB 2x15  Bicep curls 3# 2x10 bilat  OH shoulder press 3# 2x10 bilat   TE:  Rows blue TB 2x15  Shoulder ext RTB 2x15    Shoulder flexion 3# 2x10    Pec doorway stretch 15x3\"  Shoulder horizontal ADD green TB x15    HEP review and theraband instruction TE:  Supine pec arsenio/min stretch x1 min ea  Open book 10s x 10  Rows blue TB 2x15  TB bilat ER w/scap retraction 2 x 15 red   Instruction: self cervical traction TE:  Supine chin tuck 5s x 15  Rows blue TB 2x15  TB bilat ER w/scap retraction 2 x 15 red   NuStep L4 x6 min                   HEP:   4/19/2022: Wrist flexion and extension stretches  4/21/2022: OH shoulder press and bicep curls w/ DB's  4/26/2022: Shoulder ext and horizontal ADD w/ TB  Charges: MT-1, TE-2       Total Timed Treatment: 45 min  Total Treatment Time: 48 min

## 2022-05-04 ENCOUNTER — NURSE TRIAGE (OUTPATIENT)
Dept: FAMILY MEDICINE CLINIC | Facility: CLINIC | Age: 62
End: 2022-05-04

## 2022-05-04 ENCOUNTER — PATIENT MESSAGE (OUTPATIENT)
Dept: FAMILY MEDICINE CLINIC | Facility: CLINIC | Age: 62
End: 2022-05-04

## 2022-05-04 RX ORDER — METFORMIN HYDROCHLORIDE 750 MG/1
1500 TABLET, EXTENDED RELEASE ORAL
Qty: 180 TABLET | Refills: 1 | Status: SHIPPED | OUTPATIENT
Start: 2022-05-04 | End: 2022-09-29

## 2022-05-04 NOTE — TELEPHONE ENCOUNTER
Refill passed per [facility] protocol. Requested Prescriptions   Pending Prescriptions Disp Refills    METFORMIN  MG Oral Tablet 24 Hr [Pharmacy Med Name: metFORMIN HCl  MG Oral Tablet Extended Release 24 Hour] 180 tablet 0     Sig: TAKE 2 TABLETS BY MOUTH ONCE DAILY WITH BREAKFAST        Diabetes Medication Protocol Passed - 5/4/2022  1:23 PM        Passed - Last A1C < 7.5 and within past 6 months     Lab Results   Component Value Date    A1C 6.1 (H) 01/27/2022               Passed - Appointment in past 6 or next 3 months        Passed - GFR Non- > 50     Lab Results   Component Value Date    GFRNAA 99 01/27/2022                 Passed - GFR in the past 12 months               Future Appointments         Provider Department Appt Notes    Tomorrow Sherly Peters, [address] visit; 7v auth 04/14/2022-06/13/2022 ([id number]); [facility]    In 6 days Sherly Peters, [address]; [facility] [address] [facility]    In 3 months Deep Swann 19 Hematology Oncology follow up visit. cl  6m            Recent Outpatient Visits              [facility] Sherly Peters PT    Office Visit    6 days ago     [facility] Sherly Peters [place]    Office Visit    1 week ago     Sherly Solis PT    Office Visit    1 week ago     Sherly Solis PT    Office Visit    2 weeks ago     Sherly Solis [place]    Office Visit

## 2022-05-04 NOTE — TELEPHONE ENCOUNTER
----- Message from Keny Waldron RN sent at 5/4/2022  3:02 PM CDT -----  Regarding: FW: Kym      ----- Message -----  From: Keaton Blood  Sent: 5/4/2022   2:53 PM CDT  To: Kristen Rn Triage  Subject: Kym Jones do I need a shot, because I stepped on a staple? I did brake the skin, my foot was bleeding a little. Cleaned it with alcohol.

## 2022-05-04 NOTE — TELEPHONE ENCOUNTER
Last   TDAP 2/1/2016     First  Call attempt. Left Voicemail to call back our office. Office phone number provided with office hours.

## 2022-05-04 NOTE — TELEPHONE ENCOUNTER
From: Felisha Quezada  To: Niki Blanton MD  Sent: 5/4/2022 2:53 PM CDT  Subject: Staple    Hi Dr. Angie Blanton do I need a shot, because I stepped on a staple? I did brake the skin, my foot was bleeding a little. Cleaned it with alcohol.

## 2022-05-05 ENCOUNTER — OFFICE VISIT (OUTPATIENT)
Dept: PHYSICAL THERAPY | Facility: HOSPITAL | Age: 62
End: 2022-05-05
Attending: Other
Payer: MEDICAID

## 2022-05-05 ENCOUNTER — PATIENT MESSAGE (OUTPATIENT)
Dept: FAMILY MEDICINE CLINIC | Facility: CLINIC | Age: 62
End: 2022-05-05

## 2022-05-05 PROCEDURE — 97140 MANUAL THERAPY 1/> REGIONS: CPT

## 2022-05-05 PROCEDURE — 97110 THERAPEUTIC EXERCISES: CPT

## 2022-05-10 ENCOUNTER — OFFICE VISIT (OUTPATIENT)
Dept: PHYSICAL THERAPY | Facility: HOSPITAL | Age: 62
End: 2022-05-10
Attending: Other
Payer: MEDICAID

## 2022-05-10 PROCEDURE — 97110 THERAPEUTIC EXERCISES: CPT

## 2022-05-10 PROCEDURE — 97140 MANUAL THERAPY 1/> REGIONS: CPT

## 2022-05-13 ENCOUNTER — OFFICE VISIT (OUTPATIENT)
Dept: PHYSICAL THERAPY | Facility: HOSPITAL | Age: 62
End: 2022-05-13
Attending: Other
Payer: MEDICAID

## 2022-05-13 PROCEDURE — 97140 MANUAL THERAPY 1/> REGIONS: CPT

## 2022-05-13 PROCEDURE — 97110 THERAPEUTIC EXERCISES: CPT

## 2022-05-13 NOTE — LETTER
2/22/2019              Oscar Clay        [address]         [address] [place] [address]         To Whom It May Concern,    Oscar Clay is under my medical care and was seen in the office today. Please excuse any absence.     Sincerely, Analgesia was given

## 2022-05-18 ENCOUNTER — TELEPHONE (OUTPATIENT)
Dept: NEUROLOGY | Facility: CLINIC | Age: 62
End: 2022-05-18

## 2022-05-18 ENCOUNTER — OFFICE VISIT (OUTPATIENT)
Dept: NEUROLOGY | Facility: CLINIC | Age: 62
End: 2022-05-18
Payer: MEDICAID

## 2022-05-18 VITALS — SYSTOLIC BLOOD PRESSURE: 130 MMHG | DIASTOLIC BLOOD PRESSURE: 70 MMHG | HEART RATE: 77 BPM

## 2022-05-18 DIAGNOSIS — R20.2 PARESTHESIAS: Primary | ICD-10-CM

## 2022-05-18 DIAGNOSIS — R20.0 BILATERAL HAND NUMBNESS: ICD-10-CM

## 2022-05-18 PROCEDURE — 3078F DIAST BP <80 MM HG: CPT | Performed by: OTHER

## 2022-05-18 PROCEDURE — 99214 OFFICE O/P EST MOD 30 MIN: CPT | Performed by: OTHER

## 2022-05-18 PROCEDURE — 3075F SYST BP GE 130 - 139MM HG: CPT | Performed by: OTHER

## 2022-05-18 RX ORDER — GABAPENTIN 300 MG/1
300 CAPSULE ORAL NIGHTLY
Qty: 30 CAPSULE | Refills: 0 | Status: SHIPPED | OUTPATIENT
Start: 2022-05-18 | End: 2022-06-17

## 2022-05-18 NOTE — TELEPHONE ENCOUNTER
[employer] for authorization of approval for MRI C-spine wo cpt code 60047. Uploaded clinical notes.  Case Number: [id number] pending approval.

## 2022-05-19 ENCOUNTER — PATIENT MESSAGE (OUTPATIENT)
Dept: FAMILY MEDICINE CLINIC | Facility: CLINIC | Age: 62
End: 2022-05-19

## 2022-05-20 ENCOUNTER — TELEPHONE (OUTPATIENT)
Dept: PHYSICAL THERAPY | Facility: HOSPITAL | Age: 62
End: 2022-05-20

## 2022-05-20 NOTE — TELEPHONE ENCOUNTER
From: Damian Cuba  To: Niki Duncan MD  Sent: 5/19/2022 2:14 PM CDT  Subject: Medicine gabapentin    Edmond Matt wants me to try the medicine I listed. Do you think it would be okay? I read all the side affects and I am questioning this medicine.  You know my history so I would like to get your input on codyrudy Cooper

## 2022-06-09 ENCOUNTER — TELEPHONE (OUTPATIENT)
Dept: FAMILY MEDICINE CLINIC | Facility: CLINIC | Age: 62
End: 2022-06-09

## 2022-06-09 ENCOUNTER — PATIENT MESSAGE (OUTPATIENT)
Dept: FAMILY MEDICINE CLINIC | Facility: CLINIC | Age: 62
End: 2022-06-09

## 2022-06-09 LAB — AMB EXT COVID-19 RESULT: DETECTED

## 2022-06-10 ENCOUNTER — NURSE TRIAGE (OUTPATIENT)
Dept: FAMILY MEDICINE CLINIC | Facility: CLINIC | Age: 62
End: 2022-06-10

## 2022-06-10 NOTE — TELEPHONE ENCOUNTER
Please let patient know I recommend taking Paxlovid as directed. I do recommend she discontinue atorvastatin when she starts Paxlovid, restarts 2 days after completing Paxlovid. Let her know that this can significantly decrease the risk of hospitalization and severe COVID symptoms. Most common side effect metallic taste, nausea, headache.

## 2022-06-10 NOTE — TELEPHONE ENCOUNTER
Message # [phone]         2022 05:31p   [RUI]  To:  From:  REBEKAH Haji MD:  Phone#:  ----------------------------------------------------------------------  Seth Berumen, [phone],  60, HAS COVID, NEEDS TO KNOW WHAT CAN TAKE, HAS DIABETES, HIGH BLOOD PRESSURE Paged at number :  PAGE: [phone] at : QOO- 17:31

## 2022-06-10 NOTE — TELEPHONE ENCOUNTER
From: Howard Ortiz  To: Niki Mathews MD  Sent: 6/9/2022 5:26 PM CDT  Subject: Covid tested positive    Hi, with all the medicines I take what can I take for covid. I have a runny nose, fever 99.2, cough and sore throat. Just need to know what I can take with diabetes and high blood pressure.  Thanks Lucilla Boas

## 2022-06-10 NOTE — TELEPHONE ENCOUNTER
----- Message from Frank Gonzalez RN sent at 6/10/2022 11:28 AM CDT -----  Regarding: FW: Covid tested positive      ----- Message -----  From: Malinda Hirsch  Sent: 6/9/2022   5:26 PM CDT  To: Kristen Rn Triage  Subject: Covid tested positive                            Hi, with all the medicines I take what can I take for covid. I have a runny nose, fever 99.2, cough and sore throat. Just need to know what I can take with diabetes and high blood pressure.  Thanks Lizeth Pinzon

## 2022-06-10 NOTE — TELEPHONE ENCOUNTER
I spoke with the patient shortly after I was paged/text. Patient was given instruction as to what she can and should take in order to help protect her and move through the acute phase of her COVID infection. Patient understood all directions and agreed to comply with the recommendations.

## 2022-06-15 ENCOUNTER — PATIENT MESSAGE (OUTPATIENT)
Dept: FAMILY MEDICINE CLINIC | Facility: CLINIC | Age: 62
End: 2022-06-15

## 2022-06-16 NOTE — TELEPHONE ENCOUNTER
From: Little Nissen  To: Niki Cohn MD  Sent: 6/15/2022 1:54 PM CDT  Subject: Stye in left eye    Hi  what do I need to do to get rid of the state in my left eye that is developing? If it is no one rhing, it's another.  Thanks Kandis Navarrete

## 2022-06-23 ENCOUNTER — OFFICE VISIT (OUTPATIENT)
Dept: FAMILY MEDICINE CLINIC | Facility: CLINIC | Age: 62
End: 2022-06-23
Payer: MEDICAID

## 2022-06-23 ENCOUNTER — TELEPHONE (OUTPATIENT)
Dept: FAMILY MEDICINE CLINIC | Facility: CLINIC | Age: 62
End: 2022-06-23

## 2022-06-23 ENCOUNTER — OFFICE VISIT (OUTPATIENT)
Dept: PHYSICAL THERAPY | Facility: HOSPITAL | Age: 62
End: 2022-06-23
Attending: Other
Payer: MEDICAID

## 2022-06-23 VITALS
TEMPERATURE: 96 F | HEIGHT: 65.5 IN | RESPIRATION RATE: 20 BRPM | SYSTOLIC BLOOD PRESSURE: 126 MMHG | HEART RATE: 80 BPM | WEIGHT: 293 LBS | BODY MASS INDEX: 48.23 KG/M2 | DIASTOLIC BLOOD PRESSURE: 69 MMHG

## 2022-06-23 DIAGNOSIS — M72.2 PLANTAR FASCIITIS: ICD-10-CM

## 2022-06-23 DIAGNOSIS — E11.9 TYPE 2 DIABETES MELLITUS WITHOUT COMPLICATION, WITHOUT LONG-TERM CURRENT USE OF INSULIN (HCC): ICD-10-CM

## 2022-06-23 DIAGNOSIS — H00.015 HORDEOLUM EXTERNUM LEFT LOWER EYELID: Primary | ICD-10-CM

## 2022-06-23 PROCEDURE — 99213 OFFICE O/P EST LOW 20 MIN: CPT | Performed by: FAMILY MEDICINE

## 2022-06-23 PROCEDURE — 97140 MANUAL THERAPY 1/> REGIONS: CPT

## 2022-06-23 PROCEDURE — 3078F DIAST BP <80 MM HG: CPT | Performed by: FAMILY MEDICINE

## 2022-06-23 PROCEDURE — 97110 THERAPEUTIC EXERCISES: CPT

## 2022-06-23 PROCEDURE — 3008F BODY MASS INDEX DOCD: CPT | Performed by: FAMILY MEDICINE

## 2022-06-23 PROCEDURE — 3074F SYST BP LT 130 MM HG: CPT | Performed by: FAMILY MEDICINE

## 2022-06-23 RX ORDER — TOBRAMYCIN 3 MG/ML
1 SOLUTION/ DROPS OPHTHALMIC EVERY 4 HOURS
Qty: 5 ML | Refills: 0 | Status: SHIPPED | OUTPATIENT
Start: 2022-06-23 | End: 2022-06-30

## 2022-06-23 NOTE — TELEPHONE ENCOUNTER
Spoke to [employer] at [facility]. The medication is on backorder. Called Manuel, medication was in stock. Spoke with the patient. Would like prescription transferred to [facility] in [place]. Spoke with Aileen Edmond at [facility] in [place] to have prescriptione transferred. Called patient. Notified prescription will be ready in 2-3 hours at [facility] [address] ([phone]). Instructed to bring insurance card.

## 2022-06-27 ENCOUNTER — OFFICE VISIT (OUTPATIENT)
Dept: PHYSICAL THERAPY | Facility: HOSPITAL | Age: 62
End: 2022-06-27
Attending: Other
Payer: MEDICAID

## 2022-06-27 PROCEDURE — 97110 THERAPEUTIC EXERCISES: CPT

## 2022-06-27 PROCEDURE — 97140 MANUAL THERAPY 1/> REGIONS: CPT

## 2022-06-29 ENCOUNTER — HOSPITAL ENCOUNTER (OUTPATIENT)
Dept: MRI IMAGING | Facility: HOSPITAL | Age: 62
Discharge: HOME | End: 2022-06-29
Attending: Other
Payer: MEDICAID

## 2022-06-29 DIAGNOSIS — R20.2 PARESTHESIAS: ICD-10-CM

## 2022-06-29 DIAGNOSIS — R20.0 BILATERAL HAND NUMBNESS: ICD-10-CM

## 2022-06-29 PROCEDURE — 72141 MRI NECK SPINE W/O DYE: CPT | Performed by: OTHER

## 2022-06-30 ENCOUNTER — TELEPHONE (OUTPATIENT)
Dept: NEUROLOGY | Facility: CLINIC | Age: 62
End: 2022-06-30

## 2022-06-30 ENCOUNTER — DOCUMENTATION ONLY (OUTPATIENT)
Dept: NEUROLOGY | Facility: CLINIC | Age: 62
End: 2022-06-30

## 2022-06-30 DIAGNOSIS — G95.9 CERVICAL MYELOPATHY (HCC): Primary | ICD-10-CM

## 2022-06-30 NOTE — PROGRESS NOTES
Will call pt now with results. Attempted to call pt and call cut out twice. Spoke with Dr Liseth Cano who would like to see her in clinic, has availability Next Wednesday in [employer]. Left VM with patient.

## 2022-06-30 NOTE — TELEPHONE ENCOUNTER
Bette Jarquin MD  [address] [facility] Nurse  Can someone please reach out to patient that she needs to make appt with Dr. Lexie Knowles neurosurgery next Wednesday? I called three times and left voicemail. Dr. Tylor Lu looked at her MRI C spine. Spoke to patient and reviewed information above. She was understanding. Transferred call to Meghan Mason at [place] [address] to schedule appointment for next Wednesday with Dr. Tylor Lu.

## 2022-07-05 ENCOUNTER — OFFICE VISIT (OUTPATIENT)
Dept: PHYSICAL THERAPY | Facility: HOSPITAL | Age: 62
End: 2022-07-05
Attending: Other
Payer: MEDICAID

## 2022-07-05 PROCEDURE — 97140 MANUAL THERAPY 1/> REGIONS: CPT

## 2022-07-05 PROCEDURE — 97110 THERAPEUTIC EXERCISES: CPT

## 2022-07-06 ENCOUNTER — OFFICE VISIT (OUTPATIENT)
Dept: SURGERY | Facility: CLINIC | Age: 62
End: 2022-07-06
Payer: MEDICAID

## 2022-07-06 VITALS
BODY MASS INDEX: 48.23 KG/M2 | WEIGHT: 293 LBS | HEART RATE: 83 BPM | SYSTOLIC BLOOD PRESSURE: 136 MMHG | DIASTOLIC BLOOD PRESSURE: 70 MMHG | HEIGHT: 65.5 IN

## 2022-07-06 DIAGNOSIS — G95.9 CERVICAL MYELOPATHY (HCC): Primary | ICD-10-CM

## 2022-07-06 PROCEDURE — 3075F SYST BP GE 130 - 139MM HG: CPT | Performed by: NEUROLOGICAL SURGERY

## 2022-07-06 PROCEDURE — 3078F DIAST BP <80 MM HG: CPT | Performed by: NEUROLOGICAL SURGERY

## 2022-07-06 PROCEDURE — 99203 OFFICE O/P NEW LOW 30 MIN: CPT | Performed by: NEUROLOGICAL SURGERY

## 2022-07-06 PROCEDURE — 3008F BODY MASS INDEX DOCD: CPT | Performed by: NEUROLOGICAL SURGERY

## 2022-07-06 NOTE — PROGRESS NOTES
Neurosurgery staff    Patient was seen and examined. Please also see PA dancer's note for more information. She developed numbness and tingling in the left arm about 6 months ago. She also has some numbness and tingling in her right fingers. She acknowledges mild to moderate fine motor difficulties, including trouble opening bottles. She has been dropping objects. She feels that physical therapy helped with her strength, but not the numbness and tingling. Medical history is significant for type 2 diabetes and obesity. On examination, strength is 5/5. Reflexes are 1+ and symmetric. No Consuelo's or clonus. MRI of the cervical spine demonstrates C4-5 and C6-7 disc osteophyte complexes with mild to moderate spinal cord compression. There is some stenosis at C5-6 as well. Impression/plan:    1. Cervical spondylosis with stenosis and myelopathy. Obtain CT to rule out OPLL. Flexion-extension x-rays. Patient will return in several weeks, to discuss the possibility of surgical intervention. This may include corpectomy, versus posterior decompression and fusion.

## 2022-07-06 NOTE — PROGRESS NOTES
Patient is here for consult. She has chronic neck pain with n/t/w in bilateral hands. MRI completed on 6-29-22 showed C4-C7 disc protrusions and cord compression. EMG was normal.  She has been doing PT since April. PT is improving her strength.       Review of Systems:    Hand Dominance: right  General: no symptoms reported  Neuro: weakness  Head: no symptoms reported  Musculoskeletal: numbness and tingling  Cardiovascular: no symptoms reported  Gastrointestinal: no symptoms reported IBS  Genitourinary: no symptoms reported  Respiratory: no symptoms reported  Eyes: no symptoms reported  Skin: no symptoms reported  Mouth & throat: no symptoms reported  Neck: no symptoms reported  Nose: no symptoms reported  Psychiatric: no symptoms reported saddness

## 2022-07-07 ENCOUNTER — HOSPITAL ENCOUNTER (OUTPATIENT)
Dept: GENERAL RADIOLOGY | Facility: HOSPITAL | Age: 62
Discharge: HOME | End: 2022-07-07
Attending: PHYSICIAN ASSISTANT
Payer: MEDICAID

## 2022-07-07 ENCOUNTER — OFFICE VISIT (OUTPATIENT)
Dept: PHYSICAL THERAPY | Facility: HOSPITAL | Age: 62
End: 2022-07-07
Attending: Other
Payer: MEDICAID

## 2022-07-07 DIAGNOSIS — G95.9 CERVICAL MYELOPATHY (HCC): ICD-10-CM

## 2022-07-07 PROCEDURE — 97110 THERAPEUTIC EXERCISES: CPT

## 2022-07-07 PROCEDURE — 72052 X-RAY EXAM NECK SPINE 6/>VWS: CPT | Performed by: PHYSICIAN ASSISTANT

## 2022-07-07 PROCEDURE — 97140 MANUAL THERAPY 1/> REGIONS: CPT

## 2022-07-11 ENCOUNTER — OFFICE VISIT (OUTPATIENT)
Dept: PHYSICAL THERAPY | Facility: HOSPITAL | Age: 62
End: 2022-07-11
Attending: Other
Payer: MEDICAID

## 2022-07-11 PROCEDURE — 97140 MANUAL THERAPY 1/> REGIONS: CPT

## 2022-07-11 PROCEDURE — 97110 THERAPEUTIC EXERCISES: CPT

## 2022-07-13 ENCOUNTER — OFFICE VISIT (OUTPATIENT)
Dept: PHYSICAL THERAPY | Facility: HOSPITAL | Age: 62
End: 2022-07-13
Attending: Other
Payer: MEDICAID

## 2022-07-13 PROCEDURE — 97140 MANUAL THERAPY 1/> REGIONS: CPT

## 2022-07-13 PROCEDURE — 97110 THERAPEUTIC EXERCISES: CPT

## 2022-07-14 ENCOUNTER — HOSPITAL ENCOUNTER (OUTPATIENT)
Dept: CT IMAGING | Facility: HOSPITAL | Age: 62
Discharge: HOME | End: 2022-07-14
Attending: PHYSICIAN ASSISTANT
Payer: MEDICAID

## 2022-07-14 DIAGNOSIS — G95.9 CERVICAL MYELOPATHY (HCC): ICD-10-CM

## 2022-07-14 PROCEDURE — 72125 CT NECK SPINE W/O DYE: CPT | Performed by: PHYSICIAN ASSISTANT

## 2022-07-16 ENCOUNTER — LAB ENCOUNTER (OUTPATIENT)
Dept: LAB | Facility: HOSPITAL | Age: 62
End: 2022-07-16
Attending: FAMILY MEDICINE
Payer: MEDICAID

## 2022-07-16 DIAGNOSIS — E11.9 TYPE 2 DIABETES MELLITUS WITHOUT COMPLICATION, WITHOUT LONG-TERM CURRENT USE OF INSULIN (HCC): ICD-10-CM

## 2022-07-16 LAB
EST. AVERAGE GLUCOSE BLD GHB EST-MCNC: 134 MG/DL (ref 68–126)
HBA1C MFR BLD: 6.3 % (ref ?–5.7)

## 2022-07-16 PROCEDURE — 3044F HG A1C LEVEL LT 7.0%: CPT | Performed by: FAMILY MEDICINE

## 2022-07-16 PROCEDURE — 36415 COLL VENOUS BLD VENIPUNCTURE: CPT

## 2022-07-16 PROCEDURE — 83036 HEMOGLOBIN GLYCOSYLATED A1C: CPT

## 2022-07-19 ENCOUNTER — OFFICE VISIT (OUTPATIENT)
Dept: FAMILY MEDICINE CLINIC | Facility: CLINIC | Age: 62
End: 2022-07-19
Payer: MEDICAID

## 2022-07-19 VITALS
DIASTOLIC BLOOD PRESSURE: 60 MMHG | BODY MASS INDEX: 47.09 KG/M2 | OXYGEN SATURATION: 98 % | SYSTOLIC BLOOD PRESSURE: 124 MMHG | HEART RATE: 79 BPM | HEIGHT: 66 IN | TEMPERATURE: 98 F | WEIGHT: 293 LBS

## 2022-07-19 DIAGNOSIS — K58.0 IRRITABLE BOWEL SYNDROME WITH DIARRHEA: ICD-10-CM

## 2022-07-19 DIAGNOSIS — E11.9 TYPE 2 DIABETES MELLITUS WITHOUT COMPLICATION, WITHOUT LONG-TERM CURRENT USE OF INSULIN (HCC): Primary | ICD-10-CM

## 2022-07-19 DIAGNOSIS — G95.9 CERVICAL MYELOPATHY (HCC): ICD-10-CM

## 2022-07-19 PROCEDURE — 3008F BODY MASS INDEX DOCD: CPT | Performed by: FAMILY MEDICINE

## 2022-07-19 PROCEDURE — 99214 OFFICE O/P EST MOD 30 MIN: CPT | Performed by: FAMILY MEDICINE

## 2022-07-19 PROCEDURE — 3078F DIAST BP <80 MM HG: CPT | Performed by: FAMILY MEDICINE

## 2022-07-19 PROCEDURE — 3074F SYST BP LT 130 MM HG: CPT | Performed by: FAMILY MEDICINE

## 2022-07-21 ENCOUNTER — TELEPHONE (OUTPATIENT)
Dept: SURGERY | Facility: CLINIC | Age: 62
End: 2022-07-21

## 2022-07-21 NOTE — TELEPHONE ENCOUNTER
Patient needs to schedule fup appointment with Dr. Tonja Massey. To review MRI that was ordered and completed. Please help patient schedule presurgical appointment.

## 2022-08-03 ENCOUNTER — TELEPHONE (OUTPATIENT)
Dept: SURGERY | Facility: CLINIC | Age: 62
End: 2022-08-03

## 2022-08-03 ENCOUNTER — OFFICE VISIT (OUTPATIENT)
Dept: SURGERY | Facility: CLINIC | Age: 62
End: 2022-08-03
Payer: MEDICAID

## 2022-08-03 VITALS — SYSTOLIC BLOOD PRESSURE: 124 MMHG | HEART RATE: 70 BPM | DIASTOLIC BLOOD PRESSURE: 68 MMHG

## 2022-08-03 DIAGNOSIS — G95.9 CERVICAL MYELOPATHY (HCC): Primary | ICD-10-CM

## 2022-08-03 PROCEDURE — 3078F DIAST BP <80 MM HG: CPT | Performed by: NEUROLOGICAL SURGERY

## 2022-08-03 PROCEDURE — 3074F SYST BP LT 130 MM HG: CPT | Performed by: NEUROLOGICAL SURGERY

## 2022-08-03 PROCEDURE — 99214 OFFICE O/P EST MOD 30 MIN: CPT | Performed by: NEUROLOGICAL SURGERY

## 2022-08-03 NOTE — PROGRESS NOTES
Neurosurgery Clinic Visit  8/3/2022    Vidya Shirley PCP:  Ayala Sanderson. Christina Villeda MD    1960 MRN [id number]     HISTORY OF PRESENT ILLNESS:  Vidya Shirley is a(n) 64year old female who presents today for follow-up. She is accompanied by her daughter. We reviewed my last office note together. Her symptoms are unchanged. She has no new concerns. PHYSICAL EXAMINATION:  Vital Signs:  /68   Pulse 70   LMP 08/10/2021   Physical examination is unchanged. REVIEW OF STUDIES:    Flexion-extension x-rays demonstrate no evidence of instability. MRI scan was reviewed once again. This demonstrates multilevel spondylosis with spinal stenosis and cord compression, specifically at C4-5 and C6-7. 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 ASSESSMENT and PLAN:    1. Cervical spondylosis with spinal stenosis, spinal cord compression, and symptomatic myelopathy. We had a discussion regarding available treatment options. I have recommended anterior decompression and fusion. Specifically, this will include C5 and C6 corpectomy, with C4-7 anterior arthrodesis. I discussed the possibility of anterior cervical discectomy and fusion with the patient. Risks and benefits of this operation were discussed as well. Risks of the operation include, but are not limited to, anesthetic complications, pain, death, paralysis, need for more surgery, infection, failure to improve symptomatically, CSF leak, esophageal injury, tracheal injury, vascular injury, C5 palsy, instability, pseudoarthrosis, recurrent laryngeal nerve injury, etc.  The patient voices understanding, and wishes to proceed. We further discussed the use of bone graft. This may include local autograft, autograft from the iliac crest or other source, and/or structural or nonstructural allograft. I advised the patient what I anticipate using for this operation. Risks were discussed.     Patient will see Dr. Cary Mathews, her primary care physician, for clearance. We will plan to perform the operation at [facility] in late August or early September. Time spent on counseling/coordination of care:  30 Minutes    Total time spent with patient:  30 Minutes    VEGA Montaño MD  Medical Director, Neurological Surgery    8/3/2022  12:42 PM       This report was dictated using voice recognition technology. Errors in syntax or recognition may occur, and should not be construed to change the meaning of a sentence.

## 2022-08-03 NOTE — H&P (VIEW-ONLY)
Neurosurgery Clinic Visit  8/3/2022    Chepe Ferraro PCP:  Chanel Jiménez. Mary Sullivan MD    1960 MRN [id number]     HISTORY OF PRESENT ILLNESS:  Chepe Ferraro is a(n) 64year old female who presents today for follow-up. She is accompanied by her daughter. We reviewed my last office note together. Her symptoms are unchanged. She has no new concerns. PHYSICAL EXAMINATION:  Vital Signs:  /68   Pulse 70   LMP 08/10/2021   Physical examination is unchanged. REVIEW OF STUDIES:    Flexion-extension x-rays demonstrate no evidence of instability. MRI scan was reviewed once again. This demonstrates multilevel spondylosis with spinal stenosis and cord compression, specifically at C4-5 and C6-7. 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 ASSESSMENT and PLAN:    1. Cervical spondylosis with spinal stenosis, spinal cord compression, and symptomatic myelopathy. We had a discussion regarding available treatment options. I have recommended anterior decompression and fusion. Specifically, this will include C5 and C6 corpectomy, with C4-7 anterior arthrodesis. I discussed the possibility of anterior cervical discectomy and fusion with the patient. Risks and benefits of this operation were discussed as well. Risks of the operation include, but are not limited to, anesthetic complications, pain, death, paralysis, need for more surgery, infection, failure to improve symptomatically, CSF leak, esophageal injury, tracheal injury, vascular injury, C5 palsy, instability, pseudoarthrosis, recurrent laryngeal nerve injury, etc.  The patient voices understanding, and wishes to proceed. We further discussed the use of bone graft. This may include local autograft, autograft from the iliac crest or other source, and/or structural or nonstructural allograft. I advised the patient what I anticipate using for this operation. Risks were discussed.     Patient will see Dr. Christina Villeda, her primary care physician, for clearance. We will plan to perform the operation at [facility] in late August or early September. Time spent on counseling/coordination of care:  30 Minutes    Total time spent with patient:  30 Minutes    VEGA Ken MD  Medical Director, Neurological Surgery    8/3/2022  12:42 PM       This report was dictated using voice recognition technology. Errors in syntax or recognition may occur, and should not be construed to change the meaning of a sentence.

## 2022-08-04 ENCOUNTER — TELEPHONE (OUTPATIENT)
Dept: NEUROLOGY | Facility: CLINIC | Age: 62
End: 2022-08-04

## 2022-08-04 ENCOUNTER — TELEPHONE (OUTPATIENT)
Dept: FAMILY MEDICINE CLINIC | Facility: CLINIC | Age: 62
End: 2022-08-04

## 2022-08-04 RX ORDER — SCOLOPAMINE TRANSDERMAL SYSTEM 1 MG/1
1 PATCH, EXTENDED RELEASE TRANSDERMAL ONCE
Status: CANCELLED | OUTPATIENT
Start: 2022-08-04 | End: 2022-08-04

## 2022-08-04 NOTE — TELEPHONE ENCOUNTER
Patient requesting a pre-op appointment for surgery scheduled on 8/29, however there is no availability.

## 2022-08-04 NOTE — TELEPHONE ENCOUNTER
Prior Authorization for inpatient surgery initiated [employer]   Requesting coverage for:C5-6 anterior corpectomy with cervical 4-7 anterior fusion  Date of Service: 8/29/2022   Inpatient days requested:1  CPT codes: 62186,47117,54641V1,61347,57992,41188,44434,75025,55498,65702,51401    Per [employer] CPT code 51852 does not go through them. I called [employer] [phone] and spoke with [employer] [place]. Code 00530 no PA needed. Call reference #[id number]. Request for surgery pending review, pending reference #[id number]. Clinical notes uploaded    Once I receive the approval from [address] [place] [address] I need to call [facility] for inpatient stay.

## 2022-08-04 NOTE — TELEPHONE ENCOUNTER
[place] from pre-admissions calling asking if patient should have an x-ray for pre-op test.  Due to patient's BMI and history of smoking, will order chest x-ray.

## 2022-08-04 NOTE — TELEPHONE ENCOUNTER
pt states she is scheduled for surgery on 8/29/22 and has to get her bloodwork, EKG, XRay dne but wants to know if she can have that done at [facility] since it's closer to her? Also states that he number she was given for Pre- Admissions, the phone just rings and no one answers.

## 2022-08-04 NOTE — TELEPHONE ENCOUNTER
Received notice from [facility] PAT re: pt not being retested for Covid prior to sx.   Endorsed to [employer]

## 2022-08-04 NOTE — TELEPHONE ENCOUNTER
Patient is scheduled for Cervical 5-cervical 6 anterior corpectomy with cervical 4-cervical 7 anterior fusion; placement of cage and plate  on 6-47-17 with Dr Yolette Quintanilla. Y  form completed--codes per Jennifer Fee  Y Surgery order signed   Y Placed sx on surgery sheet  Y Placed on outlook calendar  Y Mychart message sent to patient with sx instructions  Y Faxed pre-op clearance request to PCP Dr Lauren Sultana   NA E-mail sent to [facility]  Y Post-op appointments made   Y PA medicaid. Routed to PA team to initiate. Y 3 day follow up reminder placed.

## 2022-08-05 ENCOUNTER — OFFICE VISIT (OUTPATIENT)
Dept: HEMATOLOGY/ONCOLOGY | Facility: HOSPITAL | Age: 62
End: 2022-08-05
Attending: INTERNAL MEDICINE
Payer: MEDICAID

## 2022-08-05 VITALS
WEIGHT: 293 LBS | OXYGEN SATURATION: 97 % | SYSTOLIC BLOOD PRESSURE: 142 MMHG | HEART RATE: 77 BPM | HEIGHT: 65.5 IN | BODY MASS INDEX: 48.23 KG/M2 | TEMPERATURE: 99 F | RESPIRATION RATE: 18 BRPM | DIASTOLIC BLOOD PRESSURE: 66 MMHG

## 2022-08-05 DIAGNOSIS — R76.8 ELEVATED SERUM IMMUNOGLOBULIN FREE LIGHT CHAIN LEVEL: Primary | ICD-10-CM

## 2022-08-05 DIAGNOSIS — D50.8 OTHER IRON DEFICIENCY ANEMIA: ICD-10-CM

## 2022-08-05 PROCEDURE — 99214 OFFICE O/P EST MOD 30 MIN: CPT | Performed by: INTERNAL MEDICINE

## 2022-08-08 NOTE — TELEPHONE ENCOUNTER
Received notification from Dr. Azalea Locke that he is in agreement with PAT that Covid testing is not necessary for pre-op due to previous recent infection. Document has been signed and faxed to [employer]. Copy remains at Nurses' desk until confirmed to be scanned into chart.

## 2022-08-17 ENCOUNTER — HOSPITAL ENCOUNTER (OUTPATIENT)
Dept: PHYSICAL THERAPY | Facility: HOSPITAL | Age: 62
Discharge: HOME | End: 2022-08-17
Attending: NEUROLOGICAL SURGERY
Payer: MEDICAID

## 2022-08-17 ENCOUNTER — HOSPITAL ENCOUNTER (OUTPATIENT)
Dept: GENERAL RADIOLOGY | Facility: HOSPITAL | Age: 62
Discharge: HOME | End: 2022-08-17
Attending: NEUROLOGICAL SURGERY
Payer: MEDICAID

## 2022-08-17 ENCOUNTER — HOSPITAL ENCOUNTER (OUTPATIENT)
Dept: CV DIAGNOSTICS | Facility: HOSPITAL | Age: 62
End: 2022-08-17
Attending: NEUROLOGICAL SURGERY
Payer: MEDICAID

## 2022-08-17 ENCOUNTER — LABORATORY ENCOUNTER (OUTPATIENT)
Dept: LAB | Facility: HOSPITAL | Age: 62
End: 2022-08-17
Attending: NEUROLOGICAL SURGERY
Payer: MEDICAID

## 2022-08-17 ENCOUNTER — TELEPHONE (OUTPATIENT)
Dept: NEUROLOGY | Facility: CLINIC | Age: 62
End: 2022-08-17

## 2022-08-17 DIAGNOSIS — G95.9 CERVICAL MYELOPATHY (HCC): ICD-10-CM

## 2022-08-17 LAB
ALBUMIN SERPL-MCNC: 3.2 G/DL (ref 3.4–5)
ALBUMIN/GLOB SERPL: 0.7 {RATIO} (ref 1–2)
ALP LIVER SERPL-CCNC: 49 U/L
ALT SERPL-CCNC: 24 U/L
ANION GAP SERPL CALC-SCNC: 4 MMOL/L (ref 0–18)
APTT PPP: 30.3 SECONDS (ref 23.3–35.6)
APTT PPP: 30.9 SECONDS (ref 23.3–35.6)
AST SERPL-CCNC: 12 U/L (ref 15–37)
ATRIAL RATE: 75 BPM
BASOPHILS # BLD AUTO: 0.03 X10(3) UL (ref 0–0.2)
BASOPHILS NFR BLD AUTO: 0.3 %
BILIRUB SERPL-MCNC: 0.4 MG/DL (ref 0.1–2)
BUN BLD-MCNC: 14 MG/DL (ref 7–18)
CALCIUM BLD-MCNC: 9 MG/DL (ref 8.5–10.1)
CHLORIDE SERPL-SCNC: 106 MMOL/L (ref 98–112)
CO2 SERPL-SCNC: 29 MMOL/L (ref 21–32)
CREAT BLD-MCNC: 0.6 MG/DL
EOSINOPHIL # BLD AUTO: 0.07 X10(3) UL (ref 0–0.7)
EOSINOPHIL NFR BLD AUTO: 0.8 %
ERYTHROCYTE [DISTWIDTH] IN BLOOD BY AUTOMATED COUNT: 13.7 %
GFR SERPLBLD BASED ON 1.73 SQ M-ARVRAT: 101 ML/MIN/1.73M2 (ref 60–?)
GLOBULIN PLAS-MCNC: 4.3 G/DL (ref 2.8–4.4)
GLUCOSE BLD-MCNC: 116 MG/DL (ref 70–99)
HCT VFR BLD AUTO: 40.1 %
HGB BLD-MCNC: 12.7 G/DL
IMM GRANULOCYTES # BLD AUTO: 0.04 X10(3) UL (ref 0–1)
IMM GRANULOCYTES NFR BLD: 0.4 %
INR BLD: 1 (ref 0.85–1.16)
INR BLD: 1.03 (ref 0.85–1.16)
LYMPHOCYTES # BLD AUTO: 2.14 X10(3) UL (ref 1–4)
LYMPHOCYTES NFR BLD AUTO: 23 %
MCH RBC QN AUTO: 28.7 PG (ref 26–34)
MCHC RBC AUTO-ENTMCNC: 31.7 G/DL (ref 31–37)
MCV RBC AUTO: 90.5 FL
MONOCYTES # BLD AUTO: 0.63 X10(3) UL (ref 0.1–1)
MONOCYTES NFR BLD AUTO: 6.8 %
NEUTROPHILS # BLD AUTO: 6.4 X10 (3) UL (ref 1.5–7.7)
NEUTROPHILS # BLD AUTO: 6.4 X10(3) UL (ref 1.5–7.7)
NEUTROPHILS NFR BLD AUTO: 68.7 %
OSMOLALITY SERPL CALC.SUM OF ELEC: 289 MOSM/KG (ref 275–295)
P AXIS: 52 DEGREES
P-R INTERVAL: 164 MS
PLATELET # BLD AUTO: 251 10(3)UL (ref 150–450)
POTASSIUM SERPL-SCNC: 3.7 MMOL/L (ref 3.5–5.1)
PROT SERPL-MCNC: 7.5 G/DL (ref 6.4–8.2)
PROTHROMBIN TIME: 13.2 SECONDS (ref 11.6–14.8)
PROTHROMBIN TIME: 13.5 SECONDS (ref 11.6–14.8)
Q-T INTERVAL: 396 MS
QRS DURATION: 88 MS
QTC CALCULATION (BEZET): 442 MS
R AXIS: -41 DEGREES
RBC # BLD AUTO: 4.43 X10(6)UL
SODIUM SERPL-SCNC: 139 MMOL/L (ref 136–145)
T AXIS: 29 DEGREES
VENTRICULAR RATE: 75 BPM
WBC # BLD AUTO: 9.3 X10(3) UL (ref 4–11)

## 2022-08-17 PROCEDURE — 87081 CULTURE SCREEN ONLY: CPT

## 2022-08-17 PROCEDURE — 36415 COLL VENOUS BLD VENIPUNCTURE: CPT

## 2022-08-17 PROCEDURE — 85730 THROMBOPLASTIN TIME PARTIAL: CPT

## 2022-08-17 PROCEDURE — 71046 X-RAY EXAM CHEST 2 VIEWS: CPT | Performed by: NEUROLOGICAL SURGERY

## 2022-08-17 PROCEDURE — 80053 COMPREHEN METABOLIC PANEL: CPT

## 2022-08-17 PROCEDURE — 85025 COMPLETE CBC W/AUTO DIFF WBC: CPT

## 2022-08-17 PROCEDURE — 93005 ELECTROCARDIOGRAM TRACING: CPT

## 2022-08-17 PROCEDURE — 93010 ELECTROCARDIOGRAM REPORT: CPT | Performed by: INTERNAL MEDICINE

## 2022-08-17 PROCEDURE — 85610 PROTHROMBIN TIME: CPT

## 2022-08-17 NOTE — TELEPHONE ENCOUNTER
Per [employer] in separate TE \"Aretha calling with [facility] to state that certain CPT codes were not covered by [facility].  CPT codes 209.36, 209.34, 209.33, 209.32 were taken off and Pawan Brown would like a call back to discuss, [phone]\"    [facility].

## 2022-08-18 NOTE — TELEPHONE ENCOUNTER
Jose Miguel Prior authorization request completed for: C5-6 anterior corpectomy with cervical 4-7 anterior fusion  Authorization #[id number]  Authorization dates: 8/29/2022 (8/8/22-2/4/23)  CPT codes approved: 91456,54223T7,16150,81165,86153,89368,15063  Number of visits/dates of service approved: 1  Physician: Dr Mendez [facility]    CPT codes 93688,53339,43067,33118 no covered by Tony Siddiqui. CPT code 53 459 91 54 can not be added to above authorization unless it is for sure going to be used. It can possibly be added after the surgery.      [facility]-6 anterior corpectomy with cervical 4-7 anterior fusion hospital stay  Authorization #[id number]  Authorization dates: 8/29/22-9/1/22  CPT codes approved 62172,35914Y4,34282,85978,16746,25416,24056  Number of visits/dates of service approved: 3  Physician: Dr Ej Garces  Location: [facility]    Any changes call nurse reviewer Luis Antonio Thacker [phone]    CPT codes 38495,60851,64582,72590 no payable by [facility]

## 2022-08-25 ENCOUNTER — OFFICE VISIT (OUTPATIENT)
Dept: FAMILY MEDICINE CLINIC | Facility: CLINIC | Age: 62
End: 2022-08-25
Payer: MEDICAID

## 2022-08-25 VITALS
HEART RATE: 76 BPM | DIASTOLIC BLOOD PRESSURE: 69 MMHG | BODY MASS INDEX: 46.53 KG/M2 | OXYGEN SATURATION: 98 % | TEMPERATURE: 98 F | WEIGHT: 293 LBS | HEIGHT: 66.5 IN | SYSTOLIC BLOOD PRESSURE: 110 MMHG

## 2022-08-25 DIAGNOSIS — Z01.818 PREOP GENERAL PHYSICAL EXAM: Primary | ICD-10-CM

## 2022-08-25 DIAGNOSIS — E11.9 TYPE 2 DIABETES MELLITUS WITHOUT COMPLICATION, WITHOUT LONG-TERM CURRENT USE OF INSULIN (HCC): ICD-10-CM

## 2022-08-25 DIAGNOSIS — G95.9 CERVICAL MYELOPATHY (HCC): ICD-10-CM

## 2022-08-25 DIAGNOSIS — I10 PRIMARY HYPERTENSION: ICD-10-CM

## 2022-08-25 PROBLEM — K52.9 CHRONIC DIARRHEA: Status: RESOLVED | Noted: 2020-06-17 | Resolved: 2022-08-25

## 2022-08-25 PROBLEM — G56.00 CARPAL TUNNEL SYNDROME: Status: RESOLVED | Noted: 2021-08-05 | Resolved: 2022-08-25

## 2022-08-25 PROCEDURE — 3074F SYST BP LT 130 MM HG: CPT | Performed by: FAMILY MEDICINE

## 2022-08-25 PROCEDURE — 3008F BODY MASS INDEX DOCD: CPT | Performed by: FAMILY MEDICINE

## 2022-08-25 PROCEDURE — 99214 OFFICE O/P EST MOD 30 MIN: CPT | Performed by: FAMILY MEDICINE

## 2022-08-25 PROCEDURE — 3078F DIAST BP <80 MM HG: CPT | Performed by: FAMILY MEDICINE

## 2022-08-26 ENCOUNTER — TELEPHONE (OUTPATIENT)
Dept: FAMILY MEDICINE CLINIC | Facility: CLINIC | Age: 62
End: 2022-08-26

## 2022-08-26 NOTE — TELEPHONE ENCOUNTER
Continue nightly Lantus and other medications until the night before surgery. On the night before surgery take half of usual dose (16 units).

## 2022-08-26 NOTE — TELEPHONE ENCOUNTER
Patient asking for directions in regards to medication prior to surgery on Monday      Lantus 32 units at night    Please advise

## 2022-08-28 ENCOUNTER — ANESTHESIA EVENT (OUTPATIENT)
Dept: SURGERY | Facility: HOSPITAL | Age: 62
DRG: 472 | End: 2022-08-28
Payer: MEDICAID

## 2022-08-29 ENCOUNTER — APPOINTMENT (OUTPATIENT)
Dept: GENERAL RADIOLOGY | Facility: HOSPITAL | Age: 62
DRG: 472 | End: 2022-08-29
Attending: NEUROLOGICAL SURGERY
Payer: MEDICAID

## 2022-08-29 ENCOUNTER — HOSPITAL ENCOUNTER (INPATIENT)
Facility: HOSPITAL | Age: 62
LOS: 3 days | Discharge: HOME | DRG: 472 | End: 2022-09-01
Attending: NEUROLOGICAL SURGERY | Admitting: NEUROLOGICAL SURGERY
Payer: MEDICAID

## 2022-08-29 ENCOUNTER — ANESTHESIA (OUTPATIENT)
Dept: SURGERY | Facility: HOSPITAL | Age: 62
DRG: 472 | End: 2022-08-29
Payer: MEDICAID

## 2022-08-29 DIAGNOSIS — G95.9 CERVICAL MYELOPATHY (HCC): Primary | ICD-10-CM

## 2022-08-29 LAB
GLUCOSE BLD-MCNC: 131 MG/DL (ref 70–99)
GLUCOSE BLD-MCNC: 182 MG/DL (ref 70–99)
GLUCOSE BLD-MCNC: 230 MG/DL (ref 70–99)

## 2022-08-29 PROCEDURE — 0RG20A0 FUSION OF 2 OR MORE CERVICAL VERTEBRAL JOINTS WITH INTERBODY FUSION DEVICE, ANTERIOR APPROACH, ANTERIOR COLUMN, OPEN APPROACH: ICD-10-PCS | Performed by: NEUROLOGICAL SURGERY

## 2022-08-29 PROCEDURE — 0RB30ZZ EXCISION OF CERVICAL VERTEBRAL DISC, OPEN APPROACH: ICD-10-PCS | Performed by: NEUROLOGICAL SURGERY

## 2022-08-29 PROCEDURE — 3078F DIAST BP <80 MM HG: CPT | Performed by: HOSPITALIST

## 2022-08-29 PROCEDURE — 99233 SBSQ HOSP IP/OBS HIGH 50: CPT | Performed by: HOSPITALIST

## 2022-08-29 PROCEDURE — 4A11X4G MONITORING OF PERIPHERAL NERVOUS ELECTRICAL ACTIVITY, INTRAOPERATIVE, EXTERNAL APPROACH: ICD-10-PCS | Performed by: NEUROLOGICAL SURGERY

## 2022-08-29 PROCEDURE — 3074F SYST BP LT 130 MM HG: CPT | Performed by: HOSPITALIST

## 2022-08-29 PROCEDURE — 76000 FLUOROSCOPY <1 HR PHYS/QHP: CPT | Performed by: NEUROLOGICAL SURGERY

## 2022-08-29 PROCEDURE — 3008F BODY MASS INDEX DOCD: CPT | Performed by: HOSPITALIST

## 2022-08-29 DEVICE — SCREW 3120314 4.0 X 14 SELF TAP VAR
Type: IMPLANTABLE DEVICE | Site: SPINE CERVICAL | Status: FUNCTIONAL
Brand: ATLANTIS® ANTERIOR CERVICAL PLATE SYSTEM

## 2022-08-29 DEVICE — STAGRAFT DBM PUTTY 5CC: Type: IMPLANTABLE DEVICE | Site: SPINE CERVICAL | Status: FUNCTIONAL

## 2022-08-29 RX ORDER — HYDROCODONE BITARTRATE AND ACETAMINOPHEN 5; 325 MG/1; MG/1
1 TABLET ORAL ONCE AS NEEDED
Status: DISCONTINUED | OUTPATIENT
Start: 2022-08-29 | End: 2022-08-29 | Stop reason: HOSPADM

## 2022-08-29 RX ORDER — ACETAMINOPHEN 500 MG
1000 TABLET ORAL ONCE
Status: DISCONTINUED | OUTPATIENT
Start: 2022-08-29 | End: 2022-08-29 | Stop reason: HOSPADM

## 2022-08-29 RX ORDER — MIDAZOLAM HYDROCHLORIDE 1 MG/ML
1 INJECTION INTRAMUSCULAR; INTRAVENOUS EVERY 5 MIN PRN
Status: DISCONTINUED | OUTPATIENT
Start: 2022-08-29 | End: 2022-08-29 | Stop reason: HOSPADM

## 2022-08-29 RX ORDER — NICOTINE POLACRILEX 4 MG
15 LOZENGE BUCCAL
Status: DISCONTINUED | OUTPATIENT
Start: 2022-08-29 | End: 2022-09-01

## 2022-08-29 RX ORDER — HYDROMORPHONE HYDROCHLORIDE 1 MG/ML
0.8 INJECTION, SOLUTION INTRAMUSCULAR; INTRAVENOUS; SUBCUTANEOUS EVERY 2 HOUR PRN
Status: DISCONTINUED | OUTPATIENT
Start: 2022-08-29 | End: 2022-09-01

## 2022-08-29 RX ORDER — ONDANSETRON 2 MG/ML
4 INJECTION INTRAMUSCULAR; INTRAVENOUS EVERY 6 HOURS PRN
Status: DISCONTINUED | OUTPATIENT
Start: 2022-08-29 | End: 2022-08-29 | Stop reason: HOSPADM

## 2022-08-29 RX ORDER — PHENYLEPHRINE HCL 10 MG/ML
VIAL (ML) INJECTION AS NEEDED
Status: DISCONTINUED | OUTPATIENT
Start: 2022-08-29 | End: 2022-08-29 | Stop reason: SURG

## 2022-08-29 RX ORDER — DEXAMETHASONE SODIUM PHOSPHATE 4 MG/ML
VIAL (ML) INJECTION AS NEEDED
Status: DISCONTINUED | OUTPATIENT
Start: 2022-08-29 | End: 2022-08-29 | Stop reason: SURG

## 2022-08-29 RX ORDER — NALOXONE HYDROCHLORIDE 0.4 MG/ML
80 INJECTION, SOLUTION INTRAMUSCULAR; INTRAVENOUS; SUBCUTANEOUS AS NEEDED
Status: DISCONTINUED | OUTPATIENT
Start: 2022-08-29 | End: 2022-08-29 | Stop reason: HOSPADM

## 2022-08-29 RX ORDER — DEXTROSE MONOHYDRATE 25 G/50ML
50 INJECTION, SOLUTION INTRAVENOUS
Status: DISCONTINUED | OUTPATIENT
Start: 2022-08-29 | End: 2022-08-29 | Stop reason: HOSPADM

## 2022-08-29 RX ORDER — SODIUM CHLORIDE, SODIUM LACTATE, POTASSIUM CHLORIDE, CALCIUM CHLORIDE 600; 310; 30; 20 MG/100ML; MG/100ML; MG/100ML; MG/100ML
INJECTION, SOLUTION INTRAVENOUS CONTINUOUS
Status: DISCONTINUED | OUTPATIENT
Start: 2022-08-29 | End: 2022-08-29

## 2022-08-29 RX ORDER — NICOTINE POLACRILEX 4 MG
30 LOZENGE BUCCAL
Status: DISCONTINUED | OUTPATIENT
Start: 2022-08-29 | End: 2022-09-01

## 2022-08-29 RX ORDER — MIDAZOLAM HYDROCHLORIDE 1 MG/ML
INJECTION INTRAMUSCULAR; INTRAVENOUS AS NEEDED
Status: DISCONTINUED | OUTPATIENT
Start: 2022-08-29 | End: 2022-08-29 | Stop reason: SURG

## 2022-08-29 RX ORDER — SODIUM CHLORIDE, SODIUM LACTATE, POTASSIUM CHLORIDE, CALCIUM CHLORIDE 600; 310; 30; 20 MG/100ML; MG/100ML; MG/100ML; MG/100ML
INJECTION, SOLUTION INTRAVENOUS CONTINUOUS PRN
Status: DISCONTINUED | OUTPATIENT
Start: 2022-08-29 | End: 2022-08-29 | Stop reason: SURG

## 2022-08-29 RX ORDER — DOCUSATE SODIUM 100 MG/1
100 CAPSULE, LIQUID FILLED ORAL 2 TIMES DAILY
Status: DISCONTINUED | OUTPATIENT
Start: 2022-08-29 | End: 2022-09-01

## 2022-08-29 RX ORDER — HYDROMORPHONE HYDROCHLORIDE 1 MG/ML
0.6 INJECTION, SOLUTION INTRAMUSCULAR; INTRAVENOUS; SUBCUTANEOUS EVERY 5 MIN PRN
Status: DISCONTINUED | OUTPATIENT
Start: 2022-08-29 | End: 2022-08-29 | Stop reason: HOSPADM

## 2022-08-29 RX ORDER — CEFAZOLIN SODIUM IN 0.9 % NACL 3 G/100 ML
3 INTRAVENOUS SOLUTION, PIGGYBACK (ML) INTRAVENOUS EVERY 8 HOURS
Status: CANCELLED | OUTPATIENT
Start: 2022-08-29 | End: 2022-08-30

## 2022-08-29 RX ORDER — BISACODYL 10 MG
10 SUPPOSITORY, RECTAL RECTAL
Status: DISCONTINUED | OUTPATIENT
Start: 2022-08-29 | End: 2022-09-01

## 2022-08-29 RX ORDER — CEFAZOLIN SODIUM IN 0.9 % NACL 3 G/100 ML
3 INTRAVENOUS SOLUTION, PIGGYBACK (ML) INTRAVENOUS ONCE
Status: DISCONTINUED | OUTPATIENT
Start: 2022-08-29 | End: 2022-08-29 | Stop reason: HOSPADM

## 2022-08-29 RX ORDER — PROCHLORPERAZINE EDISYLATE 5 MG/ML
5 INJECTION INTRAMUSCULAR; INTRAVENOUS EVERY 8 HOURS PRN
Status: DISCONTINUED | OUTPATIENT
Start: 2022-08-29 | End: 2022-08-29 | Stop reason: HOSPADM

## 2022-08-29 RX ORDER — HYDROCODONE BITARTRATE AND ACETAMINOPHEN 5; 325 MG/1; MG/1
2 TABLET ORAL ONCE AS NEEDED
Status: DISCONTINUED | OUTPATIENT
Start: 2022-08-29 | End: 2022-08-29 | Stop reason: HOSPADM

## 2022-08-29 RX ORDER — LEVOTHYROXINE SODIUM 0.07 MG/1
75 TABLET ORAL
Status: DISCONTINUED | OUTPATIENT
Start: 2022-08-30 | End: 2022-09-01

## 2022-08-29 RX ORDER — ACETAMINOPHEN 500 MG
1000 TABLET ORAL ONCE AS NEEDED
Status: DISCONTINUED | OUTPATIENT
Start: 2022-08-29 | End: 2022-08-29 | Stop reason: HOSPADM

## 2022-08-29 RX ORDER — SCOLOPAMINE TRANSDERMAL SYSTEM 1 MG/1
PATCH, EXTENDED RELEASE TRANSDERMAL
Status: COMPLETED
Start: 2022-08-29 | End: 2022-08-29

## 2022-08-29 RX ORDER — DEXTROSE MONOHYDRATE 25 G/50ML
50 INJECTION, SOLUTION INTRAVENOUS
Status: DISCONTINUED | OUTPATIENT
Start: 2022-08-29 | End: 2022-09-01

## 2022-08-29 RX ORDER — SODIUM PHOSPHATE, DIBASIC AND SODIUM PHOSPHATE, MONOBASIC 7; 19 G/133ML; G/133ML
1 ENEMA RECTAL ONCE AS NEEDED
Status: DISCONTINUED | OUTPATIENT
Start: 2022-08-29 | End: 2022-09-01

## 2022-08-29 RX ORDER — SODIUM CHLORIDE 9 MG/ML
INJECTION, SOLUTION INTRAVENOUS CONTINUOUS
Status: DISCONTINUED | OUTPATIENT
Start: 2022-08-29 | End: 2022-09-01

## 2022-08-29 RX ORDER — HYDROMORPHONE HYDROCHLORIDE 1 MG/ML
0.4 INJECTION, SOLUTION INTRAMUSCULAR; INTRAVENOUS; SUBCUTANEOUS EVERY 5 MIN PRN
Status: DISCONTINUED | OUTPATIENT
Start: 2022-08-29 | End: 2022-08-29 | Stop reason: HOSPADM

## 2022-08-29 RX ORDER — PANTOPRAZOLE SODIUM 40 MG/1
40 TABLET, DELAYED RELEASE ORAL
Status: DISCONTINUED | OUTPATIENT
Start: 2022-08-30 | End: 2022-09-01

## 2022-08-29 RX ORDER — ATORVASTATIN CALCIUM 10 MG/1
10 TABLET, FILM COATED ORAL NIGHTLY
Status: DISCONTINUED | OUTPATIENT
Start: 2022-08-29 | End: 2022-09-01

## 2022-08-29 RX ORDER — ONDANSETRON 2 MG/ML
INJECTION INTRAMUSCULAR; INTRAVENOUS AS NEEDED
Status: DISCONTINUED | OUTPATIENT
Start: 2022-08-29 | End: 2022-08-29 | Stop reason: SURG

## 2022-08-29 RX ORDER — PROCHLORPERAZINE EDISYLATE 5 MG/ML
5 INJECTION INTRAMUSCULAR; INTRAVENOUS EVERY 8 HOURS PRN
Status: DISCONTINUED | OUTPATIENT
Start: 2022-08-29 | End: 2022-09-01

## 2022-08-29 RX ORDER — SCOLOPAMINE TRANSDERMAL SYSTEM 1 MG/1
PATCH, EXTENDED RELEASE TRANSDERMAL
Status: DISPENSED
Start: 2022-08-29 | End: 2022-08-29

## 2022-08-29 RX ORDER — LIDOCAINE HYDROCHLORIDE AND EPINEPHRINE 10; 10 MG/ML; UG/ML
INJECTION, SOLUTION INFILTRATION; PERINEURAL AS NEEDED
Status: DISCONTINUED | OUTPATIENT
Start: 2022-08-29 | End: 2022-08-29 | Stop reason: HOSPADM

## 2022-08-29 RX ORDER — HYDROMORPHONE HYDROCHLORIDE 1 MG/ML
INJECTION, SOLUTION INTRAMUSCULAR; INTRAVENOUS; SUBCUTANEOUS
Status: COMPLETED
Start: 2022-08-29 | End: 2022-08-29

## 2022-08-29 RX ORDER — DIAZEPAM 5 MG/1
5 TABLET ORAL EVERY 6 HOURS PRN
Status: DISCONTINUED | OUTPATIENT
Start: 2022-08-29 | End: 2022-09-01

## 2022-08-29 RX ORDER — ROCURONIUM BROMIDE 10 MG/ML
INJECTION, SOLUTION INTRAVENOUS AS NEEDED
Status: DISCONTINUED | OUTPATIENT
Start: 2022-08-29 | End: 2022-08-29 | Stop reason: SURG

## 2022-08-29 RX ORDER — ONDANSETRON 2 MG/ML
4 INJECTION INTRAMUSCULAR; INTRAVENOUS EVERY 6 HOURS PRN
Status: DISCONTINUED | OUTPATIENT
Start: 2022-08-29 | End: 2022-09-01

## 2022-08-29 RX ORDER — NICOTINE POLACRILEX 4 MG
30 LOZENGE BUCCAL
Status: DISCONTINUED | OUTPATIENT
Start: 2022-08-29 | End: 2022-08-29 | Stop reason: HOSPADM

## 2022-08-29 RX ORDER — HYDROCODONE BITARTRATE AND ACETAMINOPHEN 10; 325 MG/1; MG/1
1-2 TABLET ORAL EVERY 4 HOURS PRN
Status: DISCONTINUED | OUTPATIENT
Start: 2022-08-29 | End: 2022-09-01

## 2022-08-29 RX ORDER — MEPERIDINE HYDROCHLORIDE 25 MG/ML
12.5 INJECTION INTRAMUSCULAR; INTRAVENOUS; SUBCUTANEOUS AS NEEDED
Status: DISCONTINUED | OUTPATIENT
Start: 2022-08-29 | End: 2022-08-29 | Stop reason: HOSPADM

## 2022-08-29 RX ORDER — SENNOSIDES 8.6 MG
17.2 TABLET ORAL NIGHTLY
Status: DISCONTINUED | OUTPATIENT
Start: 2022-08-29 | End: 2022-09-01

## 2022-08-29 RX ORDER — SODIUM CHLORIDE, SODIUM LACTATE, POTASSIUM CHLORIDE, CALCIUM CHLORIDE 600; 310; 30; 20 MG/100ML; MG/100ML; MG/100ML; MG/100ML
INJECTION, SOLUTION INTRAVENOUS CONTINUOUS
Status: DISCONTINUED | OUTPATIENT
Start: 2022-08-29 | End: 2022-08-29 | Stop reason: HOSPADM

## 2022-08-29 RX ORDER — NICOTINE POLACRILEX 4 MG
15 LOZENGE BUCCAL
Status: DISCONTINUED | OUTPATIENT
Start: 2022-08-29 | End: 2022-08-29 | Stop reason: HOSPADM

## 2022-08-29 RX ORDER — HYDROMORPHONE HYDROCHLORIDE 1 MG/ML
0.4 INJECTION, SOLUTION INTRAMUSCULAR; INTRAVENOUS; SUBCUTANEOUS EVERY 2 HOUR PRN
Status: DISCONTINUED | OUTPATIENT
Start: 2022-08-29 | End: 2022-09-01

## 2022-08-29 RX ORDER — LIDOCAINE HYDROCHLORIDE 10 MG/ML
INJECTION, SOLUTION EPIDURAL; INFILTRATION; INTRACAUDAL; PERINEURAL AS NEEDED
Status: DISCONTINUED | OUTPATIENT
Start: 2022-08-29 | End: 2022-08-29 | Stop reason: SURG

## 2022-08-29 RX ORDER — POLYETHYLENE GLYCOL 3350 17 G/17G
17 POWDER, FOR SOLUTION ORAL DAILY PRN
Status: DISCONTINUED | OUTPATIENT
Start: 2022-08-29 | End: 2022-09-01

## 2022-08-29 RX ORDER — LISINOPRIL 10 MG/1
10 TABLET ORAL DAILY
Status: DISCONTINUED | OUTPATIENT
Start: 2022-08-30 | End: 2022-09-01

## 2022-08-29 RX ORDER — DIPHENHYDRAMINE HCL 25 MG
25 CAPSULE ORAL EVERY 4 HOURS PRN
Status: DISCONTINUED | OUTPATIENT
Start: 2022-08-29 | End: 2022-09-01

## 2022-08-29 RX ORDER — DIPHENHYDRAMINE HYDROCHLORIDE 50 MG/ML
25 INJECTION INTRAMUSCULAR; INTRAVENOUS EVERY 4 HOURS PRN
Status: DISCONTINUED | OUTPATIENT
Start: 2022-08-29 | End: 2022-09-01

## 2022-08-29 RX ORDER — HYDROMORPHONE HYDROCHLORIDE 1 MG/ML
0.2 INJECTION, SOLUTION INTRAMUSCULAR; INTRAVENOUS; SUBCUTANEOUS EVERY 5 MIN PRN
Status: DISCONTINUED | OUTPATIENT
Start: 2022-08-29 | End: 2022-08-29 | Stop reason: HOSPADM

## 2022-08-29 RX ORDER — LABETALOL HYDROCHLORIDE 5 MG/ML
5 INJECTION, SOLUTION INTRAVENOUS EVERY 5 MIN PRN
Status: DISCONTINUED | OUTPATIENT
Start: 2022-08-29 | End: 2022-08-29 | Stop reason: HOSPADM

## 2022-08-29 RX ADMIN — ROCURONIUM BROMIDE 50 MG: 10 INJECTION, SOLUTION INTRAVENOUS at 11:01:00

## 2022-08-29 RX ADMIN — DEXAMETHASONE SODIUM PHOSPHATE 8 MG: 4 MG/ML VIAL (ML) INJECTION at 11:06:00

## 2022-08-29 RX ADMIN — PHENYLEPHRINE HCL 100 MCG: 10 MG/ML VIAL (ML) INJECTION at 12:48:00

## 2022-08-29 RX ADMIN — LIDOCAINE HYDROCHLORIDE 50 MG: 10 INJECTION, SOLUTION EPIDURAL; INFILTRATION; INTRACAUDAL; PERINEURAL at 10:48:00

## 2022-08-29 RX ADMIN — SODIUM CHLORIDE, SODIUM LACTATE, POTASSIUM CHLORIDE, CALCIUM CHLORIDE: 600; 310; 30; 20 INJECTION, SOLUTION INTRAVENOUS at 13:57:00

## 2022-08-29 RX ADMIN — MIDAZOLAM HYDROCHLORIDE 2 MG: 1 INJECTION INTRAMUSCULAR; INTRAVENOUS at 10:43:00

## 2022-08-29 RX ADMIN — ROCURONIUM BROMIDE 20 MG: 10 INJECTION, SOLUTION INTRAVENOUS at 13:47:00

## 2022-08-29 RX ADMIN — SODIUM CHLORIDE, SODIUM LACTATE, POTASSIUM CHLORIDE, CALCIUM CHLORIDE: 600; 310; 30; 20 INJECTION, SOLUTION INTRAVENOUS at 11:05:00

## 2022-08-29 RX ADMIN — SODIUM CHLORIDE, SODIUM LACTATE, POTASSIUM CHLORIDE, CALCIUM CHLORIDE: 600; 310; 30; 20 INJECTION, SOLUTION INTRAVENOUS at 15:56:00

## 2022-08-29 RX ADMIN — ROCURONIUM BROMIDE 30 MG: 10 INJECTION, SOLUTION INTRAVENOUS at 12:48:00

## 2022-08-29 RX ADMIN — ONDANSETRON 4 MG: 2 INJECTION INTRAMUSCULAR; INTRAVENOUS at 15:51:00

## 2022-08-29 RX ADMIN — SODIUM CHLORIDE, SODIUM LACTATE, POTASSIUM CHLORIDE, CALCIUM CHLORIDE: 600; 310; 30; 20 INJECTION, SOLUTION INTRAVENOUS at 12:25:00

## 2022-08-29 RX ADMIN — ROCURONIUM BROMIDE 20 MG: 10 INJECTION, SOLUTION INTRAVENOUS at 14:50:00

## 2022-08-29 RX ADMIN — SODIUM CHLORIDE, SODIUM LACTATE, POTASSIUM CHLORIDE, CALCIUM CHLORIDE: 600; 310; 30; 20 INJECTION, SOLUTION INTRAVENOUS at 10:43:00

## 2022-08-29 NOTE — BRIEF OP NOTE
Pre-Operative Diagnosis: Cervical myelopathy ([facility]) [G95.9]     Post-Operative Diagnosis: Cervical myelopathy ([facility] [G95.9]      Procedure Performed:   Cervical 5-cervical 6 anterior corpectomy with cervical 4-cervical 5, cervical 5-cervical 6, cervical 6-cervical 7 anterior fusion, placement of cage and plate    Surgeon(s) and Role:     Kadi Jorge MD - Primary    Assistant(s):  PA: Maryjane Cooper PA-C     Surgical Findings: please see dictation      Specimen: none     Estimated Blood Loss: No data recorded    Dictation Number:      Bria Ross PA-C  8/29/2022  4:22 PM

## 2022-08-29 NOTE — ANESTHESIA PROCEDURE NOTES
Airway  Date/Time: 8/29/2022 10:51 AM  Urgency: elective    Airway not difficult    General Information and Staff    Patient location during procedure: OR  Anesthesiologist: Rah Irwin MD  Resident/CRNA: Ilsa Guzman CRNA  Performed: anesthesiologist     Indications and Patient Condition  Indications for airway management: anesthesia  Spontaneous Ventilation: absent  Sedation level: deep  Preoxygenated: yes  Patient position: sniffing  Mask difficulty assessment: 0 - not attempted    Final Airway Details  Final airway type: endotracheal airway      Successful airway: ETT  Cuffed: yes   Successful intubation technique: Video laryngoscopy  Facilitating devices/methods: intubating stylet  Endotracheal tube insertion site: oral  Blade: GlideScope  Blade size: #3  ETT size (mm): 7.5    Cormack-Lehane Classification: grade I - full view of glottis  Placement verified by: chest auscultation and capnometry   Measured from: lips  ETT to lips (cm): 21  Number of attempts at approach: 1  Number of other approaches attempted: 0    Additional Comments  Neck kept midline and unextended throughout

## 2022-08-29 NOTE — ANESTHESIA POSTPROCEDURE EVALUATION
[facility]    Pugh Brace Patient Status:  Inpatient   Age/Gender 58year old female MRN [id number]   [facility] SURGERY Attending Sheree Chacko   Hosp Day # 0 PCP Larissa Luna. Aleisha Jones MD       Anesthesia Post-op Note    Cervical 5-cervical 6 anterior corpectomy with cervical 4-cervical 5, cervical 5-cervical 6, cervical 6-cervical 7 anterior fusion, placement of cage and plate    Procedure Summary     Date: 08/29/22 Room / Location: [address] [place] [address] OR 16 / [address] [facility] OR    Anesthesia Start: 6628 Anesthesia Stop: 7676    Procedure: Cervical 5-cervical 6 anterior corpectomy with cervical 4-cervical 5, cervical 5-cervical 6, cervical 6-cervical 7 anterior fusion, placement of cage and plate (N/A Spine Cervical) Diagnosis:       Cervical myelopathy (HCC)      ([facility] [G95.9])    Surgeons: Sheree Chacko MD Anesthesiologist: Noemy Johnson MD    Anesthesia Type: general ASA Status: 3          Anesthesia Type: general    Vitals Value Taken Time   /63 08/29/22 1623   Temp 97.5 08/29/22 1623   Pulse 62 08/29/22 1623   Resp 12 08/29/22 1623   SpO2 99 08/29/22 1623       Patient Location: PACU    Anesthesia Type: general    Airway Patency: patent and extubated    Postop Pain Control: adequate    Mental Status: preanesthetic baseline    Nausea/Vomiting: none    Cardiopulmonary/Hydration status: stable euvolemic    Complications: no apparent anesthesia related complications    Postop vital signs: stable    Dental Exam: Unchanged from Preop    Patient to be discharged from PACU when criteria met.

## 2022-08-29 NOTE — ANESTHESIA PROCEDURE NOTES
Arterial Line  Performed by: Abigail Ordaz CRNA  Authorized by: Herbert Llanes MD     General Information and Staff    Procedure Start:   Anesthesiologist: Herbert Llanes MD  CRNA:  Abigail Ordaz CRNA  Performed By:  CRNA  Patient Location:  OR  Indication: continuous blood pressure monitoring and blood sampling needed    Site Identification: real time ultrasound guided and surface landmarks    Preanesthetic Checklist: 2 patient identifiers, IV checked, risks and benefits discussed, monitors and equipment checked, pre-op evaluation, timeout performed, anesthesia consent and sterile technique used    Procedure Details    Catheter Size:  20 G  Catheter Length:  1 and 3/4 inchCatheter Type:  Arrow  Seldinger Technique?: Yes    Laterality:  LeftSite:  Radial artery  Site Prep: chlorhexidine  Line Secured:  Wrist Brace, tape and Tegaderm    Assessment    Events: patient tolerated procedure well with no complications      Medications      Additional Comments

## 2022-08-29 NOTE — INTERVAL H&P NOTE
[facility]: [facility] [G95.9]    The above referenced H&P was reviewed by Katie Stewart PA-C on 8/29/2022, the patient was examined and no significant changes have occurred in the patient's condition since the H&P was performed. I reviewed with the patient and/or legal representative the potential benefits, risks and side effects of this procedure; the likelihood of the patient achieving goals; and potential problems that might occur during recuperation. I reviewed  reasonable alternatives to the procedure, including risks, benefits and side effects related to the alternatives and risks related to not receiving this procedure. We will proceed with procedure as planned. Dr. Karlie Solano to follow.

## 2022-08-29 NOTE — ANESTHESIA PROCEDURE NOTES
Peripheral IV  Inserted by: Angela Ahumada MD    Placement  Needle size: 20 G  Laterality: right  Location: forearm  Local anesthetic: none  Site prep: alcohol  Technique: anatomical landmarks  Attempts: 3

## 2022-08-30 ENCOUNTER — APPOINTMENT (OUTPATIENT)
Dept: GENERAL RADIOLOGY | Facility: HOSPITAL | Age: 62
DRG: 472 | End: 2022-08-30
Attending: PHYSICIAN ASSISTANT
Payer: MEDICAID

## 2022-08-30 LAB
ANION GAP SERPL CALC-SCNC: 4 MMOL/L (ref 0–18)
BASOPHILS # BLD AUTO: 0.01 X10(3) UL (ref 0–0.2)
BASOPHILS NFR BLD AUTO: 0.1 %
BUN BLD-MCNC: 11 MG/DL (ref 7–18)
CALCIUM BLD-MCNC: 8.2 MG/DL (ref 8.5–10.1)
CHLORIDE SERPL-SCNC: 110 MMOL/L (ref 98–112)
CO2 SERPL-SCNC: 26 MMOL/L (ref 21–32)
CREAT BLD-MCNC: 0.63 MG/DL
EOSINOPHIL # BLD AUTO: 0 X10(3) UL (ref 0–0.7)
EOSINOPHIL NFR BLD AUTO: 0 %
ERYTHROCYTE [DISTWIDTH] IN BLOOD BY AUTOMATED COUNT: 13.7 %
GFR SERPLBLD BASED ON 1.73 SQ M-ARVRAT: 100 ML/MIN/1.73M2 (ref 60–?)
GLUCOSE BLD-MCNC: 124 MG/DL (ref 70–99)
GLUCOSE BLD-MCNC: 158 MG/DL (ref 70–99)
GLUCOSE BLD-MCNC: 163 MG/DL (ref 70–99)
GLUCOSE BLD-MCNC: 183 MG/DL (ref 70–99)
GLUCOSE BLD-MCNC: 185 MG/DL (ref 70–99)
HCT VFR BLD AUTO: 35.1 %
HGB BLD-MCNC: 11.3 G/DL
IMM GRANULOCYTES # BLD AUTO: 0.05 X10(3) UL (ref 0–1)
IMM GRANULOCYTES NFR BLD: 0.5 %
LYMPHOCYTES # BLD AUTO: 1.7 X10(3) UL (ref 1–4)
LYMPHOCYTES NFR BLD AUTO: 15.8 %
MCH RBC QN AUTO: 28.9 PG (ref 26–34)
MCHC RBC AUTO-ENTMCNC: 32.2 G/DL (ref 31–37)
MCV RBC AUTO: 89.8 FL
MONOCYTES # BLD AUTO: 0.67 X10(3) UL (ref 0.1–1)
MONOCYTES NFR BLD AUTO: 6.2 %
NEUTROPHILS # BLD AUTO: 8.3 X10 (3) UL (ref 1.5–7.7)
NEUTROPHILS # BLD AUTO: 8.3 X10(3) UL (ref 1.5–7.7)
NEUTROPHILS NFR BLD AUTO: 77.4 %
OSMOLALITY SERPL CALC.SUM OF ELEC: 293 MOSM/KG (ref 275–295)
PLATELET # BLD AUTO: 235 10(3)UL (ref 150–450)
POTASSIUM SERPL-SCNC: 3.8 MMOL/L (ref 3.5–5.1)
RBC # BLD AUTO: 3.91 X10(6)UL
SODIUM SERPL-SCNC: 140 MMOL/L (ref 136–145)
WBC # BLD AUTO: 10.7 X10(3) UL (ref 4–11)

## 2022-08-30 PROCEDURE — 72040 X-RAY EXAM NECK SPINE 2-3 VW: CPT | Performed by: PHYSICIAN ASSISTANT

## 2022-08-30 PROCEDURE — 99232 SBSQ HOSP IP/OBS MODERATE 35: CPT | Performed by: HOSPITALIST

## 2022-08-30 PROCEDURE — 99291 CRITICAL CARE FIRST HOUR: CPT | Performed by: OTHER

## 2022-08-30 NOTE — OCCUPATIONAL THERAPY NOTE
OT orders received, chart reviewed and discussed w/ PT and RN. Patient awaiting [place] brace. Will continue to follow and eval once brace received.

## 2022-08-30 NOTE — PHYSICAL THERAPY NOTE
Order received for PT eval and chart reviewed. Attempted to see Pt this am, however, Pt awaiting [place] collar. PT will continue to follow.

## 2022-08-30 NOTE — OPERATIVE REPORT
Neurosurgery operative report        Preoperative diagnosis:  Cervical spondylosis with spinal stenosis, spinal cord compression, and myelopathy      Postoperative diagnosis:  Same      Procedure performed:  1. C5 corpectomy  2. C6 corpectomy  3. C4-5 anterior discectomy  4. C5-6 anterior discectomy  5. C6-7 anterior discectomy  6. C4-5 anterior arthrodesis  7. C5-6 anterior arthrodesis  8. C6-7 anterior arthrodesis  9. Application of structural biomechanical interbody cage, spanning C4-C7  10. Application of anterior plate, B4-B6. The edges of the plate extend beyond the interbody cage, and the plate is not integral to the cage. 11.  Use of operating microscope with surgical microdissection techniques  12. Use of intraoperative fluoroscopy with live interpretation of intraoperative imaging  13. Nonstructural autograft  14. Nonstructural allograft        Surgeon: Tyrese Pace MD        Assistant: Radha Badillo PA-C        Anesthesia: General endotracheal        Estimated blood loss: 150 cc        Indications for procedure:    Please also see the relevant progress notes for further information. Briefly, this patient presented to the outpatient office with clinical and radiographic evidence of spinal stenosis, cord compression, and myelopathy. The patient was counseled regarding the natural history of this disease, as well as surgical and nonsurgical treatment options. Following a discussion of risks and benefits, she chose to proceed with this operation. Procedure in detail:    The patient was reevaluated in the preoperative holding area. The patient was reexamined. The operation was reviewed, including risks, benefits, and alternatives. Informed consent was verified. The patient was then brought back to the operating theater. A timeout was performed per protocol. General endotracheal anesthesia was induced.   Lines and monitors were started by the anesthesiologist.    Patient was placed in supine position on a Darryl table. All pressure points were carefully padded and checked. The patient's shoulders were taped. The neck was cleansed with isopropyl alcohol. Incision was marked out with a skin marker. An 18-gauge spinal needle was taped to the lateral aspect of the patient's neck, and crosstable fluoroscopy was used to confirm the correct placement of the incision. DuraPrep was now applied followed by standard sterile surgical draping. Another timeout was performed per protocol. Lidocaine with epinephrine was injected. Skin incision was made with a #15 scalpel blade. Bipolar electrocautery was used to maintain hemostasis. Blunt and sharp dissection was used to carry out deep dissection. Platysma was divided transversely and undermined. Anterior cervical fascia was divided longitudinally. The dissection plane was carried out over the medial aspect of sternocleidomastoid and the carotid sheath. Prevertebral fascia was incised with scissors and swept free of the spine with a peanut. 2 radiopaque markers were placed. AP and lateral fluoroscopy was used to confirm the correct levels of operation. Bovie electrocautery was used to make indelible marks on the C4-5, C5-6, and C6-7 anterior discs. Longus colli musculature was elevated bilaterally with Bovie electrocautery. A self-retaining retractor was placed. Initially, distraction pins were placed in the C4 and C6 vertebral bodies. The operating microscope was brought onto the field. The operating microscope and surgical microdissection techniques were used for the balance of the operation until closure. Initial discectomies were carried out at C4-5 and C5-6 with a #15 scalpel blade and a pituitary rongeur. The C5 vertebral body resection was then begun with a series of rongeurs. Bone was harvested morselized for later reimplantation.     A C5 corpectomy was continued with a high-speed drill. Anterior overhanging osteophyte at C4 was taken down with a high-speed drill. Initial discectomy at C4-5 was carried back through the posterior annulus, which was resected with Kerrison rongeur's. The C5 corpectomy was now completed with a high-speed drill and a series of Kerrison rongeurs. The distraction pins were now removed. Initial discectomy at C6-7 was carried out with a #15 scalpel blade and a pituitary rongeur. C6 corpectomy was carried out with a series of rongeurs as well as a high-speed drill. 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 Because of this, several small islands of bone were densely adherent to the underlying dura. These were left in position, and thinned out with a high-speed drill. In many places, the posterior longitudinal ligament could be successfully dissected free of the underlying dura and resected. In some other locations, this was found to be too densely adherent to the underlying dura, and had to be left in position. At the C4-5, C5-6, and C6-7 disc spaces, the annulus was either removed entirely or thinned out to the point that it no longer compressed the thecal sac, if the PLL was adherent. Foraminotomies were performed bilaterally. High-speed drill was used to create a mortise and the top of the C7 vertebral body. A ruler was brought onto the field, and a pyramesh cage was cut to an appropriate size. This was packed with demineralized bone matrix as well as the autograft harvested during corpectomies. This was tamped into position under microscopic visualization. A good interference fit was encountered. A suitable 3 level anterior cervical plate was now selected. This was secured with 4 times self drilling screws. Final tightening was applied after all screws have been started. The locking mechanism was deployed at the top and bottom.   Final AP and lateral images were taken and found to be satisfactory. The incision was irrigated copiously with saline solution. A Hemovac drain was brought out through a separate stab incision. Platysma was closed with interrupted 3-0 Vicryl's. Deep dermis was closed with interrupted inverted 3-0 Vicryl's. Skin was closed with running 4-0 Vicryl subcuticular stitch. Benzoin and Steri-Strips were applied. A dressing was applied. The drapes were removed. The patient was now returned to a neutral, supine position. Disposition: Postanesthesia care unit    Condition: Stable, extubated, and neurologically at baseline    Counts: All needle, sponge, and cottonoid counts were reported correct at the end of the operation. Complications: None    Wound classification: 1, clean    Implants: [employer] [place] anterior plate and pyramesh interbody cage    Specimen: None      This report was dictated using voice recognition technology. Errors in syntax or recognition may occur, and should not be construed to change the meaning of a sentence.

## 2022-08-30 NOTE — PROGRESS NOTES
08/30/22 1015   Clinical Encounter Type   Visited With Health care provider;Patient and family together   Routine Visit   (Responded to Spiritual Care request)   Patient's Supportive Strategies/Resources Identified [facility] Spiritual resources, children support, Worship pavithra, values and practices. Respected and supported. Patient Spiritual Encounters   Spiritual Assessment Completed Yes   Family Spiritual Encounters   Family Participation in [address] During Treatment Consistently   Taxonomy   Intended Effects Establish rapport and connectedness   Methods Encourage sharing of feelings;Encourage self reflection;Encourage story-telling;Explore pavithra and values; Explore presence of God;Offer spiritual/Rastafari support; Offer emotional support   Interventions Acknowledge current situation; Active listening;Assist with identifying strengths;Explain  role; Identify supportive relationship(s); Share words of hope and inspiration   For further spiritual care, please enter a consult in Epic, followed by contacting pager [phone] as needed.

## 2022-08-31 LAB
GLUCOSE BLD-MCNC: 111 MG/DL (ref 70–99)
GLUCOSE BLD-MCNC: 136 MG/DL (ref 70–99)
GLUCOSE BLD-MCNC: 144 MG/DL (ref 70–99)
GLUCOSE BLD-MCNC: 154 MG/DL (ref 70–99)

## 2022-08-31 PROCEDURE — 99232 SBSQ HOSP IP/OBS MODERATE 35: CPT | Performed by: HOSPITALIST

## 2022-08-31 NOTE — PROGRESS NOTES
NURSING ADMISSION NOTE  Patient admitted via bed. Oriented to room. Safety precautions initiated. Bed in low position. Call light in reach.

## 2022-08-31 NOTE — PLAN OF CARE
Patient resting in room, vitals WDL, on RA. SCD's bilaterally. IS encouraged. Voiding freely. Up min with gait belt and walker. OT to see. Soft collar when in bed, aspen collar when up. Jun drain in place, draining small amount of drainage. Dressing to neck CDI. Patient reports pain is mild-moderate, relieved by PO medication. Plan of care discussed with patient, all questions answered.

## 2022-08-31 NOTE — PLAN OF CARE
Patient is A&O x4, Neck drain was removed, and dressing is clean and intact, patient stated she had a moderate amount of pain that went away after PO pain medication, reports some numbness and tingling in upper extremities,but that is pt baseline,  strength is strong and equal in both hands, 2+ radial pulses bilaterally, ambulating minimal assist w/ walker, soft collar on and in place, last bowel movement is reported on 08/28, miralax and colace given. Pt instructed to use call light.

## 2022-09-01 VITALS
SYSTOLIC BLOOD PRESSURE: 137 MMHG | HEART RATE: 65 BPM | RESPIRATION RATE: 17 BRPM | DIASTOLIC BLOOD PRESSURE: 53 MMHG | BODY MASS INDEX: 48.23 KG/M2 | OXYGEN SATURATION: 92 % | TEMPERATURE: 99 F | WEIGHT: 293 LBS | HEIGHT: 65.5 IN

## 2022-09-01 LAB
ATRIAL RATE: 56 BPM
GLUCOSE BLD-MCNC: 138 MG/DL (ref 70–99)
P AXIS: 43 DEGREES
P-R INTERVAL: 158 MS
Q-T INTERVAL: 428 MS
QRS DURATION: 96 MS
QTC CALCULATION (BEZET): 413 MS
R AXIS: -33 DEGREES
T AXIS: 9 DEGREES
VENTRICULAR RATE: 56 BPM

## 2022-09-01 PROCEDURE — 99232 SBSQ HOSP IP/OBS MODERATE 35: CPT | Performed by: HOSPITALIST

## 2022-09-01 RX ORDER — DIAZEPAM 5 MG/1
5 TABLET ORAL EVERY 6 HOURS PRN
Qty: 30 TABLET | Refills: 0 | Status: SHIPPED | OUTPATIENT
Start: 2022-09-01

## 2022-09-01 RX ORDER — HYDROCODONE BITARTRATE AND ACETAMINOPHEN 10; 325 MG/1; MG/1
1-2 TABLET ORAL EVERY 4 HOURS PRN
Qty: 60 TABLET | Refills: 0 | Status: SHIPPED | OUTPATIENT
Start: 2022-09-01

## 2022-09-01 NOTE — PLAN OF CARE
A&O x 4. VSS. On RA. Tele-NSR. N/T to bilateral hands per baseline. Coverlet to anterior neck C/D/I. No c/o dysphagia or audible swallowing noted. Up with SB assist and walker to bathroom, voiding without issue. Soft collar ok while in bed, Aspen collar when OOB. Reviewed POC, pain management, and fall precautions with pt. Plan home tomorrow. Will continue to monitor.

## 2022-09-01 NOTE — PLAN OF CARE
Patient A&Ox4, VSS on room air. POD #3, dressing C/D/I, soft collar in place while in bed, Aspen collar on when OOB. Patient denies pain or discomfort, numbness/tingling  improving in R hand. Tolerating diet, LBM 8/28, laxatives and stool softeners given. Patient denies feeling bloated/constipated. Ambulating with walker. Will see OT today. Plan to DC home today with Rosemary Vital. Plan of care reviewed with patient, all questions answered, verbalized understanding. 11:00: Discharge paperwork reviewed with patient. Discharge instructions, medications, and follow up appointments reviewed with patient. Patient to DC home today with family. All questions answered, verbalized understanding.

## 2022-09-02 ENCOUNTER — PATIENT OUTREACH (OUTPATIENT)
Dept: CASE MANAGEMENT | Age: 62
End: 2022-09-02

## 2022-09-03 ENCOUNTER — PATIENT MESSAGE (OUTPATIENT)
Dept: FAMILY MEDICINE CLINIC | Facility: CLINIC | Age: 62
End: 2022-09-03

## 2022-09-04 NOTE — TELEPHONE ENCOUNTER
From: Rubén Sdaler  To: Niki Ramsey MD  Sent: 9/3/2022 5:18 PM CDT  Subject: Surgery of neck    Hi Dr Karthikeyan Ramsey I am having a hard time trying to take the Medformin pills. Can not get them down. Could insulin be used stead until throat is healed? If so what dosage?

## 2022-09-06 ENCOUNTER — TELEPHONE (OUTPATIENT)
Dept: HEMATOLOGY/ONCOLOGY | Facility: HOSPITAL | Age: 62
End: 2022-09-06

## 2022-09-06 NOTE — TELEPHONE ENCOUNTER
Pt contacted. Advised as per written instructions from the doctor, to increase insulin up to 36 (from 34 yesterday) based on BS over 130. Pt will call with an update tomorrow.

## 2022-09-06 NOTE — TELEPHONE ENCOUNTER
Called and spoke with patient regarding labs that were ordered and expected 8/5- she states she asked to have them done when she had pre-op labs done at [facility]- but I guess that they were not drawn. Patient not well enough to go to lab at this time and will try and get there as soon as she is able.

## 2022-09-12 RX ORDER — INSULIN GLARGINE 100 [IU]/ML
INJECTION, SOLUTION SUBCUTANEOUS
Qty: 15 ML | Refills: 2 | Status: SHIPPED | OUTPATIENT
Start: 2022-09-12

## 2022-09-12 NOTE — TELEPHONE ENCOUNTER
Refill passed per [employer] [facility] protocol.   Requested Prescriptions   Pending Prescriptions Disp Refills    LANTUS SOLOSTAR 100 UNIT/ML Subcutaneous Solution Pen-injector [Pharmacy Med Name: Lantus SoloStar 100 UNIT/ML Subcutaneous Solution Pen-injector] 15 mL 0     Sig: INJECT 32 UNITS SUBCUTANEOUSLY AT NIGHT        Diabetes Medication Protocol Passed - 9/11/2022  6:30 AM        Passed - Last A1C < 7.5 and within past 6 months     Lab Results   Component Value Date    A1C 6.3 (H) 07/16/2022               Passed - In person appointment or virtual visit in the past 6 mos or appointment in next 3 mos       Recent Outpatient Visits              2 weeks ago Preop general physical exam    [address] [place], Ayala Sanderson MD    Office Visit    1 month ago Elevated serum immunoglobulin free light chain level    [facility] Hematology Oncology Katerina Quezada MD    Office Visit    1 month ago Cervical myelopathy [facility] Gabriel Arenas MD    Office Visit    1 month ago Type 2 diabetes mellitus without complication, without long-term current use of insulin [facility] [employer] [facility], [address] Torsten Carias MD    Office Visit    2 months ago     [address], Sherly, PT    Office Visit     Future Appointments         Provider Department Appt Notes    In 2 days Gabriel Arenas MD [facility] 2 week post op sx 8-29    In 3 days [facility], [address] HFU--declines sooner appt--PCP aware    In 1 month Gabriel Arenas MD [facility] 6 week post op sx 8-29    In 4 months [address] Hematology Oncology 6 month follow up               Passed - GFR > 50     Lab Results   Component Value Date    EGFRCR 100 08/30/2022                 Passed - GFR in the past 12 months            Recent Outpatient Visits 2 weeks ago Preop general physical exam    [address] Meliza Singh MD    Office Visit    1 month ago Elevated serum immunoglobulin free light chain level    [facility] Hematology Oncology Pham Blackwood MD    Office Visit    1 month ago Cervical myelopathy [facility])    [facility] Danyel Caal MD    Office Visit    1 month ago Type 2 diabetes mellitus without complication, without long-term current use of insulin [facility])    [facility], [address] Meliza Singh MD    Office Visit    2 months ago     [address], Sherly, PT    Office Visit          Future Appointments         Provider Department Appt Notes    In 2 days Danyel Caal MD [facility] 2 week post op sx 8-29    In 3 days Meliza Singh MD [facility] [employer] [facility], [address] HFU--declines sooner appt--PCP aware    In 1 month Inderjit Jay MD [facility] 6 week post op sx 8-29    In 4 months [facility]ematology Oncology 6 month follow up

## 2022-09-14 ENCOUNTER — HOSPITAL ENCOUNTER (OUTPATIENT)
Dept: GENERAL RADIOLOGY | Facility: HOSPITAL | Age: 62
Discharge: HOME | End: 2022-09-14
Attending: PHYSICIAN ASSISTANT
Payer: MEDICAID

## 2022-09-14 ENCOUNTER — PATIENT MESSAGE (OUTPATIENT)
Dept: SURGERY | Facility: CLINIC | Age: 62
End: 2022-09-14

## 2022-09-14 ENCOUNTER — OFFICE VISIT (OUTPATIENT)
Dept: SURGERY | Facility: CLINIC | Age: 62
End: 2022-09-14

## 2022-09-14 VITALS — SYSTOLIC BLOOD PRESSURE: 110 MMHG | DIASTOLIC BLOOD PRESSURE: 68 MMHG

## 2022-09-14 DIAGNOSIS — Z98.1 S/P CERVICAL SPINAL FUSION: ICD-10-CM

## 2022-09-14 DIAGNOSIS — Z98.890 POST-OPERATIVE STATE: ICD-10-CM

## 2022-09-14 DIAGNOSIS — Z98.1 S/P CERVICAL SPINAL FUSION: Primary | ICD-10-CM

## 2022-09-14 PROCEDURE — 3074F SYST BP LT 130 MM HG: CPT | Performed by: PHYSICIAN ASSISTANT

## 2022-09-14 PROCEDURE — 72050 X-RAY EXAM NECK SPINE 4/5VWS: CPT | Performed by: PHYSICIAN ASSISTANT

## 2022-09-14 PROCEDURE — 3078F DIAST BP <80 MM HG: CPT | Performed by: PHYSICIAN ASSISTANT

## 2022-09-14 PROCEDURE — 99024 POSTOP FOLLOW-UP VISIT: CPT | Performed by: PHYSICIAN ASSISTANT

## 2022-09-14 NOTE — PROGRESS NOTES
Patient is here for 2 week post op. She had a Cervical 5-cervical 6 anterior corpectomy with cervical 4-cervical 5, cervical 5-cervical 6, cervical 6-cervical 7 anterior fusion, placement of cage and plate on 0-26-75. She states her throat is a little sore but denies pain. She has only needed 3-4 norco over the past 2 weeks.

## 2022-09-15 ENCOUNTER — PATIENT MESSAGE (OUTPATIENT)
Dept: SURGERY | Facility: CLINIC | Age: 62
End: 2022-09-15

## 2022-09-15 ENCOUNTER — OFFICE VISIT (OUTPATIENT)
Dept: FAMILY MEDICINE CLINIC | Facility: CLINIC | Age: 62
End: 2022-09-15
Payer: MEDICAID

## 2022-09-15 VITALS
OXYGEN SATURATION: 99 % | BODY MASS INDEX: 48.82 KG/M2 | DIASTOLIC BLOOD PRESSURE: 77 MMHG | SYSTOLIC BLOOD PRESSURE: 123 MMHG | HEART RATE: 69 BPM | HEIGHT: 65 IN | WEIGHT: 293 LBS | RESPIRATION RATE: 19 BRPM

## 2022-09-15 DIAGNOSIS — Z98.1 STATUS POST CERVICAL SPINAL FUSION: Primary | ICD-10-CM

## 2022-09-15 DIAGNOSIS — I10 ESSENTIAL HYPERTENSION: ICD-10-CM

## 2022-09-15 DIAGNOSIS — E11.9 TYPE 2 DIABETES MELLITUS WITHOUT COMPLICATION, WITHOUT LONG-TERM CURRENT USE OF INSULIN (HCC): ICD-10-CM

## 2022-09-15 PROCEDURE — 3074F SYST BP LT 130 MM HG: CPT | Performed by: FAMILY MEDICINE

## 2022-09-15 PROCEDURE — 3078F DIAST BP <80 MM HG: CPT | Performed by: FAMILY MEDICINE

## 2022-09-15 PROCEDURE — 99214 OFFICE O/P EST MOD 30 MIN: CPT | Performed by: FAMILY MEDICINE

## 2022-09-15 PROCEDURE — 3008F BODY MASS INDEX DOCD: CPT | Performed by: FAMILY MEDICINE

## 2022-09-15 NOTE — TELEPHONE ENCOUNTER
From: Javier Zhu  Sent: 9/15/2022 9:23 AM CDT  To: Gregery Pallas [place] [address] Nurse  Subject: XR results and further XR imaging    I had the xray done after I left the office with you yesterday. Just want to make sure everything is fine. I did not understand everything the doctor said about the xrays. That is why I am asking. You may not have had time to view the xray yet.

## 2022-09-15 NOTE — TELEPHONE ENCOUNTER
From: Chepe Ferraro  To: India Johnson MD  Sent: 9/14/2022 5:14 PM CDT  Subject: Question regarding Karey Carver what does these xray results mean. Is everything ok?

## 2022-09-19 ENCOUNTER — OFFICE VISIT (OUTPATIENT)
Dept: PHYSICAL THERAPY | Facility: HOSPITAL | Age: 62
End: 2022-09-19
Attending: PHYSICIAN ASSISTANT

## 2022-09-19 DIAGNOSIS — Z98.890 POST-OPERATIVE STATE: ICD-10-CM

## 2022-09-19 DIAGNOSIS — Z98.1 S/P CERVICAL SPINAL FUSION: ICD-10-CM

## 2022-09-19 PROCEDURE — 97162 PT EVAL MOD COMPLEX 30 MIN: CPT

## 2022-09-19 PROCEDURE — 97140 MANUAL THERAPY 1/> REGIONS: CPT

## 2022-09-20 ENCOUNTER — OFFICE VISIT (OUTPATIENT)
Dept: OCCUPATIONAL MEDICINE | Facility: HOSPITAL | Age: 62
End: 2022-09-20
Attending: PHYSICIAN ASSISTANT

## 2022-09-20 PROCEDURE — 97167 OT EVAL HIGH COMPLEX 60 MIN: CPT | Performed by: OCCUPATIONAL THERAPIST

## 2022-09-21 ENCOUNTER — OFFICE VISIT (OUTPATIENT)
Dept: PHYSICAL THERAPY | Facility: HOSPITAL | Age: 62
End: 2022-09-21
Attending: PHYSICIAN ASSISTANT

## 2022-09-21 DIAGNOSIS — Z98.890 POST-OPERATIVE STATE: ICD-10-CM

## 2022-09-21 DIAGNOSIS — Z98.1 S/P CERVICAL SPINAL FUSION: ICD-10-CM

## 2022-09-21 PROCEDURE — 97140 MANUAL THERAPY 1/> REGIONS: CPT

## 2022-09-21 PROCEDURE — 97110 THERAPEUTIC EXERCISES: CPT

## 2022-09-24 RX ORDER — GLIMEPIRIDE 2 MG/1
2 TABLET ORAL
Qty: 90 TABLET | Refills: 1 | Status: SHIPPED | OUTPATIENT
Start: 2022-09-24 | End: 2023-04-03

## 2022-09-24 RX ORDER — LISINOPRIL AND HYDROCHLOROTHIAZIDE 12.5; 1 MG/1; MG/1
1 TABLET ORAL
Qty: 90 TABLET | Refills: 1 | Status: SHIPPED | OUTPATIENT
Start: 2022-09-24 | End: 2023-04-03

## 2022-09-24 NOTE — TELEPHONE ENCOUNTER
Refill passed per [facility], [facility] protocol.       Requested Prescriptions   Pending Prescriptions Disp Refills    GLIMEPIRIDE 2 MG Oral Tab [Pharmacy Med Name: Glimepiride 2 MG Oral Tablet] 90 tablet 0     Sig: Take 1 tablet by mouth once daily with breakfast        Diabetes Medication Protocol Passed - 9/24/2022  9:54 AM        Passed - Last A1C < 7.5 and within past 6 months     Lab Results   Component Value Date    A1C 6.3 (H) 07/16/2022               Passed - In person appointment or virtual visit in the past 6 mos or appointment in next 3 mos       Recent Outpatient Visits              3 days ago S/P cervical spinal fusion    [facility], PT    Office Visit    4 days ago     [facility], OT    Office Visit    5 days ago S/P cervical spinal fusion    Sherly Solis, PT    Office Visit    1 week ago Status post cervical spinal fusion    [facility], [facility] [address], Salbador Shirley MD    Office Visit    1 week ago S/P cervical spinal fusion    HARPREET Lake MD    Office Visit     Future Appointments         Provider Department Appt Notes    In 4 days [address] [facility] [address], [place] [address] Rehab Occupational Therapy Approved 12 visits  [id number]  9/20/22 thru 11/19/22    [facility] visits  [id number]  9/19/22 thru 11/18/22    In 2 weeks MD HARPREET Carlin 6 week post op sx 8-29    In 2 weeks [facility] Approved 10 visits  [id number]  9/19/22 thru 11/18/22    In 2 weeks [facility] Approved 10 visits  [id number]  9/19/22 thru 11/18/22    In 2 weeks [address] [place] [address] Rehab Occupational Therapy Approved 12 visits  [id number]  9/20/22 thru 11/19/22    In 3 weeks Sherly Peters, [address] Approved 10 visits  [id number]  9/19/22 thru 11/18/22    In 3 weeks 10 [address] Rehab Occupational Therapy Approved 12 visits  [id number]  9/20/22 thru 11/19/22    In 3 weeks Sherly Peters, [address] Approved 10 visits  [id number]  9/19/22 thru 11/18/22    In 3 weeks 10 [address] Rehab Occupational Therapy Approved 12 visits  [id number]  9/20/22 thru 11/19/22    In 1 month [address] [place], [place] [address]ab Occupational Therapy Approved 12 visits  [id number]  9/20/22 thru 11/19/22    In 1 month [address] [place] [address] Rehab Occupational Therapy Approved 12 visits  [id number]  9/20/22 thru 11/19/22    In 1 month Sherly Peters, [address] Approved 10 visits  [id number]  9/19/22 thru 11/18/22    In 1 month [address] [place] [address] Rehab Occupational Therapy Approved 12 visits  [id number]  9/20/22 thru 11/19/22    In 1 month Sherly Peters, [address] Approved 10 visits  [id number]  9/19/22 thru 11/18/22    In 1 month [address] [place] [address] Rehab Occupational Therapy Approved 12 visits  [id number]  9/20/22 thru 11/19/22    In 1 month Sherly Peters, [facility] visit--send to [place];  Approved 10 visits  [id number]  9/19/22 thru 11/18/22    In 1 month [address] Rehab Occupational Therapy Approved 12 visits  [id number]  9/20/22 thru 11/19/22    In 1 month Sherly Peters, [address]???;   [place]    In 1 month 10 [facility], [facility] Occupational Therapy Approved 12 visits  [id number]  9/20/22 thru 11/19/22    In 1 month Sherly Peters, [address]???;   [place]    In 1 month Lisa Ladd, [address] Occupational Therapy Approved 12 visits  [id number]  9/20/22 thru 11/19/22    In 1 month [address] [place] [address] Rehab Occupational Therapy Approved 12 visits  [id number]  9/20/22 thru 11/19/22    In 1 month 10 [facility] [place] [address] Rehab Occupational Therapy Approved 12 visits  [id number]  9/20/22 thru 11/19/22    In 4 months Deep Campos 19 Hematology Oncology 6 month follow up               Passed [facility] or GFRNAA > 50     GFR Evaluation  EGFRCR: 100 , resulted on 8/30/2022            Passed - GFR in the past 12 months           LISINOPRIL-HYDROCHLOROTHIAZIDE 10-12.5 MG Oral Tab [Pharmacy Med Name: Lisinopril-hydroCHLOROthiazide 10-12.5 MG Oral Tablet] 90 tablet 0     Sig: Take 1 tablet by mouth once daily.         Hypertensive Medications Protocol Passed - 9/24/2022  9:54 AM        Passed - In person appointment in the past 12 or next 3 months       Recent Outpatient Visits              3 days ago S/P cervical spinal fusion    [facility], PT    Office Visit    4 days ago     [address] [place], OT    Office Visit    5 days ago S/P cervical spinal fusion    [address], Sherly, PT    Office Visit    1 week ago Status post cervical spinal fusion    [facility], [address], [place], Daniel Hernandez MD    Office Visit    1 week ago S/P cervical spinal fusion    [facility] Lisa Ramos MD    Office Visit     Future Appointments         Provider Department Appt Notes    In 4 days 10 [place] [facility] [address] Re[facility] Occupational Therapy Approved 12 visits  [id number]  9/20/22 thru 11/19/22    In [facility] visits  [id number]  9/19/22 thru 11/18/22    In 2 weeks Lisa Ramos MD [facility] 6 week post op sx 8-29    In 2 weeks [facility] Approved 10 visits  [id number]  9/19/22 thru 11/18/22    In 2 weeks [facility] Approved 10 visits  [id number]  9/19/22 thru 11/18/22    In 2 weeks [address], [address] [place], [place] [address]ab Occupational Therapy Approved 12 visits  [id number]  9/20/22 thru 11/19/22    In 3 weeks [facility] Approved 10 visits  [id number]  9/19/22 thru 11/18/22    In 3 weeks [address] Occupational Therapy Approved 12 visits  [id number]  9/20/22 thru 11/19/22    In 3 weeks [facility] Approved 10 visits  [id number]  9/19/22 thru 11/18/22    In 3 weeks 10 [facility], [address] [place], [place] [address]ab Occupational Therapy Approved 12 visits  [id number]  9/20/22 thru 11/19/22    In 1 month 10 [address] Occupational Therapy Approved 12 visits  [id number]  9/20/22 thru 11/19/22    In 1 month 10 [address] Rehab Occupational Therapy Approved 12 visits  [id number]  9/20/22 thru 11/19/22    In 1 month [facility] Approved 10 visits  [id number]  9/19/22 thru 11/18/22    In 1 month [address] Occupational Therapy Approved 12 visits  [id number]  9/20/22 thru 11/19/22    In 1 month [facility] Approved 10 visits  [id number]  9/19/22 thru 11/18/22    In 1 month [address], [address], [address] [place] [address] Occupational Therapy Approved 12 visits  [id number]  9/20/22 thru 11/19/22    In 1 month Sherly Peters, 1300 [facility] visit--send to [place];  Approved 10 visits  [id number]  9/19/22 thru 11/18/22    In 1 month [address] [place] [address], [facility], [address] [place] [address] Occupational Therapy Approved 12 visits  [id number]  9/20/22 thru 11/19/22    In 1 month Sherly Peters, [address]???;   [place]    In 1 month Deacon Sanchez, [address] [place] [address] Occupational Therapy Approved 12 visits  [id number]  9/20/22 thru 11/19/22    In 1 month Sherly Peters, [address]???;   [place]    In 1 month Deacon Sanchez, [address] [place] [address] Occupational Therapy Approved 12 visits  [id number]  9/20/22 thru 11/19/22    In 1 month Deacon Sanchez, [address] [place] [address] Occupational Therapy Approved 12 visits  [id number]  9/20/22 thru 11/19/22    In 1 month Deacon Morris, [address] [place] [address] Occupational Therapy Approved 12 visits  [id number]  9/20/22 thru 11/19/22    In 4 months Luis Reyes MD [facility] Hematology Oncology 6 month follow up               Passed - Last BP reading less than 140/90     BP Readings from Last 1 Encounters:  09/15/22 : 123/77                Passed - CMP or BMP in past 6 months     Recent Results (from the past 4392 hour(s))   Basic Metabolic Panel (8)    Collection Time: 08/30/22  5:30 AM   Result Value Ref Range    Glucose 158 (H) 70 - 99 mg/dL    Sodium 140 136 - 145 mmol/L    Potassium 3.8 3.5 - 5.1 mmol/L    Chloride 110 98 - 112 mmol/L    CO2 26.0 21.0 - 32.0 mmol/L    Anion Gap 4 0 - 18 mmol/L    BUN 11 7 - 18 mg/dL    Creatinine 0.63 0.55 - 1.02 mg/dL    Calcium, Total 8.2 (L) 8.5 - 10.1 mg/dL    Calculated Osmolality 293 275 - 295 mOsm/kg    eGFR-Cr 100 >=60 mL/min/1.73m2     *Note: Due to a large number of results and/or encounters for the requested time period, some results have not been displayed. A complete set of results can be found in Results Review.                  Passed - In person appointment or virtual visit in the past 6 months       Recent Outpatient Visits              3 days ago S/P cervical spinal fusion    [facility], [address] [place] [address] Office Visit    4 days ago     [address] [address] [place], [place]    Office Visit    5 days ago S/P cervical spinal fusion    [address] Sherly Peters, PT    Office Visit    1 week ago Status post cervical spinal fusion    [address], [address], [place], Janeen Benjamin MD    Office Visit    1 week ago S/P cervical spinal fusion    [facility] Sandy Del Rosario MD    Office Visit     Future Appointments         Provider Department Appt Notes    In 4 days 10 [facility], [address] [place] [address] Occupational Therapy Approved 12 visits  [id number]  9/20/22 thru 11/19/22    In 1 week Sherly Peters, [address] Approved 10 visits  [id number]  9/19/22 thru 11/18/22    In 2 weeks Sandy Del Rosario MD [facility] 6 week post op sx 8-29    In 2 weeks Sherly Peters, [address] Approved 10 visits  [id number]  9/19/22 thru 11/18/22    In 2 weeks Sherly Peters, [address] Approved 10 visits  [id number]  9/19/22 thru 11/18/22    In 2 weeks 10 [facility], [address] [place] [address] Occupational Therapy Approved 12 visits  [id number]  9/20/22 thru 11/19/22    In 3 weeks Sherly Peters, [address] Approved 10 visits  [id number]  9/19/22 thru 11/18/22    In 3 weeks 10 [address] Occupational Therapy Approved 12 visits  [id number]  9/20/22 thru 11/19/22    In 3 weeks Sherly Peters, [address] Approved 10 visits  [id number]  9/19/22 thru 11/18/22    In 3 weeks [address] [place] [address] Occupational Therapy Approved 12 visits  [id number]  9/20/22 thru 11/19/22    In 1 month [address] [place] [address] Occupational Therapy Approved 12 visits  [id number]  9/20/22 thru 11/19/22    In 1 month 10 [facility], [place] [facility] Rehab Occupational Therapy Approved 12 visits  [id number]  9/20/22 thru 11/19/22    In 1 month Sherly Peters, [address] Approved 10 visits  [id number]  9/19/22 thru 11/18/22    In 1 month [address] [place], [place] [address], [address] Rehab Occupational Therapy Approved 12 visits  [id number]  9/20/22 thru 11/19/22    In 1 month Sherly Peters, [address] [facility] Approved 10 visits  [id number]  9/19/22 thru 11/18/22    In 1 month [address] [place], [place] [address], [address] [facility] Occupational Therapy Approved 12 visits  [id number]  9/20/22 thru 11/19/22    In 1 month Sherly Peters, [address] visit--send to [facility];  Approved 10 visits  [id number]  9/19/22 thru 11/18/22    In 1 month Deacon Morris, [address] [facility] Occupational Therapy Approved 12 visits  [id number]  9/20/22 thru 11/19/22    In 1 month Sherly Peters, [address]???;   [place]    In 1 month [facility], 700 [facility] Occupational Therapy Approved 12 visits  [id number]  9/20/22 thru 11/19/22    In 1 month Sherly Peters, [address]???;   [place]    In 1 month 10 [facility] , 700 [facility] Occupational Therapy Approved 12 visits  [id number]  9/20/22 thru 11/19/22    In 1 month [address] [place] [address], [address] [facility] Occupational Therapy Approved 12 visits  [id number]  9/20/22 thru 11/19/22    In 1 month [address] [place] [address], [address] [facility] Occupational Therapy Approved 12 visits  [id number]  9/20/22 thru 11/19/22    In 4 months Deep Padgett 19 Hematology Oncology 6 month follow up               Passed [facility] or GFRNAA > 50     GFR Evaluation  EGFRCR: 100 , resulted on 8/30/2022                   Future Appointments         Provider Department Appt Notes    In 4 days 10 E \[facility]", [address] [place] [address] Occupational Therapy Approved 12 visits  [id number]  9/20/22 thru 11/19/22    In 1 week Sherly Peters, [address] Approved 10 visits  [id number]  9/19/22 thru 11/18/22    In 2 weeks Griselda Crew, MD [facility] 6 week post op sx 8-29    In 2 weeks Sherly Peters, [address] Approved 10 visits  [id number]  9/19/22 thru 11/18/22    In 2 weeks Sherly Peters [address] Approved 10 visits  [id number]  9/19/22 thru 11/18/22    In 2 weeks 10 [facility], [address] [place] [address] Rehab Occupational Therapy Approved 12 visits  [id number]  9/20/22 thru 11/19/22    In 3 weeks Sherly Peters [address] Approved 10 visits  [id number]  9/19/22 thru 11/18/22    In 3 weeks [address] Rehab Occupational Therapy Approved 12 visits  [id number]  9/20/22 thru 11/19/22    In 3 weeks Sherly Peters, [address] Approved 10 visits  [id number]  9/19/22 thru 11/18/22    In 3 weeks [address] Rehab Occupational Therapy Approved 12 visits  [id number]  9/20/22 thru 11/19/22    In 1 month 10 [address] Occupational Therapy Approved 12 visits  [id number]  9/20/22 thru 11/19/22    In 1 month 10 [address] Occupational Therapy Approved 12 visits  [id number]  9/20/22 thru 11/19/22    In 1 month Sherly Peters, [address] Approved 10 visits  [id number]  9/19/22 thru 11/18/22    In 1 month 10 [address] Occupational Therapy Approved 12 visits  [id number]  9/20/22 thru 11/19/22    In 1 month Sherly Peters, [address] [facility] Approved 10 visits  [id number]  9/19/22 thru 11/18/22    In 1 month 10 [address] Occupational Therapy Approved 12 visits  [id number]  9/20/22 thru 11/19/22    In 1 month Sherly Peters, [address] LAST auth visit--send to [place];  Approved 10 visits  [id number]  9/19/22 thru 11/18/22    In 1 month Hesteve Counts include 234 beds at the [facility], [address] [place] [address] Occupational Therapy Approved 12 visits  [id number]  9/20/22 thru 11/19/22    In 1 month HemSherly ann, [address]???;   [address]    In [facility] month [facility] 234 beds at the [facility], [address] [place] [address] Occupational Therapy Approved 12 visits  [id number]  9/20/22 thru 11/19/22    In 1 month HemSherly ann, [address]???;   [address]    In 1 month [facility], [address] [place] [address] Occupational Therapy Approved 12 visits  [id number]  9/20/22 thru 11/19/22    In 1 month [facility], [address] [place] [address] Occupational Therapy Approved 12 visits  [id number]  9/20/22 thru 11/19/22    In 1 month [facility], [address] [place] [address] Occupational Therapy Approved 12 visits  [id number]  9/20/22 thru 11/19/22    In 4 months ELVIA Nava [facility] Hematology Oncology 6 month follow up            Recent Outpatient Visits              3 days ago S/P cervical spinal fusion    [facility], PT    Office Visit    4 days ago     [facility], OT    Office Visit    5 days ago S/P cervical spinal fusion    [address] HemSherly ann, PT    Office Visit    1 week ago Status post cervical spinal fusion    [facility], [facility], [address], [address] Justice Ramos MD    Office Visit    1 week ago S/P cervical spinal fusion    [facility] Maxi Doll MD    Office Visit

## 2022-09-28 ENCOUNTER — OFFICE VISIT (OUTPATIENT)
Dept: OCCUPATIONAL MEDICINE | Facility: HOSPITAL | Age: 62
End: 2022-09-28
Attending: PHYSICIAN ASSISTANT

## 2022-09-28 ENCOUNTER — NURSE TRIAGE (OUTPATIENT)
Dept: FAMILY MEDICINE CLINIC | Facility: CLINIC | Age: 62
End: 2022-09-28

## 2022-09-28 DIAGNOSIS — Z98.1 S/P CERVICAL SPINAL FUSION: ICD-10-CM

## 2022-09-28 DIAGNOSIS — Z98.890 POST-OPERATIVE STATE: ICD-10-CM

## 2022-09-28 PROCEDURE — 97140 MANUAL THERAPY 1/> REGIONS: CPT | Performed by: OCCUPATIONAL THERAPIST

## 2022-09-28 PROCEDURE — 97110 THERAPEUTIC EXERCISES: CPT | Performed by: OCCUPATIONAL THERAPIST

## 2022-09-28 NOTE — PROGRESS NOTES
Dx:    S/P cervical spinal fusion (Z98.1)  Post-operative state (Z98.890      Authorized # of Visits:  12         Next MD visit: none scheduled  Fall Risk: standard         Precautions: Diabetes, avoid lifting over head, avoid lifting no more than 10 lbs       Medication Changes since last visit?: No  Subjective: \"I have pain radiating from my neck into my shoulder and between my shoulder blades. \" pain in left shoulder 8/10    Objective: Treatment began with moist heat on left shoulder, gentle neck rotation AROM exercises, active scapula exercises, intrinsic hand exercises,stretching    Date 09/20/22 09/28/22               Visit # 1  2                                  Evaluation x                Manual                   STM left upper traps   8 min                                                       Ther ex                   AROM neck rotation to left and right   x10               Cane elbow flex/ex, circles   x10, 2 sets each                scapula elevation/depression, protraction/retraction    x10                red web wrist flexion/extension    And      10 sec x 5    x20               Pronation/supination with 1 wt  x10          intrinsic hand strengthening with rubber band, abduction/adduction, lumbrical    x20, bilaterally                two point pinch to  velcro checkers    x40                three point pinch to  velcro checkers with red clothespins in left    x40               HEP instruction                                        Therapeutic Activity                                       Neuromuscular Re-education                                                             Modalities                    Moist heat     3 min                                     Assessment: Marcos Maguire reporting pain radiating throughout traps, treated with heat, STM and gentle AROM exercises. Patient reporting she continues to experience paresthesia in left hand across dorsum and fingertips. Reminded patient symptoms may take some time to subside. Goals:   Pt complaints of pain in left hand will decrease at worst to 1/10. Pt will be independent and compliant with comprehensive HEP to maintain progress achieved in OT. Patient will demonstrate increase in left wrist flexion to at least 50 degrees for ease in cutting up food. Patient will demonstrate increase in right  strength to at least 25 lbs and left 25 lbs for ease in opening doors. Patient will demonstrate increase in left lateral pinch to at least 7 lbs for ease in turning key    Plan: Continue with working towards reducing paresthesia and strengthening.     Charges: MT, TE2       Total Timed Treatment: 42 min  Total Treatment Time: 45 min

## 2022-09-29 ENCOUNTER — OFFICE VISIT (OUTPATIENT)
Dept: FAMILY MEDICINE CLINIC | Facility: CLINIC | Age: 62
End: 2022-09-29

## 2022-09-29 ENCOUNTER — PATIENT MESSAGE (OUTPATIENT)
Dept: FAMILY MEDICINE CLINIC | Facility: CLINIC | Age: 62
End: 2022-09-29

## 2022-09-29 VITALS
SYSTOLIC BLOOD PRESSURE: 133 MMHG | TEMPERATURE: 99 F | WEIGHT: 293 LBS | BODY MASS INDEX: 53 KG/M2 | HEART RATE: 73 BPM | DIASTOLIC BLOOD PRESSURE: 79 MMHG

## 2022-09-29 DIAGNOSIS — Z98.1 STATUS POST CERVICAL SPINAL FUSION: ICD-10-CM

## 2022-09-29 DIAGNOSIS — H10.30 ACUTE CONJUNCTIVITIS, UNSPECIFIED ACUTE CONJUNCTIVITIS TYPE, UNSPECIFIED LATERALITY: Primary | ICD-10-CM

## 2022-09-29 DIAGNOSIS — E11.9 TYPE 2 DIABETES MELLITUS WITHOUT COMPLICATION, WITHOUT LONG-TERM CURRENT USE OF INSULIN (HCC): ICD-10-CM

## 2022-09-29 PROCEDURE — 90471 IMMUNIZATION ADMIN: CPT | Performed by: FAMILY MEDICINE

## 2022-09-29 PROCEDURE — 90686 IIV4 VACC NO PRSV 0.5 ML IM: CPT | Performed by: FAMILY MEDICINE

## 2022-09-29 PROCEDURE — 3075F SYST BP GE 130 - 139MM HG: CPT | Performed by: FAMILY MEDICINE

## 2022-09-29 PROCEDURE — 99213 OFFICE O/P EST LOW 20 MIN: CPT | Performed by: FAMILY MEDICINE

## 2022-09-29 PROCEDURE — 3078F DIAST BP <80 MM HG: CPT | Performed by: FAMILY MEDICINE

## 2022-09-29 RX ORDER — TOBRAMYCIN 3 MG/ML
1 SOLUTION/ DROPS OPHTHALMIC EVERY 4 HOURS
Qty: 5 ML | Refills: 0 | Status: SHIPPED | OUTPATIENT
Start: 2022-09-29 | End: 2022-10-06

## 2022-09-29 NOTE — TELEPHONE ENCOUNTER
Arleen Cyr  Em Rn Triage 1 hour ago (8:35 AM)     MD    Talked to patient appointment made.     Message text

## 2022-09-30 ENCOUNTER — TELEPHONE (OUTPATIENT)
Dept: PHYSICAL THERAPY | Facility: HOSPITAL | Age: 62
End: 2022-09-30

## 2022-10-06 ENCOUNTER — LAB ENCOUNTER (OUTPATIENT)
Dept: LAB | Facility: HOSPITAL | Age: 62
End: 2022-10-06
Attending: INTERNAL MEDICINE
Payer: MEDICAID

## 2022-10-06 ENCOUNTER — OFFICE VISIT (OUTPATIENT)
Dept: PHYSICAL THERAPY | Facility: HOSPITAL | Age: 62
End: 2022-10-06
Attending: PHYSICIAN ASSISTANT
Payer: MEDICAID

## 2022-10-06 DIAGNOSIS — Z98.890 POST-OPERATIVE STATE: ICD-10-CM

## 2022-10-06 DIAGNOSIS — D50.8 OTHER IRON DEFICIENCY ANEMIA: ICD-10-CM

## 2022-10-06 DIAGNOSIS — R76.8 ELEVATED SERUM IMMUNOGLOBULIN FREE LIGHT CHAIN LEVEL: ICD-10-CM

## 2022-10-06 DIAGNOSIS — Z98.1 S/P CERVICAL SPINAL FUSION: ICD-10-CM

## 2022-10-06 LAB
ALBUMIN SERPL-MCNC: 3.4 G/DL (ref 3.4–5)
ALBUMIN/GLOB SERPL: 0.8 {RATIO} (ref 1–2)
ALP LIVER SERPL-CCNC: 60 U/L
ALT SERPL-CCNC: 21 U/L
ANION GAP SERPL CALC-SCNC: 6 MMOL/L (ref 0–18)
AST SERPL-CCNC: 11 U/L (ref 15–37)
BASOPHILS # BLD AUTO: 0.05 X10(3) UL (ref 0–0.2)
BASOPHILS NFR BLD AUTO: 0.5 %
BILIRUB SERPL-MCNC: 0.4 MG/DL (ref 0.1–2)
BUN BLD-MCNC: 13 MG/DL (ref 7–18)
BUN/CREAT SERPL: 19.4 (ref 10–20)
CALCIUM BLD-MCNC: 9.3 MG/DL (ref 8.5–10.1)
CHLORIDE SERPL-SCNC: 104 MMOL/L (ref 98–112)
CO2 SERPL-SCNC: 30 MMOL/L (ref 21–32)
CREAT BLD-MCNC: 0.67 MG/DL
DEPRECATED HBV CORE AB SER IA-ACNC: 60.3 NG/ML
DEPRECATED RDW RBC AUTO: 43.7 FL (ref 35.1–46.3)
EOSINOPHIL # BLD AUTO: 0.09 X10(3) UL (ref 0–0.7)
EOSINOPHIL NFR BLD AUTO: 1 %
ERYTHROCYTE [DISTWIDTH] IN BLOOD BY AUTOMATED COUNT: 13.1 % (ref 11–15)
FASTING STATUS PATIENT QL REPORTED: NO
GFR SERPLBLD BASED ON 1.73 SQ M-ARVRAT: 99 ML/MIN/1.73M2 (ref 60–?)
GLOBULIN PLAS-MCNC: 4.2 G/DL (ref 2.8–4.4)
GLUCOSE BLD-MCNC: 165 MG/DL (ref 70–99)
HCT VFR BLD AUTO: 40.2 %
HGB BLD-MCNC: 12.8 G/DL
IMM GRANULOCYTES # BLD AUTO: 0.04 X10(3) UL (ref 0–1)
IMM GRANULOCYTES NFR BLD: 0.4 %
IRON SATN MFR SERPL: 13 %
IRON SERPL-MCNC: 47 UG/DL
LYMPHOCYTES # BLD AUTO: 2.64 X10(3) UL (ref 1–4)
LYMPHOCYTES NFR BLD AUTO: 28.8 %
MCH RBC QN AUTO: 28.9 PG (ref 26–34)
MCHC RBC AUTO-ENTMCNC: 31.8 G/DL (ref 31–37)
MCV RBC AUTO: 90.7 FL
MONOCYTES # BLD AUTO: 0.58 X10(3) UL (ref 0.1–1)
MONOCYTES NFR BLD AUTO: 6.3 %
NEUTROPHILS # BLD AUTO: 5.78 X10 (3) UL (ref 1.5–7.7)
NEUTROPHILS # BLD AUTO: 5.78 X10(3) UL (ref 1.5–7.7)
NEUTROPHILS NFR BLD AUTO: 63 %
OSMOLALITY SERPL CALC.SUM OF ELEC: 294 MOSM/KG (ref 275–295)
PLATELET # BLD AUTO: 251 10(3)UL (ref 150–450)
POTASSIUM SERPL-SCNC: 3.9 MMOL/L (ref 3.5–5.1)
PROT SERPL-MCNC: 7.6 G/DL (ref 6.4–8.2)
PROT UR-MCNC: 16 MG/DL
RBC # BLD AUTO: 4.43 X10(6)UL
SODIUM SERPL-SCNC: 140 MMOL/L (ref 136–145)
TIBC SERPL-MCNC: 368 UG/DL (ref 240–450)
TRANSFERRIN SERPL-MCNC: 247 MG/DL (ref 200–360)
WBC # BLD AUTO: 9.2 X10(3) UL (ref 4–11)

## 2022-10-06 PROCEDURE — 97110 THERAPEUTIC EXERCISES: CPT

## 2022-10-06 PROCEDURE — 97140 MANUAL THERAPY 1/> REGIONS: CPT

## 2022-10-06 PROCEDURE — 83521 IG LIGHT CHAINS FREE EACH: CPT

## 2022-10-06 PROCEDURE — 84166 PROTEIN E-PHORESIS/URINE/CSF: CPT

## 2022-10-06 PROCEDURE — 84156 ASSAY OF PROTEIN URINE: CPT

## 2022-10-06 PROCEDURE — 86335 IMMUNFIX E-PHORSIS/URINE/CSF: CPT

## 2022-10-06 PROCEDURE — 85025 COMPLETE CBC W/AUTO DIFF WBC: CPT

## 2022-10-06 PROCEDURE — 82728 ASSAY OF FERRITIN: CPT

## 2022-10-06 PROCEDURE — 83540 ASSAY OF IRON: CPT

## 2022-10-06 PROCEDURE — 86334 IMMUNOFIX E-PHORESIS SERUM: CPT

## 2022-10-06 PROCEDURE — 80053 COMPREHEN METABOLIC PANEL: CPT

## 2022-10-06 PROCEDURE — 36415 COLL VENOUS BLD VENIPUNCTURE: CPT

## 2022-10-06 PROCEDURE — 84466 ASSAY OF TRANSFERRIN: CPT

## 2022-10-06 PROCEDURE — 84165 PROTEIN E-PHORESIS SERUM: CPT

## 2022-10-10 ENCOUNTER — HOSPITAL ENCOUNTER (OUTPATIENT)
Dept: GENERAL RADIOLOGY | Facility: HOSPITAL | Age: 62
Discharge: HOME | End: 2022-10-10
Attending: PHYSICIAN ASSISTANT
Payer: MEDICAID

## 2022-10-10 DIAGNOSIS — Z98.1 S/P CERVICAL SPINAL FUSION: ICD-10-CM

## 2022-10-10 PROCEDURE — 72050 X-RAY EXAM NECK SPINE 4/5VWS: CPT | Performed by: PHYSICIAN ASSISTANT

## 2022-10-11 LAB
ALBUMIN SERPL ELPH-MCNC: 3.64 G/DL (ref 3.75–5.21)
ALBUMIN/GLOB SERPL: 1.05 {RATIO} (ref 1–2)
ALPHA1 GLOB SERPL ELPH-MCNC: 0.33 G/DL (ref 0.19–0.46)
ALPHA2 GLOB SERPL ELPH-MCNC: 1.04 G/DL (ref 0.48–1.05)
B-GLOBULIN SERPL ELPH-MCNC: 1.01 G/DL (ref 0.68–1.23)
GAMMA GLOB SERPL ELPH-MCNC: 1.09 G/DL (ref 0.62–1.7)
KAPPA LC FREE SER-MCNC: 2.65 MG/DL (ref 0.33–1.94)
KAPPA LC FREE/LAMBDA FREE SER NEPH: 1.68 {RATIO} (ref 0.26–1.65)
LAMBDA LC FREE SERPL-MCNC: 1.57 MG/DL (ref 0.57–2.63)
PROT SERPL-MCNC: 7.1 G/DL (ref 6.4–8.2)

## 2022-10-12 ENCOUNTER — OFFICE VISIT (OUTPATIENT)
Dept: SURGERY | Facility: CLINIC | Age: 62
End: 2022-10-12
Payer: MEDICAID

## 2022-10-12 ENCOUNTER — OFFICE VISIT (OUTPATIENT)
Dept: PHYSICAL THERAPY | Facility: HOSPITAL | Age: 62
End: 2022-10-12
Attending: PHYSICIAN ASSISTANT
Payer: MEDICAID

## 2022-10-12 VITALS
WEIGHT: 293 LBS | HEART RATE: 76 BPM | SYSTOLIC BLOOD PRESSURE: 122 MMHG | HEIGHT: 65.5 IN | BODY MASS INDEX: 48.23 KG/M2 | DIASTOLIC BLOOD PRESSURE: 70 MMHG

## 2022-10-12 DIAGNOSIS — Z98.890 POST-OPERATIVE STATE: ICD-10-CM

## 2022-10-12 DIAGNOSIS — Z98.1 S/P CERVICAL SPINAL FUSION: ICD-10-CM

## 2022-10-12 DIAGNOSIS — Z98.1 S/P CERVICAL SPINAL FUSION: Primary | ICD-10-CM

## 2022-10-12 PROCEDURE — 97140 MANUAL THERAPY 1/> REGIONS: CPT

## 2022-10-12 PROCEDURE — 97110 THERAPEUTIC EXERCISES: CPT

## 2022-10-12 NOTE — PROGRESS NOTES
Pt here for post op. Pt states she has pain only on her left side of her neck. Pt got X-rays on Monday.

## 2022-10-14 ENCOUNTER — OFFICE VISIT (OUTPATIENT)
Dept: OCCUPATIONAL MEDICINE | Facility: HOSPITAL | Age: 62
End: 2022-10-14
Attending: PHYSICIAN ASSISTANT
Payer: MEDICAID

## 2022-10-14 ENCOUNTER — OFFICE VISIT (OUTPATIENT)
Dept: PHYSICAL THERAPY | Facility: HOSPITAL | Age: 62
End: 2022-10-14
Attending: PHYSICIAN ASSISTANT
Payer: MEDICAID

## 2022-10-14 DIAGNOSIS — Z98.1 S/P CERVICAL SPINAL FUSION: ICD-10-CM

## 2022-10-14 DIAGNOSIS — Z98.890 POST-OPERATIVE STATE: ICD-10-CM

## 2022-10-14 PROCEDURE — 97140 MANUAL THERAPY 1/> REGIONS: CPT

## 2022-10-14 PROCEDURE — 97110 THERAPEUTIC EXERCISES: CPT

## 2022-10-14 PROCEDURE — 97110 THERAPEUTIC EXERCISES: CPT | Performed by: OCCUPATIONAL THERAPIST

## 2022-10-14 PROCEDURE — 97140 MANUAL THERAPY 1/> REGIONS: CPT | Performed by: OCCUPATIONAL THERAPIST

## 2022-10-14 NOTE — PROGRESS NOTES
Dx:    S/P cervical spinal fusion (Z98.1)  Post-operative state (V72.969      Authorized # of Visits:  12         Next MD visit: 11/23/22  Fall Risk: standard         Precautions: Diabetes, avoid lifting over head, avoid lifting no more than 10 lbs       Medication Changes since last visit?: No  Subjective: \"I've been having pain in my left wrist when I turn the steering wheel. \"    Objective: Treatment began with moist heat on left shoulder, gentle neck rotation AROM exercises, active scapula exercises, intrinsic hand exercises,stretching    Date 09/20/22  09/28/22  10/14/22             Visit # 1  2  3                                Evaluation x                Manual                   STM left upper traps   8 min  STM bilateral forearm flexors and along ulnar nerve in left distal to elbow 8 min              STM along left ulnar nerve                                       Ther ex                   Wrist AROM and digit AROM in fluidotherapy for left hand   5 min        AROM neck rotation to left and right   x10  digit abduction/adduction             Cane elbow flex/ex, circles   x10, 2 sets each  tendon glides bilateral x 10              scapula elevation/depression, protraction/retraction    x10  red web  bilateral x 20              red web wrist flexion/extension    And      10 sec x 5    x20  ulnar nerve glides in left x 5             Pronation/supination with 1 wt  x10 x20 2 wts, bilateral         intrinsic hand strengthening with rubber band, abduction/adduction, lumbrical    x20, bilaterally  yellow web digit extension x 20              two point pinch to  velcro checkers    x40  digit abductin/adduction to  large pegs 25 with left/right              three point pinch to  velcro checkers with red clothespins in left    x40  red putty IP ,              HEP instruction       ulnar nerve glides for left UE x 4, 3x/day                                 Therapeutic Activity Neuromuscular Re-education                                                             Modalities                    Moist heat     3 min  3 min left hand                    fluidotherpy 110 degrees, 70 RPM for left hand with wrist and digit AROM 5 min               Assessment: Patient reporting tenderness along left ulnar nerve distal to cubital tunnel. Reviewed and issued ulnar nerve glides for left UE, Patient continues to complain of paresthesia in bilateral digits. Review sensory reeducation technique in bilateral hands    Goals:   Pt complaints of pain in left hand will decrease at worst to 1/10. Pt will be independent and compliant with comprehensive HEP to maintain progress achieved in OT. Patient will demonstrate increase in left wrist flexion to at least 50 degrees for ease in cutting up food. Patient will demonstrate increase in right  strength to at least 25 lbs and left 25 lbs for ease in opening doors. Patient will demonstrate increase in left lateral pinch to at least 7 lbs for ease in turning key    Plan: Continue with working towards reducing paresthesia and strengthening.     Charges: MT, TE2       Total Timed Treatment: 50min  Total Treatment Time: 50 min

## 2022-10-17 ENCOUNTER — TELEPHONE (OUTPATIENT)
Dept: PHYSICAL THERAPY | Facility: HOSPITAL | Age: 62
End: 2022-10-17

## 2022-10-17 ENCOUNTER — APPOINTMENT (OUTPATIENT)
Dept: PHYSICAL THERAPY | Facility: HOSPITAL | Age: 62
End: 2022-10-17
Attending: PHYSICIAN ASSISTANT
Payer: MEDICAID

## 2022-10-17 ENCOUNTER — APPOINTMENT (OUTPATIENT)
Dept: OCCUPATIONAL MEDICINE | Facility: HOSPITAL | Age: 62
End: 2022-10-17
Attending: PHYSICIAN ASSISTANT
Payer: MEDICAID

## 2022-10-19 ENCOUNTER — OFFICE VISIT (OUTPATIENT)
Dept: OCCUPATIONAL MEDICINE | Facility: HOSPITAL | Age: 62
End: 2022-10-19
Attending: PHYSICIAN ASSISTANT
Payer: MEDICAID

## 2022-10-19 ENCOUNTER — OFFICE VISIT (OUTPATIENT)
Dept: PHYSICAL THERAPY | Facility: HOSPITAL | Age: 62
End: 2022-10-19
Attending: PHYSICIAN ASSISTANT
Payer: MEDICAID

## 2022-10-19 DIAGNOSIS — Z98.1 S/P CERVICAL SPINAL FUSION: ICD-10-CM

## 2022-10-19 DIAGNOSIS — Z98.890 POST-OPERATIVE STATE: ICD-10-CM

## 2022-10-19 PROCEDURE — 97112 NEUROMUSCULAR REEDUCATION: CPT | Performed by: OCCUPATIONAL THERAPIST

## 2022-10-19 PROCEDURE — 97110 THERAPEUTIC EXERCISES: CPT

## 2022-10-19 PROCEDURE — 97110 THERAPEUTIC EXERCISES: CPT | Performed by: OCCUPATIONAL THERAPIST

## 2022-10-19 PROCEDURE — 97140 MANUAL THERAPY 1/> REGIONS: CPT

## 2022-10-19 PROCEDURE — 97140 MANUAL THERAPY 1/> REGIONS: CPT | Performed by: OCCUPATIONAL THERAPIST

## 2022-10-19 NOTE — PROGRESS NOTES
Dx:    S/P cervical spinal fusion (Z98.1)  Post-operative state (F47.573      Authorized # of Visits:  12         Next MD visit: 11/23/22  Fall Risk: standard         Precautions: Diabetes, avoid lifting over head, avoid lifting no more than 10 lbs       Medication Changes since last visit?: No  Subjective: \"I was sitting in my recliner and my whole left arm went numb. \"  Pain at times in left wrist 6/10  Objective: Treatment began with moist heat to right hand and fluidotherapy with AROM to left, follow by STM, sensory reeducation, tendon glides, and light strengthening. See flow sheet for details.     Date 09/20/22  09/28/22  10/14/22  10/19/22           Visit # 1  2  3  4                              Evaluation x                Manual                   STM left upper traps   8 min  STM bilateral forearm flexors and along ulnar nerve in left distal to elbow 8 min  STM bilateral forearm flexors and along ulnar nerve in left distal to elbow 8 min            STM along left ulnar nerve                                       Ther ex                   Wrist AROM and digit AROM in fluidotherapy for left hand   5 min 5 min       AROM neck rotation to left and right   x10  digit abduction/adduction  towel digit walking x 10, bilateral           Cane elbow flex/ex, circles   x10, 2 sets each  tendon glides bilateral x 10  tendon glides bilateral x 10            scapula elevation/depression, protraction/retraction    x10  red web  bilateral x 20  PRE wrist flex/ext , UD/RD with 2# wt x 15, left only            red web wrist flexion/extension    And      10 sec x 5    x20  ulnar nerve glides in left x 5             Pronation/supination with 1 wt  x10 x20 2 wts, bilateral         intrinsic hand strengthening with rubber band, abduction/adduction, lumbrical    x20, bilaterally  yellow web digit extension x 20              two point pinch to  velcro checkers    x40  digit abductin/adduction to  large pegs 25 with left/right              three point pinch to  velcro checkers with red clothespins in left    x40  red putty IP ,              HEP instruction       ulnar nerve glides for left UE x 4, 3x/day  wrist PRE with 2# wt see below                               Therapeutic Activity                                       Neuromuscular Re-education                              Sensory reeducation - Sens bin #3, 10 large chips, bilateral 8 min                               Modalities                    Moist heat     3 min  3 min left hand  3 min left hand                  fluidotherpy 110 degrees, 70 RPM for left hand with wrist and digit AROM 5 min  fluidotherpy 110 degrees, 70 RPM for left hand with wrist and digit AROM 5 min                   Assessment: Patient reporting numbness throughout left UE. Assessed sharp/dull sensation in anterior left shoulder which indicated lack of sensation. C/O pain in left FCR tendon and muscle with resisted flexion. Issued wrist PRE HEP with low weights. Pt voiced understanding and performs HEP correctly with minimal discomfort. Goals:   Pt complaints of pain in left hand will decrease at worst to 1/10. Pt will be independent and compliant with comprehensive HEP to maintain progress achieved in OT. Patient will demonstrate increase in left wrist flexion to at least 50 degrees for ease in cutting up food. Patient will demonstrate increase in right  strength to at least 25 lbs and left 25 lbs for ease in opening doors. Patient will demonstrate increase in left lateral pinch to at least 7 lbs for ease in turning key    Plan: Continue with working towards reducing paresthesia and strengthening. Reassess wrist PRE HEP next session.     Charges: MT, TE,NR       Total Timed Treatment: 50min  Total Treatment Time: 50 min

## 2022-10-24 ENCOUNTER — OFFICE VISIT (OUTPATIENT)
Dept: OCCUPATIONAL MEDICINE | Facility: HOSPITAL | Age: 62
End: 2022-10-24
Attending: PHYSICIAN ASSISTANT
Payer: MEDICAID

## 2022-10-24 DIAGNOSIS — Z98.890 POST-OPERATIVE STATE: ICD-10-CM

## 2022-10-24 DIAGNOSIS — Z98.1 S/P CERVICAL SPINAL FUSION: ICD-10-CM

## 2022-10-24 PROCEDURE — 97140 MANUAL THERAPY 1/> REGIONS: CPT | Performed by: OCCUPATIONAL THERAPIST

## 2022-10-24 PROCEDURE — 97110 THERAPEUTIC EXERCISES: CPT | Performed by: OCCUPATIONAL THERAPIST

## 2022-10-24 NOTE — PROGRESS NOTES
Dx:    S/P cervical spinal fusion (Z98.1)  Post-operative state (F84.579      Authorized # of Visits:  12         Next MD visit: 11/23/22  Fall Risk: standard         Precautions: Diabetes, avoid lifting over head, avoid lifting no more than 10 lbs       Medication Changes since last visit?: No  Subjective: \"I have a lot of pain radiating from the left side of my neck when I do the wrist exercises. \"  Objective: Treatment began with moist heat to right hand and fluidotherapy with AROM to left, follow by STM, sensory reeducation, tendon glides, and light strengthening. See flow sheet for details.     Date 09/20/22  09/28/22  10/14/22  10/19/22  10/24/22         Visit # 1  2  3  4  5                            Evaluation x                Manual                   STM left upper traps   8 min  STM bilateral forearm flexors and along ulnar nerve in left distal to elbow 8 min  STM bilateral forearm flexors and along ulnar nerve in left distal to elbow 8 min  STM bilateral forearm flexors and along ulnar nerve in left distal to elbow 8 min          STM along left ulnar nerve                                       Ther ex                   Wrist AROM and digit AROM in fluidotherapy for left hand   5 min 5 min 5 min      AROM neck rotation to left and right   x10  digit abduction/adduction  towel digit walking x 10, bilateral  composite grasping, hold onto potato masher, push into red putty, x 10         Cane elbow flex/ex, circles   x10, 2 sets each  tendon glides bilateral x 10  tendon glides bilateral x 10  AROM digit abduction/adduction and extension  X 10          scapula elevation/depression, protraction/retraction    x10  red web  bilateral x 20  PRE wrist flex/ext , UD/RD with 2# wt x 15, left only  PRE wrist flex/ext , UD/RD with 2# wt x 15, left only          red web wrist flexion/extension    And      10 sec x 5    x20  ulnar nerve glides in left x 5  intrinsic hand strengthening with rubber band, abduction/adduction, lumbrical x 10  intrinsic hand strengthening with rubber band, abduction/adduction, lumbrical x 20         Pronation/supination with 1 wt  x10 x20 2 wts, bilateral         intrinsic hand strengthening with rubber band, abduction/adduction, lumbrical    x20, bilaterally  yellow web digit extension x 20  yellow web digit extension x 20  yellow web digit extension x 20          two point pinch to  velcro checkers    x40  digit abductin/adduction to  large pegs 25 with left/right    Red putty, IP pinches,  twist and pull and two point pinch, bilaterally          three point pinch to  velcro checkers with red clothespins in left    x40  red putty IP ,              HEP instruction       ulnar nerve glides for left UE x 4, 3x/day  wrist PRE with 2# wt see below                               Therapeutic Activity                                       Neuromuscular Re-education                              Sensory reeducation - Sens bin #3, 10 large chips, bilateral 8 min  Sensory reeducation - Sens bin #3, 10 large chips, bilateral 5 min                             Modalities                    Moist heat     3 min  3 min left hand  3 min left hand  3 min left hand                fluidotherpy 110 degrees, 70 RPM for left hand with wrist and digit AROM 5 min  fluidotherpy 110 degrees, 70 RPM for left hand with wrist and digit AROM 5 min  fluidotherpy 110 degrees, 70 RPM for left hand with wrist and digit AROM 5 min             Assessment:   Reviewed HEP positioning to avoid undue strain in neck. Recommend patient prop up her forearm on pillow on table while doing exercises to avoid excess stretch to neck and shoulder. Initated intrinsic hand putty exercises in clinic today as well as review wrist PRE HEP. Patient complaining of \"pulling pain\" along FCU and ECU tendons during wrist PRE exercise HEP but able to complete.     Goals:   Pt complaints of pain in left hand will decrease at worst to 1/10. Pt will be independent and compliant with comprehensive HEP to maintain progress achieved in OT. Patient will demonstrate increase in left wrist flexion to at least 50 degrees for ease in cutting up food. Patient will demonstrate increase in right  strength to at least 25 lbs and left 25 lbs for ease in opening doors. Patient will demonstrate increase in left lateral pinch to at least 7 lbs for ease in turning key    Plan: Continue with working towards reducing paresthesia and strengthening.     Charges: MT, TE,NR       Total Timed Treatment: 50min  Total Treatment Time: 50 min

## 2022-10-26 ENCOUNTER — APPOINTMENT (OUTPATIENT)
Dept: OCCUPATIONAL MEDICINE | Facility: HOSPITAL | Age: 62
End: 2022-10-26
Attending: PHYSICIAN ASSISTANT
Payer: MEDICAID

## 2022-10-26 ENCOUNTER — TELEPHONE (OUTPATIENT)
Dept: OCCUPATIONAL MEDICINE | Facility: HOSPITAL | Age: 62
End: 2022-10-26

## 2022-10-28 RX ORDER — LEVOTHYROXINE SODIUM 0.07 MG/1
75 TABLET ORAL
Qty: 90 TABLET | Refills: 0 | Status: SHIPPED | OUTPATIENT
Start: 2022-10-28

## 2022-10-28 NOTE — TELEPHONE ENCOUNTER
Refill passed per [facility], [facility] protocol. Requested Prescriptions   Pending Prescriptions Disp Refills    LEVOTHYROXINE 75 MCG Oral Tab [Pharmacy Med Name: Levothyroxine Sodium 75 MCG Oral Tablet] 90 tablet 0     Sig: TAKE 1 TABLET BY MOUTH BEFORE BREAKFAST       Thyroid Medication Protocol Passed - 10/28/2022  9:48 AM        Passed - TSH in past 12 months        Passed - Last TSH value is normal     Lab Results   Component Value Date    TSH 1.560 01/27/2022                 Passed - In person appointment or virtual visit in the past 12 mos or appointment in next 3 mos     Recent Outpatient Visits              4 days ago S/P cervical spinal fusion    [facility], [address] [facility] Visit    1 week ago S/P cervical spinal fusion    [address] [place] [address], OT    Office Visit    1 week ago S/P cervical spinal fusion    [address] [facility] Sherly Peters,     Office Visit    2 weeks ago S/P cervical spinal fusion    [address] [place] [address], OT    Office Visit    2 weeks ago S/P cervical spinal fusion    [address] [facility] Sherly Peters, [place]    Office Visit          Future Appointments         Provider Department Appt Notes    In 5 days Sherly Peters, [address] [facility] Approved 10 visits  [id number]  9/19/22 thru 11/18/22    In 5 days [facility], [address] [place] [address] Occupational Therapy Approved 12 visits  [id number]  9/20/22 thru 11/19/22    [facility] [address] [facility] Approved 10 visits  [id number]  9/19/22 thru 11/18/22    In 1 week [address], [address] [place] [address] Rehab Occupational Therapy Approved 12 visits  [id number]  9/20/22 thru 11/19/22    In 1 week Sherly Peters [facility] visit--send to [place];  Approved 10 visits  [id number]  9/19/22 thru 11/18/22    In 1 week Deacon Morris K, [address] [facility], [facility] Occupational Therapy Approved 12 visits  [id number]  9/20/22 thru 11/19/22    In 1 week Sherly Peters, [address]???;   [place]    In 1 week Deacon Sanchez K, [address] [place] [address], [facility] Approved 12 visits  [id number]  9/20/22 thru 11/19/22    In 2 weeks Sherly Peters, [address]???;   [place]    In 2 weeks DayannauaDeacon velasco K, [address] [place] [address] Occupational Therapy LAST auth visit--send to [place];  Approved 12 visits  [id number]  9/20/22 thru 11/19/22    In 2 weeks Deacon Sanchez K, [address] [place] [address] Occupational Therapy auth???; [employer]    In 2 weeks DayannauarineDeacon K, [address] [place] [address] Occupational Therapy auth???; [facility]    In 3 weeks Mari Hamilton MD [facility] 6 weeks f/u    In 3 months [place] [address] Hematology Oncology 6 month follow up                  Recent Outpatient Visits              4 days ago S/P cervical spinal fusion    [address], OT    Office Visit    1 week ago S/P cervical spinal fusion    [address], OT    Office Visit    1 week ago S/P cervical spinal fusion    [address], PT    Office Visit    2 weeks ago S/P cervical spinal fusion    [address], OT    Office Visit    2 weeks ago S/P cervical spinal fusion    [address] Sherly Mccullough [place]    Office Visit          Future Appointments         Provider Department Appt Notes    In 5 days Sherly Peters, [address] Approved 10 visits  [id number]  9/19/22 thru 11/18/22    In 5 days Freedom Sanchezn K, [address] [facility] Occupational Therapy Approved 12 visits  [id number]  9/20/22 thru 11/19/22    In 1 week Sherly Peters, [address] Approved 10 visits  [id number]  9/19/22 thru 11/18/22    In 1 week [facility] [address] [facility], [address] [place], [place] [address]ab Occupational Therapy Approved 12 visits  [id number]  9/20/22 thru 11/19/22    In 1 week Sherly Peters, [address] visit--send to [place]; Approved 10 visits  [id number]  9/19/22 thru 11/18/22    In 1 week [facility], [address] [place] [address] Occupational Therapy Approved 12 visits  [id number]  9/20/22 thru 11/19/22    In 1 week Sherly Peters, [address]???;   [employer]    In 1 week [address] [facility], [address] [place] [address] Occupational [facility] Approved 12 visits  [id number]  9/20/22 thru 11/19/22    In 2 weeks Sherly Peters, [address]???;   [employer]    In 2 weeks [address] [facility], [address] [place] [address] LAST auth visit--send to [place];  Approved 12 visits  [id number]  9/20/22 thru 11/19/22    In 2 weeks [address] [facility], [address] [place] [address] Occupational [facility] auth???; [employer]    In 2 weeks [address] [facility], [address] [place] [address] Occupational [facility] auth???; [employer]    In 3 weeks Autumn Walter MD [facility] 6 weeks f/u    In 3 months Darline Perez MD [facility] AND CLINICS Hematology Oncology 6 month follow up

## 2022-10-28 NOTE — TELEPHONE ENCOUNTER
Refill passed per [place] [employer] [place], [facility] protocol. Requested Prescriptions   Pending Prescriptions Disp Refills    LEVOTHYROXINE 75 MCG Oral Tab [Pharmacy Med Name: Levothyroxine Sodium 75 MCG Oral Tablet] 90 tablet 0     Sig: TAKE 1 TABLET BY MOUTH BEFORE BREAKFAST       Thyroid Medication Protocol Passed - 10/28/2022  9:48 AM        Passed - TSH in past 12 months        Passed - Last TSH value is normal     Lab Results   Component Value Date    TSH 1.560 01/27/2022                 Passed - In person appointment or virtual visit in the past 12 mos or appointment in next 3 mos     Recent Outpatient Visits              4 days ago S/P cervical spinal fusion    [facility], [address] [facility] Visit    1 week ago S/P cervical spinal fusion    [address] [place] [address], OT    Office Visit    1 week ago S/P cervical spinal fusion    [address] [facility] Sherly Peters,     Office Visit    2 weeks ago S/P cervical spinal fusion    [address] [place] [address], OT    Office Visit    2 weeks ago S/P cervical spinal fusion    [address] [facility] Sherly Peters, [place]    Office Visit          Future Appointments         Provider Department Appt Notes    In 5 days Sherly Peters, [address] [facility] Approved 10 visits  [id number]  9/19/22 thru 11/18/22    In 5 days [facility], [address] [place] [address] Rehab Occupational Therapy Approved 12 visits  [id number]  9/20/22 thru 11/19/22    In [facility] week [facility] [address] [facility] Approved 10 visits  [id number]  9/19/22 thru 11/18/22    In 1 week [address], [address] [place] [address] Rehab Occupational Therapy Approved 12 visits  [id number]  9/20/22 thru 11/19/22    In 1 week Sherly Peters, [address] [facility] visit--send to [place];  Approved 10 visits  [id number]  9/19/22 thru 11/18/22    In 1 week Deacon Morris K, [address] [facility], [facility] Occupational Therapy Approved 12 visits  [id number]  9/20/22 thru 11/19/22    In 1 week Sherly Peters, [address]???;   [place]    In 1 week Deacon Sanchez K, [address] [place], [place] [address]ab Occupational Therapy Approved 12 visits  [id number]  9/20/22 thru 11/19/22    In 2 weeks Sherly Peters, [address]???;   [place]    In 2 weeks Freedom Sanchezn K, [address] [place] [address] Occupational Therapy LAST auth visit--send to [place];  Approved 12 visits  [id number]  9/20/22 thru 11/19/22    In 2 weeks Deacon Sanchez K, [address] [place] [address] Occupational Therapy auth???; [employer]    In 2 weeks DayannauaFreedom velascon K, [address] [place] [address] Occupational Therapy auth???; [facility]    In 3 weeks Eilene Angelucci, MD [facility] 6 weeks f/u    In 3 months Claudell Byes, [address] Hematology Oncology 6 month follow up                  Recent Outpatient Visits              4 days ago S/P cervical spinal fusion    [address], OT    Office Visit    1 week ago S/P cervical spinal fusion    [address], OT    Office Visit    1 week ago S/P cervical spinal fusion    [address], PT    Office Visit    2 weeks ago S/P cervical spinal fusion    [address], OT    Office Visit    2 weeks ago S/P cervical spinal fusion    [address] Sherly [place]ull[place]    Office Visit          Future Appointments         Provider Department Appt Notes    In 5 days Sherly Peters, [address] Approved 10 visits  [id number]  9/19/22 thru 11/18/22    In 5 days Freedom Sanchezn K, [address] [facility] Occupational Therapy Approved 12 visits  [id number]  9/20/22 thru 11/19/22    In 1 week Sherly Peters, [address] Approved 10 visits  [id number]  9/19/22 thru 11/18/22    In 1 week [facility], [address] [place], [place] [address]ab Occupational Therapy Approved 12 visits  [id number]  9/20/22 thru 11/19/22    In 1 week Sherly Peters, [address] visit--send to [place]; Approved 10 visits  [id number]  9/19/22 thru 11/18/22    In 1 week [facility], [address] [place] [address] Occupational Therapy Approved 12 visits  [id number]  9/20/22 thru 11/19/22    In 1 week Sherly Peters, [address]???;   [place]    In 1 week [facility], [address] [place], [place] [address]ab Occupational [facility] Approved 12 visits  [id number]  9/20/22 thru 11/19/22    In 2 weeks Sherly Peters, [address]???;   [place]    In 2 weeks [address] [place] [address], [address] [place] [address] Occupational [facility] LAST auth visit--send to [place];  Approved 12 visits  [id number]  9/20/22 thru 11/19/22    In 2 weeks 10 [facility], [address] [place] [address] Occupational Therapy auth???; [employer]    In 2 weeks [facility], [address] [place] [address] Occupational Therapy auth???; [employer]    In 3 weeks Zachary Velazquez MD [facility] 6 weeks f/u    In 3 months Jena Molina MD [facility] AND CLINICS Hematology Oncology 6 month follow up

## 2022-11-02 ENCOUNTER — OFFICE VISIT (OUTPATIENT)
Dept: OCCUPATIONAL MEDICINE | Facility: HOSPITAL | Age: 62
End: 2022-11-02
Attending: PHYSICIAN ASSISTANT
Payer: MEDICAID

## 2022-11-02 ENCOUNTER — OFFICE VISIT (OUTPATIENT)
Dept: PHYSICAL THERAPY | Facility: HOSPITAL | Age: 62
End: 2022-11-02
Attending: PHYSICIAN ASSISTANT
Payer: MEDICAID

## 2022-11-02 DIAGNOSIS — Z98.890 POST-OPERATIVE STATE: ICD-10-CM

## 2022-11-02 DIAGNOSIS — Z98.1 S/P CERVICAL SPINAL FUSION: ICD-10-CM

## 2022-11-02 PROCEDURE — 97530 THERAPEUTIC ACTIVITIES: CPT | Performed by: OCCUPATIONAL THERAPIST

## 2022-11-02 PROCEDURE — 97110 THERAPEUTIC EXERCISES: CPT | Performed by: OCCUPATIONAL THERAPIST

## 2022-11-02 PROCEDURE — 97140 MANUAL THERAPY 1/> REGIONS: CPT | Performed by: OCCUPATIONAL THERAPIST

## 2022-11-02 PROCEDURE — 97110 THERAPEUTIC EXERCISES: CPT

## 2022-11-02 PROCEDURE — 97140 MANUAL THERAPY 1/> REGIONS: CPT

## 2022-11-02 NOTE — PROGRESS NOTES
Dx:    S/P cervical spinal fusion (Z98.1)  Post-operative state (L05.322      Authorized # of Visits:  12         Next MD visit: 11/23/22  Fall Risk: standard         Precautions: Diabetes, avoid lifting over head, avoid lifting no more than 10 lbs       Medication Changes since last visit?: No  Subjective: \"My son says I need to do more for myself. \"   Objective: Treatment began with moist heat to right hand and fluidotherapy with AROM to left, follow by STM, sensory reeducation, tendon glides, and light strengthening. See flow sheet for details.     Date 09/20/22  09/28/22  10/14/22  10/19/22  10/24/22  11/2/22       Visit # 1  2  3  4  5  6                          Evaluation x                Manual                   STM left upper traps   8 min  STM bilateral forearm flexors and along ulnar nerve in left distal to elbow 8 min  STM bilateral forearm flexors and along ulnar nerve in left distal to elbow 8 min  STM bilateral forearm flexors and along ulnar nerve in left distal to elbow 8 min          STM along left ulnar nerve                    STM right hand         4 minutes   8 minutes  STM bilateral hands        Ther ex                   Wrist AROM and digit AROM in fluidotherapy for left hand   5 min 5 min 5 min 5 min     AROM neck rotation to left and right   x10  digit abduction/adduction  towel digit walking x 10, bilateral  composite grasping, hold onto potato masher, push into red putty, x 10  composite grasping, hold onto potato masher, push into red putty, x 10       Cane elbow flex/ex, circles   x10, 2 sets each  tendon glides bilateral x 10  tendon glides bilateral x 10  AROM digit abduction/adduction and extension  X 10  AROM digit abduction/adduction and extension  X 10        scapula elevation/depression, protraction/retraction    x10  red web  bilateral x 20  PRE wrist flex/ext , UD/RD with 2# wt x 15, left only  PRE wrist flex/ext , UD/RD with 2# wt x 15, left only          red web wrist flexion/extension    And      10 sec x 5    x20  ulnar nerve glides in left x 5  intrinsic hand strengthening with rubber band, abduction/adduction, lumbrical x 10  intrinsic hand strengthening with rubber band, abduction/adduction, lumbrical x 20  intrinsic hand strengthening with rubber band, abduction/adduction, lumbrical x 20       Pronation/supination with 1 wt  x10 x20 2 wts, bilateral        Lumbrical peg       X 5 minutes       intrinsic hand strengthening with rubber band, abduction/adduction, lumbrical    x20, bilaterally  yellow web digit extension x 20  yellow web digit extension x 20  yellow web digit extension x 20  x 25 each side         two point pinch to  velcro checkers    x40  digit abductin/adduction to  large pegs 25 with left/right    Red putty, IP pinches,  twist and pull and two point pinch, bilaterally          three point pinch to  velcro checkers with red clothespins in left    x40  red putty IP ,              HEP instruction       ulnar nerve glides for left UE x 4, 3x/day  wrist PRE with 2# wt see below    Reviewed home therex                           Therapeutic Activity                   Open/ close containers      X 5 minutes      Simulation of cutting food             x 12 minutes   Pizza slicer , fork and knife        Neuromuscular Re-education                              Sensory reeducation - Sens bin #3, 10 large chips, bilateral 8 min  Sensory reeducation - Sens bin #3, 10 large chips, bilateral 5 min                             Modalities                    Moist heat     3 min  3 min left hand  3 min left hand  3 min left hand                fluidotherpy 110 degrees, 70 RPM for left hand with wrist and digit AROM 5 min  fluidotherpy 110 degrees, 70 RPM for left hand with wrist and digit AROM 5 min  fluidotherpy 110 degrees, 70 RPM for left hand with wrist and digit AROM 5 min   fluidotherpy 110 degrees, 70 RPM for left hand with wrist and digit AROM 5 min           Assessment: Patient reports having difficulty with feeling objects with both hands. Right thumb and IF with almost normal feeling. Therapeutic activity of opening containers and cutting with knife and fork indicated mild difficulty, encouraged to utilize vision cues to maintain grasp. Goals:   Pt complaints of pain in left hand will decrease at worst to 1/10. Pt will be independent and compliant with comprehensive HEP to maintain progress achieved in OT. Patient will demonstrate increase in left wrist flexion to at least 50 degrees for ease in cutting up food. Patient will demonstrate increase in right  strength to at least 25 lbs and left 25 lbs for ease in opening doors. Patient will demonstrate increase in left lateral pinch to at least 7 lbs for ease in turning key    Plan: Continue with working towards reducing paresthesia and strengthening.     Charges: MT, TE,TA       Total Timed Treatment: 50min  Total Treatment Time: 50 min

## 2022-11-04 ENCOUNTER — OFFICE VISIT (OUTPATIENT)
Dept: PHYSICAL THERAPY | Facility: HOSPITAL | Age: 62
End: 2022-11-04
Attending: PHYSICIAN ASSISTANT
Payer: MEDICAID

## 2022-11-04 ENCOUNTER — OFFICE VISIT (OUTPATIENT)
Dept: OCCUPATIONAL MEDICINE | Facility: HOSPITAL | Age: 62
End: 2022-11-04
Attending: PHYSICIAN ASSISTANT
Payer: MEDICAID

## 2022-11-04 DIAGNOSIS — Z98.1 S/P CERVICAL SPINAL FUSION: ICD-10-CM

## 2022-11-04 DIAGNOSIS — Z98.890 POST-OPERATIVE STATE: ICD-10-CM

## 2022-11-04 PROCEDURE — 97140 MANUAL THERAPY 1/> REGIONS: CPT

## 2022-11-04 PROCEDURE — 97110 THERAPEUTIC EXERCISES: CPT | Performed by: OCCUPATIONAL THERAPIST

## 2022-11-04 PROCEDURE — 97110 THERAPEUTIC EXERCISES: CPT

## 2022-11-04 PROCEDURE — 97140 MANUAL THERAPY 1/> REGIONS: CPT | Performed by: OCCUPATIONAL THERAPIST

## 2022-11-04 NOTE — PROGRESS NOTES
Dx:    S/P cervical spinal fusion (Z98.1)  Post-operative state (S32.067      Authorized # of Visits:  12         Next MD visit: 11/23/22  Fall Risk: standard         Precautions: Diabetes, avoid lifting over head, avoid lifting no more than 10 lbs       Medication Changes since last visit?: No  Subjective: \"I cut up an apple and a carrot yesterday, I'm trying to use my hands. \"  Objective: Treatment began with moist heat to hands, follow by STM, sensory reeducation, tendon glides, and light strengthening. See flow sheet for details.     Right  strength AVG 19 lbs      Left  strength AVG 3 lbs  Right key pinch 11 llbs     Left key pinch 2 lbs  Right three point pinch: 5 lbs   Left three point pinch 3 lbs      Date 09/20/22  09/28/22  10/14/22  10/19/22  10/24/22  11/2/22  11/04/22     Visit # 1  2  3  4  5  6  7                        Evaluation x                Manual                   STM left upper traps   8 min  STM bilateral forearm flexors and along ulnar nerve in left distal to elbow 8 min  STM bilateral forearm flexors and along ulnar nerve in left distal to elbow 8 min  STM bilateral forearm flexors and along ulnar nerve in left distal to elbow 8 min    STM bilateral forearm flexors and along ulnar nerve in left distal to elbow 8 min      STM along left ulnar nerve                    STM right hand         4 minutes   8 minutes  STM bilateral hands        Ther ex                   Wrist AROM and digit AROM in fluidotherapy for left hand   5 min 5 min 5 min 5 min Red web wrist flexion stretch 10 sec x 10    AROM neck rotation to left and right   x10  digit abduction/adduction  towel digit walking x 10, bilateral  composite grasping, hold onto potato masher, push into red putty, x 10  composite grasping, hold onto potato masher, push into red putty, x 10  composite grasping, hold onto potato masher, push into red putty, x 10  Fine motor- remove and replace nut on bolt using left hand     Cane elbow flex/ex, circles   x10, 2 sets each  tendon glides bilateral x 10  tendon glides bilateral x 10  AROM digit abduction/adduction and extension  X 10  AROM digit abduction/adduction and extension  X 10 Red web  x 20, bilateral      scapula elevation/depression, protraction/retraction    x10  red web  bilateral x 20  PRE wrist flex/ext , UD/RD with 2# wt x 15, left only  PRE wrist flex/ext , UD/RD with 2# wt x 15, left only    PRE wrist flex/ext , UD/RD with 2# wt x 15, left only      red web wrist flexion/extension    And      10 sec x 5    x20  ulnar nerve glides in left x 5  intrinsic hand strengthening with rubber band, abduction/adduction, lumbrical x 10  intrinsic hand strengthening with rubber band, abduction/adduction, lumbrical x 20  intrinsic hand strengthening with rubber band, abduction/adduction, lumbrical x 20  intrinsic hand strengthening with rubber band, abduction/adduction, lumbrical x 20     Pronation/supination with 1 wt  x10 x20 2 wts, bilateral    x20 2 wts x 20    Lumbrical peg       X 5 minutes       intrinsic hand strengthening with rubber band, abduction/adduction, lumbrical    x20, bilaterally  yellow web digit extension x 20  yellow web digit extension x 20  yellow web digit extension x 20  x 25 each side   x25 eac hside      two point pinch to  velcro checkers    x40  digit abductin/adduction to  large pegs 25 with left/right    Red putty, IP pinches,  twist and pull and two point pinch, bilaterally    Red putty, IP pinches,  twist and pull and two point pinch, bilaterally      three point pinch to  velcro checkers with red clothespins in left    x40  red putty IP ,              HEP instruction       ulnar nerve glides for left UE x 4, 3x/day  wrist PRE with 2# wt see below    Reviewed home therex                           Therapeutic Activity                   Open/ close containers      X 5 minutes      Simulation of cutting food x 12 minutes   Pizza slicer , fork and knife        Neuromuscular Re-education                              Sensory reeducation - Sens bin #3, 10 large chips, bilateral 8 min  Sensory reeducation - Sens bin #3, 10 large chips, bilateral 5 min                             Modalities                    Moist heat     3 min  3 min left hand  3 min left hand  3 min left hand                fluidotherpy 110 degrees, 70 RPM for left hand with wrist and digit AROM 5 min  fluidotherpy 110 degrees, 70 RPM for left hand with wrist and digit AROM 5 min  fluidotherpy 110 degrees, 70 RPM for left hand with wrist and digit AROM 5 min   fluidotherpy 110 degrees, 70 RPM for left hand with wrist and digit AROM 5 min           Assessment: Patient reports she has on occasion shooting pain in left FCR and FCU tendons when using left hand. Patient reporting pain in left radial wrist with gripping and twisting exercise. Improved right  strength by 5 lbs, lateral pinch by 3 lbs. Left  increased by 1 lbs. Upgrade resistive wrist exercises next visit. Goals:   Pt complaints of pain in left hand will decrease at worst to 1/10. Pt will be independent and compliant with comprehensive HEP to maintain progress achieved in OT. Patient will demonstrate increase in left wrist flexion to at least 50 degrees for ease in cutting up food. Patient will demonstrate increase in right  strength to at least 25 lbs and left 25 lbs for ease in opening doors. ...(made progress toward)  Patient will demonstrate increase in left lateral pinch to at least 7 lbs for ease in turning key    Plan: Continue with working towards reducing paresthesia and strengthening. Upgrade resistive wrist exercises next visit    Charges: MT, TE2      Total Timed Treatment: 42min  Total Treatment Time: 45 min

## 2022-11-07 ENCOUNTER — OFFICE VISIT (OUTPATIENT)
Dept: OCCUPATIONAL MEDICINE | Facility: HOSPITAL | Age: 62
End: 2022-11-07
Attending: PHYSICIAN ASSISTANT
Payer: MEDICAID

## 2022-11-07 ENCOUNTER — OFFICE VISIT (OUTPATIENT)
Dept: PHYSICAL THERAPY | Facility: HOSPITAL | Age: 62
End: 2022-11-07
Attending: PHYSICIAN ASSISTANT
Payer: MEDICAID

## 2022-11-07 DIAGNOSIS — Z98.890 POST-OPERATIVE STATE: ICD-10-CM

## 2022-11-07 DIAGNOSIS — Z98.1 S/P CERVICAL SPINAL FUSION: ICD-10-CM

## 2022-11-07 PROCEDURE — 97110 THERAPEUTIC EXERCISES: CPT

## 2022-11-07 PROCEDURE — 97140 MANUAL THERAPY 1/> REGIONS: CPT | Performed by: OCCUPATIONAL THERAPIST

## 2022-11-07 PROCEDURE — 97140 MANUAL THERAPY 1/> REGIONS: CPT

## 2022-11-07 PROCEDURE — 97110 THERAPEUTIC EXERCISES: CPT | Performed by: OCCUPATIONAL THERAPIST

## 2022-11-07 NOTE — PROGRESS NOTES
Dx:    S/P cervical spinal fusion (Z98.1)  Post-operative state (Q45.833      Authorized # of Visits:  12         Next MD visit: 11/23/22  Fall Risk: standard         Precautions: Diabetes, avoid lifting over head, avoid lifting no more than 10 lbs       Medication Changes since last visit?: No  Subjective: \"I have pain sometimes on either side of my left wrist, feels like the left wrist is bigger, more swollen, but it's always like that. \" pain in left wrist 6-7/10  Objective: Treatment began with moist heat to hands, follow by STM, sensory reeducation, tendon glides, and light strengthening. See flow sheet for details.     Measurement: right wrist 16.7 cm, Left wrist 17.6 cm    Date 09/20/22  09/28/22  10/14/22  10/19/22  10/24/22  11/2/22  11/04/22  1107/22   Visit # 1  2  3  4  5  6  7 8                       Evaluation x                Manual                   STM left upper traps   8 min  STM bilateral forearm flexors and along ulnar nerve in left distal to elbow 8 min  STM bilateral forearm flexors and along ulnar nerve in left distal to elbow 8 min  STM bilateral forearm flexors and along ulnar nerve in left distal to elbow 8 min    STM bilateral forearm flexors and along ulnar nerve in left distal to elbow 8 min  STM bilateral forearm flexors and along ulnar nerve in left distal to elbow 8 mi    STM along left ulnar nerve                    STM right hand         4 minutes   8 minutes  STM bilateral hands        Ther ex                   Wrist AROM and digit AROM in fluidotherapy for left hand   5 min 5 min 5 min 5 min Red web wrist flexion stretch 10 sec x 10 Red web wrist flexion stretch 10 sec x 10   AROM neck rotation to left and right   x10  digit abduction/adduction  towel digit walking x 10, bilateral  composite grasping, hold onto potato masher, push into red putty, x 10  composite grasping, hold onto potato masher, push into red putty, x 10  composite grasping, hold onto potato masher, push into red putty, x 10  Fine motor- remove and replace nut on bolt using left hand  composite grasping, hold onto potato masher, push into red putty, x 10     Cane elbow flex/ex, circles   x10, 2 sets each  tendon glides bilateral x 10  tendon glides bilateral x 10  AROM digit abduction/adduction and extension  X 10  AROM digit abduction/adduction and extension  X 10 Red web  x 20, bilateral  Red web  x 20 bilateral    scapula elevation/depression, protraction/retraction    x10  red web  bilateral x 20  PRE wrist flex/ext , UD/RD with 2# wt x 15, left only  PRE wrist flex/ext , UD/RD with 2# wt x 15, left only    PRE wrist flex/ext , UD/RD with 2# wt x 15, left only      red web wrist flexion/extension    And      10 sec x 5    x20  ulnar nerve glides in left x 5  intrinsic hand strengthening with rubber band, abduction/adduction, lumbrical x 10  intrinsic hand strengthening with rubber band, abduction/adduction, lumbrical x 20  intrinsic hand strengthening with rubber band, abduction/adduction, lumbrical x 20  intrinsic hand strengthening with rubber band, abduction/adduction, lumbrical x 20  Intrinsic hand strengthening with rubber band, abduction/adduction, lumbrical x 20   Pronation/supination with 1 wt  x10 x20 2 wts, bilateral    x20 2 wts x 20    Lumbrical peg       X 5 minutes   cocontraciton - wrist rotation , rotate     intrinsic hand strengthening with rubber band, abduction/adduction, lumbrical    x20, bilaterally  yellow web digit extension x 20  yellow web digit extension x 20  yellow web digit extension x 20  x 25 each side   x25 eac hside  x25    two point pinch to  velcro checkers    x40  digit abductin/adduction to  large pegs 25 with left/right    Red putty, IP pinches,  twist and pull and two point pinch, bilaterally    Red putty, IP pinches,  twist and pull and two point pinch, bilaterally  Red putty, IP pinches,  twist and pull and two point pinch, bilaterally    three point pinch to  velcro checkers with red clothespins in left    x40  red putty IP ,              HEP instruction       ulnar nerve glides for left UE x 4, 3x/day  wrist PRE with 2# wt see below    Reviewed home therex                           Therapeutic Activity                   Open/ close containers      X 5 minutes      Simulation of cutting food             x 12 minutes   Pizza slicer , fork and knife        Neuromuscular Re-education                              Sensory reeducation - Sens bin #3, 10 large chips, bilateral 8 min  Sensory reeducation - Sens bin #3, 10 large chips, bilateral 5 min      Sensory reeducation - Sens bin #3, 10 large chips, bilateral 5 min                       Modalities                    Moist heat     3 min  3 min left hand  3 min left hand  3 min left hand      3 min in left hand          fluidotherpy 110 degrees, 70 RPM for left hand with wrist and digit AROM 5 min  fluidotherpy 110 degrees, 70 RPM for left hand with wrist and digit AROM 5 min  fluidotherpy 110 degrees, 70 RPM for left hand with wrist and digit AROM 5 min   fluidotherpy 110 degrees, 70 RPM for left hand with wrist and digit AROM 5 min     fluidotherpy 110 degrees, 70 RPM for left hand with wrist and digit AROM 5 min       Assessment: Patient reports pain in left hand has not improved since start of therapy, lately most pain in left thumb and small finger as well as radial and ulnar wrist. During session today, complained of wrist pain in left and pain in left thenar eminence. Goals:   Pt complaints of pain in left hand will decrease at worst to 1/10. Pt will be independent and compliant with comprehensive HEP to maintain progress achieved in OT. Patient will demonstrate increase in left wrist flexion to at least 50 degrees for ease in cutting up food.   Patient will demonstrate increase in right  strength to at least 25 lbs and left 25 lbs for ease in opening doors. ...(made progress toward)  Patient will demonstrate increase in left lateral pinch to at least 7 lbs for ease in turning key    Plan: Continue with working towards reducing paresthesia and strengthening Reassess pain in wrists and upgrade resistive wrist exercises next visit if pain unchanged or better.     Charges: MT, TE2      Total Timed Treatment: 42min  Total Treatment Time: 45 min

## 2022-11-09 ENCOUNTER — OFFICE VISIT (OUTPATIENT)
Dept: PHYSICAL THERAPY | Facility: HOSPITAL | Age: 62
End: 2022-11-09
Attending: PHYSICIAN ASSISTANT
Payer: MEDICAID

## 2022-11-09 ENCOUNTER — OFFICE VISIT (OUTPATIENT)
Dept: OCCUPATIONAL MEDICINE | Facility: HOSPITAL | Age: 62
End: 2022-11-09
Attending: PHYSICIAN ASSISTANT
Payer: MEDICAID

## 2022-11-09 DIAGNOSIS — Z98.890 POST-OPERATIVE STATE: ICD-10-CM

## 2022-11-09 DIAGNOSIS — Z98.1 S/P CERVICAL SPINAL FUSION: ICD-10-CM

## 2022-11-09 PROCEDURE — 97110 THERAPEUTIC EXERCISES: CPT | Performed by: OCCUPATIONAL THERAPIST

## 2022-11-09 PROCEDURE — 97140 MANUAL THERAPY 1/> REGIONS: CPT | Performed by: OCCUPATIONAL THERAPIST

## 2022-11-09 PROCEDURE — 97110 THERAPEUTIC EXERCISES: CPT

## 2022-11-09 PROCEDURE — 97140 MANUAL THERAPY 1/> REGIONS: CPT

## 2022-11-11 ENCOUNTER — TELEPHONE (OUTPATIENT)
Dept: SURGERY | Facility: CLINIC | Age: 62
End: 2022-11-11

## 2022-11-11 NOTE — TELEPHONE ENCOUNTER
Per last OV note by AUGIE Vicente on 10-12-22:  \"PLAN:   1. Medication: None prescribed  2. Imaging:                 - Reviewed today:                              -X-ray cervical spine:                                            -Agree with radiology, hardware appears stable                - Ordered today:                              -X-ray cervical spine:                                            -Complete prior to 3-month postoperative visit  3. Activity:                 -Continue PT/OT                -Gradually increase activity with assistance of PT/OT  4. Follow up in 6 weeks with Dr. Yolette Quintanilla or call or follow up sooner or go to the ED for any new, worsening or concerning signs or symptoms \"    X-ray is not scheduled yet. Patient has apt with Dr Yolette Quintanilla on 11-23-22. [employer] message sent with reminder to get x-ray done prior to apt.

## 2022-11-14 ENCOUNTER — OFFICE VISIT (OUTPATIENT)
Dept: PHYSICAL THERAPY | Facility: HOSPITAL | Age: 62
End: 2022-11-14
Attending: PHYSICIAN ASSISTANT
Payer: MEDICAID

## 2022-11-14 ENCOUNTER — OFFICE VISIT (OUTPATIENT)
Dept: OCCUPATIONAL MEDICINE | Facility: HOSPITAL | Age: 62
End: 2022-11-14
Attending: PHYSICIAN ASSISTANT
Payer: MEDICAID

## 2022-11-14 DIAGNOSIS — Z98.890 POST-OPERATIVE STATE: ICD-10-CM

## 2022-11-14 DIAGNOSIS — Z98.1 S/P CERVICAL SPINAL FUSION: ICD-10-CM

## 2022-11-14 PROCEDURE — 97110 THERAPEUTIC EXERCISES: CPT

## 2022-11-14 PROCEDURE — 97140 MANUAL THERAPY 1/> REGIONS: CPT

## 2022-11-14 PROCEDURE — 97140 MANUAL THERAPY 1/> REGIONS: CPT | Performed by: OCCUPATIONAL THERAPIST

## 2022-11-14 PROCEDURE — 97110 THERAPEUTIC EXERCISES: CPT | Performed by: OCCUPATIONAL THERAPIST

## 2022-11-16 ENCOUNTER — OFFICE VISIT (OUTPATIENT)
Dept: OCCUPATIONAL MEDICINE | Facility: HOSPITAL | Age: 62
End: 2022-11-16
Attending: PHYSICIAN ASSISTANT
Payer: MEDICAID

## 2022-11-16 PROCEDURE — 97530 THERAPEUTIC ACTIVITIES: CPT | Performed by: OCCUPATIONAL THERAPIST

## 2022-11-16 PROCEDURE — 97110 THERAPEUTIC EXERCISES: CPT | Performed by: OCCUPATIONAL THERAPIST

## 2022-11-17 ENCOUNTER — APPOINTMENT (OUTPATIENT)
Dept: OCCUPATIONAL MEDICINE | Facility: HOSPITAL | Age: 62
End: 2022-11-17
Attending: PHYSICIAN ASSISTANT
Payer: MEDICAID

## 2022-11-18 ENCOUNTER — TELEPHONE (OUTPATIENT)
Dept: SURGERY | Facility: CLINIC | Age: 62
End: 2022-11-18

## 2022-11-18 ENCOUNTER — PATIENT MESSAGE (OUTPATIENT)
Dept: SURGERY | Facility: CLINIC | Age: 62
End: 2022-11-18

## 2022-11-18 NOTE — TELEPHONE ENCOUNTER
When patient calls to reschedule offer [facility] on Wednesday 11/30 she is in office in am  Otherwise Dr. Zamarripa Penpamela following week 11/30.

## 2022-11-18 NOTE — TELEPHONE ENCOUNTER
Noted issue was addressed in another TE     Noted pt is scheduled for an appointment on 11/23/22 with B. Ricci Simmonds, PA at the [place] location    Nothing further needed for this encounter

## 2022-11-21 ENCOUNTER — HOSPITAL ENCOUNTER (OUTPATIENT)
Dept: GENERAL RADIOLOGY | Facility: HOSPITAL | Age: 62
Discharge: HOME | End: 2022-11-21
Attending: PHYSICIAN ASSISTANT
Payer: MEDICAID

## 2022-11-21 ENCOUNTER — OFFICE VISIT (OUTPATIENT)
Dept: OCCUPATIONAL MEDICINE | Facility: HOSPITAL | Age: 62
End: 2022-11-21
Attending: PHYSICIAN ASSISTANT
Payer: MEDICAID

## 2022-11-21 DIAGNOSIS — Z98.890 POST-OPERATIVE STATE: ICD-10-CM

## 2022-11-21 DIAGNOSIS — Z98.1 S/P CERVICAL SPINAL FUSION: ICD-10-CM

## 2022-11-21 PROCEDURE — 72050 X-RAY EXAM NECK SPINE 4/5VWS: CPT | Performed by: PHYSICIAN ASSISTANT

## 2022-11-21 PROCEDURE — 97140 MANUAL THERAPY 1/> REGIONS: CPT | Performed by: OCCUPATIONAL THERAPIST

## 2022-11-21 PROCEDURE — 97110 THERAPEUTIC EXERCISES: CPT | Performed by: OCCUPATIONAL THERAPIST

## 2022-11-22 ENCOUNTER — APPOINTMENT (OUTPATIENT)
Dept: OCCUPATIONAL MEDICINE | Facility: HOSPITAL | Age: 62
End: 2022-11-22
Attending: PHYSICIAN ASSISTANT
Payer: MEDICAID

## 2022-11-23 ENCOUNTER — OFFICE VISIT (OUTPATIENT)
Dept: SURGERY | Facility: CLINIC | Age: 62
End: 2022-11-23
Payer: MEDICAID

## 2022-11-23 VITALS — DIASTOLIC BLOOD PRESSURE: 70 MMHG | SYSTOLIC BLOOD PRESSURE: 130 MMHG

## 2022-11-23 DIAGNOSIS — M79.642 HAND PAIN, LEFT: ICD-10-CM

## 2022-11-23 DIAGNOSIS — Z98.890 POST-OPERATIVE STATE: ICD-10-CM

## 2022-11-23 DIAGNOSIS — M79.632 LEFT FOREARM PAIN: ICD-10-CM

## 2022-11-23 DIAGNOSIS — Z98.1 S/P CERVICAL SPINAL FUSION: Primary | ICD-10-CM

## 2022-11-23 PROCEDURE — 3075F SYST BP GE 130 - 139MM HG: CPT | Performed by: PHYSICIAN ASSISTANT

## 2022-11-23 PROCEDURE — 3078F DIAST BP <80 MM HG: CPT | Performed by: PHYSICIAN ASSISTANT

## 2022-11-23 PROCEDURE — 99024 POSTOP FOLLOW-UP VISIT: CPT | Performed by: PHYSICIAN ASSISTANT

## 2022-11-23 NOTE — PROGRESS NOTES
Patient is here for 12 week post op visit. She is s/p Cervical 5-cervical 6 anterior corpectomy with cervical 4-cervical 5, cervical 5-cervical 6, cervical 6-cervical 7 anterior fusion, placement of cage and plate performed on 3-82-73 by Dr Cesar Jimenez. She did not have pain before surgery just n/t. She now has left arm pain with n/t. She has bilateral hand weakness, L>R. PT and OT are not helping.

## 2022-11-25 ENCOUNTER — TELEPHONE (OUTPATIENT)
Dept: PHYSICAL THERAPY | Facility: HOSPITAL | Age: 62
End: 2022-11-25

## 2022-11-28 ENCOUNTER — TELEPHONE (OUTPATIENT)
Dept: ORTHOPEDICS CLINIC | Facility: CLINIC | Age: 62
End: 2022-11-28

## 2022-11-28 NOTE — TELEPHONE ENCOUNTER
Pt will be eval-ed first by Dr. Mino Borrero before xray imaging needs are determined. Per PCP notes, pt c/o numbness/tingling in the left forearm/hand.

## 2022-11-28 NOTE — TELEPHONE ENCOUNTER
NP Left forearm pain & Left Hand. Please advise if imaging is needed.   Future Appointments   Date Time Provider Fara Odette   12/1/2022  1:30 PM Sherly Peters, PT [facility] PT EM [facility]   12/7/2022 10:40 AM Fannie Hernandez MD [facility] [address] [place] [address]   12/8/2022 12:45 PM Sherly Peters, PT [facility] PT EM [facility]   12/15/2022 12:00 PM Sherly Peters, PT [facility] PT EM [facility]   12/20/2022  1:40 PM Scott Arthur MD [facility] [place]   12/22/2022 12:00 PM Sherly Peters, PT [facility] PT EM [facility]   12/29/2022 12:00 PM Sherly Peters, PT [facility] PT EM [facility]   1/3/2023 12:00 PM Sherly Peters, PT [facility] PT EM [facility]   2/8/2023  3:00 PM Ruth Morrison MD [address] [place] [address] HEM ONC [facility]

## 2022-11-29 ENCOUNTER — APPOINTMENT (OUTPATIENT)
Dept: OCCUPATIONAL MEDICINE | Facility: HOSPITAL | Age: 62
End: 2022-11-29
Attending: PHYSICIAN ASSISTANT
Payer: MEDICAID

## 2022-12-01 ENCOUNTER — APPOINTMENT (OUTPATIENT)
Dept: PHYSICAL THERAPY | Facility: HOSPITAL | Age: 62
End: 2022-12-01
Attending: PHYSICIAN ASSISTANT
Payer: MEDICAID

## 2022-12-01 ENCOUNTER — APPOINTMENT (OUTPATIENT)
Dept: OCCUPATIONAL MEDICINE | Facility: HOSPITAL | Age: 62
End: 2022-12-01
Attending: PHYSICIAN ASSISTANT
Payer: MEDICAID

## 2022-12-07 ENCOUNTER — OFFICE VISIT (OUTPATIENT)
Dept: SURGERY | Facility: CLINIC | Age: 62
End: 2022-12-07
Payer: MEDICAID

## 2022-12-07 VITALS
HEART RATE: 76 BPM | HEIGHT: 66 IN | BODY MASS INDEX: 47.09 KG/M2 | WEIGHT: 293 LBS | SYSTOLIC BLOOD PRESSURE: 120 MMHG | DIASTOLIC BLOOD PRESSURE: 72 MMHG

## 2022-12-07 DIAGNOSIS — Z98.890 POST-OPERATIVE STATE: ICD-10-CM

## 2022-12-07 DIAGNOSIS — Z98.1 S/P CERVICAL SPINAL FUSION: Primary | ICD-10-CM

## 2022-12-07 DIAGNOSIS — G95.9 CERVICAL MYELOPATHY (HCC): ICD-10-CM

## 2022-12-07 PROCEDURE — 99212 OFFICE O/P EST SF 10 MIN: CPT | Performed by: NEUROLOGICAL SURGERY

## 2022-12-07 PROCEDURE — 3074F SYST BP LT 130 MM HG: CPT | Performed by: NEUROLOGICAL SURGERY

## 2022-12-07 PROCEDURE — 3078F DIAST BP <80 MM HG: CPT | Performed by: NEUROLOGICAL SURGERY

## 2022-12-07 PROCEDURE — 3008F BODY MASS INDEX DOCD: CPT | Performed by: NEUROLOGICAL SURGERY

## 2022-12-07 NOTE — PROGRESS NOTES
Neurosurgery staff    Patient seen and examined. She reports several concerns. She underwent two-level cervical corpectomy on August 29 for cervical myelopathy. She complains of numbness and tingling in her left upper extremity. She says this is entirely new since the operation. However, review of preoperative records indicates that she had some numbness in her hand, up through the forearm, prior to surgery. She does have some pain in her left shoulder now, which apparently is new. She is applying for SSI disability, and expressed her concern that our office is holding up this application because we cannot say whether her deficits are temporary or permanent. On examination, the patient events a submaximal effort in the left upper extremity. No clear neurologic deficit. Hypoesthesia to light touch over the entire left upper extremity in a nondermatomal fashion. Upright x-rays demonstrate good positioning of graft and hardware without evidence of pseudoarthrosis or screw loosening. Given the patient's complaints about left upper extremity numbness, I have prescribed an MRI scan of the cervical spine. I cautioned the patient that this may be nondiagnostic due to susceptibility artifact. I offered a CT myelogram, but she has refused this.     The patient was also given a referral to outside occupational therapy, at her request.    Return in 3 weeks to review MRI and check clinical progress

## 2022-12-07 NOTE — PROGRESS NOTES
Pt here for follow up. Pt states she has pain. Pt would like pain meds if he thinks she still needs them.

## 2022-12-08 ENCOUNTER — OFFICE VISIT (OUTPATIENT)
Dept: PHYSICAL THERAPY | Facility: HOSPITAL | Age: 62
End: 2022-12-08
Attending: PHYSICIAN ASSISTANT
Payer: MEDICAID

## 2022-12-08 ENCOUNTER — APPOINTMENT (OUTPATIENT)
Dept: OCCUPATIONAL MEDICINE | Facility: HOSPITAL | Age: 62
End: 2022-12-08
Attending: PHYSICIAN ASSISTANT
Payer: MEDICAID

## 2022-12-08 PROCEDURE — 97140 MANUAL THERAPY 1/> REGIONS: CPT

## 2022-12-08 PROCEDURE — 97110 THERAPEUTIC EXERCISES: CPT

## 2022-12-12 ENCOUNTER — PATIENT MESSAGE (OUTPATIENT)
Dept: SURGERY | Facility: CLINIC | Age: 62
End: 2022-12-12

## 2022-12-12 NOTE — TELEPHONE ENCOUNTER
From: Malinda Hirsch  To: An [place], [place]  Sent: 12/12/2022 9:55 AM CST  Subject: MRI    Hi [facility], the earliest I can get in for an MRI is Jan 21, 2023 at 8:45 am. Please let Doctor Savanna Aguirre know as well. Waiting now to find out if the insurance will ok it.  Thanks, Frances Santoss

## 2022-12-13 ENCOUNTER — APPOINTMENT (OUTPATIENT)
Dept: OCCUPATIONAL MEDICINE | Facility: HOSPITAL | Age: 62
End: 2022-12-13
Attending: PHYSICIAN ASSISTANT
Payer: MEDICAID

## 2022-12-15 ENCOUNTER — OFFICE VISIT (OUTPATIENT)
Dept: PHYSICAL THERAPY | Facility: HOSPITAL | Age: 62
End: 2022-12-15
Attending: PHYSICIAN ASSISTANT
Payer: MEDICAID

## 2022-12-15 ENCOUNTER — APPOINTMENT (OUTPATIENT)
Dept: OCCUPATIONAL MEDICINE | Facility: HOSPITAL | Age: 62
End: 2022-12-15
Attending: PHYSICIAN ASSISTANT
Payer: MEDICAID

## 2022-12-15 PROCEDURE — 97140 MANUAL THERAPY 1/> REGIONS: CPT

## 2022-12-15 PROCEDURE — 97110 THERAPEUTIC EXERCISES: CPT

## 2022-12-20 ENCOUNTER — OFFICE VISIT (OUTPATIENT)
Dept: ORTHOPEDICS CLINIC | Facility: CLINIC | Age: 62
End: 2022-12-20
Payer: MEDICAID

## 2022-12-20 VITALS — WEIGHT: 293 LBS | BODY MASS INDEX: 47.09 KG/M2 | HEIGHT: 66 IN

## 2022-12-20 DIAGNOSIS — G56.02 CARPAL TUNNEL SYNDROME OF LEFT WRIST: Primary | ICD-10-CM

## 2022-12-20 DIAGNOSIS — G56.22 CUBITAL TUNNEL SYNDROME ON LEFT: ICD-10-CM

## 2022-12-20 PROCEDURE — 99204 OFFICE O/P NEW MOD 45 MIN: CPT | Performed by: ORTHOPAEDIC SURGERY

## 2022-12-20 PROCEDURE — 3008F BODY MASS INDEX DOCD: CPT | Performed by: ORTHOPAEDIC SURGERY

## 2022-12-22 ENCOUNTER — APPOINTMENT (OUTPATIENT)
Dept: PHYSICAL THERAPY | Facility: HOSPITAL | Age: 62
End: 2022-12-22
Attending: PHYSICIAN ASSISTANT
Payer: MEDICAID

## 2022-12-29 ENCOUNTER — OFFICE VISIT (OUTPATIENT)
Dept: PHYSICAL THERAPY | Facility: HOSPITAL | Age: 62
End: 2022-12-29
Attending: PHYSICIAN ASSISTANT
Payer: MEDICAID

## 2022-12-29 PROCEDURE — 97140 MANUAL THERAPY 1/> REGIONS: CPT

## 2022-12-29 PROCEDURE — 97110 THERAPEUTIC EXERCISES: CPT

## 2023-01-03 ENCOUNTER — TELEPHONE (OUTPATIENT)
Dept: NEUROLOGY | Facility: CLINIC | Age: 63
End: 2023-01-03

## 2023-01-03 ENCOUNTER — OFFICE VISIT (OUTPATIENT)
Dept: PHYSICAL THERAPY | Facility: HOSPITAL | Age: 63
End: 2023-01-03
Attending: PHYSICIAN ASSISTANT
Payer: MEDICAID

## 2023-01-03 ENCOUNTER — PATIENT MESSAGE (OUTPATIENT)
Dept: SURGERY | Facility: CLINIC | Age: 63
End: 2023-01-03

## 2023-01-03 ENCOUNTER — TELEPHONE (OUTPATIENT)
Dept: ORTHOPEDICS CLINIC | Facility: CLINIC | Age: 63
End: 2023-01-03

## 2023-01-03 PROCEDURE — 97140 MANUAL THERAPY 1/> REGIONS: CPT

## 2023-01-03 PROCEDURE — 97110 THERAPEUTIC EXERCISES: CPT

## 2023-01-03 NOTE — TELEPHONE ENCOUNTER
From: Johnna Spivey  To: Radha Badillo PA-C  Sent: 1/3/2023 9:40 AM CST  Subject: Orthopedic    Hi I saw Fabien Clark on Dec 20th. He said I need to schedule an carpal tunnel test with Dr. Emery Hobbs. He never put the order in so I can not schedule it. Need your help with this.

## 2023-01-03 NOTE — TELEPHONE ENCOUNTER
Pt is calling for direction after seeing the orthopedic doctor to have a carpel tunnel test done. Pt states she has a few questions and would like to speak with the nurse or [place] if possible.  Pls advise

## 2023-01-03 NOTE — TELEPHONE ENCOUNTER
Called patient. She saw Dr Carmen Roy in Orthopaedics and was told she needed an EMG done to assess for carpal tunnel by Dr Berta Carrillo. She called that office and there was no EMG order on file. She is requesting AUGIE Balderrama order it for her. She has not contacted Dr Luiza Perez office to get the order. Informed patient she needs to call Dr Luiza Perez office for the order. Provided his office #. Pt appreciative.

## 2023-01-03 NOTE — TELEPHONE ENCOUNTER
Patient is requesting order for EMG be placed, states she has been unable to schedule the requested test as the order is not in.

## 2023-01-04 NOTE — TELEPHONE ENCOUNTER
Notified patient via [employer] that the EMG order was placed and she is able to call and schedule the appointment

## 2023-01-10 ENCOUNTER — OFFICE VISIT (OUTPATIENT)
Dept: PHYSICAL THERAPY | Facility: HOSPITAL | Age: 63
End: 2023-01-10
Attending: PHYSICIAN ASSISTANT
Payer: MEDICAID

## 2023-01-10 PROCEDURE — 97110 THERAPEUTIC EXERCISES: CPT

## 2023-01-10 RX ORDER — PANTOPRAZOLE SODIUM 40 MG/1
TABLET, DELAYED RELEASE ORAL
Qty: 90 TABLET | Refills: 0 | Status: SHIPPED | OUTPATIENT
Start: 2023-01-10

## 2023-01-10 RX ORDER — LEVOTHYROXINE SODIUM 0.07 MG/1
TABLET ORAL
Qty: 90 TABLET | Refills: 0 | Status: SHIPPED | OUTPATIENT
Start: 2023-01-10

## 2023-01-16 ENCOUNTER — PATIENT MESSAGE (OUTPATIENT)
Dept: PHYSICAL MEDICINE AND REHAB | Facility: CLINIC | Age: 63
End: 2023-01-16

## 2023-01-16 ENCOUNTER — PROCEDURE VISIT (OUTPATIENT)
Dept: PHYSICAL MEDICINE AND REHAB | Facility: CLINIC | Age: 63
End: 2023-01-16
Payer: MEDICAID

## 2023-01-16 DIAGNOSIS — R20.0 NUMBNESS AND TINGLING IN LEFT HAND: ICD-10-CM

## 2023-01-16 DIAGNOSIS — M79.602 LEFT ARM PAIN: Primary | ICD-10-CM

## 2023-01-16 DIAGNOSIS — R20.2 NUMBNESS AND TINGLING IN LEFT HAND: ICD-10-CM

## 2023-01-16 PROCEDURE — 95909 NRV CNDJ TST 5-6 STUDIES: CPT | Performed by: PHYSICAL MEDICINE & REHABILITATION

## 2023-01-16 PROCEDURE — 95886 MUSC TEST DONE W/N TEST COMP: CPT | Performed by: PHYSICAL MEDICINE & REHABILITATION

## 2023-01-17 ENCOUNTER — PATIENT MESSAGE (OUTPATIENT)
Dept: SURGERY | Facility: CLINIC | Age: 63
End: 2023-01-17

## 2023-01-17 NOTE — TELEPHONE ENCOUNTER
From: Johnna Spivey  To: Marietta Leos DO  Sent: 1/16/2023 7:12 PM CST  Subject: Darlin Hobbs, I do not see the test results in my chart yet. So you I have nerve damage, since you told me I do not have carpal tunnel?  Thanks, Windy Barefoot

## 2023-01-18 NOTE — TELEPHONE ENCOUNTER
Per documentation by MA with Dr Helena Madrigal office \"I have sent you a copy of the EMG results. I believe you can find these results under letters in your MyChart. Please reach out to Dr. Manny Ro for recommendations based off the EMG test. \"  EMG results:  \"Conclusion:   1) This is a normal electrodiagnostic study. 2) With regards to the clinical question posed there is no evidence of median mononeuropathy across the wrist (carpal tunnel syndrome). In comparison to the EMG done on 12/13/2021 there is no significant difference in the electrodiagnostic findings. 3) There is no definitive evidence of left cervical radiculopathy. \"    Please advise.

## 2023-01-18 NOTE — TELEPHONE ENCOUNTER
From: Bipin Mode  To: Marlen Crespo, [place]  Sent: 1/17/2023 9:19 PM CST  Subject: Rocío Antony, Dr. Lilia Flores ordered a carpal tunnel test, which I had yesterday. The Dr. Andrea Molina did tell me I do not have carpal tunnel. Could you please let me know what the test results mean. They are in my chart. Do I need to see Dr. Lilia Flores the orthopedic doctor again? Thanks, Russell I did not understand everything Dr Andrea Molina wrote.

## 2023-01-20 ENCOUNTER — TELEPHONE (OUTPATIENT)
Dept: NEUROLOGY | Facility: CLINIC | Age: 63
End: 2023-01-20

## 2023-01-21 ENCOUNTER — HOSPITAL ENCOUNTER (OUTPATIENT)
Dept: MRI IMAGING | Facility: HOSPITAL | Age: 63
Discharge: HOME | End: 2023-01-21
Attending: NEUROLOGICAL SURGERY
Payer: MEDICAID

## 2023-01-21 ENCOUNTER — PATIENT MESSAGE (OUTPATIENT)
Dept: SURGERY | Facility: CLINIC | Age: 63
End: 2023-01-21

## 2023-01-21 DIAGNOSIS — Z98.1 S/P CERVICAL SPINAL FUSION: ICD-10-CM

## 2023-01-21 DIAGNOSIS — G95.9 CERVICAL MYELOPATHY (HCC): ICD-10-CM

## 2023-01-21 PROCEDURE — 72141 MRI NECK SPINE W/O DYE: CPT | Performed by: NEUROLOGICAL SURGERY

## 2023-01-23 ENCOUNTER — TELEPHONE (OUTPATIENT)
Dept: SURGERY | Facility: CLINIC | Age: 63
End: 2023-01-23

## 2023-01-23 NOTE — TELEPHONE ENCOUNTER
Patient had cervical MRI on 1-21-23. Per Dr Tylor Lu this morning \"Please make sure Shoaib Lal follows up with me in the next week or two. I don't see a future visit. \"    Per TE started 1-23-23, AUGIE Vicente routed to  to schedule an appointment for follow up.   [facility] msg sent requesting patient call to schedule apt to discuss MRI results at [address].

## 2023-01-23 NOTE — TELEPHONE ENCOUNTER
From: Nba Melara  To: Germaine Chávez MD  Sent: 1/21/2023 5:05 PM CST  Subject: MRI Results    Hi Dr. Jessica Banks, what do these test results mean. I do not understand medical terms that much. Could you explain this so I can understand these results myself.  Thanks, Prakash Waldron

## 2023-01-23 NOTE — TELEPHONE ENCOUNTER
Please see Dr. Lennox Snide' recent TE. He'd like to see this patient for follow up and MRI review in the next week or so.  Please schedule and advise, thank you

## 2023-01-23 NOTE — PROGRESS NOTES
Breanne and Jairon Jimenez,    Please make sure Marcos Ting follows up with me in the next week or two. I don't see a future visit. Thanks.

## 2023-01-24 ENCOUNTER — OFFICE VISIT (OUTPATIENT)
Dept: ORTHOPEDICS CLINIC | Facility: CLINIC | Age: 63
End: 2023-01-24
Payer: MEDICAID

## 2023-01-24 VITALS — HEIGHT: 66 IN | WEIGHT: 293 LBS | BODY MASS INDEX: 47.09 KG/M2

## 2023-01-24 DIAGNOSIS — M50.90 CERVICAL DISC DISEASE: Primary | ICD-10-CM

## 2023-01-24 PROCEDURE — 3008F BODY MASS INDEX DOCD: CPT | Performed by: ORTHOPAEDIC SURGERY

## 2023-01-24 PROCEDURE — 99212 OFFICE O/P EST SF 10 MIN: CPT | Performed by: ORTHOPAEDIC SURGERY

## 2023-02-01 ENCOUNTER — OFFICE VISIT (OUTPATIENT)
Dept: SURGERY | Facility: CLINIC | Age: 63
End: 2023-02-01
Payer: MEDICAID

## 2023-02-01 VITALS
WEIGHT: 293 LBS | BODY MASS INDEX: 47.09 KG/M2 | SYSTOLIC BLOOD PRESSURE: 122 MMHG | HEART RATE: 78 BPM | DIASTOLIC BLOOD PRESSURE: 72 MMHG | HEIGHT: 66 IN

## 2023-02-01 DIAGNOSIS — G95.9 CERVICAL MYELOPATHY (HCC): Primary | ICD-10-CM

## 2023-02-01 PROCEDURE — 99214 OFFICE O/P EST MOD 30 MIN: CPT | Performed by: NEUROLOGICAL SURGERY

## 2023-02-01 PROCEDURE — 3078F DIAST BP <80 MM HG: CPT | Performed by: NEUROLOGICAL SURGERY

## 2023-02-01 PROCEDURE — 3008F BODY MASS INDEX DOCD: CPT | Performed by: NEUROLOGICAL SURGERY

## 2023-02-01 PROCEDURE — 3074F SYST BP LT 130 MM HG: CPT | Performed by: NEUROLOGICAL SURGERY

## 2023-02-02 ENCOUNTER — TELEPHONE (OUTPATIENT)
Dept: PHYSICAL THERAPY | Facility: HOSPITAL | Age: 63
End: 2023-02-02

## 2023-02-03 ENCOUNTER — TELEPHONE (OUTPATIENT)
Dept: PHYSICAL THERAPY | Facility: HOSPITAL | Age: 63
End: 2023-02-03

## 2023-02-08 ENCOUNTER — OFFICE VISIT (OUTPATIENT)
Dept: HEMATOLOGY/ONCOLOGY | Facility: HOSPITAL | Age: 63
End: 2023-02-08
Attending: INTERNAL MEDICINE
Payer: MEDICAID

## 2023-02-08 VITALS
BODY MASS INDEX: 48.23 KG/M2 | WEIGHT: 293 LBS | HEIGHT: 65.5 IN | RESPIRATION RATE: 20 BRPM | HEART RATE: 75 BPM | SYSTOLIC BLOOD PRESSURE: 147 MMHG | TEMPERATURE: 98 F | OXYGEN SATURATION: 99 % | DIASTOLIC BLOOD PRESSURE: 73 MMHG

## 2023-02-08 DIAGNOSIS — R76.8 ELEVATED SERUM IMMUNOGLOBULIN FREE LIGHT CHAIN LEVEL: Primary | ICD-10-CM

## 2023-02-08 PROCEDURE — 99213 OFFICE O/P EST LOW 20 MIN: CPT | Performed by: INTERNAL MEDICINE

## 2023-02-22 ENCOUNTER — OFFICE VISIT (OUTPATIENT)
Dept: PHYSICAL THERAPY | Facility: HOSPITAL | Age: 63
End: 2023-02-22
Attending: NEUROLOGICAL SURGERY
Payer: MEDICAID

## 2023-02-22 DIAGNOSIS — G95.9 CERVICAL MYELOPATHY (HCC): ICD-10-CM

## 2023-02-22 PROCEDURE — 97162 PT EVAL MOD COMPLEX 30 MIN: CPT

## 2023-02-28 ENCOUNTER — OFFICE VISIT (OUTPATIENT)
Dept: PHYSICAL THERAPY | Facility: HOSPITAL | Age: 63
End: 2023-02-28
Attending: NEUROLOGICAL SURGERY
Payer: MEDICAID

## 2023-02-28 PROCEDURE — 97110 THERAPEUTIC EXERCISES: CPT

## 2023-02-28 PROCEDURE — 97140 MANUAL THERAPY 1/> REGIONS: CPT

## 2023-03-02 ENCOUNTER — APPOINTMENT (OUTPATIENT)
Dept: PHYSICAL THERAPY | Facility: HOSPITAL | Age: 63
End: 2023-03-02
Attending: NEUROLOGICAL SURGERY
Payer: MEDICAID

## 2023-03-03 ENCOUNTER — OFFICE VISIT (OUTPATIENT)
Dept: PHYSICAL THERAPY | Facility: HOSPITAL | Age: 63
End: 2023-03-03
Attending: NEUROLOGICAL SURGERY
Payer: MEDICAID

## 2023-03-03 PROCEDURE — 97110 THERAPEUTIC EXERCISES: CPT

## 2023-03-03 PROCEDURE — 97140 MANUAL THERAPY 1/> REGIONS: CPT

## 2023-03-05 DIAGNOSIS — E11.9 TYPE 2 DIABETES MELLITUS WITHOUT COMPLICATION, WITHOUT LONG-TERM CURRENT USE OF INSULIN (HCC): Primary | ICD-10-CM

## 2023-03-06 ENCOUNTER — OFFICE VISIT (OUTPATIENT)
Dept: PHYSICAL THERAPY | Facility: HOSPITAL | Age: 63
End: 2023-03-06
Attending: NEUROLOGICAL SURGERY
Payer: MEDICAID

## 2023-03-06 PROCEDURE — 97110 THERAPEUTIC EXERCISES: CPT

## 2023-03-06 PROCEDURE — 97140 MANUAL THERAPY 1/> REGIONS: CPT

## 2023-03-06 RX ORDER — INSULIN GLARGINE 100 [IU]/ML
INJECTION, SOLUTION SUBCUTANEOUS
Qty: 15 ML | Refills: 2 | Status: CANCELLED | OUTPATIENT
Start: 2023-03-06

## 2023-03-06 NOTE — TELEPHONE ENCOUNTER
Please review. Protocol failed / No protocol.   Requested Prescriptions   Pending Prescriptions Disp Refills    LANTUS SOLOSTAR 100 UNIT/ML Subcutaneous Solution Pen-injector 15 mL 2     Sig: INJECT 32 UNITS SUBCUTANEOUSLY AT NIGHT       Diabetes Medication Protocol Failed - 3/6/2023  1:33 PM        Failed - Last A1C < 7.5 and within past 6 months     Lab Results   Component Value Date    A1C 6.3 (H) 07/16/2022             Passed - In person appointment or virtual visit in the past 6 mos or appointment in next 3 mos     Recent Outpatient Visits              Today     [facility] [address], [place]    Office Visit    3 days ago     [facility], [facility]    Office Visit    6 days ago     [facility] [address], [address] Visit    1 week ago Cervical myelopathy [facility])    [address] [place] Taryn Ramirez, PT    Office Visit    3 weeks ago Elevated serum immunoglobulin free light chain level    [facility] Hematology Oncology Rajendra Garces MD    Office Visit          Future Appointments         Provider Department Appt Notes    In 2 days Taryn Ramirez, [address] 6  24 UNITS  02/22/23 - 04/23/23    In 1 week Taryn Ramirez [address] [facility] visit--send to [facility] GLORIA APPR 6  24 UNITS  02/22/23 - 04/3/23    In 1 week Taryn Ramirez, [address] auth???; [facility]    In 1 month Lester Jaramillo MD [facility], [facility] (3) month f/u    In 5 months [facility] [address] Hematology Oncology 6 month follow up               Passed [facility] or GFRNAA > 50     GFR Evaluation  EGFRCR: 99 , resulted on 10/6/2022          Passed - GFR in the past 12 months

## 2023-03-06 NOTE — TELEPHONE ENCOUNTER
Contact patient to schedule diabetes follow-up. Okay to give Megan Tucker. My schedule within the next month. Send back for refill if needed prior to visit.

## 2023-03-07 NOTE — PROGRESS NOTES
DISCHARGE SUMMARY    DX: Primary osteoarthritis of left knee (M17.12)  Authorized # of Visits: 8/8         Next MD visit:  (To schedule)   Fall Risk: standard         Precautions: n/a             Subjective: Pt reports 90% improvement.  Pt reports greatest increase in L knee extension ROM necessary for proper gait mechanics, including terminal knee extension at heel strike-MET    4) The patient will demonstrate a 5x sit<>stand test within normal for age/gender without UE assist necessary to ease transitions Tarsorrhaphy Text: A tarsorrhaphy was performed using Frost sutures.

## 2023-03-08 ENCOUNTER — OFFICE VISIT (OUTPATIENT)
Dept: PHYSICAL THERAPY | Facility: HOSPITAL | Age: 63
End: 2023-03-08
Attending: NEUROLOGICAL SURGERY
Payer: MEDICAID

## 2023-03-08 PROCEDURE — 97140 MANUAL THERAPY 1/> REGIONS: CPT

## 2023-03-08 PROCEDURE — 97110 THERAPEUTIC EXERCISES: CPT

## 2023-03-08 NOTE — TELEPHONE ENCOUNTER
Patient scheduled appointment for 3/17. Patient will have enough medication to last until her appointment. Patient is requesting orders to complete any blood work needed before her appointment.

## 2023-03-09 RX ORDER — INSULIN GLARGINE 100 [IU]/ML
INJECTION, SOLUTION SUBCUTANEOUS
Qty: 15 ML | Refills: 0 | OUTPATIENT
Start: 2023-03-09

## 2023-03-09 NOTE — TELEPHONE ENCOUNTER
Last CBC, CMP done by Dr. Ancelmo Hill in [address] [place] [address] done in July 2022    Please advise on what labs are needed. Patient would like to get them done prior to the visit on 3/17/23.     Patient will need to be educated to be sure only the labs ordered by Dr. Yann Mar are done now, she has orders due for Dr. Ancelmo Hill in August.

## 2023-03-09 NOTE — TELEPHONE ENCOUNTER
Orders entered, please let patient know information below and that we will need urine sample at the time of labs.

## 2023-03-13 ENCOUNTER — TELEPHONE (OUTPATIENT)
Dept: PHYSICAL THERAPY | Facility: HOSPITAL | Age: 63
End: 2023-03-13

## 2023-03-13 ENCOUNTER — APPOINTMENT (OUTPATIENT)
Dept: PHYSICAL THERAPY | Facility: HOSPITAL | Age: 63
End: 2023-03-13
Attending: NEUROLOGICAL SURGERY
Payer: MEDICAID

## 2023-03-14 ENCOUNTER — OFFICE VISIT (OUTPATIENT)
Dept: PHYSICAL THERAPY | Facility: HOSPITAL | Age: 63
End: 2023-03-14
Attending: NEUROLOGICAL SURGERY
Payer: MEDICAID

## 2023-03-14 PROCEDURE — 97110 THERAPEUTIC EXERCISES: CPT

## 2023-03-15 ENCOUNTER — APPOINTMENT (OUTPATIENT)
Dept: PHYSICAL THERAPY | Facility: HOSPITAL | Age: 63
End: 2023-03-15
Attending: NEUROLOGICAL SURGERY
Payer: MEDICAID

## 2023-03-15 ENCOUNTER — TELEPHONE (OUTPATIENT)
Dept: PHYSICAL THERAPY | Facility: HOSPITAL | Age: 63
End: 2023-03-15

## 2023-03-17 ENCOUNTER — OFFICE VISIT (OUTPATIENT)
Dept: FAMILY MEDICINE CLINIC | Facility: CLINIC | Age: 63
End: 2023-03-17

## 2023-03-17 ENCOUNTER — TELEPHONE (OUTPATIENT)
Dept: FAMILY MEDICINE CLINIC | Facility: CLINIC | Age: 63
End: 2023-03-17

## 2023-03-17 VITALS
SYSTOLIC BLOOD PRESSURE: 131 MMHG | TEMPERATURE: 98 F | DIASTOLIC BLOOD PRESSURE: 63 MMHG | HEART RATE: 68 BPM | BODY MASS INDEX: 53 KG/M2 | WEIGHT: 293 LBS

## 2023-03-17 DIAGNOSIS — E11.9 TYPE 2 DIABETES MELLITUS WITHOUT COMPLICATION, WITH LONG-TERM CURRENT USE OF INSULIN (HCC): Primary | ICD-10-CM

## 2023-03-17 DIAGNOSIS — M25.561 CHRONIC PAIN OF RIGHT KNEE: ICD-10-CM

## 2023-03-17 DIAGNOSIS — Z79.4 TYPE 2 DIABETES MELLITUS WITHOUT COMPLICATION, WITH LONG-TERM CURRENT USE OF INSULIN (HCC): Primary | ICD-10-CM

## 2023-03-17 DIAGNOSIS — E03.8 SUBCLINICAL HYPOTHYROIDISM: ICD-10-CM

## 2023-03-17 DIAGNOSIS — E66.01 MORBID (SEVERE) OBESITY DUE TO EXCESS CALORIES (HCC): ICD-10-CM

## 2023-03-17 DIAGNOSIS — I10 PRIMARY HYPERTENSION: ICD-10-CM

## 2023-03-17 DIAGNOSIS — Z12.31 BREAST CANCER SCREENING BY MAMMOGRAM: ICD-10-CM

## 2023-03-17 DIAGNOSIS — M79.602 LEFT ARM PAIN: ICD-10-CM

## 2023-03-17 DIAGNOSIS — E11.9 TYPE 2 DIABETES MELLITUS WITHOUT COMPLICATION, WITHOUT LONG-TERM CURRENT USE OF INSULIN (HCC): ICD-10-CM

## 2023-03-17 DIAGNOSIS — G89.29 CHRONIC PAIN OF RIGHT KNEE: ICD-10-CM

## 2023-03-17 LAB
CARTRIDGE LOT#: 30 NUMERIC
CREAT UR-SCNC: 172 MG/DL
HEMOGLOBIN A1C: 6.2 % (ref 4.3–5.6)
MICROALBUMIN UR-MCNC: 0.68 MG/DL
MICROALBUMIN/CREAT 24H UR-RTO: 4 UG/MG (ref ?–30)

## 2023-03-17 PROCEDURE — 3075F SYST BP GE 130 - 139MM HG: CPT | Performed by: FAMILY MEDICINE

## 2023-03-17 PROCEDURE — 3061F NEG MICROALBUMINURIA REV: CPT | Performed by: FAMILY MEDICINE

## 2023-03-17 PROCEDURE — 3078F DIAST BP <80 MM HG: CPT | Performed by: FAMILY MEDICINE

## 2023-03-17 PROCEDURE — 83036 HEMOGLOBIN GLYCOSYLATED A1C: CPT | Performed by: FAMILY MEDICINE

## 2023-03-17 PROCEDURE — 99214 OFFICE O/P EST MOD 30 MIN: CPT | Performed by: FAMILY MEDICINE

## 2023-03-17 PROCEDURE — 3044F HG A1C LEVEL LT 7.0%: CPT | Performed by: FAMILY MEDICINE

## 2023-03-17 RX ORDER — INSULIN GLARGINE 100 [IU]/ML
INJECTION, SOLUTION SUBCUTANEOUS
Qty: 15 ML | Refills: 2 | Status: SHIPPED | OUTPATIENT
Start: 2023-03-17

## 2023-03-19 ENCOUNTER — PATIENT MESSAGE (OUTPATIENT)
Dept: FAMILY MEDICINE CLINIC | Facility: CLINIC | Age: 63
End: 2023-03-19

## 2023-03-20 NOTE — TELEPHONE ENCOUNTER
From: Edgardo Bhardwaj  To: Niki Vanessa MD  Sent: 3/19/2023 12:10 PM CDT  Subject: Dilated Retinal Eye Exam    Hi Dr. Fabiola Vanessa, I had my eye exam on Jan. 5, 2023. It was at [address] [place] [address]. [facility] [address] phone # [phone], fax# [phone]. Email [email or web address] I am giving you this info so you can contact him for eye exam results.  Thanks, Cristina Burton

## 2023-04-03 RX ORDER — LISINOPRIL AND HYDROCHLOROTHIAZIDE 12.5; 1 MG/1; MG/1
1 TABLET ORAL
Qty: 90 TABLET | Refills: 3 | Status: SHIPPED | OUTPATIENT
Start: 2023-04-03

## 2023-04-03 RX ORDER — GLIMEPIRIDE 2 MG/1
2 TABLET ORAL
Qty: 90 TABLET | Refills: 3 | Status: SHIPPED | OUTPATIENT
Start: 2023-04-03

## 2023-04-24 ENCOUNTER — HOSPITAL ENCOUNTER (OUTPATIENT)
Dept: MAMMOGRAPHY | Facility: HOSPITAL | Age: 63
Discharge: HOME | End: 2023-04-24
Attending: FAMILY MEDICINE
Payer: MEDICAID

## 2023-04-24 DIAGNOSIS — Z12.31 BREAST CANCER SCREENING BY MAMMOGRAM: ICD-10-CM

## 2023-04-24 PROCEDURE — 77067 SCR MAMMO BI INCL CAD: CPT | Performed by: FAMILY MEDICINE

## 2023-04-24 PROCEDURE — 77063 BREAST TOMOSYNTHESIS BI: CPT | Performed by: FAMILY MEDICINE

## 2023-04-27 ENCOUNTER — HOSPITAL ENCOUNTER (OUTPATIENT)
Dept: GENERAL RADIOLOGY | Facility: HOSPITAL | Age: 63
Discharge: HOME | End: 2023-04-27
Attending: NEUROLOGICAL SURGERY
Payer: MEDICAID

## 2023-04-27 ENCOUNTER — LAB ENCOUNTER (OUTPATIENT)
Dept: LAB | Facility: HOSPITAL | Age: 63
End: 2023-04-27
Attending: FAMILY MEDICINE
Payer: MEDICAID

## 2023-04-27 DIAGNOSIS — G95.9 CERVICAL MYELOPATHY (HCC): ICD-10-CM

## 2023-04-27 DIAGNOSIS — E03.8 SUBCLINICAL HYPOTHYROIDISM: ICD-10-CM

## 2023-04-27 DIAGNOSIS — E11.9 TYPE 2 DIABETES MELLITUS WITHOUT COMPLICATION, WITHOUT LONG-TERM CURRENT USE OF INSULIN (HCC): ICD-10-CM

## 2023-04-27 LAB
CHOLEST SERPL-MCNC: 133 MG/DL (ref ?–200)
EST. AVERAGE GLUCOSE BLD GHB EST-MCNC: 143 MG/DL (ref 68–126)
FASTING PATIENT LIPID ANSWER: NO
HBA1C MFR BLD: 6.6 % (ref ?–5.7)
HDLC SERPL-MCNC: 62 MG/DL (ref 40–59)
LDLC SERPL CALC-MCNC: 53 MG/DL (ref ?–100)
NONHDLC SERPL-MCNC: 71 MG/DL (ref ?–130)
TRIGL SERPL-MCNC: 97 MG/DL (ref 30–149)
TSI SER-ACNC: 3.49 MIU/ML (ref 0.36–3.74)
VLDLC SERPL CALC-MCNC: 14 MG/DL (ref 0–30)

## 2023-04-27 PROCEDURE — 3044F HG A1C LEVEL LT 7.0%: CPT | Performed by: FAMILY MEDICINE

## 2023-04-27 PROCEDURE — 84443 ASSAY THYROID STIM HORMONE: CPT

## 2023-04-27 PROCEDURE — 83036 HEMOGLOBIN GLYCOSYLATED A1C: CPT

## 2023-04-27 PROCEDURE — 36415 COLL VENOUS BLD VENIPUNCTURE: CPT

## 2023-04-27 PROCEDURE — 72040 X-RAY EXAM NECK SPINE 2-3 VW: CPT | Performed by: NEUROLOGICAL SURGERY

## 2023-04-27 PROCEDURE — 80061 LIPID PANEL: CPT

## 2023-04-28 ENCOUNTER — PATIENT MESSAGE (OUTPATIENT)
Dept: FAMILY MEDICINE CLINIC | Facility: CLINIC | Age: 63
End: 2023-04-28

## 2023-04-28 NOTE — TELEPHONE ENCOUNTER
From: Nisha Singh  To: Niki Chang MD  Sent: 2023 3:19 PM CDT  Subject: Family History    Here is what I found out about my family. Grandma dad's side. She  of hepatic failure,auto immune hepatitis,diabetes type 2, and renal failure.@ age 68. My dad  of cardio pulmonary failure, metastatic brain cancer, and lung cancer@ Age 62. Frederrey Gautam dad's side, complete heart failure, endocarditis and terrible enlargement of the heart@ Age 22. you asked me about my families medical info, so I am sending it to you.  Thanks, Hayde Oquendo

## 2023-05-03 ENCOUNTER — OFFICE VISIT (OUTPATIENT)
Dept: SURGERY | Facility: CLINIC | Age: 63
End: 2023-05-03
Payer: MEDICAID

## 2023-05-03 VITALS
WEIGHT: 293 LBS | SYSTOLIC BLOOD PRESSURE: 120 MMHG | HEIGHT: 65.5 IN | BODY MASS INDEX: 48.23 KG/M2 | DIASTOLIC BLOOD PRESSURE: 80 MMHG

## 2023-05-03 DIAGNOSIS — G95.9 CERVICAL MYELOPATHY (HCC): ICD-10-CM

## 2023-05-03 DIAGNOSIS — M19.039 WRIST ARTHRITIS: Primary | ICD-10-CM

## 2023-05-03 PROCEDURE — 3074F SYST BP LT 130 MM HG: CPT | Performed by: NEUROLOGICAL SURGERY

## 2023-05-03 PROCEDURE — 3008F BODY MASS INDEX DOCD: CPT | Performed by: NEUROLOGICAL SURGERY

## 2023-05-03 PROCEDURE — 3079F DIAST BP 80-89 MM HG: CPT | Performed by: NEUROLOGICAL SURGERY

## 2023-05-03 PROCEDURE — 99213 OFFICE O/P EST LOW 20 MIN: CPT | Performed by: NEUROLOGICAL SURGERY

## 2023-05-03 NOTE — PROGRESS NOTES
Pt here for follow up. Pt also had X-rays 4/27/23. Pt states she is having back pain. Pt has pain down left shoulder blade down to left arm.

## 2023-05-04 ENCOUNTER — PATIENT MESSAGE (OUTPATIENT)
Dept: SURGERY | Facility: CLINIC | Age: 63
End: 2023-05-04

## 2023-05-04 ENCOUNTER — TELEPHONE (OUTPATIENT)
Dept: NEUROLOGY | Facility: CLINIC | Age: 63
End: 2023-05-04

## 2023-05-04 DIAGNOSIS — M79.642 LEFT HAND PAIN: Primary | ICD-10-CM

## 2023-05-04 NOTE — TELEPHONE ENCOUNTER
Noted the providers feedback listed below. Called pt to inform of the providers recommendations listed below. Pt states that the plastic surgeon is requesting neurosurgery to review recent imaging and determine if she has arthritis in her hand. Informed patient that she is being recommended to follow up with Dr. Jose Medina MD as recommended by [address] provider listed below. Pt is requesting to have an referral ordered for her to return to Dr. Eri Mosqueda. Informed patient that her requests will be endorsed to the providers for review and feedback. Pt acknowledged.      Call was ended     Routed to the providers for review and feedback

## 2023-05-04 NOTE — TELEPHONE ENCOUNTER
Pt states called referral for plastic surgeon and was told they do not take arthritis cases. Please call pt to provide add'l referral.

## 2023-05-04 NOTE — TELEPHONE ENCOUNTER
From: Gemma Blackmon  To: Tawny Jeong MD  Sent: 5/4/2023 11:30 AM CDT  Subject: Plastic Surgery Referral    Hi , yesterday you gave me a referral to see Dr Sarah Ritter. I tried to make a appointment, and he does not handle any orders for wrist arthritis. He asked that you recommended someone who works in that field of arthritis.  Thanks, Ben Brantley

## 2023-05-04 NOTE — TELEPHONE ENCOUNTER
The patient has seen Dr. Estrellita Lowe MD with ortho. He specializes in hand/upper extremity surgery and orthopaedic surgery. I'd recommend she follow up with him.

## 2023-05-11 ENCOUNTER — OFFICE VISIT (OUTPATIENT)
Dept: FAMILY MEDICINE CLINIC | Facility: CLINIC | Age: 63
End: 2023-05-11

## 2023-05-11 ENCOUNTER — PATIENT MESSAGE (OUTPATIENT)
Dept: SURGERY | Facility: CLINIC | Age: 63
End: 2023-05-11

## 2023-05-11 VITALS
WEIGHT: 293 LBS | SYSTOLIC BLOOD PRESSURE: 126 MMHG | TEMPERATURE: 98 F | HEART RATE: 71 BPM | DIASTOLIC BLOOD PRESSURE: 79 MMHG | BODY MASS INDEX: 54 KG/M2

## 2023-05-11 DIAGNOSIS — M25.561 CHRONIC PAIN OF RIGHT KNEE: ICD-10-CM

## 2023-05-11 DIAGNOSIS — S39.012A STRAIN OF LUMBAR REGION, INITIAL ENCOUNTER: Primary | ICD-10-CM

## 2023-05-11 DIAGNOSIS — G89.29 CHRONIC PAIN OF RIGHT KNEE: ICD-10-CM

## 2023-05-11 PROCEDURE — 3074F SYST BP LT 130 MM HG: CPT | Performed by: FAMILY MEDICINE

## 2023-05-11 PROCEDURE — 99213 OFFICE O/P EST LOW 20 MIN: CPT | Performed by: FAMILY MEDICINE

## 2023-05-11 PROCEDURE — 3078F DIAST BP <80 MM HG: CPT | Performed by: FAMILY MEDICINE

## 2023-05-11 RX ORDER — SENNOSIDES 8.6 MG
1300 CAPSULE ORAL 2 TIMES DAILY
Qty: 60 TABLET | Refills: 5 | Status: SHIPPED | OUTPATIENT
Start: 2023-05-11

## 2023-05-11 RX ORDER — CYCLOBENZAPRINE HCL 10 MG
10 TABLET ORAL 3 TIMES DAILY PRN
Qty: 30 TABLET | Refills: 0 | Status: SHIPPED | OUTPATIENT
Start: 2023-05-11

## 2023-05-11 NOTE — TELEPHONE ENCOUNTER
From: Zeferino Staley  To: Scott Mcdermott MD  Sent: 5/11/2023 1:44 PM CDT  Subject: Referral    Hi Dr. Cesar Jimenez I need a referral to see Dr Josué Lind again.  Referrals are only good for 3 months, I saw him in Jan.   Thanks Denisa Goins

## 2023-05-12 ENCOUNTER — TELEPHONE (OUTPATIENT)
Dept: ORTHOPEDICS CLINIC | Facility: CLINIC | Age: 63
End: 2023-05-12

## 2023-05-12 DIAGNOSIS — M79.642 LEFT HAND PAIN: Primary | ICD-10-CM

## 2023-05-12 NOTE — TELEPHONE ENCOUNTER
Patient is scheduled with Dr. Ravinder Mcguire for left hand pain. Please advise if imaging is needed.

## 2023-05-23 ENCOUNTER — OFFICE VISIT (OUTPATIENT)
Dept: ORTHOPEDICS CLINIC | Facility: CLINIC | Age: 63
End: 2023-05-23
Payer: MEDICAID

## 2023-05-23 ENCOUNTER — OFFICE VISIT (OUTPATIENT)
Dept: ORTHOPEDICS CLINIC | Facility: CLINIC | Age: 63
End: 2023-05-23

## 2023-05-23 ENCOUNTER — HOSPITAL ENCOUNTER (OUTPATIENT)
Dept: GENERAL RADIOLOGY | Age: 63
Discharge: HOME | End: 2023-05-23
Attending: ORTHOPAEDIC SURGERY
Payer: MEDICAID

## 2023-05-23 ENCOUNTER — HOSPITAL ENCOUNTER (OUTPATIENT)
Dept: GENERAL RADIOLOGY | Facility: HOSPITAL | Age: 63
Discharge: HOME | End: 2023-05-23
Attending: ORTHOPAEDIC SURGERY
Payer: MEDICAID

## 2023-05-23 VITALS — BODY MASS INDEX: 47.09 KG/M2 | HEIGHT: 66 IN | WEIGHT: 293 LBS

## 2023-05-23 VITALS
DIASTOLIC BLOOD PRESSURE: 87 MMHG | HEIGHT: 66 IN | BODY MASS INDEX: 47.09 KG/M2 | WEIGHT: 293 LBS | SYSTOLIC BLOOD PRESSURE: 151 MMHG | HEART RATE: 90 BPM

## 2023-05-23 DIAGNOSIS — M17.11 PRIMARY OSTEOARTHRITIS OF RIGHT KNEE: Primary | ICD-10-CM

## 2023-05-23 DIAGNOSIS — E66.01 CLASS 3 SEVERE OBESITY DUE TO EXCESS CALORIES WITH SERIOUS COMORBIDITY AND BODY MASS INDEX (BMI) OF 50.0 TO 59.9 IN ADULT (HCC): ICD-10-CM

## 2023-05-23 DIAGNOSIS — M25.561 RIGHT KNEE PAIN, UNSPECIFIED CHRONICITY: ICD-10-CM

## 2023-05-23 DIAGNOSIS — M50.90 CERVICAL DISC DISEASE: Primary | ICD-10-CM

## 2023-05-23 DIAGNOSIS — M79.642 LEFT HAND PAIN: ICD-10-CM

## 2023-05-23 PROCEDURE — 20610 DRAIN/INJ JOINT/BURSA W/O US: CPT | Performed by: ORTHOPAEDIC SURGERY

## 2023-05-23 PROCEDURE — 3077F SYST BP >= 140 MM HG: CPT | Performed by: ORTHOPAEDIC SURGERY

## 2023-05-23 PROCEDURE — 73130 X-RAY EXAM OF HAND: CPT | Performed by: ORTHOPAEDIC SURGERY

## 2023-05-23 PROCEDURE — 3008F BODY MASS INDEX DOCD: CPT | Performed by: ORTHOPAEDIC SURGERY

## 2023-05-23 PROCEDURE — 73562 X-RAY EXAM OF KNEE 3: CPT | Performed by: ORTHOPAEDIC SURGERY

## 2023-05-23 PROCEDURE — 99213 OFFICE O/P EST LOW 20 MIN: CPT | Performed by: ORTHOPAEDIC SURGERY

## 2023-05-23 PROCEDURE — 3079F DIAST BP 80-89 MM HG: CPT | Performed by: ORTHOPAEDIC SURGERY

## 2023-05-23 PROCEDURE — 99244 OFF/OP CNSLTJ NEW/EST MOD 40: CPT | Performed by: ORTHOPAEDIC SURGERY

## 2023-05-23 RX ORDER — TRIAMCINOLONE ACETONIDE 40 MG/ML
40 INJECTION, SUSPENSION INTRA-ARTICULAR; INTRAMUSCULAR ONCE
Status: COMPLETED | OUTPATIENT
Start: 2023-05-23 | End: 2023-05-23

## 2023-05-23 RX ADMIN — TRIAMCINOLONE ACETONIDE 40 MG: 40 INJECTION, SUSPENSION INTRA-ARTICULAR; INTRAMUSCULAR at 16:38:00

## 2023-05-23 NOTE — PROGRESS NOTES
Per verbal order from AUGIE Lentz, draw up 5ml of 0.5% Marcaine and 1ml of Kenalog 40 for cortisone injection to right knee Jenny Linares  Patient provided education handout for cortisone injection.

## 2023-05-24 ENCOUNTER — PATIENT MESSAGE (OUTPATIENT)
Dept: FAMILY MEDICINE CLINIC | Facility: CLINIC | Age: 63
End: 2023-05-24

## 2023-05-24 DIAGNOSIS — E66.01 MORBID OBESITY (HCC): Primary | ICD-10-CM

## 2023-05-31 RX ORDER — ATORVASTATIN CALCIUM 10 MG/1
10 TABLET, FILM COATED ORAL NIGHTLY
Qty: 90 TABLET | Refills: 3 | Status: SHIPPED | OUTPATIENT
Start: 2023-05-31

## 2023-06-01 NOTE — TELEPHONE ENCOUNTER
Refill passed per [place] [employer], [facility] protocol. Requested Prescriptions   Pending Prescriptions Disp Refills    ATORVASTATIN 10 MG Oral Tab [Pharmacy Med Name: Atorvastatin Calcium 10 MG Oral Tablet] 90 tablet 0     Sig: Take 1 tablet by mouth nightly       Cholesterol Medication Protocol Passed - 5/31/2023 12:47 AM        Passed - ALT in past 12 months        Passed - LDL in past 12 months        Passed - Last ALT < 80     Lab Results   Component Value Date    ALT 21 10/06/2022             Passed - Last LDL < 130     Lab Results   Component Value Date    LDL 53 04/27/2023               Passed - In person appointment or virtual visit in the past 12 mos or appointment in next 3 mos     Recent Outpatient Visits              1 week ago     [facility] [place], Ange Sorto MD    Office Visit    1 week ago Primary osteoarthritis of right knee    [address] [place], [address], Yonny Ham MD    Office Visit    2 weeks ago [employer] of lumbar region, initial encounter    Deepthi Schaefer MD    Office Visit    4 weeks ago Wrist arthritis    Tre Kumar, Conor Hunter, Brian Weinstein MD    Office Visit    2 months ago Type 2 diabetes mellitus without complication, with long-term current use of insulin [facility])    [address], Faizan Pittman MD    Office Visit          Future Appointments         Provider Department Appt Notes    In 3 weeks Sherly Peters, PT [address] FHP    In 1 month Sherly Peters, [address]?   BCBS FHP    In 1 month Sherly Peters,  [facility] MEDICAID/BLUE CROSS [id number]    In 1 month Sherly Peters,  [facility] MEDICAID/BLUE [address] [place] [address] PLAN    In 1 month Sherly Peters,  [address] MEDICAID/BLUE CROSS [address]    In 1 month Sherly Peters, [address] MEDICAID/BLUE [address]    In 2 months [address] Hematology Oncology 6 month follow up    In 5 months Yamileth Caballero MD [address], [address], [facility] CHP  [address] [place] [address]                  Recent Outpatient Visits              1 week ago     [address], [facility]    Office Visit    1 week ago Primary osteoarthritis of right knee    Nikky Coffey, [place] MD    Office Visit    2 weeks ago [employer] of lumbar region, initial encounter    Moise Esparza MD    Office Visit    4 weeks ago Wrist arthritis    Shon Sherman, Kali Cordoba MD    Office Visit    2 months ago Type 2 diabetes mellitus without complication, with long-term current use of insulin [facility])    Nikky Coffey, Veronica Rodrigez MD    Office Visit          Future Appointments         Provider Department Appt Notes    In 3 weeks Sherly Peters, PT [address] FHP    In 1 month Sherly Peters, [address]?   BCBS FHP    In 1 month Sherly Peters,  [address] MEDICAID/BLUE CROSS [id number]    In 1 month Sherly Peters,  [facility] CROSS MEDICAID/BLUE CROSS [address]    In 1 month Sherly Peters,  [facility] MEDICAID/BLUE CROSS [address]    In 1 month Sherly Peters,  [address] MEDICAID/BLUE [address]    In 2 months Adamaris Alcantar MD [facility] Hematology Oncology 6 month follow up    In 5 months Vimal Hammer MD [address], [address], [facility] CHP  [address]

## 2023-06-08 ENCOUNTER — TELEPHONE (OUTPATIENT)
Dept: PHYSICAL THERAPY | Facility: HOSPITAL | Age: 63
End: 2023-06-08

## 2023-06-18 ENCOUNTER — HOSPITAL ENCOUNTER (OUTPATIENT)
Age: 63
Discharge: HOME | End: 2023-06-18
Payer: MEDICAID

## 2023-06-18 VITALS
HEART RATE: 80 BPM | RESPIRATION RATE: 18 BRPM | DIASTOLIC BLOOD PRESSURE: 82 MMHG | TEMPERATURE: 98 F | SYSTOLIC BLOOD PRESSURE: 145 MMHG | OXYGEN SATURATION: 97 %

## 2023-06-18 DIAGNOSIS — J01.00 ACUTE NON-RECURRENT MAXILLARY SINUSITIS: Primary | ICD-10-CM

## 2023-06-18 PROCEDURE — 99213 OFFICE O/P EST LOW 20 MIN: CPT

## 2023-06-18 RX ORDER — AMOXICILLIN 875 MG/1
875 TABLET, COATED ORAL 2 TIMES DAILY
Qty: 20 TABLET | Refills: 0 | Status: SHIPPED | OUTPATIENT
Start: 2023-06-18 | End: 2023-06-28

## 2023-06-18 NOTE — DISCHARGE INSTRUCTIONS
Take amoxicillin twice a day until gone. Use over-the-counter Flonase. Warm compresses to the face. Warm humidified air. Increase fluid intake.   Follow-up with your primary doctor if no improvement

## 2023-06-18 NOTE — ED INITIAL ASSESSMENT (HPI)
Pt here with complains of sinus pressure sore throat and runny nose since June 9th, reports negative covid test at home.

## 2023-06-21 ENCOUNTER — APPOINTMENT (OUTPATIENT)
Dept: PHYSICAL THERAPY | Facility: HOSPITAL | Age: 63
End: 2023-06-21
Attending: FAMILY MEDICINE
Payer: MEDICAID

## 2023-06-21 ENCOUNTER — TELEPHONE (OUTPATIENT)
Dept: PHYSICAL THERAPY | Facility: HOSPITAL | Age: 63
End: 2023-06-21

## 2023-06-30 ENCOUNTER — OFFICE VISIT (OUTPATIENT)
Dept: PHYSICAL THERAPY | Facility: HOSPITAL | Age: 63
End: 2023-06-30
Attending: FAMILY MEDICINE
Payer: MEDICAID

## 2023-06-30 DIAGNOSIS — G89.29 CHRONIC PAIN OF RIGHT KNEE: ICD-10-CM

## 2023-06-30 DIAGNOSIS — M25.561 CHRONIC PAIN OF RIGHT KNEE: ICD-10-CM

## 2023-06-30 DIAGNOSIS — S39.012A STRAIN OF LUMBAR REGION, INITIAL ENCOUNTER: Primary | ICD-10-CM

## 2023-06-30 PROCEDURE — 97162 PT EVAL MOD COMPLEX 30 MIN: CPT

## 2023-07-03 ENCOUNTER — OFFICE VISIT (OUTPATIENT)
Dept: PHYSICAL THERAPY | Facility: HOSPITAL | Age: 63
End: 2023-07-03
Attending: FAMILY MEDICINE
Payer: MEDICAID

## 2023-07-03 PROCEDURE — 97110 THERAPEUTIC EXERCISES: CPT

## 2023-07-03 PROCEDURE — 97140 MANUAL THERAPY 1/> REGIONS: CPT

## 2023-07-05 ENCOUNTER — OFFICE VISIT (OUTPATIENT)
Dept: PHYSICAL THERAPY | Facility: HOSPITAL | Age: 63
End: 2023-07-05
Attending: FAMILY MEDICINE
Payer: MEDICAID

## 2023-07-05 PROCEDURE — 97110 THERAPEUTIC EXERCISES: CPT

## 2023-07-05 NOTE — PROGRESS NOTES
Diagnosis:   Strain of lumbar region, initial encounter (S39.012A)  Chronic pain of right knee (A62.341,E63.84)      Referring Provider: Naty Montes  Date of Evaluation:    6/30/2023     Precautions:   h/o cervical fusion Next MD visit:   none scheduled  Date of Surgery: n/a   Insurance Primary/Secondary: CLARISSE [employer] Drafts / N/A     # Auth Visits: 8 visits by 8/29/23            Subjective: Patient reports she was \"sore all over\" yesterday, thinks it is partly related to what we did on Monday. It was painful just to stand and walk. Today is feeling better but she is still in pain in both knees and her low back, along with her R shoulder. Pain: 7/10 (B knee pain)      Objective:     Initial Evaluation   Subjective *    P1 (L low back): sitting in tall chair, walking her dog, bending to pick things up from the ground, washing dishes     P2 (R knee): sitting too long, getting up from a chair, stepping up/down from the step outside     Objective *    Lumbar AROM    Flexion: WNL (decreased reversal of lumbar lordosis) *LBP    Extension: WNL (hinge around L4)    Sidebending: R WNL (R LBP); L WNL (R LBP)    Rotation: R WNL; L 30% limited *LBP    Quadrant: R 20% limited, L 20% limited    PAIVM: Hypermobile CPA L3. Hypomobile and painful L UPA L5-S1 with reproduction of LBP.  Locally painful R UPA L4-5 and L5-S1    Hip MMT    Extension: R 3-/5; L 3-/5    Abduction: R 3-/5; L 3-/5    Sit <> stand: significant weight shift to L LE    LE neuro screen: light touch sensation intact L2-S1; DTR 2+ L4, 1+ S1 B    MMT  Hip flexion: 3-/5 R, 3/5 L  Knee extension: 4/5 R*, 5/5 L  Ankle DF: 5/5 B  Ankle PF: 4/5 R, 5/5 L    Knee AROM:  R 16-85  L   Pain with OP into flexion + abduction B, flexion + adduction R    Knee accessory motion: hypomobile AP, medial glide on R, patellofemoral glides hypomobile on L>R (all planes), no pain reproduction on R    Step up (4 inch): heavy reliance on UE assist to ascend on the R LE Assessment: Deferred manual therapy only due to time constraints, prioritized adding cardiovascular exercise both for pain modulation and to improve overall activity tolerance. Modified quad strengthening for her HEP to seated rather than lying as patient reported LE cramping during/after SAQ performed at home. Goals:  (to be met in 12 visits)  Pt will demonstrate good understanding of proper posture and body mechanics to decrease pain and improve spinal safety   Pt will improve lumbar spine AROM rotation to WNL to allow increase ease with walking her dog  Pt will demonstrate improved core strength to be able to perform washing dishes with <5/10 pain   Pt will improve quad strength to 5/5 to ascend 1 flight of stairs reciprocally without UE assist   Pt will increase hip and knee strength to grossly 4/5 to be able to get up and down from the floor safely   Pt will be independent and compliant with comprehensive HEP to maintain progress achieved in PT        Plan: Progress knee ROM and strength, lower lumbar mobility. Date: 7/3/2023  TX#: 2/8 Date: 7/5/2023                 TX#: 3/8 Date:                 TX#: 4/ Date:                 TX#: 5/ Date:    Tx#: 6/   Therapeutic Exercise Objective testing  LTR to R x15  SAQ over HFR x10 R   HEP update NuStep L4 10 min  LAQ 2 x 15 R  LTR to R x20  HEP update      Neuromuscular Re-education        Therapeutic Activity        Manual Therapy L UPA L5-S1 Gr III  R tibiofemoral AP glide over HFR Gr III+       HEP: LTR to R, R SAQ    Charges: TE 3      Total Timed Treatment: TE 38 min  Total Treatment Time: 38 min

## 2023-07-10 ENCOUNTER — OFFICE VISIT (OUTPATIENT)
Dept: PHYSICAL THERAPY | Facility: HOSPITAL | Age: 63
End: 2023-07-10
Attending: FAMILY MEDICINE
Payer: MEDICAID

## 2023-07-10 PROCEDURE — 97110 THERAPEUTIC EXERCISES: CPT

## 2023-07-10 PROCEDURE — 97140 MANUAL THERAPY 1/> REGIONS: CPT

## 2023-07-10 NOTE — PROGRESS NOTES
Diagnosis:   Strain of lumbar region, initial encounter (S39.012A)  Chronic pain of right knee (M25.136,P86.94)      Referring Provider: Gertrude Yadav  Date of Evaluation:    6/30/2023     Precautions:   h/o cervical fusion Next MD visit:   none scheduled  Date of Surgery: n/a   Insurance Primary/Secondary: BLUE CROSS MEDICAID / N/A     # Auth Visits: 8 visits by 8/29/23            Subjective: Patient reports she was mostly sore in the L side of her low back last visit, her knees haven't been feeling too bad. Objective:     Initial Evaluation   Subjective *    P1 (L low back): sitting in tall chair, walking her dog, bending to pick things up from the ground, washing dishes     P2 (R knee): sitting too long, getting up from a chair, stepping up/down from the step outside     Objective *    Lumbar AROM    Flexion: WNL (decreased reversal of lumbar lordosis) *LBP    Extension: WNL (hinge around L4)    Sidebending: R WNL (R LBP); L WNL (R LBP)    Rotation: R WNL; L 30% limited *LBP    Quadrant: R 20% limited, L 20% limited    PAIVM: Hypermobile CPA L3. Hypomobile and painful L UPA L5-S1 with reproduction of LBP. Locally painful R UPA L4-5 and L5-S1    Hip MMT    Extension: R 3-/5; L 3-/5    Abduction: R 3-/5; L 3-/5    Sit <> stand: significant weight shift to L LE    LE neuro screen: light touch sensation intact L2-S1; DTR 2+ L4, 1+ S1 B    MMT  Hip flexion: 3-/5 R, 3/5 L  Knee extension: 4/5 R*, 5/5 L  Ankle DF: 5/5 B  Ankle PF: 4/5 R, 5/5 L    Knee AROM:  R 16-85  L   Pain with OP into flexion + abduction B, flexion + adduction R    Knee accessory motion: hypomobile AP, medial glide on R, patellofemoral glides hypomobile on L>R (all planes), no pain reproduction on R    Step up (4 inch): heavy reliance on UE assist to ascend on the R LE     R knee extension ROM -10 deg       Assessment: Patient is improving with R knee extension ROM, and was able to progress to added resistance for quad strengthening today.  Trialed tibiofemoral distraction with gapping of the lateral joint compartment for pain modulation. Goals:  (to be met in 12 visits)  Pt will demonstrate good understanding of proper posture and body mechanics to decrease pain and improve spinal safety   Pt will improve lumbar spine AROM rotation to WNL to allow increase ease with walking her dog  Pt will demonstrate improved core strength to be able to perform washing dishes with <5/10 pain   Pt will improve quad strength to 5/5 to ascend 1 flight of stairs reciprocally without UE assist   Pt will increase hip and knee strength to grossly 4/5 to be able to get up and down from the floor safely   Pt will be independent and compliant with comprehensive HEP to maintain progress achieved in PT        Plan: Initiate hip strengthening next visit and add to HEP. Resume manual therapy at the lumbar spine. Progress knee ROM and strength, lower lumbar mobility. Date: 7/3/2023  TX#: 2/8 Date: 7/5/2023   TX#: 3/8 Date: 7/10/2023   TX#: 4/8 Date:                 TX#: 5/ Date:    Tx#: 6/   Therapeutic Exercise Objective testing  LTR to R x15  SAQ over HFR x10 R   HEP update NuStep L4 10 min  LAQ 2 x 15 R  LTR to R x20  HEP update NuStep L4 10 min  LAQ 3 x 12 R 1#  HEP update     Neuromuscular Re-education        Therapeutic Activity        Manual Therapy L UPA L5-S1 Gr III  R tibiofemoral AP glide over HFR Gr III+  R knee distraction with tibial adduction Gr III+     HEP: LTR to R, R SAQ    Charges: TE 2, MT 1      Total Timed Treatment: MT 12 min, TE 28 min  Total Treatment Time: 40 min

## 2023-07-12 ENCOUNTER — APPOINTMENT (OUTPATIENT)
Dept: PHYSICAL THERAPY | Facility: HOSPITAL | Age: 63
End: 2023-07-12
Attending: FAMILY MEDICINE
Payer: MEDICAID

## 2023-07-13 ENCOUNTER — OFFICE VISIT (OUTPATIENT)
Dept: PHYSICAL THERAPY | Facility: HOSPITAL | Age: 63
End: 2023-07-13
Attending: FAMILY MEDICINE
Payer: MEDICAID

## 2023-07-13 PROCEDURE — 97110 THERAPEUTIC EXERCISES: CPT

## 2023-07-13 PROCEDURE — 97140 MANUAL THERAPY 1/> REGIONS: CPT

## 2023-07-13 NOTE — PROGRESS NOTES
Diagnosis:   Strain of lumbar region, initial encounter (S39.012A)  Chronic pain of right knee (S20.119,J35.14)      Referring Provider: Erika Mckeon  Date of Evaluation:    6/30/2023     Precautions:   h/o cervical fusion Next MD visit:   none scheduled  Date of Surgery: n/a   Insurance Primary/Secondary: BLUE CROSS MEDICAID / N/A     # Auth Visits: 8 visits by 8/29/23            Subjective: Patient reports she didn't notice much difference in knee pain after last visit. Is feeling sore all over today, mostly in her back. She thinks the last two days have been worse because of the weather. Objective:     Initial Evaluation   Subjective *    P1 (L low back): sitting in tall chair, walking her dog, bending to pick things up from the ground, washing dishes     P2 (R knee): sitting too long, getting up from a chair, stepping up/down from the step outside     Objective *    Lumbar AROM    Flexion: WNL (decreased reversal of lumbar lordosis) *LBP    Extension: WNL (hinge around L4)    Sidebending: R WNL (R LBP); L WNL (R LBP)    Rotation: R WNL; L 30% limited *LBP    Quadrant: R 20% limited, L 20% limited    PAIVM: Hypermobile CPA L3. Hypomobile and painful L UPA L5-S1 with reproduction of LBP.  Locally painful R UPA L4-5 and L5-S1    Hip MMT    Extension: R 3-/5; L 3-/5    Abduction: R 3-/5; L 3-/5    Sit <> stand: significant weight shift to L LE    LE neuro screen: light touch sensation intact L2-S1; DTR 2+ L4, 1+ S1 B    MMT  Hip flexion: 3-/5 R, 3/5 L  Knee extension: 4/5 R*, 5/5 L  Ankle DF: 5/5 B  Ankle PF: 4/5 R, 5/5 L    Knee AROM:  R 16-85  L   Pain with OP into flexion + abduction B, flexion + adduction R    Knee accessory motion: hypomobile AP, medial glide on R, patellofemoral glides hypomobile on L>R (all planes), no pain reproduction on R    Step up (4 inch): heavy reliance on UE assist to ascend on the R LE          Assessment: Patient with minimal response to tibiofemoral joint mobilization, resumed manual therapy at the spine to address ongoing low back pain along with modulation of central sensitization. Noted increasing step natacha on NuStep today, and able to initiate CKC LE strengthening without exacerbating knee pain. Goals:  (to be met in 12 visits)  Pt will demonstrate good understanding of proper posture and body mechanics to decrease pain and improve spinal safety   Pt will improve lumbar spine AROM rotation to WNL to allow increase ease with walking her dog  Pt will demonstrate improved core strength to be able to perform washing dishes with <5/10 pain   Pt will improve quad strength to 5/5 to ascend 1 flight of stairs reciprocally without UE assist   Pt will increase hip and knee strength to grossly 4/5 to be able to get up and down from the floor safely   Pt will be independent and compliant with comprehensive HEP to maintain progress achieved in PT        Plan: Initiate hip strengthening next visit and add to HEP. Progress knee ROM and strength, lower lumbar mobility. Date: 7/3/2023  TX#: 2/8 Date: 7/5/2023   TX#: 3/8 Date: 7/10/2023   TX#: 4/8 Date:  7/13/2023        TX#: 5/8 Date:    Tx#: 6/   Therapeutic Exercise Objective testing  LTR to R x15  SAQ over HFR x10 R   HEP update NuStep L4 10 min  LAQ 2 x 15 R  LTR to R x20  HEP update NuStep L4 10 min  LAQ 3 x 12 R 1#  HEP update NuStep L4 10 min  Shuttle double leg press 30# 2 x 20  HEP update: cardiovascular exercise x10 minutes daily    Neuromuscular Re-education        Therapeutic Activity        Manual Therapy L UPA L5-S1 Gr III  R tibiofemoral AP glide over HFR Gr III+  R knee distraction with tibial adduction Gr III+ CPA L5 Gr III-    HEP: LTR to R, R SAQ    Charges: TE 2, MT 1      Total Timed Treatment: MT 14 min, TE 26 min  Total Treatment Time: 40 min

## 2023-07-20 ENCOUNTER — OFFICE VISIT (OUTPATIENT)
Dept: PHYSICAL THERAPY | Facility: HOSPITAL | Age: 63
End: 2023-07-20
Attending: FAMILY MEDICINE
Payer: MEDICAID

## 2023-07-20 PROCEDURE — 97110 THERAPEUTIC EXERCISES: CPT

## 2023-07-20 PROCEDURE — 97140 MANUAL THERAPY 1/> REGIONS: CPT

## 2023-07-20 NOTE — PROGRESS NOTES
Diagnosis:   Strain of lumbar region, initial encounter (S39.012A)  Chronic pain of right knee (J53.107,M02.24)      Referring Provider: Ellie Sandifer  Date of Evaluation:    6/30/2023     Precautions:   h/o cervical fusion Next MD visit:   none scheduled  Date of Surgery: n/a   Insurance Primary/Secondary: BL[employer]OSS MEDICAID / N/A     # Auth Visits: 8 visits by 8/29/23            Subjective: Patient reports her L lower back really hasn't been bothering her, she hasn't had to take Tylenol the past few days. She has been able to walk her dog up to 3x/day which has been a goal for her, but she will still feel the pain in her back when washing dishes and sometimes when first getting up from a chair. Objective:     Initial Evaluation   Subjective *    P1 (L low back): sitting in tall chair, walking her dog, bending to pick things up from the ground, washing dishes     P2 (R knee): sitting too long, getting up from a chair, stepping up/down from the step outside     Objective *    Lumbar AROM    Flexion: WNL (decreased reversal of lumbar lordosis) *LBP    Extension: WNL (hinge around L4)    Sidebending: R WNL (R LBP); L WNL (R LBP)    Rotation: R WNL; L 30% limited *LBP    Quadrant: R 20% limited, L 20% limited    PAIVM: Hypermobile CPA L3. Hypomobile and painful L UPA L5-S1 with reproduction of LBP.  Locally painful R UPA L4-5 and L5-S1    Hip MMT    Extension: R 3-/5; L 3-/5    Abduction: R 3-/5; L 3-/5    Sit <> stand: significant weight shift to L LE    LE neuro screen: light touch sensation intact L2-S1; DTR 2+ L4, 1+ S1 B    MMT  Hip flexion: 3-/5 R, 3/5 L  Knee extension: 4/5 R*, 5/5 L  Ankle DF: 5/5 B  Ankle PF: 4/5 R, 5/5 L    Knee AROM:  R 16-85  L   Pain with OP into flexion + abduction B, flexion + adduction R    Knee accessory motion: hypomobile AP, medial glide on R, patellofemoral glides hypomobile on L>R (all planes), no pain reproduction on R    Step up (4 inch): heavy reliance on UE assist to ascend on the R LE     R UPA L2-3 hypomobile with pain reproduction      Assessment: Patient with decreasing symptom irritability in the low back, and pain reports today were reproduced in region of upper lumbar stiffness. Continue to note improving overall activity tolerance, and patient reports compliance with aerobic exercise at home. Goals:  (to be met in 12 visits)  Pt will demonstrate good understanding of proper posture and body mechanics to decrease pain and improve spinal safety   Pt will improve lumbar spine AROM rotation to WNL to allow increase ease with walking her dog  Pt will demonstrate improved core strength to be able to perform washing dishes with <5/10 pain   Pt will improve quad strength to 5/5 to ascend 1 flight of stairs reciprocally without UE assist   Pt will increase hip and knee strength to grossly 4/5 to be able to get up and down from the floor safely   Pt will be independent and compliant with comprehensive HEP to maintain progress achieved in PT        Plan: Continue core stabilization, LE strengthening. Progress knee ROM and strength, lower lumbar mobility.    Date: 7/3/2023  TX#: 2/8 Date: 7/5/2023   TX#: 3/8 Date: 7/10/2023   TX#: 4/8 Date:  7/13/2023        TX#: 5/8 Date: 7/20/2023   Tx#: 6/8   Therapeutic Exercise Objective testing  LTR to R x15  SAQ over HFR x10 R   HEP update NuStep L4 10 min  LAQ 2 x 15 R  LTR to R x20  HEP update NuStep L4 10 min  LAQ 3 x 12 R 1#  HEP update NuStep L4 10 min  Shuttle double leg press 30# 2 x 20  HEP update: cardiovascular exercise x10 minutes daily NuStep L5 10 min  Unilateral row with slow eccentric GTB 2 x 15 B  HEP review: cardiovascular exercise x10 minutes daily, regular activity throughout the day   Neuromuscular Re-education        Therapeutic Activity        Manual Therapy L UPA L5-S1 Gr III  R tibiofemoral AP glide over HFR Gr III+  R knee distraction with tibial adduction Gr III+ CPA L5 Gr III- R UPA L2 Gr III   HEP: LTR to R, R SAQ    Charges: TE 2, MT 1      Total Timed Treatment: MT 14 min, TE 26 min  Total Treatment Time: 40 min

## 2023-07-27 ENCOUNTER — OFFICE VISIT (OUTPATIENT)
Dept: PHYSICAL THERAPY | Facility: HOSPITAL | Age: 63
End: 2023-07-27
Attending: FAMILY MEDICINE
Payer: MEDICAID

## 2023-07-27 PROCEDURE — 97110 THERAPEUTIC EXERCISES: CPT

## 2023-07-27 NOTE — PROGRESS NOTES
Diagnosis:   Strain of lumbar region, initial encounter (S39.012A)  Chronic pain of right knee (J46.163,Z75.21)      Referring Provider: Ilana Lerma  Date of Evaluation:    6/30/2023     Precautions:   h/o cervical fusion Next MD visit:   none scheduled  Date of Surgery: n/a   Insurance Primary/Secondary: [employer]OSS MEDICAID / N/A     # Auth Visits: 8 visits by 8/29/23            Subjective: Patient reports her back has been feeling pretty good, and her knee hasn't been too painful. Has been doing more around the house and is pushing herself to do a little more even when the pain comes on and it hasn't felt too bad. Notes it is still difficult to go up/down the stairs with her R leg. Objective:     Initial Evaluation   Subjective *    P1 (L low back): sitting in tall chair, walking her dog, bending to pick things up from the ground, washing dishes     P2 (R knee): sitting too long, getting up from a chair, stepping up/down from the step outside     Objective *    Lumbar AROM    Flexion: WNL (decreased reversal of lumbar lordosis) *LBP    Extension: WNL (hinge around L4)    Sidebending: R WNL (R LBP); L WNL (R LBP)    Rotation: R WNL; L 30% limited *LBP    Quadrant: R 20% limited, L 20% limited    PAIVM: Hypermobile CPA L3. Hypomobile and painful L UPA L5-S1 with reproduction of LBP.  Locally painful R UPA L4-5 and L5-S1    Hip MMT    Extension: R 3-/5; L 3-/5    Abduction: R 3-/5; L 3-/5    Sit <> stand: significant weight shift to L LE    LE neuro screen: light touch sensation intact L2-S1; DTR 2+ L4, 1+ S1 B    MMT  Hip flexion: 3-/5 R, 3/5 L  Knee extension: 4/5 R*, 5/5 L  Ankle DF: 5/5 B  Ankle PF: 4/5 R, 5/5 L    Knee AROM:  R 16-85  L   Pain with OP into flexion + abduction B, flexion + adduction R    Knee accessory motion: hypomobile AP, medial glide on R, patellofemoral glides hypomobile on L>R (all planes), no pain reproduction on R    Step up (4 inch): heavy reliance on UE assist to ascend on the R LE Assessment: Deferred manual therapy today as patient reports decreasing symptoms and will benefit most from improving LE strength and overall activity tolerance. Goals:  (to be met in 12 visits)  Pt will demonstrate good understanding of proper posture and body mechanics to decrease pain and improve spinal safety   Pt will improve lumbar spine AROM rotation to WNL to allow increase ease with walking her dog  Pt will demonstrate improved core strength to be able to perform washing dishes with <5/10 pain   Pt will improve quad strength to 5/5 to ascend 1 flight of stairs reciprocally without UE assist   Pt will increase hip and knee strength to grossly 4/5 to be able to get up and down from the floor safely   Pt will be independent and compliant with comprehensive HEP to maintain progress achieved in PT        Plan: Continue core stabilization, LE strengthening. Progress knee ROM and strength, lower lumbar mobility.    Date: 7/3/2023  TX#: 2/8 Date: 7/5/2023   TX#: 3/8 Date: 7/10/2023   TX#: 4/8 Date:  7/13/2023        TX#: 5/8 Date: 7/20/2023   Tx#: 6/8 ate: 7/27/2023   Tx#: 7/8   Therapeutic Exercise Objective testing  LTR to R x15  SAQ over HFR x10 R   HEP update NuStep L4 10 min  LAQ 2 x 15 R  LTR to R x20  HEP update NuStep L4 10 min  LAQ 3 x 12 R 1#  HEP update NuStep L4 10 min  Shuttle double leg press 30# 2 x 20  HEP update: cardiovascular exercise x10 minutes daily NuStep L5 10 min  Unilateral row with slow eccentric GTB 2 x 15 B  HEP review: cardiovascular exercise x10 minutes daily, regular activity throughout the day NuStep L5 10 min  Shuttle double leg press 55# 3 x 15  Side stepping 2 laps  Forward step up x15 4 inch R  Lateral step up x10 4 inch R   Neuromuscular Re-education         Therapeutic Activity         Manual Therapy L UPA L5-S1 Gr III  R tibiofemoral AP glide over HFR Gr III+  R knee distraction with tibial adduction Gr III+ CPA L5 Gr III- R UPA L2 Gr III    HEP: LTR to R, R SAQ    Charges: TE 3      Total Timed Treatment: TE 40 min  Total Treatment Time: 40 min

## 2023-07-30 ENCOUNTER — PATIENT MESSAGE (OUTPATIENT)
Dept: FAMILY MEDICINE CLINIC | Facility: CLINIC | Age: 63
End: 2023-07-30

## 2023-08-03 ENCOUNTER — TELEPHONE (OUTPATIENT)
Dept: PHYSICAL THERAPY | Facility: HOSPITAL | Age: 63
End: 2023-08-03

## 2023-08-03 ENCOUNTER — APPOINTMENT (OUTPATIENT)
Dept: PHYSICAL THERAPY | Facility: HOSPITAL | Age: 63
End: 2023-08-03
Attending: FAMILY MEDICINE
Payer: MEDICAID

## 2023-08-05 ENCOUNTER — LAB ENCOUNTER (OUTPATIENT)
Dept: LAB | Facility: HOSPITAL | Age: 63
End: 2023-08-05
Attending: INTERNAL MEDICINE
Payer: MEDICAID

## 2023-08-05 DIAGNOSIS — R76.8 ELEVATED SERUM IMMUNOGLOBULIN FREE LIGHT CHAIN LEVEL: ICD-10-CM

## 2023-08-05 LAB
ALBUMIN SERPL-MCNC: 3.1 G/DL (ref 3.4–5)
ALBUMIN/GLOB SERPL: 0.7 {RATIO} (ref 1–2)
ALP LIVER SERPL-CCNC: 46 U/L
ALT SERPL-CCNC: 46 U/L
ANION GAP SERPL CALC-SCNC: 7 MMOL/L (ref 0–18)
AST SERPL-CCNC: 28 U/L (ref 15–37)
BASOPHILS # BLD AUTO: 0.05 X10(3) UL (ref 0–0.2)
BASOPHILS NFR BLD AUTO: 0.5 %
BILIRUB SERPL-MCNC: 0.5 MG/DL (ref 0.1–2)
BUN BLD-MCNC: 12 MG/DL (ref 7–18)
BUN/CREAT SERPL: 17.6 (ref 10–20)
CALCIUM BLD-MCNC: 8.7 MG/DL (ref 8.5–10.1)
CHLORIDE SERPL-SCNC: 108 MMOL/L (ref 98–112)
CO2 SERPL-SCNC: 27 MMOL/L (ref 21–32)
CREAT BLD-MCNC: 0.68 MG/DL
DEPRECATED RDW RBC AUTO: 43.7 FL (ref 35.1–46.3)
EGFRCR SERPLBLD CKD-EPI 2021: 98 ML/MIN/1.73M2 (ref 60–?)
EOSINOPHIL # BLD AUTO: 0.09 X10(3) UL (ref 0–0.7)
EOSINOPHIL NFR BLD AUTO: 0.9 %
ERYTHROCYTE [DISTWIDTH] IN BLOOD BY AUTOMATED COUNT: 13.3 % (ref 11–15)
FASTING STATUS PATIENT QL REPORTED: NO
GLOBULIN PLAS-MCNC: 4.5 G/DL (ref 2.8–4.4)
GLUCOSE BLD-MCNC: 129 MG/DL (ref 70–99)
HCT VFR BLD AUTO: 39.6 %
HGB BLD-MCNC: 12.9 G/DL
IGA SERPL-MCNC: 307 MG/DL (ref 70–312)
IGM SERPL-MCNC: 97.2 MG/DL (ref 43–279)
IMM GRANULOCYTES # BLD AUTO: 0.05 X10(3) UL (ref 0–1)
IMM GRANULOCYTES NFR BLD: 0.5 %
IMMUNOGLOBULIN PNL SER-MCNC: 1060 MG/DL (ref 791–1643)
LYMPHOCYTES # BLD AUTO: 2.88 X10(3) UL (ref 1–4)
LYMPHOCYTES NFR BLD AUTO: 30 %
MCH RBC QN AUTO: 29.1 PG (ref 26–34)
MCHC RBC AUTO-ENTMCNC: 32.6 G/DL (ref 31–37)
MCV RBC AUTO: 89.4 FL
MONOCYTES # BLD AUTO: 0.55 X10(3) UL (ref 0.1–1)
MONOCYTES NFR BLD AUTO: 5.7 %
NEUTROPHILS # BLD AUTO: 5.98 X10 (3) UL (ref 1.5–7.7)
NEUTROPHILS # BLD AUTO: 5.98 X10(3) UL (ref 1.5–7.7)
NEUTROPHILS NFR BLD AUTO: 62.4 %
OSMOLALITY SERPL CALC.SUM OF ELEC: 295 MOSM/KG (ref 275–295)
PLATELET # BLD AUTO: 260 10(3)UL (ref 150–450)
POTASSIUM SERPL-SCNC: 3.9 MMOL/L (ref 3.5–5.1)
PROT SERPL-MCNC: 7.6 G/DL (ref 6.4–8.2)
RBC # BLD AUTO: 4.43 X10(6)UL
SODIUM SERPL-SCNC: 142 MMOL/L (ref 136–145)
WBC # BLD AUTO: 9.6 X10(3) UL (ref 4–11)

## 2023-08-05 PROCEDURE — 84165 PROTEIN E-PHORESIS SERUM: CPT

## 2023-08-05 PROCEDURE — 83521 IG LIGHT CHAINS FREE EACH: CPT

## 2023-08-05 PROCEDURE — 85025 COMPLETE CBC W/AUTO DIFF WBC: CPT

## 2023-08-05 PROCEDURE — 36415 COLL VENOUS BLD VENIPUNCTURE: CPT

## 2023-08-05 PROCEDURE — 80053 COMPREHEN METABOLIC PANEL: CPT

## 2023-08-05 PROCEDURE — 82784 ASSAY IGA/IGD/IGG/IGM EACH: CPT

## 2023-08-05 PROCEDURE — 86334 IMMUNOFIX E-PHORESIS SERUM: CPT

## 2023-08-07 ENCOUNTER — PATIENT MESSAGE (OUTPATIENT)
Dept: FAMILY MEDICINE CLINIC | Facility: CLINIC | Age: 63
End: 2023-08-07

## 2023-08-07 LAB
ALBUMIN SERPL ELPH-MCNC: 3.52 G/DL (ref 3.75–5.21)
ALBUMIN/GLOB SERPL: 1.04 {RATIO} (ref 1–2)
ALPHA1 GLOB SERPL ELPH-MCNC: 0.32 G/DL (ref 0.19–0.46)
ALPHA2 GLOB SERPL ELPH-MCNC: 1.05 G/DL (ref 0.48–1.05)
B-GLOBULIN SERPL ELPH-MCNC: 0.97 G/DL (ref 0.68–1.23)
GAMMA GLOB SERPL ELPH-MCNC: 1.05 G/DL (ref 0.62–1.7)
KAPPA LC FREE SER-MCNC: 2.8 MG/DL (ref 0.33–1.94)
KAPPA LC FREE/LAMBDA FREE SER NEPH: 1.5 {RATIO} (ref 0.26–1.65)
LAMBDA LC FREE SERPL-MCNC: 1.86 MG/DL (ref 0.57–2.63)
PROT SERPL-MCNC: 6.9 G/DL (ref 6.4–8.2)

## 2023-08-08 ENCOUNTER — LAB ENCOUNTER (OUTPATIENT)
Dept: LAB | Facility: HOSPITAL | Age: 63
End: 2023-08-08
Attending: INTERNAL MEDICINE
Payer: MEDICAID

## 2023-08-08 DIAGNOSIS — R76.8 ELEVATED SERUM IMMUNOGLOBULIN FREE LIGHT CHAIN LEVEL: ICD-10-CM

## 2023-08-08 LAB — PROT UR-MCNC: 35.4 MG/DL

## 2023-08-08 PROCEDURE — 86335 IMMUNFIX E-PHORSIS/URINE/CSF: CPT

## 2023-08-08 PROCEDURE — 84156 ASSAY OF PROTEIN URINE: CPT

## 2023-08-08 PROCEDURE — 84166 PROTEIN E-PHORESIS/URINE/CSF: CPT

## 2023-08-08 NOTE — TELEPHONE ENCOUNTER
Please let patient know I reviewed labs, I do not see any major changes of concern but should discuss kappa light chain with Dr. Jarad Hernandez.

## 2023-08-08 NOTE — TELEPHONE ENCOUNTER
From: Kacey Arndt  To: Niki Morejon MD  Sent: 8/7/2023 12:25 PM CDT  Subject: Blood Work from Aug 5, 2023    Hi , I did blood work for  on Aug.5th. could you please tell me what all of this means.  Thanks, Esmer Lockhart

## 2023-08-09 ENCOUNTER — TELEPHONE (OUTPATIENT)
Dept: PHYSICAL THERAPY | Facility: HOSPITAL | Age: 63
End: 2023-08-09

## 2023-08-09 ENCOUNTER — OFFICE VISIT (OUTPATIENT)
Dept: HEMATOLOGY/ONCOLOGY | Facility: HOSPITAL | Age: 63
End: 2023-08-09
Attending: INTERNAL MEDICINE
Payer: MEDICAID

## 2023-08-09 VITALS
SYSTOLIC BLOOD PRESSURE: 146 MMHG | HEART RATE: 79 BPM | TEMPERATURE: 98 F | OXYGEN SATURATION: 99 % | RESPIRATION RATE: 20 BRPM | DIASTOLIC BLOOD PRESSURE: 64 MMHG

## 2023-08-09 DIAGNOSIS — R76.8 ELEVATED SERUM IMMUNOGLOBULIN FREE LIGHT CHAIN LEVEL: Primary | ICD-10-CM

## 2023-08-09 PROCEDURE — 99213 OFFICE O/P EST LOW 20 MIN: CPT | Performed by: INTERNAL MEDICINE

## 2023-08-10 ENCOUNTER — TELEPHONE (OUTPATIENT)
Dept: PHYSICAL THERAPY | Facility: HOSPITAL | Age: 63
End: 2023-08-10

## 2023-08-14 ENCOUNTER — OFFICE VISIT (OUTPATIENT)
Dept: PHYSICAL THERAPY | Facility: HOSPITAL | Age: 63
End: 2023-08-14
Attending: FAMILY MEDICINE
Payer: MEDICAID

## 2023-08-14 PROCEDURE — 97110 THERAPEUTIC EXERCISES: CPT

## 2023-08-14 NOTE — PROGRESS NOTES
Diagnosis:   Strain of lumbar region, initial encounter (Z88.953H)  Chronic pain of right knee (M25.561,G89.29)      Referring Provider: Cecile Rodriguez  Date of Evaluation:    6/30/2023     Precautions:   h/o cervical fusion Next MD visit:   none scheduled  Date of Surgery: n/a    Discharge Summary  Pt has attended 8 visits in Physical Therapy. Insurance Primary/Secondary: BLUE [employer]OSS MEDICAID / N/A     # Auth Visits: 8 visits by 8/29/23            Subjective: Patient reports mild stiffness in her knee after sitting for a while, otherwise has been feeling pretty good. She hasn't really been feeling her low back too much. She feels good with where her knee and low back are at this point. Primary complaint at this time is pain in her R shoulder. Objective:     Initial Evaluation Reassessment 8/14/2023    Subjective *    P1 (L low back): sitting in tall chair, walking her dog, bending to pick things up from the ground, washing dishes     P2 (R knee): sitting too long, getting up from a chair, stepping up/down from the step outside     Objective *    Lumbar AROM    Flexion: WNL (decreased reversal of lumbar lordosis) *LBP    Extension: WNL (hinge around L4)    Sidebending: R WNL (R LBP); L WNL (R LBP)    Rotation: R WNL; L 30% limited *LBP    Quadrant: R 20% limited, L 20% limited    PAIVM: Hypermobile CPA L3. Hypomobile and painful L UPA L5-S1 with reproduction of LBP.  Locally painful R UPA L4-5 and L5-S1    Hip MMT    Extension: R 3-/5; L 3-/5    Abduction: R 3-/5; L 3-/5    Sit <> stand: significant weight shift to L LE    LE neuro screen: light touch sensation intact L2-S1; DTR 2+ L4, 1+ S1 B    MMT  Hip flexion: 3-/5 R, 3/5 L  Knee extension: 4/5 R*, 5/5 L  Ankle DF: 5/5 B  Ankle PF: 4/5 R, 5/5 L    Knee AROM:  R 16-85  L   Pain with OP into flexion + abduction B, flexion + adduction R    Knee accessory motion: hypomobile AP, medial glide on R, patellofemoral glides hypomobile on L>R (all planes), no pain reproduction on R    Step up (4 inch): heavy reliance on UE assist to ascend on the R LE MMT    Hip ER: 4+/5 R    Knee extension: 5/5 R    Knee flexion: 5/5 R    Lumbar AROM    Flexion: WNL     Extension: WNL     Sidebending: R WNL; L WNL    Rotation: R WNL; L WNL  All pain-free           Assessment: Patient has made good progress in physical therapy, demonstrating improvements in lumbar ROM, knee and hip strength, pain behaviors and overall activity tolerance. Patient reports ability to perform household chores, walk her dog, and perform sit <> stand with minimal limitation. She has met all goals set at initial evaluation and is independent and compliant with a home program that will allow long-term maintenance of gains made in PT. Patient is appropriate for discharge.         Goals:  (to be met in 12 visits)  Pt will demonstrate good understanding of proper posture and body mechanics to decrease pain and improve spinal safety  - met  Pt will improve lumbar spine AROM rotation to WNL to allow increase ease with walking her dog - met  Pt will demonstrate improved core strength to be able to perform washing dishes with <5/10 pain  - met  Pt will improve quad strength to 5/5 to ascend 1 flight of stairs reciprocally without UE assist  - met  Pt will increase hip and knee strength to grossly 4/5 to be able to get up and down from the floor safely  - met  Pt will be independent and compliant with comprehensive HEP to maintain progress achieved in PT  - met       Plan: D/C with continued compliance to HEP     Date: 7/3/2023  TX#: 2/8 Date: 7/5/2023   TX#: 3/8 Date: 7/10/2023   TX#: 4/8 Date:  7/13/2023        TX#: 5/8 Date: 7/20/2023   Tx#: 6/8 date: 7/27/2023   Tx#: 7/8 Date: 8/14/2023   Tx#: 8/8   Therapeutic Exercise Objective testing  LTR to R x15  SAQ over HFR x10 R   HEP update NuStep L4 10 min  LAQ 2 x 15 R  LTR to R x20  HEP update NuStep L4 10 min  LAQ 3 x 12 R 1#  HEP update NuStep L4 10 min  Shuttle double leg press 30# 2 x 20  HEP update: cardiovascular exercise x10 minutes daily NuStep L5 10 min  Unilateral row with slow eccentric GTB 2 x 15 B  HEP review: cardiovascular exercise x10 minutes daily, regular activity throughout the day NuStep L5 10 min  Shuttle double leg press 55# 3 x 15  Side stepping 2 laps  Forward step up x15 4 inch R  Lateral step up x10 4 inch R Reassessment for PN  NuStep L5 11.5 min  Edu: importance of remaining active, exercise prescription   Neuromuscular Re-education          Therapeutic Activity          Manual Therapy L UPA L5-S1 Gr III  R tibiofemoral AP glide over HFR Gr III+  R knee distraction with tibial adduction Gr III+ CPA L5 Gr III- R UPA L2 Gr III     HEP: LTR to R, R SAQ    Charges: TE 3      Total Timed Treatment: TE 40 min  Total Treatment Time: 40 min

## 2023-08-30 ENCOUNTER — OFFICE VISIT (OUTPATIENT)
Dept: SURGERY | Facility: CLINIC | Age: 63
End: 2023-08-30
Payer: MEDICAID

## 2023-08-30 ENCOUNTER — HOSPITAL ENCOUNTER (OUTPATIENT)
Dept: GENERAL RADIOLOGY | Facility: HOSPITAL | Age: 63
Discharge: HOME | End: 2023-08-30
Attending: NEUROLOGICAL SURGERY
Payer: MEDICAID

## 2023-08-30 ENCOUNTER — PATIENT MESSAGE (OUTPATIENT)
Dept: FAMILY MEDICINE CLINIC | Facility: CLINIC | Age: 63
End: 2023-08-30

## 2023-08-30 VITALS — DIASTOLIC BLOOD PRESSURE: 76 MMHG | SYSTOLIC BLOOD PRESSURE: 122 MMHG

## 2023-08-30 DIAGNOSIS — Z98.1 HISTORY OF FUSION OF CERVICAL SPINE: Primary | ICD-10-CM

## 2023-08-30 DIAGNOSIS — M25.511 ACUTE PAIN OF RIGHT SHOULDER: ICD-10-CM

## 2023-08-30 DIAGNOSIS — G95.9 CERVICAL MYELOPATHY (HCC): ICD-10-CM

## 2023-08-30 PROCEDURE — 72040 X-RAY EXAM NECK SPINE 2-3 VW: CPT | Performed by: NEUROLOGICAL SURGERY

## 2023-08-31 ENCOUNTER — APPOINTMENT (OUTPATIENT)
Dept: PHYSICAL THERAPY | Facility: HOSPITAL | Age: 63
End: 2023-08-31
Attending: FAMILY MEDICINE
Payer: MEDICAID

## 2023-08-31 ENCOUNTER — TELEPHONE (OUTPATIENT)
Dept: SURGERY | Facility: CLINIC | Age: 63
End: 2023-08-31

## 2023-09-05 ENCOUNTER — OFFICE VISIT (OUTPATIENT)
Dept: FAMILY MEDICINE CLINIC | Facility: CLINIC | Age: 63
End: 2023-09-05

## 2023-09-05 VITALS
WEIGHT: 293 LBS | DIASTOLIC BLOOD PRESSURE: 77 MMHG | HEART RATE: 68 BPM | BODY MASS INDEX: 54 KG/M2 | TEMPERATURE: 98 F | OXYGEN SATURATION: 94 % | SYSTOLIC BLOOD PRESSURE: 119 MMHG

## 2023-09-05 DIAGNOSIS — G95.9 CERVICAL MYELOPATHY (HCC): ICD-10-CM

## 2023-09-05 DIAGNOSIS — M50.90 CERVICAL DISC DISEASE: Primary | ICD-10-CM

## 2023-09-05 PROCEDURE — 99214 OFFICE O/P EST MOD 30 MIN: CPT | Performed by: FAMILY MEDICINE

## 2023-09-05 PROCEDURE — 3078F DIAST BP <80 MM HG: CPT | Performed by: FAMILY MEDICINE

## 2023-09-05 PROCEDURE — 3074F SYST BP LT 130 MM HG: CPT | Performed by: FAMILY MEDICINE

## 2023-09-05 NOTE — PROGRESS NOTES
Subjective:   Patient ID: Mohamud Liang is a 61year old female. Patient presents for form completion as below. History/Other:   Review of Systems  Current Outpatient Medications   Medication Sig Dispense Refill    atorvastatin 10 MG Oral Tab Take 1 tablet (10 mg total) by mouth nightly. 90 tablet 3    Acetaminophen ER (TYLENOL 8 HOUR ARTHRITIS PAIN) 650 MG Oral Tab CR Take 2 tablets (1,300 mg total) by mouth in the morning and 2 tablets (1,300 mg total) before bedtime. 60 tablet 5    metFORMIN 500 MG Oral Tab Take 2 tablets (1,000 mg total) by mouth 2 (two) times daily with meals. LEVOTHYROXINE 75 MCG Oral Tab TAKE 1 TABLET BY MOUTH BEFORE BREAKFAST 90 tablet 3    PANTOPRAZOLE 40 MG Oral Tab EC TAKE 1 TABLET BY MOUTH ONCE DAILY BEFORE BREAKFAST 90 tablet 3    lisinopril-hydroCHLOROthiazide 10-12.5 MG Oral Tab Take 1 tablet by mouth once daily. 90 tablet 3    glimepiride 2 MG Oral Tab Take 1 tablet (2 mg total) by mouth daily with breakfast. 90 tablet 3    LANTUS SOLOSTAR 100 UNIT/ML Subcutaneous Solution Pen-injector INJECT 32 UNITS SUBCUTANEOUSLY AT NIGHT 15 mL 2    Multiple Vitamins-Minerals (EQ VISION FORMULA 50+ OR) Take 1 tablet by mouth daily. Insulin Pen Needle (BD PEN NEEDLE MINI U/F) 31G X 5 MM Does not apply Misc Use with Lantus 100 each 2    Glucose Blood (ONETOUCH ULTRA BLUE) In Vitro Strip USE ONCE DAILY 100 each 11    ONETOUCH DELICA LANCETS 59E Does not apply Misc Apply 1 Units topically daily. 90 each 3    Blood Glucose Monitoring Suppl ([address]) W/DEVICE Does not apply Kit test T. I.D      Cyanocobalamin (VITAMIN B 12 OR) Take by mouth daily. Allergies:  Chlorhexidine           RASH    Objective:   Physical Exam  Constitutional:       Appearance: Normal appearance. Neurological:      Mental Status: She is alert. Comments: Cervical spine with markedly decreased range of motion  Upper extremities with decreased  strength bilaterally. Assessment & Plan:   Cervical disc disease  (primary encounter diagnosis)  Cervical myelopathy ([facility] [address]  Patient presents for completion of forms relating to application for disability due to pain and weakness in upper extremities as well as decreased range of motion neck. She is is 1 year status post anterior cervical corpectomy, MRI shows some residual stenosis at C6-7, and at C5-6. She has completed extensive physical therapy. See forms completed today. No orders of the defined types were placed in this encounter.       Meds This Visit:  Requested Prescriptions      No prescriptions requested or ordered in this encounter       Imaging & Referrals:  None

## 2023-09-16 RX ORDER — INSULIN GLARGINE 100 [IU]/ML
INJECTION, SOLUTION SUBCUTANEOUS
Qty: 15 ML | Refills: 5 | Status: SHIPPED | OUTPATIENT
Start: 2023-09-16

## 2023-10-06 ENCOUNTER — OFFICE VISIT (OUTPATIENT)
Dept: FAMILY MEDICINE CLINIC | Facility: CLINIC | Age: 63
End: 2023-10-06

## 2023-10-06 VITALS
WEIGHT: 293 LBS | HEART RATE: 71 BPM | BODY MASS INDEX: 54 KG/M2 | DIASTOLIC BLOOD PRESSURE: 77 MMHG | SYSTOLIC BLOOD PRESSURE: 124 MMHG | TEMPERATURE: 98 F

## 2023-10-06 DIAGNOSIS — I10 ESSENTIAL HYPERTENSION: ICD-10-CM

## 2023-10-06 DIAGNOSIS — Z79.4 TYPE 2 DIABETES MELLITUS WITHOUT COMPLICATION, WITH LONG-TERM CURRENT USE OF INSULIN (HCC): Primary | ICD-10-CM

## 2023-10-06 DIAGNOSIS — G95.9 CERVICAL MYELOPATHY (HCC): ICD-10-CM

## 2023-10-06 DIAGNOSIS — E11.9 TYPE 2 DIABETES MELLITUS WITHOUT COMPLICATION, WITH LONG-TERM CURRENT USE OF INSULIN (HCC): Primary | ICD-10-CM

## 2023-10-06 DIAGNOSIS — E66.01 MORBID OBESITY (HCC): ICD-10-CM

## 2023-10-06 PROBLEM — M15.9 PRIMARY OSTEOARTHRITIS INVOLVING MULTIPLE JOINTS: Status: ACTIVE | Noted: 2023-10-06

## 2023-10-06 PROBLEM — M15.0 PRIMARY OSTEOARTHRITIS INVOLVING MULTIPLE JOINTS: Status: ACTIVE | Noted: 2023-10-06

## 2023-10-06 LAB
CARTRIDGE LOT#: 612 NUMERIC
HEMOGLOBIN A1C: 6.7 % (ref 4.3–5.6)

## 2023-10-06 PROCEDURE — 90686 IIV4 VACC NO PRSV 0.5 ML IM: CPT | Performed by: FAMILY MEDICINE

## 2023-10-06 PROCEDURE — 3078F DIAST BP <80 MM HG: CPT | Performed by: FAMILY MEDICINE

## 2023-10-06 PROCEDURE — 83036 HEMOGLOBIN GLYCOSYLATED A1C: CPT | Performed by: FAMILY MEDICINE

## 2023-10-06 PROCEDURE — 3074F SYST BP LT 130 MM HG: CPT | Performed by: FAMILY MEDICINE

## 2023-10-06 PROCEDURE — 90471 IMMUNIZATION ADMIN: CPT | Performed by: FAMILY MEDICINE

## 2023-10-06 PROCEDURE — 99214 OFFICE O/P EST MOD 30 MIN: CPT | Performed by: FAMILY MEDICINE

## 2023-10-06 PROCEDURE — 3044F HG A1C LEVEL LT 7.0%: CPT | Performed by: FAMILY MEDICINE

## 2023-10-06 RX ORDER — LORATADINE 10 MG/1
10 TABLET ORAL DAILY
COMMUNITY

## 2023-10-06 RX ORDER — TIRZEPATIDE 2.5 MG/.5ML
2.5 INJECTION, SOLUTION SUBCUTANEOUS WEEKLY
Qty: 10 ML | Refills: 0 | Status: SHIPPED | OUTPATIENT
Start: 2023-10-06

## 2023-10-06 NOTE — PROGRESS NOTES
Subjective:   Patient ID: Howard Ortiz is a 61year old female. Diabetes  She presents for her follow-up diabetic visit. She has type 2 diabetes mellitus. There are no hypoglycemic associated symptoms. Pertinent negatives for diabetes include no chest pain and no foot ulcerations. There are no hypoglycemic complications. Symptoms are stable. History/Other:   Review of Systems   Cardiovascular:  Negative for chest pain. Current Outpatient Medications   Medication Sig Dispense Refill    loratadine 10 MG Oral Tab Take 1 tablet (10 mg total) by mouth daily. Tirzepatide [facility]) 2.5 MG/0.5ML Subcutaneous Solution Pen-injector Inject 2.5 mg into the skin once a week. 10 mL 0    LANTUS SOLOSTAR 100 UNIT/ML Subcutaneous Solution Pen-injector INJECT 36 UNITS SUBCUTANEOUSLY AT NIGHT 15 mL 5    atorvastatin 10 MG Oral Tab Take 1 tablet (10 mg total) by mouth nightly. 90 tablet 3    Acetaminophen ER (TYLENOL 8 HOUR ARTHRITIS PAIN) 650 MG Oral Tab CR Take 2 tablets (1,300 mg total) by mouth in the morning and 2 tablets (1,300 mg total) before bedtime. 60 tablet 5    metFORMIN 500 MG Oral Tab Take 2 tablets (1,000 mg total) by mouth 2 (two) times daily with meals. LEVOTHYROXINE 75 MCG Oral Tab TAKE 1 TABLET BY MOUTH BEFORE BREAKFAST 90 tablet 3    PANTOPRAZOLE 40 MG Oral Tab EC TAKE 1 TABLET BY MOUTH ONCE DAILY BEFORE BREAKFAST 90 tablet 3    lisinopril-hydroCHLOROthiazide 10-12.5 MG Oral Tab Take 1 tablet by mouth once daily. 90 tablet 3    glimepiride 2 MG Oral Tab Take 1 tablet (2 mg total) by mouth daily with breakfast. 90 tablet 3    Multiple Vitamins-Minerals (EQ VISION FORMULA 50+ OR) Take 1 tablet by mouth daily. Insulin Pen Needle (BD PEN NEEDLE MINI U/F) 31G X 5 MM Does not apply Misc Use with Lantus 100 each 2    Glucose Blood (ONETOUCH ULTRA BLUE) In Vitro Strip USE ONCE DAILY 100 each 11    ONETOUCH DELICA LANCETS 21T Does not apply Misc Apply 1 Units topically daily.  90 each 3 Blood Glucose Monitoring Suppl ([address]   [address]) W/DEVICE Does not apply Kit test T. I.D       Allergies:  Chlorhexidine           RASH    Objective:   Physical Exam  Constitutional:       Appearance: Normal appearance. Cardiovascular:      Rate and Rhythm: Normal rate and regular rhythm. Pulses: Normal pulses. Heart sounds: Normal heart sounds. Pulmonary:      Effort: Pulmonary effort is normal.      Breath sounds: Normal breath sounds. Musculoskeletal:      Right lower leg: No edema. Left lower leg: No edema. Neurological:      Mental Status: She is alert. Assessment & Plan:   Type 2 diabetes mellitus without complication, with long-term current use of insulin ([facility])  (primary encounter diagnosis)-continuing on Lantus, glimepiride. Hemoglobin A1c today 6.7. Discussed consideration of GLP-1 agonist.  [place] pharmacy [place] [facility] If she is able to fill it then she will call and we will discuss discontinuing glimepiride and monitoring blood sugars carefully to adjust insulin. Cervical myelopathy ([facility])-denied for disability. She is continuing to work with her . Ongoing hand pain and numbness. Morbid obesity ([facility])-encourage healthy diet, regular exercise. Mounjaro as above    Essential hypertension-blood pressure controlled on current medication. Orders Placed This Encounter      POC Glycohemoglobin [98432]      Fluzone Quadrivalent 6mo+ 0.5mL      Meds This Visit:  Requested Prescriptions     Signed Prescriptions Disp Refills    Tirzepatide (MOUNJARO) 2.5 MG/0.5ML Subcutaneous Solution Pen-injector 10 mL 0     Sig: Inject 2.5 mg into the skin once a week.        Imaging & Referrals:  INFLUENZA VACCINE, QUAD, PRESERVATIVE FREE, 0.5 ML

## 2023-10-10 ENCOUNTER — TELEPHONE (OUTPATIENT)
Dept: FAMILY MEDICINE CLINIC | Facility: CLINIC | Age: 63
End: 2023-10-10

## 2023-10-10 DIAGNOSIS — E11.9 TYPE 2 DIABETES MELLITUS WITHOUT COMPLICATION, WITH LONG-TERM CURRENT USE OF INSULIN (HCC): Primary | ICD-10-CM

## 2023-10-10 DIAGNOSIS — Z79.4 TYPE 2 DIABETES MELLITUS WITHOUT COMPLICATION, WITH LONG-TERM CURRENT USE OF INSULIN (HCC): Primary | ICD-10-CM

## 2023-10-10 NOTE — TELEPHONE ENCOUNTER
Per [employer], a prior authorization is needed for patient's Mounjaro 2.5mg/0.5ml Pen-injectors. Login to [employer]/login and click \"Enter a Key\". Enter the patient's last name, date of birth and the Key:    Key: [id number]    Patient Last Name: Kimberly Madera    : 1960    Complete the prior authorization and click \"Send to Plan\" for approval. Please notify [employer] when a determination has been received from the plan.

## 2023-10-11 ENCOUNTER — PATIENT MESSAGE (OUTPATIENT)
Dept: FAMILY MEDICINE CLINIC | Facility: CLINIC | Age: 63
End: 2023-10-11

## 2023-10-11 RX ORDER — DULAGLUTIDE 0.75 MG/.5ML
0.75 INJECTION, SOLUTION SUBCUTANEOUS WEEKLY
Qty: 12 EACH | Refills: 0 | Status: SHIPPED | OUTPATIENT
Start: 2023-10-11

## 2023-10-11 NOTE — TELEPHONE ENCOUNTER
Please let patient know Vannessa Hamirma denied but Trulicity on formulary so sent this rx instead. Same once a week injections, same side effects. If she is able to fill this, she should call us re changing glimiperide.

## 2023-10-12 ENCOUNTER — OFFICE VISIT (OUTPATIENT)
Dept: PHYSICAL MEDICINE AND REHAB | Facility: CLINIC | Age: 63
End: 2023-10-12
Payer: MEDICAID

## 2023-10-12 DIAGNOSIS — M54.12 RADICULITIS OF LEFT CERVICAL REGION: ICD-10-CM

## 2023-10-12 DIAGNOSIS — R20.2 NUMBNESS AND TINGLING IN LEFT HAND: Primary | ICD-10-CM

## 2023-10-12 DIAGNOSIS — R20.0 NUMBNESS AND TINGLING IN LEFT HAND: Primary | ICD-10-CM

## 2023-10-12 PROCEDURE — 99214 OFFICE O/P EST MOD 30 MIN: CPT | Performed by: PHYSICAL MEDICINE & REHABILITATION

## 2023-10-12 RX ORDER — DULOXETIN HYDROCHLORIDE 30 MG/1
30 CAPSULE, DELAYED RELEASE ORAL DAILY
Qty: 90 CAPSULE | Refills: 0 | Status: SHIPPED | OUTPATIENT
Start: 2023-10-12

## 2023-10-13 NOTE — TELEPHONE ENCOUNTER
From: Hui Larry  To: Niki Sultana  Sent: 10/11/2023 5:00 PM CDT  Subject: José Sultana, what is this medicine? ? The pharmacy just texted me saying ready for .  Thanks, Nguyễn Suarez

## 2023-10-13 NOTE — TELEPHONE ENCOUNTER
Dr. Maura Toney, you note indicates you planned to send [facility]. Prescription was sent for Trulicity. Do you want patient to take Trulicity?

## 2023-10-16 NOTE — TELEPHONE ENCOUNTER
From Dr. James People Note:     If she is able to fill the Trulicity then she will call and we will discuss discontinuing glimepiride and monitoring blood sugars carefully to adjust insulin.

## 2023-10-21 ENCOUNTER — PATIENT MESSAGE (OUTPATIENT)
Dept: FAMILY MEDICINE CLINIC | Facility: CLINIC | Age: 63
End: 2023-10-21

## 2023-10-23 NOTE — TELEPHONE ENCOUNTER
From: Huber Friday  To: Niki Lind  Sent: 10/21/2023 10:11 AM CDT  Subject: Keven Lind, I started a new medicine from Dr. Tray Loyd called Duloxetine for nerve damage. Can this medicine be taken with trulicity? I do not want to be sick from all this medicines being mixed together. With trulicity do I take the whole thing?  Thanks, Majo Mark

## 2023-11-14 ENCOUNTER — OFFICE VISIT (OUTPATIENT)
Dept: SURGERY | Facility: CLINIC | Age: 63
End: 2023-11-14
Payer: MEDICAID

## 2023-11-14 ENCOUNTER — OFFICE VISIT (OUTPATIENT)
Dept: FAMILY MEDICINE CLINIC | Facility: CLINIC | Age: 63
End: 2023-11-14
Payer: MEDICAID

## 2023-11-14 ENCOUNTER — TELEPHONE (OUTPATIENT)
Dept: FAMILY MEDICINE CLINIC | Facility: CLINIC | Age: 63
End: 2023-11-14

## 2023-11-14 VITALS
WEIGHT: 293 LBS | BODY MASS INDEX: 48.23 KG/M2 | DIASTOLIC BLOOD PRESSURE: 74 MMHG | HEIGHT: 65.2 IN | HEART RATE: 75 BPM | OXYGEN SATURATION: 98 % | SYSTOLIC BLOOD PRESSURE: 129 MMHG

## 2023-11-14 VITALS
HEART RATE: 74 BPM | HEIGHT: 65.2 IN | SYSTOLIC BLOOD PRESSURE: 122 MMHG | BODY MASS INDEX: 48.23 KG/M2 | DIASTOLIC BLOOD PRESSURE: 62 MMHG | OXYGEN SATURATION: 96 % | TEMPERATURE: 97 F | WEIGHT: 293 LBS

## 2023-11-14 DIAGNOSIS — E66.01 MORBID OBESITY (HCC): ICD-10-CM

## 2023-11-14 DIAGNOSIS — E78.5 DYSLIPIDEMIA: ICD-10-CM

## 2023-11-14 DIAGNOSIS — E11.9 TYPE 2 DIABETES MELLITUS WITHOUT COMPLICATION, WITH LONG-TERM CURRENT USE OF INSULIN (HCC): ICD-10-CM

## 2023-11-14 DIAGNOSIS — I10 ESSENTIAL HYPERTENSION: ICD-10-CM

## 2023-11-14 DIAGNOSIS — Z79.4 TYPE 2 DIABETES MELLITUS WITHOUT COMPLICATION, WITH LONG-TERM CURRENT USE OF INSULIN (HCC): Primary | ICD-10-CM

## 2023-11-14 DIAGNOSIS — M17.0 PRIMARY OSTEOARTHRITIS OF BOTH KNEES: ICD-10-CM

## 2023-11-14 DIAGNOSIS — E66.01 MORBID OBESITY WITH BMI OF 50.0-59.9, ADULT (HCC): ICD-10-CM

## 2023-11-14 DIAGNOSIS — M25.561 CHRONIC PAIN OF RIGHT KNEE: ICD-10-CM

## 2023-11-14 DIAGNOSIS — E11.9 TYPE 2 DIABETES MELLITUS WITHOUT COMPLICATION, WITH LONG-TERM CURRENT USE OF INSULIN (HCC): Primary | ICD-10-CM

## 2023-11-14 DIAGNOSIS — M50.90 CERVICAL DISC DISEASE: ICD-10-CM

## 2023-11-14 DIAGNOSIS — G89.29 CHRONIC PAIN OF RIGHT KNEE: ICD-10-CM

## 2023-11-14 DIAGNOSIS — Z00.00 ROUTINE PHYSICAL EXAMINATION: Primary | ICD-10-CM

## 2023-11-14 DIAGNOSIS — Z79.4 TYPE 2 DIABETES MELLITUS WITHOUT COMPLICATION, WITH LONG-TERM CURRENT USE OF INSULIN (HCC): ICD-10-CM

## 2023-11-14 DIAGNOSIS — I10 PRIMARY HYPERTENSION: ICD-10-CM

## 2023-11-14 PROCEDURE — 3008F BODY MASS INDEX DOCD: CPT | Performed by: FAMILY MEDICINE

## 2023-11-14 PROCEDURE — 3078F DIAST BP <80 MM HG: CPT | Performed by: INTERNAL MEDICINE

## 2023-11-14 PROCEDURE — 3074F SYST BP LT 130 MM HG: CPT | Performed by: FAMILY MEDICINE

## 2023-11-14 PROCEDURE — 3008F BODY MASS INDEX DOCD: CPT | Performed by: INTERNAL MEDICINE

## 2023-11-14 PROCEDURE — 3078F DIAST BP <80 MM HG: CPT | Performed by: FAMILY MEDICINE

## 2023-11-14 PROCEDURE — 99204 OFFICE O/P NEW MOD 45 MIN: CPT | Performed by: INTERNAL MEDICINE

## 2023-11-14 PROCEDURE — 99396 PREV VISIT EST AGE 40-64: CPT | Performed by: FAMILY MEDICINE

## 2023-11-14 PROCEDURE — 3074F SYST BP LT 130 MM HG: CPT | Performed by: INTERNAL MEDICINE

## 2023-11-14 RX ORDER — INSULIN GLARGINE 100 [IU]/ML
15 INJECTION, SOLUTION SUBCUTANEOUS NIGHTLY
Qty: 15 ML | Refills: 5 | Status: SHIPPED | OUTPATIENT
Start: 2023-11-14

## 2023-11-14 RX ORDER — DULAGLUTIDE 1.5 MG/.5ML
1.5 INJECTION, SOLUTION SUBCUTANEOUS WEEKLY
Qty: 12 EACH | Refills: 1 | Status: SHIPPED | OUTPATIENT
Start: 2023-11-14

## 2023-11-14 RX ORDER — INSULIN GLARGINE 100 [IU]/ML
15 INJECTION, SOLUTION SUBCUTANEOUS NIGHTLY
Qty: 15 ML | Refills: 5 | Status: SHIPPED | OUTPATIENT
Start: 2023-11-14 | End: 2023-11-14

## 2023-11-14 NOTE — PROGRESS NOTES
Subjective:   Patient ID: Raghu Castillo is a 61year old female. Patient presents for routine physical and issues as below. History/Other:   Review of Systems   Constitutional: Negative. Respiratory: Negative. Cardiovascular: Negative. Gastrointestinal: Negative. Musculoskeletal:         Neck arm and hand pain   Skin: Negative. Neurological: Negative. Current Outpatient Medications   Medication Sig Dispense Refill    Dulaglutide (TRULICITY) 1.5 QU/5.6UT Subcutaneous Solution Pen-injector Inject 1.5 mg into the skin once a week. 12 each 1    LANTUS SOLOSTAR 100 UNIT/ML Subcutaneous Solution Pen-injector Inject 15 Units into the skin nightly. INJECT 36 UNITS SUBCUTANEOUSLY AT NIGHT 15 mL 5    DULoxetine 30 MG Oral Cap DR Particles Take 1 capsule (30 mg total) by mouth daily. 90 capsule 0    loratadine 10 MG Oral Tab Take 1 tablet (10 mg total) by mouth daily. atorvastatin 10 MG Oral Tab Take 1 tablet (10 mg total) by mouth nightly. 90 tablet 3    Acetaminophen ER (TYLENOL 8 HOUR ARTHRITIS PAIN) 650 MG Oral Tab CR Take 2 tablets (1,300 mg total) by mouth in the morning and 2 tablets (1,300 mg total) before bedtime. 60 tablet 5    metFORMIN 500 MG Oral Tab Take 2 tablets (1,000 mg total) by mouth 2 (two) times daily with meals. LEVOTHYROXINE 75 MCG Oral Tab TAKE 1 TABLET BY MOUTH BEFORE BREAKFAST 90 tablet 3    PANTOPRAZOLE 40 MG Oral Tab EC TAKE 1 TABLET BY MOUTH ONCE DAILY BEFORE BREAKFAST 90 tablet 3    lisinopril-hydroCHLOROthiazide 10-12.5 MG Oral Tab Take 1 tablet by mouth once daily. 90 tablet 3    Multiple Vitamins-Minerals (EQ VISION FORMULA 50+ OR) Take 1 tablet by mouth daily. Insulin Pen Needle (BD PEN NEEDLE MINI U/F) 31G X 5 MM Does not apply Misc Use with Lantus 100 each 2    Glucose Blood (ONETOUCH ULTRA BLUE) In Vitro Strip USE ONCE DAILY 100 each 11    ONETOUCH DELICA LANCETS 94D Does not apply Misc Apply 1 Units topically daily.  90 each 3    Blood Glucose Monitoring Suppl ([address]) W/DEVICE Does not apply Kit test T. I.D       Allergies: Allergies   Allergen Reactions    Chlorhexidine RASH       Objective:   Physical Exam  Constitutional:       Appearance: Normal appearance. She is obese. Cardiovascular:      Rate and Rhythm: Normal rate and regular rhythm. Heart sounds: Normal heart sounds. Pulmonary:      Effort: Pulmonary effort is normal.      Breath sounds: Normal breath sounds. Comments: Breast-normal appearance, no masses  Abdominal:      Palpations: Abdomen is soft. There is no mass. Tenderness: There is no abdominal tenderness. Musculoskeletal:      Comments: Ankles and feet with 1+ nonpitting edema bilaterally   Lymphadenopathy:      Cervical: No cervical adenopathy. Skin:     General: Skin is warm and dry. Neurological:      Mental Status: She is alert and oriented to person, place, and time. Bilateral barefoot skin diabetic exam is normal, visualized feet and the appearance is normal.  Bilateral monofilament/sensation of both feet is normal.  Pulsation pedal pulse exam of both lower legs/feet is normal as well. Assessment & Plan:   1. Routine physical examination-patient is single, 2 adult children, she is off work primarily due to disability from severe pain and weakness neck and upper extremities. Encourage regular exercise  She saw Dr. Naeem Latham for initial visit today, planning to see dietitian. Colon cancer screening up-to-date  Mammogram up-to-date  Reviewed most recent labs. 2. Type 2 diabetes mellitus without complication, with long-term current use of insulin (HCC)-continuing Lantus 36 units daily, metformin, and started Trulicity 5.75 2 weeks ago. She had episode of hypoglycemia after falling asleep without eating dinner 1 to 2 weeks ago. At this time plan to decrease Lantus to 15 units nightly. Call if fasting blood sugars greater than 180 more than 2 times in a week.   With next refill of Trulicity plan to increase to 1.5 mg weekly. Continue current dose of metformin. Fortunately, she has noted decreased diarrhea since starting Lantus. Follow-up with me in 3 months, hemoglobin A1c at that time   3. Morbid obesity (HCC)-seen at weight loss clinic with Dr. Suhail Ying as above. Encouraged efforts. 4. Chronic pain of right knee-requesting parking placard. 5. Cervical disc disease-status post surgery with Dr. Halley Chaudhary. Now seeing Dr. Teo Forman. Reports some improvement with starting Cymbalta. 6. Essential hypertension-blood pressure controlled with current medications. No orders of the defined types were placed in this encounter. Meds This Visit:  Requested Prescriptions     Signed Prescriptions Disp Refills    Dulaglutide (TRULICITY) 1.5 KV/7.1HB Subcutaneous Solution Pen-injector 12 each 1     Sig: Inject 1.5 mg into the skin once a week. LANTUS SOLOSTAR 100 UNIT/ML Subcutaneous Solution Pen-injector 15 mL 5     Sig: Inject 15 Units into the skin nightly.  INJECT 36 UNITS SUBCUTANEOUSLY AT NIGHT       Imaging & Referrals:  None

## 2023-11-14 NOTE — TELEPHONE ENCOUNTER
[address] calling to verify if Dr. Raine Adams would like 15 units or 36 units nightly. Medication Quantity Refills Start End   LANTUS SOLOSTAR 100 UNIT/ML Subcutaneous Solution Pen-injector 15 mL 5 11/14/2023    Sig:   Inject 15 Units into the skin nightly.  INJECT 36 UNITS SUBCUTANEOUSLY AT NIGHT

## 2023-11-14 NOTE — PATIENT INSTRUCTIONS
Decrease Lantus to 15 U nightly. Increase Trulicity with next refill to 1.5 weekly    Check BS fasting in the morning on most days. Call if greater than 180 FASTING more than 2 times per week.     See me in 3 months

## 2023-11-30 ENCOUNTER — HOSPITAL ENCOUNTER (OUTPATIENT)
Dept: GENERAL RADIOLOGY | Facility: HOSPITAL | Age: 63
Discharge: HOME | End: 2023-11-30
Attending: PHYSICAL MEDICINE & REHABILITATION
Payer: MEDICAID

## 2023-11-30 ENCOUNTER — OFFICE VISIT (OUTPATIENT)
Dept: PHYSICAL MEDICINE AND REHAB | Facility: CLINIC | Age: 63
End: 2023-11-30
Payer: MEDICAID

## 2023-11-30 VITALS — BODY MASS INDEX: 55 KG/M2 | OXYGEN SATURATION: 99 % | WEIGHT: 293 LBS | HEART RATE: 75 BPM

## 2023-11-30 DIAGNOSIS — M25.511 CHRONIC RIGHT SHOULDER PAIN: ICD-10-CM

## 2023-11-30 DIAGNOSIS — G89.29 CHRONIC RIGHT SHOULDER PAIN: ICD-10-CM

## 2023-11-30 DIAGNOSIS — G89.29 CHRONIC RIGHT SHOULDER PAIN: Primary | ICD-10-CM

## 2023-11-30 DIAGNOSIS — M25.511 CHRONIC RIGHT SHOULDER PAIN: Primary | ICD-10-CM

## 2023-11-30 PROCEDURE — 73030 X-RAY EXAM OF SHOULDER: CPT | Performed by: PHYSICAL MEDICINE & REHABILITATION

## 2023-11-30 PROCEDURE — 99214 OFFICE O/P EST MOD 30 MIN: CPT | Performed by: PHYSICAL MEDICINE & REHABILITATION

## 2023-11-30 NOTE — PATIENT INSTRUCTIONS
Increase the duloxetine to 60mg daily. If it gives you stomach upset, nausea, drowsiness or dizziness decrease to 30mg. If the increased dose seems to help on next refill I can prescribe 60mg capsules. Have the Xray and start physical therapy for you shoulder.

## 2023-12-11 DIAGNOSIS — M54.12 RADICULITIS OF LEFT CERVICAL REGION: ICD-10-CM

## 2023-12-11 DIAGNOSIS — R20.2 NUMBNESS AND TINGLING IN LEFT HAND: ICD-10-CM

## 2023-12-11 DIAGNOSIS — R20.0 NUMBNESS AND TINGLING IN LEFT HAND: ICD-10-CM

## 2023-12-12 RX ORDER — DULOXETIN HYDROCHLORIDE 30 MG/1
60 CAPSULE, DELAYED RELEASE ORAL DAILY
Qty: 60 CAPSULE | Refills: 0 | Status: SHIPPED | OUTPATIENT
Start: 2023-12-12

## 2023-12-12 NOTE — TELEPHONE ENCOUNTER
Refill Request    Medication request: DULoxetine 30 MG Oral Cap DR Particles   Take 1 capsule (30 mg total) by mouth daily. LOV:11/30/2023 Arlyn Garrettse,    Due back to clinic per last office note:  \"Return in about 6 weeks (around 1/11/2024). \"  NOV: Visit date not found      [place]: 10/12/2023 #90    Urine drug screen (if applicable): N/A  Pain contract: N/A    LOV plan (if weaning or changing medications): Per Dr. Saida Davila notes: \"Increase the duloxetine to 60mg daily. If it gives you stomach upset, nausea, drowsiness or dizziness decrease to 30mg. If the increased dose seems to help on next refill I can prescribe 60mg capsules. \"          S/W patient and she stated that she is feeling better with pain except for the rotator cuff. She states she occasionally feels some stomach upset, but overall she is doing okay on the increased dose. Pt reports that she starts her physical therapy in January and is hoping this will help her shoulder pain. This RN scheduled patient for a f/u appointment w/ Dr. López Castle for two weeks after her physical therapy. This RN updated patient's Duloxetine order to reflect the dose change to 60 mg. Patient's insurance does not cover 60 mg - so updated to take (2) 30 mg capsules (60 mg total) daily #60 for a 1 month supply.

## 2023-12-27 ENCOUNTER — OFFICE VISIT (OUTPATIENT)
Dept: SURGERY | Facility: CLINIC | Age: 63
End: 2023-12-27
Payer: MEDICAID

## 2023-12-27 VITALS — BODY MASS INDEX: 48.23 KG/M2 | WEIGHT: 293 LBS | HEIGHT: 65.2 IN

## 2023-12-27 DIAGNOSIS — Z79.4 TYPE 2 DIABETES MELLITUS WITHOUT COMPLICATION, WITH LONG-TERM CURRENT USE OF INSULIN (HCC): ICD-10-CM

## 2023-12-27 DIAGNOSIS — E66.01 MORBID OBESITY WITH BMI OF 50.0-59.9, ADULT (HCC): Primary | ICD-10-CM

## 2023-12-27 DIAGNOSIS — K21.9 GASTROESOPHAGEAL REFLUX DISEASE, UNSPECIFIED WHETHER ESOPHAGITIS PRESENT: ICD-10-CM

## 2023-12-27 DIAGNOSIS — E11.9 TYPE 2 DIABETES MELLITUS WITHOUT COMPLICATION, WITH LONG-TERM CURRENT USE OF INSULIN (HCC): ICD-10-CM

## 2023-12-27 DIAGNOSIS — I10 PRIMARY HYPERTENSION: ICD-10-CM

## 2023-12-27 DIAGNOSIS — E78.5 DYSLIPIDEMIA: ICD-10-CM

## 2023-12-27 PROCEDURE — 3008F BODY MASS INDEX DOCD: CPT

## 2023-12-27 PROCEDURE — 97802 MEDICAL NUTRITION INDIV IN: CPT

## 2023-12-27 NOTE — PATIENT INSTRUCTIONS
Goals: 1. Keep a food record, My Net Diary, select the macros dashboard. 2.  Strive to consume at least 4-6 meals/snacks per day. Include protein and produce when you eat. Aim for 58-68 grams of protein per day or 20-25% of overall calories. Try to keep the carbohydrates at 120 grams per day or less. (Try cauliflower rice, zucchini noodles or edamame or black bean spaghetti). 3.  Practice mindful eating strategies, chew food 20-30 times before swallowing. Make the meals last 30 minutes. 4.  Aim for 64 oz per day of water. (Try Protein water, adding True Lemon, Crystal Light). 5.  Taper caffeine and carbonation. 6.  Exercise with a goal of 30 minutes per day for exercise (for example,walking). 7.  Strength training 10 minutes 3 days per week.    (Gym Rat no more-18 at home exercises) or (7 minute workout song on YouTube)

## 2024-01-01 NOTE — TELEPHONE ENCOUNTER
Refill passed per [place] [employer], [facility] protocol, however, listed as historical. Please advise. Thanks.     Requested Prescriptions   Pending Prescriptions Disp Refills    METFORMIN 500 MG Oral Tab [Pharmacy Med Name: metFORMIN HCl 500 MG Oral Tablet] 360 tablet 0     Sig: TAKE 2 TABLETS BY MOUTH TWICE DAILY WITH MEALS       Diabetes Medication Protocol Passed - 12/31/2023  1:31 PM        Passed - Last A1C < 7.5 and within past 6 months     Lab Results   Component Value Date    A1C 6.7 (A) 10/06/2023             Passed - In person appointment or virtual visit in the past 6 mos or appointment in next 3 mos     Recent Outpatient Visits              5 days ago Morbid obesity with BMI of 50.0-59.9, adult ([facility] [E66.01, Z68.43]    [facility]    Office Visit    1 month ago Chronic right shoulder pain    [facility], [address] [facility] [facility] [facility], [facility] Thee Adams DO    Office Visit    1 month ago Routine physical examination    [address] [facility], Juve John MD    Office Visit    1 month ago Type 2 diabetes mellitus without complication, with long-term current use of insulin ([facility]    Chasity Warner MD    Office Visit    2 months ago Numbness and tingling in left hand    [facility], [address], [facility] Thee Adams DO    Office Visit          Future Appointments         Provider Department Appt Notes    In 2 weeks Estevan Putty, [address]     In 3 weeks Estevan Putty, [address]     In 4 weeks Danielle Schaefer, [address]     In 4 weeks Patrice Husbands, [facility], [address] [facility], [place] Follow up Appt    In 4 weeks Estevan Putty, [address]     In 1 month Mantee Putty, [address]     In 1 month Charly Hernesto Crow, [address]     In 1 month Elo Malone, [address]     In 1 month Jazmyne Solo, [address]     In 2 months Av Haley MD [address], [address], [address]     In 2 months Olinda Lipscomb RD [facility], [address], [place] [address] or [facility] > 50     GFR Evaluation  EGFRCR: 98 , resulted on 8/5/2023          Passed - GFR in the past 12 months           Recent Outpatient Visits              5 days ago Morbid obesity with BMI of 50.0-59.9, adult ([facility] [E66.01, Z68.43]    [address], [address], [facility]    Office Visit    1 month ago Chronic right shoulder pain    [facility], [address], [place], [place]    Office Visit    1 month ago [facility]ine physical [facility]    Office Visit    1 month ago Type 2 diabetes mellitus without complication, with long-term current use of insulin [facility])    Jesica Tolliver MD    Office Visit    2 months ago Numbness and tingling in left hand    [address], [address], [facility],     Office Visit          Future Appointments         Provider Department Appt Notes    In 2 weeks Jazmyne Solo, [address]     In 3 weeks Jazmyne Solo, [address]     In 4 weeks Elo Malone, [address]     In 4 weeks Autumn Dumont, [address], [address], [place] Follow up Appt    In 4 weeks Jazmyne Solo, [address]     In 1 month Jazmyne Solo, [address]     In 1 month Jazmyne Solo, [facility]     In 1 month Elo Malone, [facility] In 1 month Jazmyne Solo, [address] [facility]     In 2 months Av Haley MD [address], [address] [facility]     In 2 months Olinda Lipscomb, [address], [address] [facility],[address], [facility]

## 2024-01-16 ENCOUNTER — APPOINTMENT (OUTPATIENT)
Dept: PHYSICAL THERAPY | Facility: HOSPITAL | Age: 64
End: 2024-01-16
Attending: PHYSICAL MEDICINE & REHABILITATION
Payer: MEDICAID

## 2024-01-19 DIAGNOSIS — M54.12 RADICULITIS OF LEFT CERVICAL REGION: ICD-10-CM

## 2024-01-19 DIAGNOSIS — R20.2 NUMBNESS AND TINGLING IN LEFT HAND: ICD-10-CM

## 2024-01-19 DIAGNOSIS — R20.0 NUMBNESS AND TINGLING IN LEFT HAND: ICD-10-CM

## 2024-01-22 RX ORDER — DULOXETIN HYDROCHLORIDE 30 MG/1
60 CAPSULE, DELAYED RELEASE ORAL DAILY
Qty: 60 CAPSULE | Refills: 0 | Status: SHIPPED | OUTPATIENT
Start: 2024-01-22

## 2024-01-22 NOTE — TELEPHONE ENCOUNTER
Refill Request     Medication request: DULoxetine 30 MG Oral Cap DR Particles   Take 1 capsule (30 mg total) by mouth daily.               LOV:11/30/2023 Jaison Russell DO   Due back to clinic per last office note:    NOV: 1/30/2024 Jaison Russell DO      [place]: 12/14/23 #30    Urine drug screen (if applicable): na  Pain contract: na    LOV plan (if weaning or changing medications): \"Increase the duloxetine to 60mg daily. If it gives you stomach upset, nausea, drowsiness or dizziness decrease to 30mg. If the increased dose seems to help on next refill I can prescribe 60mg capsules. \"     RN spoke to patient at last refill. See notes for 12/11/23 encounter

## 2024-01-25 ENCOUNTER — OFFICE VISIT (OUTPATIENT)
Dept: PHYSICAL THERAPY | Facility: HOSPITAL | Age: 64
End: 2024-01-25
Attending: PHYSICAL MEDICINE & REHABILITATION
Payer: MEDICAID

## 2024-01-25 DIAGNOSIS — M25.511 CHRONIC RIGHT SHOULDER PAIN: Primary | ICD-10-CM

## 2024-01-25 DIAGNOSIS — G89.29 CHRONIC RIGHT SHOULDER PAIN: Primary | ICD-10-CM

## 2024-01-25 PROCEDURE — 97110 THERAPEUTIC EXERCISES: CPT

## 2024-01-25 PROCEDURE — 97162 PT EVAL MOD COMPLEX 30 MIN: CPT

## 2024-01-25 NOTE — PROGRESS NOTES
UPPER EXTREMITY EVALUATION:   Nicolette Nation    1960  Referring Physician:  Jaison Russell  Diagnosis: Chronic right shoulder pain (M25.511,G89.29)   Initial Evaluation Date: 2024  Date of Surgery: None for this diagnosis   Authorized # of visits TBD  Through date: 2024    Precautions/Hx: ACDF C4-7,     Past medical history was reviewed with Nicolette.   Past Medical History:   Diagnosis Date    Back problem     previous disc problem    Cervical disc herniation     involving C6-C7    Cervical polyp 2021    Chronic diarrhea 2020    Colon polyp 2019    COVID 2022    Fever chills nausea. No hospitalization or residual    Diabetes (HCC) 2014    Diverticular disease     Essential hypertension     Hemorrhoids     High blood pressure     High cholesterol     History of COVID-19 2022    Hyperlipidemia     IBS (irritable bowel syndrome)     Incontinence     stool    Morbid obesity with BMI of 50.0-59.9, adult (HCC)     Myopia of both eyes 2015    Osteoarthritis     Postmenopause     at age 50-54    Subclinical hypothyroidism 2010    subclinical hypothyroidism, primary.  3/2010 trial of Synthroid, no improvement sx, d/c'd    Varicella     chicken pox    Visual impairment     glasses     Past Surgical History:   Procedure Laterality Date      , 1991    x2     CHOLECYSTECTOMY      COLONOSCOPY N/A 2019    Procedure: COLONOSCOPY;  Surgeon: Marcus Shah MD;  Location: [facility] ENDOSCOPY    COLONOSCOPY N/A 2021    Procedure: COLONOSCOPY;  Surgeon: Marcus Shah MD;  Location: [facility] ENDOSCOPY    ENDOMETRIAL BIOPSY - JAR(S): 2  2020    HYSTERECTOMY  10/2021    no associated bleeding complication    HYSTEROSCOPY      PROCEDURE:  Cervical polypectomy, hysteroscopy, MyoSure resection of endometrial polyp, and dilation and curettage.     OTHER  2022    Cervical 5-cervical 6 anterior corpectomy with cervical 4-cervical 5, cervical  Patient ID: Ivonne Giron is a 51 y.o. female.    Pt presents today to evaluate medication review.    CHIEF COMPLAINT:  Patient reports pain is abdominal pain and right ankle/foot.     HPI:  She takes tramadol 50 mg up to 8 per day along with low dose gabapentin.  She had an ankle fracture in Feb and then surgery and now with crutches and had other pain medications for that.     She does not talk much about the right ankle pain as it is casted to the mid calf but cumbersome with using crutches and having some pain due to issues with that.    She continues with her usual upper abdominal pain and the tramadol helps keep that under control. She denies any problems with bowel or bladder.    PAST PAIN HISTORY  Medications previously tried -bentyl makes her sleepy, butrans patch,hydrocodone post op   Other modalities completed -neurology for headaches  Past Injection List   TPI  ICNB  Pain Assessment: 0-10  Pain Score: 8(worst 10; best 5)  Pain Type: Acute pain  Pain Location: Ankle  Pain Radiating Towards: around to anterior left abdomen  Pain Descriptors: Throbbing  Pain Frequency: Constant/continuous  Interference on Function: chores; driving; shopping  Pain Onset: Awakened from sleep  Clinical Progression: Not changed  Pain Interventions: Medication (See MAR), Repositioned, Rest, Elevated   There were no vitals taken for this visit.      Current Outpatient Medications:   •  traMADoL (ULTRAM 50) 50 mg tablet, TAKE 1 TO 2 TABLETS BY MOUTH EVERY 4 HOURS AS NEEDED . DO NOT EXCEED 8 PER 24 HOURS FOR  PAIN  SCALE  8-10/10, Disp: 240 tablet, Rfl: 0  •  gabapentin (NEURONTIN) 300 mg capsule, TAKE 1 CAPSULE BY MOUTH THREE TIMES DAILY, Disp: 90 capsule, Rfl: 3  •  PARoxetine (PAXIL) 10 mg tablet, Take 10 mg by mouth every morning, Disp: , Rfl:   •  rimegepant (Nurtec ODT) 75 mg tablet,disintegrating, Take 75 mg by mouth daily as needed (migraine) Max 1 tab per 24 hours, Disp: 8 tablet, Rfl: 11  •   5-cervical 6, cervical 6-cervical 7 anterior fusion, placement of cage and plate    TUBAL LIGATION      s/p BTL        SUBJECTIVE:   PATIENT SUMMARY:  Nicolette Nation is a 63 year old y/o female who presents to physical therapy today with complaints of R shoulder pain.  Pt notes pain from the ant med shoulder to sup to post.  She will have radiation of pain up the R side of the neck.   She has hx of L UE numbness and tingling.  She had a C4-7 ACDF in 2022 without full resolution of symptoms.  She states that she has difficulty reaching into her refrigerator.  She does some exercises w soup cans.   History of current condition:6 mo hx of R shoulder pain  Current functional limitations include, but are not limited to reaching, dressing    Nicolette describes prior level of function as able to perform all desired ADL's without difficulty in the R shoulder.  Has limitations in L since prior to surgery.  Pt goals included in as therapy goals below.      ASSESSMENT   Pt presents with subjective complaints and functional limitations as noted above. Pt's function and objective finding are consistent with physician's diagnosis.  Pt presents with the following limitations: postural alterations; (+) impingement testing; decreased sensation to light touch throughout L UE;  decreased cervical and R>>L shoulder ROM; decreased R shldr strength; and decreased B upper quadrant flexibility. .    Pt and PT discussed evaluation findings, pathology, POC and HEP.  Pt voiced understanding and performs HEP correctly without reported pain. Skilled Physical Therapy is medically necessary to address the above impairments and reach functional goals.    In agreement with functional score and clinical rationale, this evaluation involved Moderate Complexity decision making due to 1-2 personal factors/comorbidities, 3 body structures involved/activity limitations, and evolving symptoms including changing pain levels.    SUBJECTIVE:     Visit count  butalbital-acetaminophen-caff (FIORICET, ESGIC) -40 mg, Take 1 tablet by mouth See administration instructions, Disp: , Rfl:   •  galcanezumab-gnlm (Emgality Pen) 120 mg/mL pen, Inject 120 mg under the skin every 28 (twenty-eight) days, Disp: 1 pen, Rfl: 11  •  PARoxetine (PAXIL) 40 mg tablet, Take 40 mg by mouth every morning, Disp: , Rfl:   •  alendronate (FOSAMAX) 70 mg tablet, Take 70 mg by mouth once a week, Disp: , Rfl:   •  potassium chloride (KLOR-CON M20) 20 mEq CR tablet, Take 20 mEq by mouth daily, Disp: , Rfl: 1  •  M-Libia Plus 27 mg iron- 1 mg tablet, Take 1 tablet by mouth daily, Disp: , Rfl:   •  omeprazole (PriLOSEC) 20 mg capsule, Take 20 mg by mouth daily, Disp: , Rfl:   •  cetirizine (ZyrTEC) 10 mg tablet, Take 10 mg by mouth as needed, Disp: , Rfl:   •  atorvastatin (LIPITOR) 40 mg tablet, Take 40 mg by mouth nightly, Disp: , Rfl:   •  ibuprofen (MOTRIN IB) 200 mg tablet, Take 200 mg by mouth as needed.  , Disp: , Rfl:   •  prenatal 25-iron fum-folic-dha (PRENATAL-1) -200 mg-mcg-mg capsule, Take 1 capsule by mouth daily., Disp: , Rfl:   •  calcium carbonate-vitamin D3 (CALCIUM 500 WITH D) 500 mg(1,250mg) -400 unit tablet, Take 1 tablet by mouth daily.  , Disp: , Rfl:   •  losartan (COZAAR) 100 mg tablet, Take by mouth daily.  , Disp: , Rfl:      Review of Systems   Gastrointestinal: Positive for abdominal pain.   Musculoskeletal: Positive for arthralgias, gait problem and myalgias.         Past Medical History:   Diagnosis Date   • Abdominal pain, chronic, generalized    • Accelerated essential hypertension    • Chronic pain disorder    • Intercostal neuropathic pain    • Migraine    • Pain     surgical sight pain since gallbladder surgery        Past Surgical History:   Procedure Laterality Date   • CHOLECYSTECTOMY         Imaging:  No results found.    Physical Exam  Vitals and nursing note reviewed.   Constitutional:       General: She is not in acute distress.     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 hand w numbness, prior accounts payable  Home environment: apartment alone  Stairs: 1 step in  Unexplained wt loss: no  Blood Thinners: no  Diabetes: yes, 10 years  Latex Allergy: no  Pacemaker: no       OBJECTIVE:     Objective Initial Evaluation Data 2024:    QuickDASH Outcome Score  Score: 84.09 % (2024 10:01 AM)        Posture:  B genu valgus R>L, B shoulder protraction, min forward head position, min L trunk rotation      Shldr impingement testing 2024       Brower R (+)   Brower L (-)   Neer's R (+)   Neer's L (-)       Dermatomes 2024       Lat neck(C4) R wnl   Lat neck (C4) L DULL   Ant deltoid (C5) R wnl   Ant deltoid (C5) L DULL   Dorsal prox thumb (C6) R wnl   Dorsal prox thumb (C6) L DULL   Dorsal prox 3 digit (C7) R wnl   Dorsal prox 3 digit (C7) L DULL   Dorsal 4-5 digits (C8) R wnl   Dorsal 4-5 digits (C8) L DULL   Med prox elbow (T1) R wnl   Med prox elbow (T1) L DULL        UE Neural Glides 2024   ULTT 1 R wnl   ULTT 1 L wnl   ULTT 2 R wnl   ULTT 2 L  wnl   ULTT 3 R wnl   ULTT 3 L wnl       Cervical ROM 2024   Fwd head 24   Flexion 60 nl 29   Extension 70 nl 31   R SB 45 nl 25   L SB 45 nl 16   R Rotation 80 nl 35   L Rotation 80 nl 33   *pain limiting     Shoulder ROM 2024   Flex R 180 nl 75*   Flex L 180 nl 158   Extn R 50 nl 23*   Extn L 50 nl 55   Abd R 180 nl 60*   Abd L 180 nl 158   ER R 90 nl 47* @ 75 abd   ER L 90 nl 83   IR R 80 nl 21* @ 75 abd   IR L 80 nl 60  is well-developed.   HENT:      Head: Normocephalic.   Eyes:      Conjunctiva/sclera: Conjunctivae normal.   Cardiovascular:      Rate and Rhythm: Normal rate.   Pulmonary:      Effort: Pulmonary effort is normal. No respiratory distress.   Abdominal:      General: There is no distension.          Comments: No distension today    Musculoskeletal:         General: Tenderness present. No deformity. Normal range of motion.      Cervical back: Normal range of motion and neck supple.        Legs:       Comments: Right foot in post op show and cast to mid calf     Skin:     General: Skin is warm and dry.      Findings: No rash.   Neurological:      Mental Status: She is alert and oriented to person, place, and time.   Psychiatric:         Behavior: Behavior normal.         Thought Content: Thought content normal.         Judgment: Judgment normal.         ASSESSMENT    1. Long-term current use of opiate analgesic    2. Abdominal pain, chronic, right upper quadrant    3. Closed fracture of right ankle with routine healing        PLAN    Medication Therapy: stay on tramadol #240 and refills sent.  Injections: none  Referrals placed: none  Consider for future treatments: unsure she will need to recover from ankle fracture, we have always talked about doing another GI visit and/or going to [facility].    PDMP reviewed.  MME = 40   Narcan not done. Aberrant behavior = no. Affect/Mood = about the same. ADL = with partial assistance. Analgesia = adequate. Adverse Side Effects = no. Goals have been addressed and discussed with this patient.  The patient was advised that if at any time they feel oversedated to stop the medication and seek emergency care. UDS and contract November 2020    DISCUSSION  Today's plan was a combined effort between the patient and myself. The patient understood the plan, verbally consented, and agreed to participate. An After Visit Summary with special instructions/information regarding today's office    *pain limiting    Elbow ROM 1/25/2024   Flex R wnl   Flex L wnl   Extn R wnl   Extn L wnl   Supination R wnl   Supination L wnl   Pronation R wnl   Pronation L wnl       MMT 5/5 1/25/2024   C5 shldr abd R 2+*   Shldr abd L 5   C6 biceps R 5-   Biceps L 5   C7 Triceps R 4+*   Triceps L 4* elbow pain   T1 Interossei R 4   T1 Interossei L 4   ER R 4*   ER L 5   IR R 4+   IR L 5   C8 EPL R 5   EPL L 5   *pain limiting    UE flexibility 1/25/2024   Pectorals R Mod-max   Pectorals L mod   UT R Mod-max   UT L mod   Scalenes R Mod-max   Scalenes L mod     Imaging:   PROCEDURE: XR SHOULDER, COMPLETE (MIN 2 VIEWS), RIGHT (CPT=73030)     COMPARISON: None.     INDICATIONS: Pain to the right shoulder x3 months without injury.     TECHNIQUE: 3 views were obtained.       FINDINGS:  BONES: No acute fracture or dislocation is apparent.  There is calcific tendinitis of the right rotator cuff.  Acromioclavicular and glenohumeral joints appear well maintained.  Mineralization is within normal limits.  SOFT TISSUES: Negative for visible soft tissue swelling or radiopaque foreign body.    EFFUSION: None visible.    OTHER: Partially imaged anterior cervical discectomy and fusion.               Impression   CONCLUSION:     Calcific tendinitis of the right rotator cuff.  Otherwise, unremarkable right shoulder radiographs.          elm-remote     Dictated by (CST): Jose Jones MD on 11/30/2023 at 3:20 PM      Finalized by (CST): Jose Jones MD on 11/30/2023 at 3:22 PM       Treatment performed this date:    Therapeutic Exercise:  Visit #   1/8    Position Exercise HEP 1/25/2024    Supine B shldr flex hands clasped H X     TB B shldr ER H X 10x red    Sidelying Sleeper stretch H X    Sitting B shldr flex hands clasped H X     Pulleys  X 3 min     TB B shldr ER  H X 5x red    Neuro Re-ed  X shldr anatomy & biomechanics, impingement    H = HEP. Pt given copies of this exercise for home program.  X = Exercises done this date - pt  visit was given to the patient (see patient instructions).     The diagnostic assessment has been reviewed. Patient and/or patient's surrogate has expressed a good understanding of the assessment and recommendations from today's visit. There are no apparent barriers to understanding this information. Patient’s questions were addressed.  I have reviewed the patients current medications using all immediate resources available.     Patient has been advised to contact this office or the answering service with questions or concerns that may arise. Patient has been advised to follow up with Primary Care Provider for general medical needs.     A total of 15 minutes was required for this visit. Greater than 50% of this time was spent in direct face to face communication with the patient and/or surrogate in order to provide counseling regarding meds and new ankle fracture and med refills.    Dragon voice recognition program was used to aid in this documentation which might generate inaccuracies despite efforts made to avoid them.  Please take it into context and contact the provider with any questions.    Roberto Elliott, CNP   verbalized understanding and demonstrated competence. All exercises done B unless otherwise indicated.  Pt advised to discontinue exercises that increase pain and to call or return to therapist to discuss.  Each intervention above is specifically prescribed to address the patients identified impairments, activity limitations, and participation restrictions.    ASSESSMENT     Physical Therapy Goals:  From 1/25/2024 to 4/24/2024  - Created with patient input during initial evaluation  1. Pt will be independent in beginning level of HEP for stretching, posture and strengthening.   2. Pt will be able to don/doff shirt without increased symptoms.  3. Pt will be able to doff/don jacket without increased symptoms.  4. Pt will be able to reach into upper cabinet without increased symptoms.  5. Pt will be able to pull up pants and underwear without increased symptoms.     PLAN OF CARE:      Frequency/Duration: Patient will be seen for 1-2x/week or a total of 18 visits over a 90 day period. Treatment will include: Manual Therapy; Therapeutic Exercises; Neuromuscular Re-education; Therapeutic Activity; Ultrasound; Electrical Stim; Traction; Patient education; Home exercise program instruction.    Education or treatment limitation: None  Rehab Potential: good    Patient was advised of these findings, precautions, goals of treatment, plan of care, beginning self care and treatment options and has agreed to actively participate in planning and for this course of care.    Charges: PT Chloe 2, TE1    Total Timed treatment: 12 min      Total Treatment Time: 60 min    Thank you for your referral. Please co-sign or sign and return this letter via fax as soon as possible to [phone]. If you have any question please contact me at Dept: [phone].    Sincerely,  Electronically signed by therapist: Lizette Ware PT    Physician’s certification required: Yes  I certify the need for these services furnished under this plan of  treatment and while under my care.    X______________________________________________ Date________________  Certification From: 1/25/2024      To: 4/24/2024        ____________________________________________________________________________________________    21st Century Cures Act Notice to Patient: Medical documents like this are made available to patients in the interest of transparency. However, be advised this is a medical document and it is intended as [id number] communication between your medical providers. This medical document may contain abbreviations, assessments, medical data, and results or other terms that are unfamiliar. Medical documents are intended to carry relevant information, facts as evident, and the clinical opinion of the practitioner. As such, this medical document may be written in language that appears blunt or direct. You are encouraged to contact your medical provider and/or [facility] if you have any questions about this medical document.

## 2024-01-29 ENCOUNTER — OFFICE VISIT (OUTPATIENT)
Dept: PHYSICAL THERAPY | Facility: HOSPITAL | Age: 64
End: 2024-01-29
Attending: PHYSICAL MEDICINE & REHABILITATION
Payer: MEDICAID

## 2024-01-29 PROCEDURE — 97140 MANUAL THERAPY 1/> REGIONS: CPT

## 2024-01-29 PROCEDURE — 97530 THERAPEUTIC ACTIVITIES: CPT

## 2024-01-29 PROCEDURE — 97110 THERAPEUTIC EXERCISES: CPT

## 2024-01-29 NOTE — PROGRESS NOTES
Diagnosis:   Chronic right shoulder pain (M25.511,G89.29)       Referring Provider: Yvonne  Date of Evaluation:   1/25/2024    Precautions:  Hx: ACDF C4-7,   Next MD visit: 1/30  Date of Surgery: n/a   Insurance Primary/Secondary: [employer] MEDICAID / N/A       # Auth Visits: 10   Total Timed Treatment: 45 min  Date POC Expires: 3/25  Total Treatment time: 45 min       Charges: There Ex 2, There Act 1, Man There 1       Treatment Number: 2    Subjective: Pt states she was taz the HEP well until yesterday when she noted increased pain in the anterior shoulder following the HEP, which she feels she may have overdone. She also notes numbness and tingling in the L hand following the HEP.  Pain: 8/10     Objective/Goals: Rx: flow chart   1/29/2024   Visit: 2       HEP Hold on seated AA flexion  Hold on Sleeper stretch  Post capsule stretch        Therapeutic Exercises Review of seated and supine AA flex  ER sidelying 1# 10x2  Review of Sleeper stretch- provoking so d/c'ed  Post capsule stretch x 3  MR supine 10x2  Tried min assist for supine pressup for flexors x 3 - d/c'ed due to pain.   25'        Manual PROM all planes supine   10'      Neuro ReEducation       Therapeutic Activity Inst in avoiding shrug with functional reach and HEP  Inst in avoiding positions and activities which increase risk of impingement or RC strain  10'      Modalities         HEP to date: Supine AAROM flex hands clasped, (B) ER with yellow band, post capsule stretch    Education: use of CP     Assessment: pt presents with high level of symptomatic irritability. She notes pain with active abd seated at 30 degrees. Guarding with end-range PROM ER; all others empty end-feel.      Plan: Review post capsule stretch. Trial of row with band, GH ext with scap stab with band, IR with band. Cont with emphasis on scap stab, IR/ER strength, and ROM.

## 2024-01-30 ENCOUNTER — OFFICE VISIT (OUTPATIENT)
Dept: PHYSICAL MEDICINE AND REHAB | Facility: CLINIC | Age: 64
End: 2024-01-30
Payer: MEDICAID

## 2024-01-30 ENCOUNTER — OFFICE VISIT (OUTPATIENT)
Dept: PHYSICAL THERAPY | Facility: HOSPITAL | Age: 64
End: 2024-01-30
Attending: PHYSICAL MEDICINE & REHABILITATION
Payer: MEDICAID

## 2024-01-30 VITALS — BODY MASS INDEX: 53 KG/M2 | WEIGHT: 293 LBS | OXYGEN SATURATION: 97 % | HEART RATE: 79 BPM

## 2024-01-30 DIAGNOSIS — M54.12 RADICULITIS OF LEFT CERVICAL REGION: ICD-10-CM

## 2024-01-30 DIAGNOSIS — M75.31 CALCIFIC TENDONITIS OF RIGHT SHOULDER: Primary | ICD-10-CM

## 2024-01-30 PROCEDURE — 97112 NEUROMUSCULAR REEDUCATION: CPT

## 2024-01-30 PROCEDURE — 99214 OFFICE O/P EST MOD 30 MIN: CPT | Performed by: PHYSICAL MEDICINE & REHABILITATION

## 2024-01-30 PROCEDURE — 97140 MANUAL THERAPY 1/> REGIONS: CPT

## 2024-01-30 PROCEDURE — 97110 THERAPEUTIC EXERCISES: CPT

## 2024-01-30 PROCEDURE — 97530 THERAPEUTIC ACTIVITIES: CPT

## 2024-01-30 NOTE — PROGRESS NOTES
Nicolette Nation    8/6/1960  Referring Physician:  Jaison Russell  Diagnosis: Chronic right shoulder pain (M25.511,G89.29)   Initial Evaluation Date: 1/25/2024  Date of Surgery: None for this diagnosis   Authorized # of visits TBD  Through date: 4/24/2024    Precautions/Hx: ACDF C4-7,     SUBJECTIVE:     Pt reports that she has had some increase in her L UE tingling w trying to do her exercises.  She states that she is sore today.  She feels that she is still recovering from doing too much on Sunday.      Visit count 4/24/2024 1/8 2/10 3/10   Date 1/25/2024 1/29/2024 1/30/2024    R shoulder      Pain Range 5 to 10/10 8/10 2-8/10   Pain Ave 5-6/10 --- 5/10        OBJECTIVE:     Treatment performed this date:    Therapeutic Exercise:  Visit #   1/8 2/10 3/10   Position Exercise HEP 1/25/2024 1/29/2024 1/30/2024    Supine B shldr flex hands clasped H X X      TB B shldr ER H X 10x red      Press ups for flexors   X 3x dc'd pain     R shldr IR/ER    X    Sidelying Sleeper stretch  X      Shldr ER   X 1# 10x2    Sitting B shldr flex hands clasped H X  X     Pulleys  X 3 min  X 5 min    TB B shldr ER  H X 5x red  X 7x red    Post capsule stretch H  X 3x X 4x   Ther Act Function   nst in avoiding shrug with functional reach and HEP. Inst in avoiding positions and activities which increase risk of impingement or RC strain  X reaching control and awareness   Neuro Re-ed  X shldr anatomy & biomechanics, impingement  X    Man Tx PROM all planes supine   X  X     CR/RI    X R shldr IR/ER   H = HEP. Pt given copies of this exercise for home program.  X = Exercises done this date - pt verbalized understanding and demonstrated competence. All exercises done B unless otherwise indicated.  Pt advised to discontinue exercises that increase pain and to call or return to therapist to discuss.  Each intervention above is specifically prescribed to address the patients identified impairments, activity limitations, and participation  restrictions.    ASSESSMENT     Pt continues to have c/o R shoulder pain.  She is working to pace her activity and exercises. Pt able to progress R shoulder flex ROM to 129 deg after use of pulleys.    Pt educated on strengthening concept of microfiber tears and neural regrowth occurring normally causing muscle soreness.  Advised pt this is normal.   Recommended resting from the exercise for a day, then returning to performance.   Pt educated on further review of self care for avoidance of impingement.  Pt did well with man tx for rotation ROM w addition of CR/RI which made further ROM gains.  Pt advised this will also aid in muscle activation into new available ROM.    Physical Therapy Goals:  From 1/25/2024 to 4/24/2024  - Created with patient input during initial evaluation  1. Pt will be independent in beginning level of HEP for stretching, posture and strengthening.   2. Pt will be able to don/doff shirt without increased symptoms.  3. Pt will be able to doff/don jacket without increased symptoms.  4. Pt will be able to reach into upper cabinet without increased symptoms.  5. Pt will be able to pull up pants and underwear without increased symptoms.     PLAN OF CARE:      Continue PT per original plan for therapeutic exercises, posture retraining, therapeutic activities, manual treatment, neuromuscular reeducation, therapeutic pain neuroscience education, patient education, self care home management, home exercise program, and modalities as needed.    Charged Units Units Minutes   Ther Ex 1 15   Neuro 1 8   Ther Activity 1 10   Gait     Man Tx 1 12        Total Timed  45   Total Tx Time  45         ____________________________________________________________________________________________    21st Century Cures Act Notice to Patient: Medical documents like this are made available to patients in the interest of transparency. However, be advised this is a medical document and it is intended as setf-jj-drwp  communication between your medical providers. This medical document may contain abbreviations, assessments, medical data, and results or other terms that are unfamiliar. Medical documents are intended to carry relevant information, facts as evident, and the clinical opinion of the practitioner. As such, this medical document may be written in language that appears blunt or direct. You are encouraged to contact your medical provider and/or [facility] Patient Experience if you have any questions about this medical document.     Objective Initial Evaluation Data 1/25/2024:    QuickDASH Outcome Score  Score: 84.09 % (1/18/2024 10:01 AM)        Posture:  B genu valgus R>L, B shoulder protraction, min forward head position, min L trunk rotation      Shldr impingement testing 1/25/2024       Brower R (+)   Brower L (-)   Neer's R (+)   Neer's L (-)       Dermatomes 1/25/2024       Lat neck(C4) R wnl   Lat neck (C4) L DULL   Ant deltoid (C5) R wnl   Ant deltoid (C5) L DULL   Dorsal prox thumb (C6) R wnl   Dorsal prox thumb (C6) L DULL   Dorsal prox 3 digit (C7) R wnl   Dorsal prox 3 digit (C7) L DULL   Dorsal 4-5 digits (C8) R wnl   Dorsal 4-5 digits (C8) L DULL   Med prox elbow (T1) R wnl   Med prox elbow (T1) L DULL        UE Neural Glides 1/25/2024   ULTT 1 R wnl   ULTT 1 L wnl   ULTT 2 R wnl   ULTT 2 L  wnl   ULTT 3 R wnl   ULTT 3 L wnl       Cervical ROM 1/25/2024   Fwd head 24   Flexion 60 nl 29   Extension 70 nl 31   R SB 45 nl 25   L SB 45 nl 16   R Rotation 80 nl 35   L Rotation 80 nl 33   *pain limiting     Shoulder ROM 1/25/2024   Flex R 180 nl 75*   Flex L 180 nl 158   Extn R 50 nl 23*   Extn L 50 nl 55   Abd R 180 nl 60*   Abd L 180 nl 158   ER R 90 nl 47* @ 75 abd   ER L 90 nl 83   IR R 80 nl 21* @ 75 abd   IR L 80 nl 60   *pain limiting    Elbow ROM 1/25/2024   Flex R wnl   Flex L wnl   Extn R wnl   Extn L wnl   Supination R wnl   Supination L wnl   Pronation R wnl   Pronation L wnl       MMT  5/5 1/25/2024   C5 shldr abd R 2+*   Shldr abd L 5   C6 biceps R 5-   Biceps L 5   C7 Triceps R 4+*   Triceps L 4* elbow pain   T1 Interossei R 4   T1 Interossei L 4   ER R 4*   ER L 5   IR R 4+   IR L 5   C8 EPL R 5   EPL L 5   *pain limiting    UE flexibility 1/25/2024   Pectorals R Mod-max   Pectorals L mod   UT R Mod-max   UT L mod   Scalenes R Mod-max   Scalenes L mod     Imaging:   PROCEDURE: XR SHOULDER, COMPLETE (MIN 2 VIEWS), RIGHT (CPT=73030)     COMPARISON: None.     INDICATIONS: Pain to the right shoulder x3 months without injury.     TECHNIQUE: 3 views were obtained.       FINDINGS:  BONES: No acute fracture or dislocation is apparent.  There is calcific tendinitis of the right rotator cuff.  Acromioclavicular and glenohumeral joints appear well maintained.  Mineralization is within normal limits.  SOFT TISSUES: Negative for visible soft tissue swelling or radiopaque foreign body.    EFFUSION: None visible.    OTHER: Partially imaged anterior cervical discectomy and fusion.               Impression   CONCLUSION:     Calcific tendinitis of the right rotator cuff.  Otherwise, unremarkable right shoulder radiographs.          elm-remote     Dictated by (CST): Jose Jones MD on 11/30/2023 at 3:20 PM      Finalized by (CST): Jose Jones MD on 11/30/2023 at 3:22 PM

## 2024-02-01 ENCOUNTER — OFFICE VISIT (OUTPATIENT)
Dept: PHYSICAL THERAPY | Facility: HOSPITAL | Age: 64
End: 2024-02-01
Attending: PHYSICAL MEDICINE & REHABILITATION
Payer: MEDICAID

## 2024-02-01 PROCEDURE — 97110 THERAPEUTIC EXERCISES: CPT

## 2024-02-01 PROCEDURE — 97530 THERAPEUTIC ACTIVITIES: CPT

## 2024-02-01 PROCEDURE — 97140 MANUAL THERAPY 1/> REGIONS: CPT

## 2024-02-01 NOTE — PROGRESS NOTES
Nicolette Nation    8/6/1960  Referring Physician:  Jaison Russell  Diagnosis: Chronic right shoulder pain (M25.511,G89.29)   Initial Evaluation Date: 1/25/2024  Date of Surgery: None for this diagnosis   Authorized # of visits TBD  Through date: 4/24/2024    Precautions/Hx: ACDF C4-7,     SUBJECTIVE:     Pt reports that she did laundry yesterday and states that she was working to control her shoulder position while lifting.  Pt notes that she was able to lie on her side for 15 min without pain and added sleeper stretches back into her HEP.  She states that she was able to push the vacuum  with the R arm yesterday    Visit count 4/24/2024 1/8 2/10 3/10 4/10   Date 1/25/2024 1/29/2024 1/30/2024 2/1/2024    R shoulder       Pain Range 5 to 10/10 8/10 2-8/10 2-4/10   Pain Ave 5-6/10 --- 5/10 3-4/10        OBJECTIVE:     Treatment performed this date:    Therapeutic Exercise:  Visit #   2/10 3/10 4/10   Position Exercise HEP 1/29/2024 1/30/2024 2/1/2024    Supine B shldr flex hands clasped H X   X 10x    TB B shldr ER H   X 10x2 red    TB B shldr abd H   X 10x yellow    Press ups for flexors  X 3x dc'd pain      R shldr IR/ER   X  x    Serratus ant press    X 10x CGA   Sidelying Sleeper stretch        Shldr ER  X 1# 10x2     Sitting B shldr flex hands clasped H  X      Pulleys   X 5 min X 5 min    TB B shldr ER  H  X 7x red 10x red    Post capsule stretch H X 3x X 4x X 5x   Ther Act Function  nst in avoiding shrug with functional reach and HEP. Inst in avoiding positions and activities which increase risk of impingement or RC strain  X reaching control and awareness X scapular control for beginning lifting   Neuro Re-ed   X     Man Tx PROM all planes supine  X  X  X     CR/RI   X R shldr IR/ER X R shldr IR/ER   H = HEP. Pt given copies of this exercise for home program.  X = Exercises done this date - pt verbalized understanding and demonstrated competence. All exercises done B unless otherwise indicated.  Pt  advised to discontinue exercises that increase pain and to call or return to therapist to discuss.  Each intervention above is specifically prescribed to address the patients identified impairments, activity limitations, and participation restrictions.    ASSESSMENT     Pt continues to make very good progress in PT with decreased pain, increased ROM and increased function. See ROM below. Pt is very consistent w her HEP and doing well to pace and return to some difficult exercises.  Pt did well to add resistance to shldr abd.  Pt educated further on the importance of scapular control for lifting and ADL's.  Pt well challenged to perform supine serratus press without wt or resistance.  Pt will continue to benefit from PT intervention to achieve goals.      Physical Therapy Goals:  From 1/25/2024 to 4/24/2024  - Created with patient input during initial evaluation  1. Pt will be independent in beginning level of HEP for stretching, posture and strengthening.   2. Pt will be able to don/doff shirt without increased symptoms.  3. Pt will be able to doff/don jacket without increased symptoms.  4. Pt will be able to reach into upper cabinet without increased symptoms.  5. Pt will be able to pull up pants and underwear without increased symptoms.     PLAN OF CARE:      Assess response to progress of IR/ER man tx..  Continue PT per original plan for therapeutic exercises, posture retraining, therapeutic activities, manual treatment, neuromuscular reeducation, therapeutic pain neuroscience education, patient education, self care home management, home exercise program, and modalities as needed.    Charged Units Units Minutes    Ther Ex 2 30   Neuro     Ther Activity 1 10   Gait     Man Tx 1 15        Total Timed  55   Total Tx Time  55         ____________________________________________________________________________________________    21st Century Cures Act Notice to Patient: Medical documents like this are made available  to patients in the interest of transparency. However, be advised this is a medical document and it is intended as qkxi-jw-jtsb communication between your medical providers. This medical document may contain abbreviations, assessments, medical data, and results or other terms that are unfamiliar. Medical documents are intended to carry relevant information, facts as evident, and the clinical opinion of the practitioner. As such, this medical document may be written in language that appears blunt or direct. You are encouraged to contact your medical provider and/or [facility] Patient Experience if you have any questions about this medical document.     Objective Initial Evaluation Data 1/25/2024:    QuickDASH Outcome Score  Score: 84.09 % (1/18/2024 10:01 AM)        Posture:  B genu valgus R>L, B shoulder protraction, min forward head position, min L trunk rotation      Shldr impingement testing 1/25/2024       Brower R (+)   Brower L (-)   Neer's R (+)   Neer's L (-)       Dermatomes 1/25/2024       Lat neck(C4) R wnl   Lat neck (C4) L DULL   Ant deltoid (C5) R wnl   Ant deltoid (C5) L DULL   Dorsal prox thumb (C6) R wnl   Dorsal prox thumb (C6) L DULL   Dorsal prox 3 digit (C7) R wnl   Dorsal prox 3 digit (C7) L DULL   Dorsal 4-5 digits (C8) R wnl   Dorsal 4-5 digits (C8) L DULL   Med prox elbow (T1) R wnl   Med prox elbow (T1) L DULL        UE Neural Glides 1/25/2024   ULTT 1 R wnl   ULTT 1 L wnl   ULTT 2 R wnl   ULTT 2 L  wnl   ULTT 3 R wnl   ULTT 3 L wnl       Cervical ROM 1/25/2024   Fwd head 24   Flexion 60 nl 29   Extension 70 nl 31   R SB 45 nl 25   L SB 45 nl 16   R Rotation 80 nl 35   L Rotation 80 nl 33   *pain limiting     Shoulder ROM 1/25/2024 2/1/2024    Flex R 180 nl 75* ---   Flex L 180 nl 158 ---   Extn R 50 nl 23* ---   Extn L 50 nl 55 ---   Abd R 180 nl 60* ---   Abd L 180 nl 158 ---   ER R 90 nl 47* @ 75 abd 80 @ 90 abd   ER L 90 nl 83 ---   IR R 80 nl 21* @ 75 abd 50 @ 90 abd   IR  L 80 nl 60 ---   *pain limiting    Elbow ROM 1/25/2024   Flex R wnl   Flex L wnl   Extn R wnl   Extn L wnl   Supination R wnl   Supination L wnl   Pronation R wnl   Pronation L wnl       MMT 5/5 1/25/2024   C5 shldr abd R 2+*   Shldr abd L 5   C6 biceps R 5-   Biceps L 5   C7 Triceps R 4+*   Triceps L 4* elbow pain   T1 Interossei R 4   T1 Interossei L 4   ER R 4*   ER L 5   IR R 4+   IR L 5   C8 EPL R 5   EPL L 5   *pain limiting    UE flexibility 1/25/2024   Pectorals R Mod-max   Pectorals L mod   UT R Mod-max   UT L mod   Scalenes R Mod-max   Scalenes L mod     Imaging:   PROCEDURE: XR SHOULDER, COMPLETE (MIN 2 VIEWS), RIGHT (CPT=73030)     COMPARISON: None.     INDICATIONS: Pain to the right shoulder x3 months without injury.     TECHNIQUE: 3 views were obtained.       FINDINGS:  BONES: No acute fracture or dislocation is apparent.  There is calcific tendinitis of the right rotator cuff.  Acromioclavicular and glenohumeral joints appear well maintained.  Mineralization is within normal limits.  SOFT TISSUES: Negative for visible soft tissue swelling or radiopaque foreign body.    EFFUSION: None visible.    OTHER: Partially imaged anterior cervical discectomy and fusion.               Impression   CONCLUSION:     Calcific tendinitis of the right rotator cuff.  Otherwise, unremarkable right shoulder radiographs.          elm-remote     Dictated by (CST): Jose Jones MD on 11/30/2023 at 3:20 PM      Finalized by (GENEVA): Jose Jones MD on 11/30/2023 at 3:22 PM

## 2024-02-01 NOTE — PROGRESS NOTES
Progress note    C/C:   Chief Complaint   Patient presents with    Follow - Up     11/30/23 LOV. Taking duloxetine 60mg prn. She just started PT for R shoulder. Pain 5/10. Tingling in R hand and t/n in L hand. Tylenol prn.       HPI: 63 year old female presents for follow up. Started physical therapy and is seeing improvement in ROM. Shoulder pain persists, though somewhat less painful. Continues to have numbness in the left arm and hand. Pain seems to improve with duloxetine 60mg, no side effects; taking PRN.     Pertinent allergies:   Allergies   Allergen Reactions    Chlorhexidine RASH        Physical exam:  Pulse 79   Wt (!) 319 lb (144.7 kg)   LMP 08/10/2021   SpO2 97%   BMI 52.76 kg/m²      PE right shoulder exam:    Range of motion:  Forward flexion: 100 degrees active, with pain  Abduction: 120 degrees  Internal rotation: buttocks  External rotation: no restriction to PROM    Provocative test:  Supraspinatus test: +  Hawkin's test: +    Imaging: XR right shoulder dated 11/30/2023 independently reviewed with patient, and report also reviewed - quite small area of calcification along the insertion of the RTC tendons    Assessment and plan  Calcific tendonitis, rotator cuff  Cervical myelopathy s/p cervical spine fusion    Continue physical therapy; if progress stalls or right shoulder pain worsens consider right subacromial bursa steroid injection under US guidance. Due to the rather small size of the calcification it is not worth attempting lavage and aspiration; steroid injection alone may help. She was instructed to take the duloxetine regularly.     Follow up after PT, or earlier if symptoms worsen.     Jaison Russell DO  Physical Medicine and Rehabilitation  [facility]

## 2024-02-05 ENCOUNTER — APPOINTMENT (OUTPATIENT)
Dept: PHYSICAL THERAPY | Facility: HOSPITAL | Age: 64
End: 2024-02-05
Attending: PHYSICAL MEDICINE & REHABILITATION
Payer: MEDICAID

## 2024-02-05 ENCOUNTER — TELEPHONE (OUTPATIENT)
Dept: PHYSICAL THERAPY | Facility: HOSPITAL | Age: 64
End: 2024-02-05

## 2024-02-07 ENCOUNTER — TELEPHONE (OUTPATIENT)
Dept: PHYSICAL THERAPY | Facility: HOSPITAL | Age: 64
End: 2024-02-07

## 2024-02-07 ENCOUNTER — APPOINTMENT (OUTPATIENT)
Dept: PHYSICAL THERAPY | Facility: HOSPITAL | Age: 64
End: 2024-02-07
Attending: PHYSICAL MEDICINE & REHABILITATION
Payer: MEDICAID

## 2024-02-12 ENCOUNTER — OFFICE VISIT (OUTPATIENT)
Dept: PHYSICAL THERAPY | Facility: HOSPITAL | Age: 64
End: 2024-02-12
Attending: PHYSICAL MEDICINE & REHABILITATION
Payer: MEDICAID

## 2024-02-12 PROCEDURE — 97140 MANUAL THERAPY 1/> REGIONS: CPT

## 2024-02-12 PROCEDURE — 97112 NEUROMUSCULAR REEDUCATION: CPT

## 2024-02-12 PROCEDURE — 97110 THERAPEUTIC EXERCISES: CPT

## 2024-02-12 NOTE — PROGRESS NOTES
Nicolette Nation    8/6/1960  Referring Physician:  Jaison Russell  Diagnosis: Chronic right shoulder pain (M25.511,G89.29)   Initial Evaluation Date: 1/25/2024  Date of Surgery: None for this diagnosis   Authorized # of visits TBD  Through date: 4/24/2024    Precautions/Hx: ACDF C4-7,     SUBJECTIVE:     Pt reports that she remains improved.     Visit count 4/24/2024 1/8 2/10 3/10 4/10 5/10   Date 1/25/2024 1/29/2024 1/30/2024 2/1/2024 2/12/2024    R shoulder        Pain Range 5 to 10/10 8/10 2-8/10 2-4/10 2-4/10   Pain Ave 5-6/10 --- 5/10 3-4/10 3-4/10        OBJECTIVE:     Treatment performed this date:    Therapeutic Exercise:  Visit #   3/10 4/10 5/10   Position Exercise HEP 1/30/2024 2/1/2024 2/12/2024    Supine B shldr flex hands clasped H  X 10x     TB B shldr ER H  X 10x2 red     TB B shldr abd H  X 10x yellow     Press ups for flexors        R shldr IR/ER  X  x     Serratus ant press   X 10x CGA     Shldr IR/ER H   X 10x2 2#   Sidelying Sleeper stretch    X 3x30s    Shldr ER    10x   Sitting B shldr flex hands clasped H X   10x    Pulleys  X 5 min X 5 min X 5 min cues for scapula and lateral expansion    TB B shldr ER  H X 7x red 10x red 10x, 5x red    Post capsule stretch H X 4x X 5x 5x   Standing Shldr extn on plinth H   X 10x   Ther Act Function  X reaching control and awareness X scapular control for beginning lifting    Neuro Re-ed  X   X see assessment    Man Tx PROM all planes supine  X  X  X     CR/RI  X R shldr IR/ER X R shldr IR/ER X R shldr IR/ER   H = HEP. Pt given copies of this exercise for home program.  X = Exercises done this date - pt verbalized understanding and demonstrated competence. All exercises done B unless otherwise indicated.  Pt advised to discontinue exercises that increase pain and to call or return to therapist to discuss.  Each intervention above is specifically prescribed to address the patients identified impairments, activity limitations, and participation  restrictions.    ASSESSMENT     Pt doing well with improved mobility and function.  Pt did well with new exercise to expand shoulder extn ROM.  Pt demonstrates near even R to L shoulder flex w use of pulleys.  Discussed possible use of pulleys at home.  Pt continues to make good gains with CR/RI for R shoulder IR/ER.   Pt continues to progress well.  Pt remains cooperative and motivated, performing HEP consistently.      Physical Therapy Goals:  From 1/25/2024 to 4/24/2024  - Created with patient input during initial evaluation  1. Pt will be independent in beginning level of HEP for stretching, posture and strengthening.   2. Pt will be able to don/doff shirt without increased symptoms.  3. Pt will be able to doff/don jacket without increased symptoms.  4. Pt will be able to reach into upper cabinet without increased symptoms.  5. Pt will be able to pull up pants and underwear without increased symptoms.     PLAN OF CARE:      Assess response to shldr extn self mob.  Continue PT per original plan for therapeutic exercises, posture retraining, therapeutic activities, manual treatment, neuromuscular reeducation, therapeutic pain neuroscience education, patient education, self care home management, home exercise program, and modalities as needed.    Charged Units Units Minutes    Ther Ex 2 24   Neuro 1 10   Ther Activity     Gait     Man Tx 1 11        Total Timed  45   Total Tx Time  45         ____________________________________________________________________________________________    21st Century Cures Act Notice to Patient: Medical documents like this are made available to patients in the interest of transparency. However, be advised this is a medical document and it is intended as faiz-aw-javq communication between your medical providers. This medical document may contain abbreviations, assessments, medical data, and results or other terms that are unfamiliar. Medical documents are intended to carry relevant  information, facts as evident, and the clinical opinion of the practitioner. As such, this medical document may be written in language that appears blunt or direct. You are encouraged to contact your medical provider and/or [facility] Patient Experience if you have any questions about this medical document.     Objective Initial Evaluation Data 1/25/2024:    QuickDASH Outcome Score  Score: 84.09 % (1/18/2024 10:01 AM)        Posture:  B genu valgus R>L, B shoulder protraction, min forward head position, min L trunk rotation      Shldr impingement testing 1/25/2024       Brower R (+)   Brower L (-)   Neer's R (+)   Neer's L (-)       Dermatomes 1/25/2024       Lat neck(C4) R wnl   Lat neck (C4) L DULL   Ant deltoid (C5) R wnl   Ant deltoid (C5) L DULL   Dorsal prox thumb (C6) R wnl   Dorsal prox thumb (C6) L DULL   Dorsal prox 3 digit (C7) R wnl   Dorsal prox 3 digit (C7) L DULL   Dorsal 4-5 digits (C8) R wnl   Dorsal 4-5 digits (C8) L DULL   Med prox elbow (T1) R wnl   Med prox elbow (T1) L DULL        UE Neural Glides 1/25/2024   ULTT 1 R wnl   ULTT 1 L wnl   ULTT 2 R wnl   ULTT 2 L  wnl   ULTT 3 R wnl   ULTT 3 L wnl       Cervical ROM 1/25/2024   Fwd head 24   Flexion 60 nl 29   Extension 70 nl 31   R SB 45 nl 25   L SB 45 nl 16   R Rotation 80 nl 35   L Rotation 80 nl 33   *pain limiting     Shoulder ROM 1/25/2024 2/1/2024 2/12/2024    Flex R 180 nl 75* --- ---   Flex L 180 nl 158 --- ---   Extn R 50 nl 23* --- ---   Extn L 50 nl 55 --- ---   Abd R 180 nl 60* --- ---   Abd L 180 nl 158 --- ---   ER R 90 nl 47* @ 75 abd 80 @ 90 abd 85 @ 90 abd   ER L 90 nl 83 --- ---   IR R 80 nl 21* @ 75 abd 50 @ 90 abd 62 @ 90 abd   IR L 80 nl 60 --- ---   *pain limiting    Elbow ROM 1/25/2024   Flex R wnl   Flex L wnl   Extn R wnl   Extn L wnl   Supination R wnl   Supination L wnl   Pronation R wnl   Pronation L wnl       MMT 5/5 1/25/2024   C5 shldr abd R 2+*   Shldr abd L 5   C6 biceps R 5-   Biceps L 5   C7  Triceps R 4+*   Triceps L 4* elbow pain   T1 Interossei R 4   T1 Interossei L 4   ER R 4*   ER L 5   IR R 4+   IR L 5   C8 EPL R 5   EPL L 5   *pain limiting    UE flexibility 1/25/2024   Pectorals R Mod-max   Pectorals L mod   UT R Mod-max   UT L mod   Scalenes R Mod-max   Scalenes L mod     Imaging:   PROCEDURE: XR SHOULDER, COMPLETE (MIN 2 VIEWS), RIGHT (CPT=73030)     COMPARISON: None.     INDICATIONS: Pain to the right shoulder x3 months without injury.     TECHNIQUE: 3 views were obtained.       FINDINGS:  BONES: No acute fracture or dislocation is apparent.  There is calcific tendinitis of the right rotator cuff.  Acromioclavicular and glenohumeral joints appear well maintained.  Mineralization is within normal limits.  SOFT TISSUES: Negative for visible soft tissue swelling or radiopaque foreign body.    EFFUSION: None visible.    OTHER: Partially imaged anterior cervical discectomy and fusion.               Impression   CONCLUSION:     Calcific tendinitis of the right rotator cuff.  Otherwise, unremarkable right shoulder radiographs.          elm-remote     Dictated by (CST): Jose Jones MD on 11/30/2023 at 3:20 PM      Finalized by (CST): oJse Jones MD on 11/30/2023 at 3:22 PM

## 2024-02-15 ENCOUNTER — TELEPHONE (OUTPATIENT)
Dept: PHYSICAL THERAPY | Facility: HOSPITAL | Age: 64
End: 2024-02-15

## 2024-02-16 ENCOUNTER — TELEPHONE (OUTPATIENT)
Dept: FAMILY MEDICINE CLINIC | Facility: CLINIC | Age: 64
End: 2024-02-16

## 2024-02-19 ENCOUNTER — OFFICE VISIT (OUTPATIENT)
Dept: PHYSICAL THERAPY | Facility: HOSPITAL | Age: 64
End: 2024-02-19
Attending: PHYSICAL MEDICINE & REHABILITATION
Payer: MEDICAID

## 2024-02-19 PROCEDURE — 97112 NEUROMUSCULAR REEDUCATION: CPT

## 2024-02-19 PROCEDURE — 97140 MANUAL THERAPY 1/> REGIONS: CPT

## 2024-02-19 PROCEDURE — 97110 THERAPEUTIC EXERCISES: CPT

## 2024-02-19 NOTE — PROGRESS NOTES
Nicolette Nation    8/6/1960  Referring Physician:  Jaison Russell  Diagnosis: Chronic right shoulder pain (M25.511,G89.29)   Initial Evaluation Date: 1/25/2024  Date of Surgery: None for this diagnosis   Authorized # of visits TBD  Through date: 4/24/2024    Precautions/Hx: ACDF C4-7,     SUBJECTIVE:     Pt reports that she is sore today in the neck and B shoulders.  She notes that she has increased R knee pain w damp weather.  She states that her L arm is also flared today w a burning sensation in the L hand.  Pt states that she was not able to get comfortable to sleep last night so was up in the recliner.  She took Tylenol and still could not get comfortable to sleep.      Visit count 4/24/2024 1/8 2/10 3/10 4/10 5/10 6/10   Date 1/25/2024 1/29/2024 1/30/2024 2/1/2024 2/12/2024 2/19/2024    R shoulder         Pain Range 5 to 10/10 8/10 2-8/10 2-4/10 2-4/10 2-9/10   Pain Ave 5-6/10 --- 5/10 3-4/10 3-4/10 4/10   Pain Current --- --- --- --- --- 4/10        OBJECTIVE:     Treatment performed this date:    Therapeutic Exercise:  Visit #   4/10 5/10 6/10   Position Exercise HEP 2/1/2024 2/12/2024 2/19/2024    Supine B shldr flex hands clasped H X 10x  10x    TB B shldr ER H X 10x2 red  10x2 red    TB B shldr abd H X 10x yellow  15x yellow    Press ups for flexors        R shldr IR/ER  x  10x    Serratus ant press  X 10x CGA      Shldr IR/ER H  X 10x2 2#    Sidelying Sleeper stretch   X 3x30s 3x30    Shldr ER   10x    Sitting B shldr flex hands clasped H  10x     Pulleys  X 5 min X 5 min cues for scapula and lateral expansion     TB B shldr ER  H 10x red 10x, 5x red     Post capsule stretch H X 5x 5x     1st rib self mob H   X    Standing Shldr extn on plinth H  X 10x    Ther Act Function  X scapular control for beginning lifting     Neuro Re-ed   X see assessment  X see assessment    Man Tx PROM all planes supine  X  X      CR/RI  X R shldr IR/ER X R shldr IR/ER X    Suboccipital release    X     Manual cervical  traction    X     MET    X C0-1 FrotRSBL; C1-2 R rot; C3 ERSR   H = HEP. Pt given copies of this exercise for home program.  X = Exercises done this date - pt verbalized understanding and demonstrated competence. All exercises done B unless otherwise indicated.  Pt advised to discontinue exercises that increase pain and to call or return to therapist to discuss.  Each intervention above is specifically prescribed to address the patients identified impairments, activity limitations, and participation restrictions.    ASSESSMENT     Pt had flare up of neck and shoulder pain from unknown trigger.  She was not able to get comfortable to sleep.  Pt educated on body pressurization changes associated with barometric pressure changes.  Analogy with photos of chip bags at elevated altitude to reinforce understanding.  Pt demonstrates segmental mobility limitations as noted above and tolerated manual therapy with >50% improvement in mobility after treatment.  Pt advised that man tx goal is to allow improved and ongoing mobility and strength.        Physical Therapy Goals:  From 1/25/2024 to 4/24/2024  - Created with patient input during initial evaluation  1. Pt will be independent in beginning level of HEP for stretching, posture and strengthening.   2. Pt will be able to don/doff shirt without increased symptoms.  3. Pt will be able to doff/don jacket without increased symptoms.  4. Pt will be able to reach into upper cabinet without increased symptoms.  5. Pt will be able to pull up pants and underwear without increased symptoms.     PLAN OF CARE:      Assess response to man tx  Continue PT per original plan for therapeutic exercises, posture retraining, therapeutic activities, manual treatment, neuromuscular reeducation, therapeutic pain neuroscience education, patient education, self care home management, home exercise program, and modalities as needed.    Charged Units Units Minutes    Ther Ex 1 12   Neuro 1 10   Ther  Activity     Gait     Man Tx 2 23        Total Timed  45   Total Tx Time  45         ____________________________________________________________________________________________    21st Century Cures Act Notice to Patient: Medical documents like this are made available to patients in the interest of transparency. However, be advised this is a medical document and it is intended as hubs-hf-daoc communication between your medical providers. This medical document may contain abbreviations, assessments, medical data, and results or other terms that are unfamiliar. Medical documents are intended to carry relevant information, facts as evident, and the clinical opinion of the practitioner. As such, this medical document may be written in language that appears blunt or direct. You are encouraged to contact your medical provider and/or [facility] Patient Experience if you have any questions about this medical document.     Objective Initial Evaluation Data 1/25/2024:    QuickDASH Outcome Score  Score: 84.09 % (1/18/2024 10:01 AM)        Posture:  B genu valgus R>L, B shoulder protraction, min forward head position, min L trunk rotation      Shldr impingement testing 1/25/2024       Brower R (+)   Brower L (-)   Neer's R (+)   Neer's L (-)       Dermatomes 1/25/2024       Lat neck(C4) R wnl   Lat neck (C4) L DULL   Ant deltoid (C5) R wnl   Ant deltoid (C5) L DULL   Dorsal prox thumb (C6) R wnl   Dorsal prox thumb (C6) L DULL   Dorsal prox 3 digit (C7) R wnl   Dorsal prox 3 digit (C7) L DULL   Dorsal 4-5 digits (C8) R wnl   Dorsal 4-5 digits (C8) L DULL   Med prox elbow (T1) R wnl   Med prox elbow (T1) L DULL        UE Neural Glides 1/25/2024   ULTT 1 R wnl   ULTT 1 L wnl   ULTT 2 R wnl   ULTT 2 L  wnl   ULTT 3 R wnl   ULTT 3 L wnl       Cervical ROM 1/25/2024   Fwd head 24   Flexion 60 nl 29   Extension 70 nl 31   R SB 45 nl 25   L SB 45 nl 16   R Rotation 80 nl 35   L Rotation 80 nl 33   *pain limiting      Shoulder ROM 1/25/2024 2/1/2024 2/12/2024    Flex R 180 nl 75* --- ---   Flex L 180 nl 158 --- ---   Extn R 50 nl 23* --- ---   Extn L 50 nl 55 --- ---   Abd R 180 nl 60* --- ---   Abd L 180 nl 158 --- ---   ER R 90 nl 47* @ 75 abd 80 @ 90 abd 85 @ 90 abd   ER L 90 nl 83 --- ---   IR R 80 nl 21* @ 75 abd 50 @ 90 abd 62 @ 90 abd   IR L 80 nl 60 --- ---   *pain limiting    Elbow ROM 1/25/2024   Flex R wnl   Flex L wnl   Extn R wnl   Extn L wnl   Supination R wnl   Supination L wnl   Pronation R wnl   Pronation L wnl       MMT 5/5 1/25/2024   C5 shldr abd R 2+*   Shldr abd L 5   C6 biceps R 5-   Biceps L 5   C7 Triceps R 4+*   Triceps L 4* elbow pain   T1 Interossei R 4   T1 Interossei L 4   ER R 4*   ER L 5   IR R 4+   IR L 5   C8 EPL R 5   EPL L 5   *pain limiting    UE flexibility 1/25/2024   Pectorals R Mod-max   Pectorals L mod   UT R Mod-max   UT L mod   Scalenes R Mod-max   Scalenes L mod     Imaging:   PROCEDURE: XR SHOULDER, COMPLETE (MIN 2 VIEWS), RIGHT (CPT=73030)     COMPARISON: None.     INDICATIONS: Pain to the right shoulder x3 months without injury.     TECHNIQUE: 3 views were obtained.       FINDINGS:  BONES: No acute fracture or dislocation is apparent.  There is calcific tendinitis of the right rotator cuff.  Acromioclavicular and glenohumeral joints appear well maintained.  Mineralization is within normal limits.  SOFT TISSUES: Negative for visible soft tissue swelling or radiopaque foreign body.    EFFUSION: None visible.    OTHER: Partially imaged anterior cervical discectomy and fusion.               Impression   CONCLUSION:     Calcific tendinitis of the right rotator cuff.  Otherwise, unremarkable right shoulder radiographs.          [id number]     Dictated by (CST): Jose Jones MD on 11/30/2023 at 3:20 PM      Finalized by (CST): Jose Jones MD on 11/30/2023 at 3:22 PM

## 2024-02-24 ENCOUNTER — LAB ENCOUNTER (OUTPATIENT)
Dept: LAB | Facility: HOSPITAL | Age: 64
End: 2024-02-24
Attending: FAMILY MEDICINE
Payer: MEDICAID

## 2024-02-24 DIAGNOSIS — Z79.4 TYPE 2 DIABETES MELLITUS WITHOUT COMPLICATION, WITH LONG-TERM CURRENT USE OF INSULIN (HCC): ICD-10-CM

## 2024-02-24 DIAGNOSIS — E11.9 TYPE 2 DIABETES MELLITUS WITHOUT COMPLICATION, WITH LONG-TERM CURRENT USE OF INSULIN (HCC): ICD-10-CM

## 2024-02-24 LAB
ANION GAP SERPL CALC-SCNC: 5 MMOL/L (ref 0–18)
BUN BLD-MCNC: 9 MG/DL (ref 9–23)
BUN/CREAT SERPL: 12.9 (ref 10–20)
CALCIUM BLD-MCNC: 9.4 MG/DL (ref 8.7–10.4)
CHLORIDE SERPL-SCNC: 105 MMOL/L (ref 98–112)
CHOLEST SERPL-MCNC: 118 MG/DL (ref ?–200)
CO2 SERPL-SCNC: 29 MMOL/L (ref 21–32)
CREAT BLD-MCNC: 0.7 MG/DL
CREAT UR-SCNC: 204.1 MG/DL
EGFRCR SERPLBLD CKD-EPI 2021: 97 ML/MIN/1.73M2 (ref 60–?)
EST. AVERAGE GLUCOSE BLD GHB EST-MCNC: 163 MG/DL (ref 68–126)
FASTING PATIENT LIPID ANSWER: YES
FASTING STATUS PATIENT QL REPORTED: YES
GLUCOSE BLD-MCNC: 132 MG/DL (ref 70–99)
HBA1C MFR BLD: 7.3 % (ref ?–5.7)
HDLC SERPL-MCNC: 44 MG/DL (ref 40–59)
LDLC SERPL CALC-MCNC: 55 MG/DL (ref ?–100)
MICROALBUMIN UR-MCNC: 0.5 MG/DL
MICROALBUMIN/CREAT 24H UR-RTO: 2.4 UG/MG (ref ?–30)
NONHDLC SERPL-MCNC: 74 MG/DL (ref ?–130)
OSMOLALITY SERPL CALC.SUM OF ELEC: 289 MOSM/KG (ref 275–295)
POTASSIUM SERPL-SCNC: 3.7 MMOL/L (ref 3.5–5.1)
SODIUM SERPL-SCNC: 139 MMOL/L (ref 136–145)
TRIGL SERPL-MCNC: 102 MG/DL (ref 30–149)
TSI SER-ACNC: 1.7 MIU/ML (ref 0.55–4.78)
VLDLC SERPL CALC-MCNC: 15 MG/DL (ref 0–30)

## 2024-02-24 PROCEDURE — 84443 ASSAY THYROID STIM HORMONE: CPT

## 2024-02-24 PROCEDURE — 82043 UR ALBUMIN QUANTITATIVE: CPT

## 2024-02-24 PROCEDURE — 80048 BASIC METABOLIC PNL TOTAL CA: CPT

## 2024-02-24 PROCEDURE — 83036 HEMOGLOBIN GLYCOSYLATED A1C: CPT

## 2024-02-24 PROCEDURE — 36415 COLL VENOUS BLD VENIPUNCTURE: CPT

## 2024-02-24 PROCEDURE — 80061 LIPID PANEL: CPT

## 2024-02-24 PROCEDURE — 82570 ASSAY OF URINE CREATININE: CPT

## 2024-02-26 ENCOUNTER — OFFICE VISIT (OUTPATIENT)
Dept: FAMILY MEDICINE CLINIC | Facility: CLINIC | Age: 64
End: 2024-02-26
Payer: MEDICAID

## 2024-02-26 VITALS
RESPIRATION RATE: 18 BRPM | HEIGHT: 65 IN | WEIGHT: 293 LBS | DIASTOLIC BLOOD PRESSURE: 82 MMHG | BODY MASS INDEX: 48.82 KG/M2 | SYSTOLIC BLOOD PRESSURE: 132 MMHG | HEART RATE: 82 BPM

## 2024-02-26 DIAGNOSIS — Z79.4 TYPE 2 DIABETES MELLITUS WITHOUT COMPLICATION, WITH LONG-TERM CURRENT USE OF INSULIN (HCC): Primary | ICD-10-CM

## 2024-02-26 DIAGNOSIS — E66.01 MORBID OBESITY (HCC): ICD-10-CM

## 2024-02-26 DIAGNOSIS — M50.90 CERVICAL DISC DISEASE: ICD-10-CM

## 2024-02-26 DIAGNOSIS — I10 ESSENTIAL HYPERTENSION: ICD-10-CM

## 2024-02-26 DIAGNOSIS — E11.9 TYPE 2 DIABETES MELLITUS WITHOUT COMPLICATION, WITH LONG-TERM CURRENT USE OF INSULIN (HCC): Primary | ICD-10-CM

## 2024-02-26 PROCEDURE — 99214 OFFICE O/P EST MOD 30 MIN: CPT | Performed by: FAMILY MEDICINE

## 2024-02-26 RX ORDER — INSULIN GLARGINE 100 [IU]/ML
20 INJECTION, SOLUTION SUBCUTANEOUS NIGHTLY
COMMUNITY
Start: 2024-02-26

## 2024-02-26 NOTE — PROGRESS NOTES
Subjective:   Patient ID: Nicolette Nation is a 63 year old female.    Diabetes  She presents for her follow-up diabetic visit. She has type 2 diabetes mellitus. Her disease course has been stable. There are no hypoglycemic associated symptoms. There are no diabetic associated symptoms. Pertinent negatives for diabetes include no foot ulcerations. Symptoms are stable.       History/Other:   Review of Systems  Current Outpatient Medications   Medication Sig Dispense Refill   • LANTUS SOLOSTAR 100 UNIT/ML Subcutaneous Solution Pen-injector Inject 20 Units into the skin nightly.     • DULOXETINE 30 MG Oral Cap DR Particles Take 2 capsules by mouth once daily 60 capsule 0   • metFORMIN 500 MG Oral Tab Take 2 tablets (1,000 mg total) by mouth 2 (two) times daily with meals. 360 tablet 3   • Dulaglutide (TRULICITY) 1.5 MG/0.5ML Subcutaneous Solution Pen-injector Inject 1.5 mg into the skin once a week. 12 each 1   • loratadine 10 MG Oral Tab Take 1 tablet (10 mg total) by mouth daily.     • atorvastatin 10 MG Oral Tab Take 1 tablet (10 mg total) by mouth nightly. 90 tablet 3   • Acetaminophen ER (TYLENOL 8 HOUR ARTHRITIS PAIN) 650 MG Oral Tab CR Take 2 tablets (1,300 mg total) by mouth in the morning and 2 tablets (1,300 mg total) before bedtime. 60 tablet 5   • LEVOTHYROXINE 75 MCG Oral Tab TAKE 1 TABLET BY MOUTH BEFORE BREAKFAST 90 tablet 3   • PANTOPRAZOLE 40 MG Oral Tab EC TAKE 1 TABLET BY MOUTH ONCE DAILY BEFORE BREAKFAST 90 tablet 3   • lisinopril-hydroCHLOROthiazide 10-12.5 MG Oral Tab Take 1 tablet by mouth once daily. 90 tablet 3   • Multiple Vitamins-Minerals (EQ VISION FORMULA 50+ OR) Take 1 tablet by mouth daily.     • Insulin Pen Needle (BD PEN NEEDLE MINI U/F) 31G X 5 MM Does not apply Misc Use with Lantus 100 each 2   • Glucose Blood (ONETOUCH ULTRA BLUE) In Vitro Strip USE ONCE DAILY 100 each 11   • ONETOUCH DELICA LANCETS 33G Does not apply Misc Apply 1 Units topically daily. 90 each 3   • Blood Glucose  Monitoring Suppl (ONETOUCH ULTRA SYSTEM) W/DEVICE Does not apply Kit test T.I.D       Allergies:  Allergies   Allergen Reactions   • Chlorhexidine RASH       Objective:   Physical Exam  Constitutional:       Appearance: Normal appearance.   Cardiovascular:      Rate and Rhythm: Normal rate and regular rhythm.      Pulses: Normal pulses.      Heart sounds: Normal heart sounds.   Pulmonary:      Effort: Pulmonary effort is normal.      Breath sounds: Normal breath sounds.   Musculoskeletal:      Right lower leg: No edema.      Left lower leg: No edema.   Neurological:      Mental Status: She is alert.       Assessment & Plan:   1. Type 2 diabetes mellitus without complication, with long-term current use of insulin (HCC)-hemoglobin A1c 7.3.  At last visit we had reduced dose of Lantus due to episodes of hypoglycemia with starting Trulicity.  She is now taking Lantus 20 mg daily and metformin in addition to Trulicity 1.5 mg weekly.  No episodes of hypoglycemia with this dose of Lantus.  She brings record of home blood sugars, several are elevated.  And hemoglobin A1c now a little above goal.  However she is having success with significant weight loss.  Therefore no medication adjustments today, but continue weight loss efforts with Dr. Mccrary and follow-up with me in 3 months.   2. Essential hypertension-blood pressure well-controlled on current medication   3. Morbid obesity (HCC)-reviewed dietary recommendations.  Exercise as tolerated.  Continuing with Dr. Mccrary.  Congratulated on success as above.   4. Cervical disc disease-continue bilateral arm pain and hand numbness causing significant disability, has upcoming disability hearing.  Continuing physical therapy, and follow-up appointment with Dr. Russell later this week.       No orders of the defined types were placed in this encounter.      Meds This Visit:  Requested Prescriptions      No prescriptions requested or ordered in this encounter       Imaging &  Referrals:  None

## 2024-02-27 ENCOUNTER — PATIENT MESSAGE (OUTPATIENT)
Dept: FAMILY MEDICINE CLINIC | Facility: CLINIC | Age: 64
End: 2024-02-27

## 2024-02-27 DIAGNOSIS — Z12.31 BREAST CANCER SCREENING BY MAMMOGRAM: Primary | ICD-10-CM

## 2024-02-27 NOTE — TELEPHONE ENCOUNTER
From: Nicolette Nation  To: Niki Pozo  Sent: 2/27/2024 10:29 AM CST  Subject: Mammogram    Hi Dr. Pozo, do I need a referral for a mammogram? I need to schedule one for this year. Thanks, Nicolette

## 2024-02-29 ENCOUNTER — OFFICE VISIT (OUTPATIENT)
Dept: PHYSICAL MEDICINE AND REHAB | Facility: CLINIC | Age: 64
End: 2024-02-29
Payer: MEDICAID

## 2024-02-29 VITALS — WEIGHT: 293 LBS | OXYGEN SATURATION: 100 % | HEIGHT: 65 IN | BODY MASS INDEX: 48.82 KG/M2 | HEART RATE: 82 BPM

## 2024-02-29 DIAGNOSIS — M75.31 CALCIFIC TENDONITIS OF RIGHT SHOULDER: Primary | ICD-10-CM

## 2024-02-29 PROCEDURE — 20611 DRAIN/INJ JOINT/BURSA W/US: CPT | Performed by: PHYSICAL MEDICINE & REHABILITATION

## 2024-02-29 RX ORDER — LIDOCAINE HYDROCHLORIDE 10 MG/ML
3 INJECTION, SOLUTION INFILTRATION; PERINEURAL ONCE
Status: COMPLETED | OUTPATIENT
Start: 2024-02-29 | End: 2024-02-29

## 2024-02-29 RX ORDER — TRIAMCINOLONE ACETONIDE 40 MG/ML
40 INJECTION, SUSPENSION INTRA-ARTICULAR; INTRAMUSCULAR ONCE
Status: COMPLETED | OUTPATIENT
Start: 2024-02-29 | End: 2024-02-29

## 2024-02-29 NOTE — PROCEDURES
Continues to have limited ROM of the right shoulder despite physical therapy. At this point I recommend right subacromial bursa steroid injection, see below for details.    She also has had newer onset left forearm and hand burning to the 2nd and 3rd fingers, intermittent. Monitor symptoms for now, continue duloxetine as prescribed.     Dx: calcific tendonitis of right shoulder  Procedure: right subacromial bursa steroid injection under US guidance    The patient is here for a right shoulder subdeltoid bursal injection done under ultrasound guidance.  Under ultrasound guidance the right lateral subdeltoid bursa was identified and a chante was placed on the patient's skin.The skin was cleaned with alcohol swabs x 3 and anesthetized with ethyl chloride spray.  Then a 25 gauge needle was inserted under ultrasound guidance into the right lateral subdeltoid bursa.  Aspiration was performed and no blood, fluid, or air was aspirated.  The patient was then injected with 4 ml of 1.0 ml of 40 mg of Kenalog/ml and 3.0 ml of 1% lidocaine without epinephrine.  The needle was removed and a band aid was applied.  The patient will follow up in 4 week(s).    Jaison Russell DO  Physical Medicine and Rehabilitation  [facility]

## 2024-03-04 ENCOUNTER — APPOINTMENT (OUTPATIENT)
Dept: PHYSICAL THERAPY | Facility: HOSPITAL | Age: 64
End: 2024-03-04
Attending: PHYSICAL MEDICINE & REHABILITATION
Payer: MEDICAID

## 2024-03-12 ENCOUNTER — TELEPHONE (OUTPATIENT)
Dept: PHYSICAL THERAPY | Facility: HOSPITAL | Age: 64
End: 2024-03-12

## 2024-03-14 ENCOUNTER — OFFICE VISIT (OUTPATIENT)
Dept: SURGERY | Facility: CLINIC | Age: 64
End: 2024-03-14
Payer: MEDICAID

## 2024-03-14 VITALS
OXYGEN SATURATION: 96 % | HEART RATE: 80 BPM | SYSTOLIC BLOOD PRESSURE: 128 MMHG | DIASTOLIC BLOOD PRESSURE: 88 MMHG | WEIGHT: 293 LBS | BODY MASS INDEX: 48.23 KG/M2 | HEIGHT: 65.2 IN

## 2024-03-14 DIAGNOSIS — E78.5 DYSLIPIDEMIA: ICD-10-CM

## 2024-03-14 DIAGNOSIS — Z79.4 TYPE 2 DIABETES MELLITUS WITHOUT COMPLICATION, WITH LONG-TERM CURRENT USE OF INSULIN (HCC): Primary | ICD-10-CM

## 2024-03-14 DIAGNOSIS — E66.01 MORBID OBESITY WITH BMI OF 50.0-59.9, ADULT (HCC): ICD-10-CM

## 2024-03-14 DIAGNOSIS — E11.9 TYPE 2 DIABETES MELLITUS WITHOUT COMPLICATION, WITH LONG-TERM CURRENT USE OF INSULIN (HCC): Primary | ICD-10-CM

## 2024-03-14 DIAGNOSIS — I10 PRIMARY HYPERTENSION: ICD-10-CM

## 2024-03-14 PROCEDURE — 99214 OFFICE O/P EST MOD 30 MIN: CPT | Performed by: INTERNAL MEDICINE

## 2024-03-14 NOTE — PROGRESS NOTES
[facility]  [address] [address]  Dept: [phone]       Patient:  Nicolette Nation  :      1960  MRN:      [id number]    Chief Complaint:    Chief Complaint   Patient presents with    Follow - Up    Weight Management     Weight check        SUBJECTIVE     History of Present Illness:  Nicolette is being seen today for a follow-up for non surgical weight loss    Past Medical History:   Past Medical History:   Diagnosis Date    Back problem     previous disc problem    Cervical disc herniation     involving C6-C7    Cervical polyp 2021    Chronic diarrhea 2020    Colon polyp 2019    COVID 2022    Fever chills nausea. No hospitalization or residual    Diabetes (HCC) 2014    Diverticular disease     Essential hypertension     Hemorrhoids     High blood pressure     High cholesterol     History of COVID-19 2022    Hyperlipidemia     IBS (irritable bowel syndrome)     Incontinence     stool    Morbid obesity with BMI of 50.0-59.9, adult (HCC)     Myopia of both eyes 2015    Osteoarthritis     Postmenopause     at age 50-54    Subclinical hypothyroidism 2010    subclinical hypothyroidism, primary.  3/2010 trial of Synthroid, no improvement sx, d/c'd    Varicella     chicken pox    Visual impairment     glasses        Comorbidities:  Back pain-Improvement?  yes, Joint pain-Improvement?  yes, Diabetes-Improvement?  yes, Hypertension-Improvement?  yes, ANTHONY-Improvement?  yes, and Snoring-Improvement?  yes    OBJECTIVE     Vitals: /88   Pulse 80   Ht 5' 5.2\" (1.656 m)   Wt (!) 312 lb 4.8 oz (141.7 kg)   LMP 08/10/2021   SpO2 96%   BMI 51.65 kg/m²     Initial weight loss: -19   Total weight loss: -19    Start weight: 331    Wt Readings from Last 3 Encounters:   24 (!) 312 lb 4.8 oz (141.7 kg)   24 (!) 312 lb (141.5 kg)   24 (!) 312 lb 12.8 oz (141.9 kg)       Patient  Medications:    Current Outpatient Medications   Medication Sig Dispense Refill    LANTUS SOLOSTAR 100 UNIT/ML Subcutaneous Solution Pen-injector Inject 20 Units into the skin nightly.      DULOXETINE 30 MG Oral Cap DR Particles Take 2 capsules by mouth once daily 60 capsule 0    metFORMIN 500 MG Oral Tab Take 2 tablets (1,000 mg total) by mouth 2 (two) times daily with meals. 360 tablet 3    Dulaglutide (TRULICITY) 1.5 MG/0.5ML Subcutaneous Solution Pen-injector Inject 1.5 mg into the skin once a week. 12 each 1    loratadine 10 MG Oral Tab Take 1 tablet (10 mg total) by mouth daily.      atorvastatin 10 MG Oral Tab Take 1 tablet (10 mg total) by mouth nightly. 90 tablet 3    Acetaminophen ER (TYLENOL 8 HOUR ARTHRITIS PAIN) 650 MG Oral Tab CR Take 2 tablets (1,300 mg total) by mouth in the morning and 2 tablets (1,300 mg total) before bedtime. 60 tablet 5    LEVOTHYROXINE 75 MCG Oral Tab TAKE 1 TABLET BY MOUTH BEFORE BREAKFAST 90 tablet 3    PANTOPRAZOLE 40 MG Oral Tab EC TAKE 1 TABLET BY MOUTH ONCE DAILY BEFORE BREAKFAST 90 tablet 3    lisinopril-hydroCHLOROthiazide 10-12.5 MG Oral Tab Take 1 tablet by mouth once daily. 90 tablet 3    Multiple Vitamins-Minerals (EQ VISION FORMULA 50+ OR) Take 1 tablet by mouth daily.      Insulin Pen Needle (BD PEN NEEDLE MINI U/F) 31G X 5 MM Does not apply Misc Use with Lantus 100 each 2    Glucose Blood (ONETOUCH ULTRA BLUE) In Vitro Strip USE ONCE DAILY 100 each 11    ONETOUCH DELICA LANCETS 33G Does not apply Misc Apply 1 Units topically daily. 90 each 3    Blood Glucose Monitoring Suppl (ONETOUCH ULTRA SYSTEM) W/DEVICE Does not apply Kit test T.I.D       Allergies:  Chlorhexidine     Social History:    Social History     Socioeconomic History    Marital status:      Spouse name: Not on file    Number of children: Not on file    Years of education: Not on file    Highest education level: Not on file   Occupational History    Not on file   Tobacco Use    Smoking status:  Former     Packs/day: 0.50     Years: 2.00     Additional pack years: 0.00     Total pack years: 1.00     Types: Cigarettes     Quit date: 1985     Years since quittin.2    Smokeless tobacco: Never   Vaping Use    Vaping Use: Never used   Substance and Sexual Activity    Alcohol use: No    Drug use: No    Sexual activity: Not Currently     Comment: last active in    Other Topics Concern     Service Not Asked    Blood Transfusions Not Asked    Caffeine Concern Yes     Comment: soda    Occupational Exposure Not Asked    Hobby Hazards Not Asked    Sleep Concern Not Asked    Stress Concern Not Asked    Weight Concern Not Asked    Special Diet Not Asked    Back Care Not Asked    Exercise Not Asked    Bike Helmet Not Asked    Seat Belt Not Asked    Self-Exams Not Asked   Social History Narrative    Nicolette is . She has 2 adult children. Patient is unemployed,but formerly worked in [employer]. She lives with her son in [place], [place]     Social Determinants of Health     Financial Resource Strain: Not on file   Food Insecurity: Not on file   Transportation Needs: Not on file   Physical Activity: Not on file   Stress: Not on file   Social Connections: Not on file   Housing Stability: Not on file     Surgical History:    Past Surgical History:   Procedure Laterality Date      , 1991    x2     CHOLECYSTECTOMY      COLONOSCOPY N/A 2019    Procedure: COLONOSCOPY;  Surgeon: Marcus Shah MD;  Location: [facility] ENDOSCOPY    COLONOSCOPY N/A 2021    Procedure: COLONOSCOPY;  Surgeon: Marcus Shah MD;  Location: [facility] ENDOSCOPY    ENDOMETRIAL BIOPSY - JAR(S): 2  2020    HYSTERECTOMY  10/2021    no associated bleeding complication    HYSTEROSCOPY      PROCEDURE:  Cervical polypectomy, hysteroscopy, MyoSure resection of endometrial polyp, and dilation and curettage.     OTHER  2022    Cervical 5-cervical 6 anterior corpectomy with cervical  4-cervical 5, cervical 5-cervical 6, cervical 6-cervical 7 anterior fusion, placement of cage and plate    TUBAL LIGATION      s/p BTL     Family History:    Family History   Problem Relation Age of Onset    Stroke Mother         TIA    Cancer Father         lung    Other (smoker) Father     Diabetes Brother     Diabetes Paternal Grandmother         Fathers side of family    Other (auto immune) Paternal Grandmother     Other (hepatitis) Paternal Grandmother     Renal Disease Paternal Grandmother     Heart Disease Paternal Grandfather     Diabetes Other         Grandmother [SIDE NOT STATED]    Thyroid disease Other         family h/o    Glaucoma Neg         family h/o    Bleeding Disorders Neg     DVT/VTE Neg        Food Journal  Reviewed and Discussed:       Patient has a Food Journal?: yes   Patient is reading nutrition labels?  yes  Average Caloric Intake:     Average CHO Intake: 130  Is patient exercising? no  Type of exercise?     Eating Habits  Patient states the following:  Eats 1 meal(s) per day  Length of time it takes to consume a meal:  20  # of snacks per day: 1 Type of snacks:  fruit  Amount of soda consumption per day:  diet  Amount of water (in ounces) per day:  48  Drinking between meals only:  yes  Toughest challenge:  exercise     Nutritional Goals  Limit carbohydrates to 100 gms per day, Eat 100-200 calories within 1 hour of waking , and Eat 3-4 cups of fresh fruits or vegetables daily    Behavior Modifications Reviewed and Discussed  Eat breakfast, Eat 3 meals per day, Plan meals in advance, Read nutrition labels, Drink 64 oz of water per day, Maintain a daily food journal, No drinking 30 minutes before or after meals, Utlize portion control strategies to reduce calorie intake, Identify triggers for eating and manage cues, and Eat slowly and take 20 to 30 minutes to complete each meal    Exercise Goals Reviewed and Discussed    Increase as tolerated    ROS:    Constitutional:  negative  Respiratory: negative  Cardiovascular: negative  Gastrointestinal: positive for diarrhea  Musculoskeletal:positive for arthralgias and back pain  Neurological: negative  Behavioral/Psych: negative  Endocrine: negative  All other systems were reviewed and are negative    Physical Exam:   General appearance: alert, appears stated age, cooperative, and morbidly obese  Head: Normocephalic, without obvious abnormality, atraumatic  Neck: no adenopathy, no carotid bruit, no JVD, supple, symmetrical, trachea midline, and thyroid not enlarged, symmetric, no tenderness/mass/nodules  Back: symmetric, no curvature. ROM normal. No CVA tenderness.  Lungs: clear to auscultation bilaterally  Heart: S1, S2 normal, no murmur, click, rub or gallop, regular rate and rhythm  Abdomen: soft, non-tender; bowel sounds normal; no masses,  no organomegaly  Extremities: extremities normal, atraumatic, no cyanosis or edema  Pulses: 2+ and symmetric  Skin: Skin color, texture, turgor normal. No rashes or lesions  Neurologic: Grossly normal    ASSESSMENT     DIABETES:    The patient denies any episodes of hypoglycemia since her last clinic visit.  she denies any lower extremity skin breakdown or foot ulcers.    HYPERCHOLESTEROLEMIA:  The patient states that her cholesterol has been well controlled on her current medication.    Lab Results   Component Value Date/Time    CHOLEST 118 02/24/2024 11:42 AM    LDL 55 02/24/2024 11:42 AM    HDL 44 02/24/2024 11:42 AM    TRIG 102 02/24/2024 11:42 AM    VLDL 15 02/24/2024 11:42 AM    TCHDLRATIO 2.9 04/28/2015 06:51 PM       HYPERTENSION:  The patient's blood pressure has been well controlled.  she has been checking it as instructed and has remained in relatively good control.    Encounter Diagnosis(ses):   Encounter Diagnoses   Name Primary?    Type 2 diabetes mellitus without complication, with long-term current use of insulin (HCC) [E11.9, Z79.4] Yes    Primary hypertension [I10]      Dyslipidemia [E78.5]     Morbid obesity with BMI of 50.0-59.9, adult ([facility]) [E66.01, Z68.43]        PLAN     Patient is not interested in bariatric surgery. Patient desires to pursue traditional weight loss at this time.      HYPERTENSION: Blood pressure stable on the above medications. No interval change in antihypertensive medication.     DYSLIPIDEMIA: Stable on the above prescribed meal plan and medication. Liver function stable.    Lab Results   Component Value Date/Time    CHOLEST 118 02/24/2024 11:42 AM    LDL 55 02/24/2024 11:42 AM    HDL 44 02/24/2024 11:42 AM    TRIG 102 02/24/2024 11:42 AM    VLDL 15 02/24/2024 11:42 AM    TCHDLRATIO 2.9 04/28/2015 06:51 PM       DEGENERATIVE JOINT DISEASE: Of the knees is stable. Encourage upper body exercises if unable to perform weight-bearing exercises.      Goals for next month:  1. Keep a food log.  2. Drink 48-64 ounces of non-caloric beverages per day. No fruit juices or regular soda.  3. Increase activity-upper body exercises, walk 10 minutes per day.  4. Increase fruit and vegetable servings to 5-6 per day.      Tolerating Trulicity per PCP    Feels better    Elevated blood sugars may be due to recent steroid injections    Diagnoses and all orders for this visit:    Type 2 diabetes mellitus without complication, with long-term current use of insulin (HCC) [E11.9, Z79.4]    Primary hypertension [I10]    Dyslipidemia [E78.5]    Morbid obesity with BMI of 50.0-59.9, adult ([facility]) [E66.01, Z68.43]          Chin Mccrary MD

## 2024-03-15 ENCOUNTER — TELEPHONE (OUTPATIENT)
Dept: PHYSICAL THERAPY | Facility: HOSPITAL | Age: 64
End: 2024-03-15

## 2024-03-29 ENCOUNTER — TELEPHONE (OUTPATIENT)
Dept: PHYSICAL MEDICINE AND REHAB | Facility: CLINIC | Age: 64
End: 2024-03-29

## 2024-03-29 DIAGNOSIS — M75.31 CALCIFIC TENDONITIS OF RIGHT SHOULDER: Primary | ICD-10-CM

## 2024-04-01 ENCOUNTER — APPOINTMENT (OUTPATIENT)
Dept: PHYSICAL THERAPY | Facility: HOSPITAL | Age: 64
End: 2024-04-01
Attending: PHYSICAL MEDICINE & REHABILITATION
Payer: MEDICAID

## 2024-04-08 ENCOUNTER — APPOINTMENT (OUTPATIENT)
Dept: PHYSICAL THERAPY | Facility: HOSPITAL | Age: 64
End: 2024-04-08
Attending: PHYSICAL MEDICINE & REHABILITATION
Payer: MEDICAID

## 2024-04-10 ENCOUNTER — OFFICE VISIT (OUTPATIENT)
Dept: SURGERY | Facility: CLINIC | Age: 64
End: 2024-04-10
Payer: MEDICAID

## 2024-04-10 ENCOUNTER — OFFICE VISIT (OUTPATIENT)
Dept: PHYSICAL THERAPY | Facility: HOSPITAL | Age: 64
End: 2024-04-10
Attending: PHYSICAL MEDICINE & REHABILITATION
Payer: MEDICAID

## 2024-04-10 VITALS
BODY MASS INDEX: 48.23 KG/M2 | HEART RATE: 99 BPM | DIASTOLIC BLOOD PRESSURE: 76 MMHG | HEIGHT: 65.2 IN | SYSTOLIC BLOOD PRESSURE: 126 MMHG | WEIGHT: 293 LBS

## 2024-04-10 DIAGNOSIS — M50.30 DDD (DEGENERATIVE DISC DISEASE), CERVICAL: ICD-10-CM

## 2024-04-10 DIAGNOSIS — R29.898 LEFT HAND WEAKNESS: ICD-10-CM

## 2024-04-10 DIAGNOSIS — M54.12 LEFT CERVICAL RADICULOPATHY: Primary | ICD-10-CM

## 2024-04-10 DIAGNOSIS — R20.2 NUMBNESS AND TINGLING IN LEFT HAND: ICD-10-CM

## 2024-04-10 DIAGNOSIS — R20.0 NUMBNESS AND TINGLING IN LEFT HAND: ICD-10-CM

## 2024-04-10 DIAGNOSIS — Z98.1 S/P CERVICAL SPINAL FUSION: ICD-10-CM

## 2024-04-10 PROCEDURE — 97110 THERAPEUTIC EXERCISES: CPT

## 2024-04-10 PROCEDURE — 99214 OFFICE O/P EST MOD 30 MIN: CPT | Performed by: PHYSICIAN ASSISTANT

## 2024-04-10 PROCEDURE — 97162 PT EVAL MOD COMPLEX 30 MIN: CPT

## 2024-04-10 NOTE — PROGRESS NOTES
Last Office Visit: 08/30/2023    Established patient presents today for a follow up from Cervical 5-cervical 6 anterior corpectomy with cervical 4-cervical 5, cervical 5-cervical 6, cervical 6-cervical 7 anterior fusion, placement of cage and plate surgery completed on 08/29/2022 by Dr. Kerns. Patient states that she has persistent pain from neck to entire left arm with numbness and tingling that has worsen for a year. Patient is taking Duloxetine for pain relief. She has calcium build up in rotator cuff in right arm and received steroid shot for rotator cuff by Dr. Russell. She is in therapy for for right rotator cuff but not neck. She is concern about hand numbness and is afraid to cut off her finger when using a knife because of her hand numbness.

## 2024-04-10 NOTE — PROGRESS NOTES
UPPER EXTREMITY EVALUATION:   Nicolette Nation    1960  Diagnosis: Calcific tendonitis of right shoulder (M75.31)   Referring Physician:  Jaison Wilson MD visit: none  Initial Evaluation Date: 4/10/2024  Date of Surgery: None for this diagnosis  Insurance: [employer]  Authorized # of visits:  TBD  Plan of care to: 2024    Precautions/Hx: ACDF C4-7, daily diarrhea, HTN, HLD, IBS severe, OA, c-sec x 2, cholecystecomy, hysterectomy, hysteroscopy, tubal ligation    Past medical history was reviewed with Nicolette.   Past Medical History:    Back problem    previous disc problem    Cervical disc herniation    involving C6-C7    Cervical polyp    Chronic diarrhea    Colon polyp    COVID    Fever chills nausea. No hospitalization or residual    Diabetes (HCC)    Diverticular disease    Essential hypertension    Hemorrhoids    High blood pressure    High cholesterol    History of COVID-19    Hyperlipidemia    IBS (irritable bowel syndrome)    Incontinence    stool    Morbid obesity with BMI of 50.0-59.9, adult (HCC)    Myopia of both eyes    Osteoarthritis    Postmenopause    at age 50-54    Subclinical hypothyroidism    subclinical hypothyroidism, primary.  3/2010 trial of Synthroid, no improvement sx, d/c'd    Varicella    chicken pox    Visual impairment    glasses     Past Surgical History:   Procedure Laterality Date      , 1991    x2     Cholecystectomy      Colonoscopy N/A 2019    Procedure: COLONOSCOPY;  Surgeon: Marcus Shah MD;  Location: [facility] ENDOSCOPY    Colonoscopy N/A 2021    Procedure: COLONOSCOPY;  Surgeon: Marcus Shah MD;  Location: [facility] ENDOSCOPY    Endometrial biopsy - jar(s): 2  2020    Hysterectomy  10/2021    no associated bleeding complication    Hysteroscopy      PROCEDURE:  Cervical polypectomy, hysteroscopy, MyoSure resection of endometrial polyp, and dilation and curettage.     Other  2022    Cervical 5-cervical 6 anterior  corpectomy with cervical 4-cervical 5, cervical 5-cervical 6, cervical 6-cervical 7 anterior fusion, placement of cage and plate    Tubal ligation      s/p BTL        SUBJECTIVE:   PATIENT SUMMARY:  Nicolette Nation is a 63 year old y/o female who presents to physical therapy today with complaints of R shoulder pain.  She now also has pain in the mid lower cervical spine w pain radiating into the R parascapular area and to the R elbow.  History of current condition:1 year hx of shoulder pain.  She also has pain into the L UE to the elbow with tingling to the 1-3 L fingers.  She notes that she gets some relief with Duloxetine. She states that she has been worse since her recent shot.  She has not been able to do her previous HEP  Current functional limitations include, but are not limited to writing, dressing, shampooing hair    Nicolette describes prior level of function as able to perform all desired ADL's without difficulty in the R shoulder.  She has continued to have limitations in the L UE since prior to her ACDF.  Pt goals included in as therapy goals below.      ASSESSMENT   Pt presents with subjective complaints and functional limitations as noted above. Pt's function and objective finding are consistent with physician's diagnosis.  Pt presents with the following limitations: (+) impingement texting; decreased L sensation to light touch; decreased cervical ROM; decreased B shldr ROM R>>L; and decreased upper quadrant flexibility.      Pt and PT discussed evaluation findings, pathology, POC and HEP.  Pt voiced understanding and performs HEP correctly without reported pain. Skilled Physical Therapy is medically necessary to address the above impairments and reach functional goals.    In agreement with functional score and clinical rationale, this evaluation involved Moderate Complexity decision making due to 1-2 personal factors/comorbidities, 3 body structures involved/activity limitations, and evolving symptoms  including changing pain levels.    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, prior in accounts payable  Home environment: apartment alone son to move in son  Stairs: 1 step in  Unexplained wt loss: non  Blood Thinners: no  Diabetes: yes 10 years  Latex Allergy: no  Pacemaker: no     OBJECTIVE:     Objective Initial Evaluation Data 4/10/2024:    QuickDASH Outcome Score  Score: 86.36 % (2024 12:54 PM)        Posture:  B shoulder protraction, min forward head position, B genu valgus R>L    Shldr impingement testing 4/10/2024       Brower R (-)   Brower L (+) severe w min range   Neer's R (-)   Neer's L (+) severe w min range       Dermatomes 4/10/2024       Lat neck(C4) R wnl   Lat neck (C4) L DULL   Ant deltoid (C5) R wnl   Ant deltoid (C5) L DULL   Dorsal prox thumb (C6) R wnl   Dorsal prox thumb (C6) L DULL   Dorsal prox 3 digit (C7) R wnl   Dorsal prox 3 digit (C7) L DULL   Dorsal 4-5 digits (C8) R wnl   Dorsal 4-5 digits (C8) L DULL   Med prox elbow (T1) R wnl   Med prox elbow (T1) L DULL        UE Neural Glides 4/10/2024   ULTT 1 R wnl   ULTT 1 L wnl   ULTT 2 R wnl   ULTT 2 L  wnl   ULTT 3 R wnl   ULTT 3 L wnl       Cervical ROM 4/10/2024   Fwd head 30   Flexion 60 nl 38   Extension 70 nl 15   R SB 45 nl 25   L SB 45 nl 21   R Rotation 80 nl 37   L Rotation 80 nl 34   *pain limiting     Shoulder ROM 4/10/2024   Flex R 180 nl 80   Flex L 180 nl 151   Extn R 50 nl 25   Extn L 50 nl 59   Abd R 180 nl 56   Abd L 180 nl 152   ER R 90 nl 38 @ 50 abd   ER L 90 nl 90   IR R 80 nl 48 @ 50 abd    IR L 80 nl 58   *pain limiting    Elbow ROM 4/10/2024   Flex R wnl   Flex L wnl   Extn R wnl   Extn L wnl   Supination R wnl   Supination L wnl   Pronation R wnl   Pronation L wnl       MMT 5/5 4/14/2024   C5 shldr abd R 2*   Shldr abd L 5   C6 biceps R 5-   Biceps L 5   C7 Triceps R 5-*   Triceps L 4+   T1 Interossei R 4   T1 Interossei L 4   ER R 4+*   ER L 5   IR R 4+*   IR L 5   C8 EPL R 5   EPL L 5   *pain limiting     UE flexibility 4/10/2024   UT R mod   UT L mod   Scalenes R mod   Scalenes L mod   Lats R max   Lats L min     Imaging:   PROCEDURE: XR CERVICAL SPINE (2 OR 3 VIEWS) (CPT=72040)     COMPARISON: [facility], XR CERVICAL SPINE (2-3 VIEWS) (CPT=72040), 4/27/2023, 12:36 PM.  [facility], XR CERVICAL SPINE (4 VIEWS) (CPT=72050), 11/21/2022, 4:20 PM.  [facility], XR  CERVICAL SPINE (4 VIEWS) (CPT=72050), 10/10/2022, 1:37 PM.     INDICATIONS: Follow up cervical spine surgery from August 2022.     TECHNIQUE: Cervical spine radiographs (2 views)     FINDINGS:     ALIGNMENT: Normal alignment.    ATLANTOAXIAL: Normal odontoid and atlantoaxial joints.    VERTEBRAL BODIES:   No acute fracture.  Postsurgical changes of anterior and interbody cage fusion from C4-C7.  The hardware appears intact and well seated.  DISC SPACES: Mild narrowing of the native discs.  PREVERTEBRAL SOFT TISSUES: Negative.    OTHER: Negative.                 Impression   CONCLUSION:     Prior postsurgical changes of C4-C7 anterior and interbody cage fusion with no evidence of hardware complication.           Dictated by (CST): Naman Helms MD on 8/30/2023 at 11:54 AM      Finalized by (CST): Naman Helms MD on 8/30/2023 at 11:56 AM     PROCEDURE: XR SHOULDER, COMPLETE (MIN 2 VIEWS), RIGHT (CPT=73030)     COMPARISON: None.     INDICATIONS: Pain to the right shoulder x3 months without injury.     TECHNIQUE: 3 views were obtained.       FINDINGS:  BONES: No acute  fracture or dislocation is apparent.  There is calcific tendinitis of the right rotator cuff.  Acromioclavicular and glenohumeral joints appear well maintained.  Mineralization is within normal limits.  SOFT TISSUES: Negative for visible soft tissue swelling or radiopaque foreign body.    EFFUSION: None visible.    OTHER: Partially imaged anterior cervical discectomy and fusion.               Impression   CONCLUSION:     Calcific tendinitis of the right rotator cuff.  Otherwise, unremarkable right shoulder radiographs.          elm-remote     Dictated by (CST): Jose Jones MD on 11/30/2023 at 3:20 PM      Finalized by (CST): Jose Jones MD on 11/30/2023 at 3:22 PM         Treatment performed this date:    Therapeutic Exercise:  Visit #   1/8    Position Exercise HEP 4/10/2024    Sitting Pulleys  H X 3min    Standing  Pendulum flex/extn H  X      Pendulum horiz abd/add H  X      Pendulum CW/CCW H  X            Neuro Re-ed  X importance of consistent mobility    H = HEP. Pt given copies of this exercise for home program.  X = Exercises done this date - pt verbalized understanding and demonstrated competence. All exercises done B unless otherwise indicated.  Pt advised to discontinue exercises that increase pain and to call or return to therapist to discuss.  Each intervention above is specifically prescribed to address the patients identified impairments, activity limitations, and participation restrictions.    ASSESSMENT     Physical Therapy Goals:  From 4/10/2024 to 7/9/2024  - Created with patient input during initial evaluation  1. Pt will be independent in beginning level of HEP for stretching, posture and strengthening.   2. Pt will be able to don/doff shirt/jacket without increased symptoms.  3. Pt will be able to pull pants up without increased symptoms.  4. Pt will be able to shampoo hair without increased symptoms.  5. Pt will be able to hold pen and write without increased symptoms.     PLAN OF  CARE:      Frequency/Duration: Patient will be seen for 1-2x/week or a total of 18 visits over a 90 day period. Treatment will include: Manual Therapy; Therapeutic Exercises; Neuromuscular Re-education; Therapeutic Activity; Ultrasound; Electrical Stim; Traction; Patient education; Home exercise program instruction.    Education or treatment limitation: None  Rehab Potential: good    Patient was advised of these findings, precautions, goals of treatment, plan of care, beginning self care and treatment options and has agreed to actively participate in planning and for this course of care.    Charges: PT Chloe 2, TE 1    Total Timed treatment: 12 min      Total Treatment Time: 45 min    Thank you for your referral. Please co-sign or sign and return this letter via fax as soon as possible to [phone]. If you have any question please contact me at Dept: [phone].    Sincerely,  Electronically signed by therapist: Lizette Ware PT    Physician’s certification required: Yes  I certify the need for these services furnished under this plan of treatment and while under my care.    X______________________________________________ Date________________  Certification From: 4/10/2024      To: 7/9/2024        ____________________________________________________________________________________________    21st Century Cures Act Notice to Patient: Medical documents like this are made available to patients in the interest of transparency. However, be advised this is a medical document and it is intended as [id number] communication between your medical providers. This medical document may contain abbreviations, assessments, medical data, and results or other terms that are unfamiliar. Medical documents are intended to carry relevant information, facts as evident, and the clinical opinion of the practitioner. As such, this medical document may be written in language that appears blunt or direct. You are encouraged to contact  your medical provider and/or [facility] if you have any questions about this medical document.

## 2024-04-10 NOTE — PROGRESS NOTES
Patient: Nicolette Nation  Medical Record Number: [id number]  YOB: 1960  PCP: Niki Pozo MD    Reason for visit: Cervical follow up    HISTORY OF CHIEF COMPLAINT:    Nicolette Nation is a very pleasant 63 year old female who presents today for: Cervical follow up  S/p 8/29/22: Dr. Kerns: C5, C6 anterior corpectomy with C4-C7 ACDF  The patient endorses continued intermittent neck pain as well as left upper extremity burning sensation, pain and persistent numbness and tingling.  She states a few weeks ago from the left elbow region across the forearm to the first and second digit, burning and numbness and tingling, which is new.  Prior EMGs have been negative.  She is following with Ortho as needed.  She is seeing Dr. Russell for conservative treatment.  No right upper extremity complaints.  No gait instability.  She states she needs bilateral knee replacements, which makes climbing stairs difficult.  No leg heaviness or fatigue that she is aware of. Left hand weakness is stable.     Last hx: 8/2023  Nicolette Nation is a very pleasant 63 year old female who presents today for: Cervical follow up, 1 year surgical visit  S/p 8/29/22: Dr. Kerns: C5, C6 anterior corpectomy with C4-C7 ACDF  The patient endorses chronic left upper extremity numbness/tingling with hand weakness, onset prior to surgical intervention.  The symptoms are stable.  She has seen Ortho for this as well.  Over the past month she has had acute right lateral neck and shoulder pain.  Aggravated with movement at the shoulder joint.     Last hx: 5/3/23  HISTORY OF PRESENT ILLNESS:  Nicolette Nation is a(n) 62 year old female seen today in postoperative follow-up.  On August 29, I performed a cervical corpectomy and fusion for myelopathy.     Overall, she is stable.  She still complains of some residual pain in her neck with radiation to the left scapula, and down the arm.  She has pain particularly at the base of the left hand, near  the CMC joint on the ulnar aspect.  She also has some tenderness there.  Overall, her symptoms are unchanged from prior.     She notes that she did strain her back recently.  She is taking Tylenol for this.     The insurance discontinued her physical therapy.     Past Medical History:    Back problem    previous disc problem    Cervical disc herniation    involving C6-C7    Cervical polyp    Chronic diarrhea    Colon polyp    COVID    Fever chills nausea. No hospitalization or residual    Diabetes (HCC)    Diverticular disease    Essential hypertension    Hemorrhoids    High blood pressure    High cholesterol    History of COVID-19    Hyperlipidemia    IBS (irritable bowel syndrome)    Incontinence    stool    Morbid obesity with BMI of 50.0-59.9, adult (HCC)    Myopia of both eyes    Osteoarthritis    Postmenopause    at age 50-54    Subclinical hypothyroidism    subclinical hypothyroidism, primary.  3/2010 trial of Synthroid, no improvement sx, d/c'd    Varicella    chicken pox    Visual impairment    glasses      Past Surgical History:   Procedure Laterality Date      , 1991    x2     Cholecystectomy      Colonoscopy N/A 2019    Procedure: COLONOSCOPY;  Surgeon: Marcus Shah MD;  Location: [facility] ENDOSCOPY    Colonoscopy N/A 2021    Procedure: COLONOSCOPY;  Surgeon: Marcus Shah MD;  Location: [facility] ENDOSCOPY    Endometrial biopsy - jar(s): 2  2020    Hysterectomy  10/2021    no associated bleeding complication    Hysteroscopy      PROCEDURE:  Cervical polypectomy, hysteroscopy, MyoSure resection of endometrial polyp, and dilation and curettage.     Other  2022    Cervical 5-cervical 6 anterior corpectomy with cervical 4-cervical 5, cervical 5-cervical 6, cervical 6-cervical 7 anterior fusion, placement of cage and plate    Tubal ligation      s/p BTL      Family History   Problem Relation Age of Onset    Stroke Mother         TIA    Cancer Father          lung    Other (smoker) Father     Diabetes Brother     Diabetes Paternal Grandmother         Fathers side of family    Other (auto immune) Paternal Grandmother     Other (hepatitis) Paternal Grandmother     Renal Disease Paternal Grandmother     Heart Disease Paternal Grandfather     Diabetes Other         Grandmother [SIDE NOT STATED]    Thyroid disease Other         family h/o    Glaucoma Neg         family h/o    Bleeding Disorders Neg     DVT/VTE Neg       Social History     Socioeconomic History    Marital status:    Tobacco Use    Smoking status: Former     Current packs/day: 0.00     Average packs/day: 0.5 packs/day for 2.0 years (1.0 ttl pk-yrs)     Types: Cigarettes     Start date: 1983     Quit date: 1985     Years since quittin.2    Smokeless tobacco: Never   Vaping Use    Vaping status: Never Used   Substance and Sexual Activity    Alcohol use: No    Drug use: No    Sexual activity: Not Currently     Comment: last active in    Other Topics Concern    Caffeine Concern Yes     Comment: soda      Allergies   Allergen Reactions    Chlorhexidine RASH      Current Medications:  Current Outpatient Medications   Medication Sig Dispense Refill    LANTUS SOLOSTAR 100 UNIT/ML Subcutaneous Solution Pen-injector Inject 20 Units into the skin nightly.      DULOXETINE 30 MG Oral Cap DR Particles Take 2 capsules by mouth once daily 60 capsule 0    metFORMIN 500 MG Oral Tab Take 2 tablets (1,000 mg total) by mouth 2 (two) times daily with meals. 360 tablet 3    Dulaglutide (TRULICITY) 1.5 MG/0.5ML Subcutaneous Solution Pen-injector Inject 1.5 mg into the skin once a week. 12 each 1    loratadine 10 MG Oral Tab Take 1 tablet (10 mg total) by mouth daily.      atorvastatin 10 MG Oral Tab Take 1 tablet (10 mg total) by mouth nightly. 90 tablet 3    Acetaminophen ER (TYLENOL 8 HOUR ARTHRITIS PAIN) 650 MG Oral Tab CR Take 2 tablets (1,300 mg total) by mouth in the morning and 2 tablets (1,300 mg  total) before bedtime. 60 tablet 5    LEVOTHYROXINE 75 MCG Oral Tab TAKE 1 TABLET BY MOUTH BEFORE BREAKFAST 90 tablet 3    PANTOPRAZOLE 40 MG Oral Tab EC TAKE 1 TABLET BY MOUTH ONCE DAILY BEFORE BREAKFAST 90 tablet 3    lisinopril-hydroCHLOROthiazide 10-12.5 MG Oral Tab Take 1 tablet by mouth once daily. 90 tablet 3    Multiple Vitamins-Minerals (EQ VISION FORMULA 50+ OR) Take 1 tablet by mouth daily.      Insulin Pen Needle (BD PEN NEEDLE MINI U/F) 31G X 5 MM Does not apply Misc Use with Lantus 100 each 2    Glucose Blood (ONETOUCH ULTRA BLUE) In Vitro Strip USE ONCE DAILY 100 each 11    ONETOUCH DELICA LANCETS 33G Does not apply Misc Apply 1 Units topically daily. 90 each 3    Blood Glucose Monitoring Suppl (ONETOUCH ULTRA SYSTEM) W/DEVICE Does not apply Kit test T.I.D          REVIEW OF SYSTEMS   Comprehensive review of systems done. Negative except what is outlined in the above HPI.     PHYSICAL EXAMIMATION    height is 65.2\" and weight is 312 lb (141.5 kg) (abnormal).   GENERAL: Very pleasant patient is in no apparent distress. Sitting comfortably in the examination chair.   HEENT: Normocephalic, atraumatic.  RESPIRATORY RATE: Easy and Even   SKIN: Warm and dry  NEURO: Awake, alert and orientated. Speech fluent, comprehension intact, answering questions appropriately.     SPINE:  Gait/Coordination: Gait deferred    Upper Extremity Strength:     Deltoid  Biceps  Triceps    Intrinsics      Right 5 5 5 5 5     Left 5 5 5 5 5   Lower Extremity Strength:     Iliopsoas    D-flexion   Right       5         5   Left       5         5       DATA:   None    IMAGING:   No recent imaging    MEDICAL DECISION MAKING:     ASSESSMENT and PLAN:    ICD-10-CM    1. Left cervical radiculopathy  M54.12 MRI SPINE CERVICAL (W+WO) (CPT=72156) [[id number]]     XR CERVICAL SPINE (4VIEWS) (CPT=72050)      2. Numbness and tingling in left hand  R20.0 MRI SPINE CERVICAL (W+WO) (CPT=72156) [[id number]]    R20.2 XR CERVICAL SPINE (4VIEWS)  (CPT=72050)      3. Left hand weakness  R29.898 MRI SPINE CERVICAL (W+WO) (CPT=72156) [[id number]]     XR CERVICAL SPINE (4VIEWS) (CPT=72050)      4. DDD (degenerative disc disease), cervical  M50.30 MRI SPINE CERVICAL (W+WO) (CPT=72156) [[id number]]     XR CERVICAL SPINE (4VIEWS) (CPT=72050)      5. S/P cervical spinal fusion  Z98.1 MRI SPINE CERVICAL (W+WO) (CPT=72156) [[id number]]     XR CERVICAL SPINE (4VIEWS) (CPT=72050)        PLAN:   1. Medication: None prescribed  2. Imaging:    - Reviewed today:    -No recent imaging   - Ordered today:    -MRI cervical with and without contrast, x-ray cervical:     -Further assess intermittent chronic neck and left upper extremity symptoms.  Recent progression of left forearm burning with hand numbness and tingling.  The patient is unsure if she would like to proceed with imaging at this time, she would like to discuss treatment options with Dr. Russell prior.  She endorses claustrophobia.  If the patient decides to proceed with imaging, advised to contact her office and I will prescribe Valium.  She was advised she cannot drive or operate machinery on this medication therapy and will need to be driven to and from her MRI appointment.  3.  Continue follow-up with Dr. Russell  4. Follow up with Dr. Kerns after completing imaging, if patient decides to proceed, or call or follow up sooner or go to the ED for any new, worsening or concerning signs or symptoms     I discussed the plan with the patient. The patient agrees with the plan, verbalized understanding and is appreciative. All questions were sought out and thoroughly answered to satisfaction.       Total visit time: 40 minutes  More than 50% spent coordinating care, providing patient education, discussing further imaging, discussing physiatry and counseling.    Malina Vicente M.S., PA-C  [address], [address]  [place] [address]  [phone]  4/10/2024 10:47 AM    Dragon speech  recognition software was used to prepare this note. If a word or phrase is confusing, it is likely due to a failure of recognition. Please contact me with any questions or clarifications.

## 2024-04-15 ENCOUNTER — APPOINTMENT (OUTPATIENT)
Dept: PHYSICAL THERAPY | Facility: HOSPITAL | Age: 64
End: 2024-04-15
Attending: PHYSICAL MEDICINE & REHABILITATION
Payer: MEDICAID

## 2024-04-15 PROCEDURE — 97110 THERAPEUTIC EXERCISES: CPT

## 2024-04-15 PROCEDURE — 97112 NEUROMUSCULAR REEDUCATION: CPT

## 2024-04-15 PROCEDURE — 97140 MANUAL THERAPY 1/> REGIONS: CPT

## 2024-04-15 NOTE — PROGRESS NOTES
Nicolette Nation    8/6/1960  Diagnosis: Calcific tendonitis of right shoulder (M75.31)   Referring Physician:  Jaison Wilson MD visit: none  Initial Evaluation Date: 4/10/2024  Date of Surgery: None for this diagnosis  Insurance: [employer]  Authorized # of visits:  8 visits to 6/9/2024  Plan of care to: 7/9/2024    Precautions/Hx: ACDF C4-7, daily diarrhea, HTN, HLD, IBS severe, OA, c-sec x 2, cholecystecomy, hysterectomy, hysteroscopy, tubal ligation    SUBJECTIVE:     Pt reports that she continues to have severe shoulder pain.     Visit count  1/8 2/8    Date 4/10/2024 4/15/2024     R shoulder      Pain Range 7-9/10 7-9/10    Pain Ave 7/10 7/10         OBJECTIVE:     Treatment performed this date:    Therapeutic Exercise:  Visit #   1/8 2/8   Position Exercise HEP 4/10/2024 4/15/2024    Supine B shldr flex hands clasped H  X 5x   Sitting Pulleys  H X 3min X 3 min    B shldr flex hands clasped H  X 5x    TB shldr ER H  X 10x, 5x, yellow   Standing  Pendulum flex/extn H  X  5x    Pendulum horiz abd/add H  X  5x    Pendulum CW/CCW H  X  5x          Neuro Re-ed  X importance of consistent mobility X see assessment    Man tx PROM   X R shldr all planes    CR/RI   X R shldr IR/ER    Joint mob   X R post glide, inf glide    STM   X R supraspinatus, UT, pect minor   H = HEP. Pt given copies of this exercise for home program.  X = Exercises done this date - pt verbalized understanding and demonstrated competence. All exercises done B unless otherwise indicated.  Pt advised to discontinue exercises that increase pain and to call or return to therapist to discuss.  Each intervention above is specifically prescribed to address the patients identified impairments, activity limitations, and participation restrictions.    ASSESSMENT     Pt continues to have severe flare ups of R shoulder pain w ROM.  Pt educated on the importance of pacing activities to avoid overdoing w boom and bust to allow continued progression of  exercise and functional activities.  Pt verbalized understanding.   Pt demonstrates decreased joint mobility and tolerated joint mobility and ROM well.  Pt able to tolerate min-mod resistance to shoulder IR/ER strength and range.  Pt tender and restricted in R ant chest wall - did well w man tx and advised that continued mobility is required for healing.      Physical Therapy Goals:  From 4/10/2024 to 7/9/2024  - Created with patient input during initial evaluation  1. Pt will be independent in beginning level of HEP for stretching, posture and strengthening.   2. Pt will be able to don/doff shirt/jacket without increased symptoms.  3. Pt will be able to pull pants up without increased symptoms.  4. Pt will be able to shampoo hair without increased symptoms.  5. Pt will be able to hold pen and write without increased symptoms.     PLAN OF CARE:      Continue PT per original plan for therapeutic exercises, posture retraining, therapeutic activities, manual treatment, neuromuscular reeducation, therapeutic pain neuroscience education, patient education, self care home management, home exercise program, and modalities as needed.    Charged Units Units Minutes   Ther Ex 1 14   Neuro 1 8   Ther Activity     Gait     Man Tx 2 23        Total Timed  45   Total Tx Time  45       ____________________________________________________________________________________________    21st Century Cures Act Notice to Patient: Medical documents like this are made available to patients in the interest of transparency. However, be advised this is a medical document and it is intended as vzil-rw-cewv communication between your medical providers. This medical document may contain abbreviations, assessments, medical data, and results or other terms that are unfamiliar. Medical documents are intended to carry relevant information, facts as evident, and the clinical opinion of the practitioner. As such, this medical document may be written in  language that appears blunt or direct. You are encouraged to contact your medical provider and/or [facility] Patient Experience if you have any questions about this medical document.   Objective Initial Evaluation Data 4/10/2024:    QuickDASH Outcome Score  Score: 86.36 % (4/9/2024 12:54 PM)        Posture:  B shoulder protraction, min forward head position, B genu valgus R>L    Shldr impingement testing 4/10/2024       Brower R (-)   Brower L (+) severe w min range   Neer's R (-)   Neer's L (+) severe w min range       Dermatomes 4/10/2024       Lat neck(C4) R wnl   Lat neck (C4) L DULL   Ant deltoid (C5) R wnl   Ant deltoid (C5) L DULL   Dorsal prox thumb (C6) R wnl   Dorsal prox thumb (C6) L DULL   Dorsal prox 3 digit (C7) R wnl   Dorsal prox 3 digit (C7) L DULL   Dorsal 4-5 digits (C8) R wnl   Dorsal 4-5 digits (C8) L DULL   Med prox elbow (T1) R wnl   Med prox elbow (T1) L DULL        UE Neural Glides 4/10/2024   ULTT 1 R wnl   ULTT 1 L wnl   ULTT 2 R wnl   ULTT 2 L  wnl   ULTT 3 R wnl   ULTT 3 L wnl       Cervical ROM 4/10/2024   Fwd head 30   Flexion 60 nl 38   Extension 70 nl 15   R SB 45 nl 25   L SB 45 nl 21   R Rotation 80 nl 37   L Rotation 80 nl 34   *pain limiting     Shoulder ROM 4/10/2024   Flex R 180 nl 80   Flex L 180 nl 151   Extn R 50 nl 25   Extn L 50 nl 59   Abd R 180 nl 56   Abd L 180 nl 152   ER R 90 nl 38 @ 50 abd   ER L 90 nl 90   IR R 80 nl 48 @ 50 abd   IR L 80 nl 58   *pain limiting    Elbow ROM 4/10/2024   Flex R wnl   Flex L wnl   Extn R wnl   Extn L wnl   Supination R wnl   Supination L wnl   Pronation R wnl   Pronation L wnl       MMT 5/5 4/14/2024   C5 shldr abd R 2*   Shldr abd L 5   C6 biceps R 5-   Biceps L 5   C7 Triceps R 5-*   Triceps L 4+   T1 Interossei R 4   T1 Interossei L 4   ER R 4+*   ER L 5   IR R 4+*   IR L 5   C8 EPL R 5   EPL L 5   *pain limiting     UE flexibility 4/10/2024   UT R mod   UT L mod   Scalenes R mod   Scalenes L mod   Lats R max   Lats L  min

## 2024-04-24 ENCOUNTER — HOSPITAL ENCOUNTER (OUTPATIENT)
Dept: MAMMOGRAPHY | Facility: HOSPITAL | Age: 64
Discharge: HOME | End: 2024-04-24
Attending: FAMILY MEDICINE
Payer: MEDICAID

## 2024-04-24 DIAGNOSIS — Z12.31 BREAST CANCER SCREENING BY MAMMOGRAM: ICD-10-CM

## 2024-04-24 PROCEDURE — 77063 BREAST TOMOSYNTHESIS BI: CPT | Performed by: FAMILY MEDICINE

## 2024-04-24 PROCEDURE — 77067 SCR MAMMO BI INCL CAD: CPT | Performed by: FAMILY MEDICINE

## 2024-04-25 ENCOUNTER — OFFICE VISIT (OUTPATIENT)
Dept: PHYSICAL THERAPY | Facility: HOSPITAL | Age: 64
End: 2024-04-25
Attending: PHYSICAL MEDICINE & REHABILITATION
Payer: MEDICAID

## 2024-04-25 PROCEDURE — 97112 NEUROMUSCULAR REEDUCATION: CPT

## 2024-04-25 PROCEDURE — 97140 MANUAL THERAPY 1/> REGIONS: CPT

## 2024-04-25 PROCEDURE — 97110 THERAPEUTIC EXERCISES: CPT

## 2024-04-25 PROCEDURE — 97530 THERAPEUTIC ACTIVITIES: CPT

## 2024-04-25 NOTE — PROGRESS NOTES
Nicolette Nation    8/6/1960  Diagnosis: Calcific tendonitis of right shoulder (M75.31)   Referring Physician:  Jaison Wilson MD visit: none  Initial Evaluation Date: 4/10/2024  Date of Surgery: None for this diagnosis  Insurance: [employer]  Authorized # of visits:  8 visits to 6/9/2024  Plan of care to: 7/9/2024    Precautions/Hx: ACDF C4-7, daily diarrhea, HTN, HLD, IBS severe, OA, c-sec x 2, cholecystecomy, hysterectomy, hysteroscopy, tubal ligation    SUBJECTIVE:     Pt reports that she has been able to use her arm more.      Visit count  1/8 2/8 3/8   Date 4/10/2024 4/15/2024  4/25/2024    R shoulder      Pain Range 7-9/10 7-9/10 6-7/10   Pain Ave 7/10 7/10 6-7/10        OBJECTIVE:     Treatment performed this date:    Therapeutic Exercise:  Visit #   1/8 2/8 3/8   Position Exercise HEP 4/10/2024 4/15/2024  4/25/2024    Supine B shldr flex hands clasped H  X 5x     Shldr IR/ER    X 1# 10x2   Sitting Pulleys  H X 3min X 3 min X 3 min    B shldr flex hands clasped H  X 5x     TB shldr ER H  X 10x, 5x, yellow    Standing  Pendulum flex/extn H  X  5x     Pendulum horiz abd/add H  X  5x     Pendulum CW/CCW H  X  5x     B shldr extn hands on elev plinth H   X 5x ea prog elev of table x 3    Towel R shldr IR H   X 5x2   Neuro Re-ed  X importance of consistent mobility X see assessment  X see assessment    Ther Act Function    X washing back, don/doff coat   Man tx PROM   X R shldr all planes X R shldr all planes    CR/RI   X R shldr IR/ER X R shldr IR/ER    Joint mob   X R post glide, inf glide     STM   X R supraspinatus, UT, pect minor     Mobilization w movement    X Scap upward rotation, R shldr ER   H = HEP. Pt given copies of this exercise for home program.  X = Exercises done this date - pt verbalized understanding and demonstrated competence. All exercises done B unless otherwise indicated.  Pt advised to discontinue exercises that increase pain and to call or return to therapist to discuss.  Each  intervention above is specifically prescribed to address the patients identified impairments, activity limitations, and participation restrictions.    ASSESSMENT     Pt doing well with decreased pain and increased function.  Pt continues to demonstrate decreased shoulder extn and did well w self mobilization exercises.  Pt remains motivated and performing HEP consistently.  Pt educated on the importance of scapular mobility and humeral rotation for normal GH jt function.  Discussed humeral anatomy. Pt did well with man mob w movement demonstrating good gains in scapula glide.  Pt assessed and demonstrates good improvements in B shoulder ROM as noted below.  Pt instructed in mobility exercise specific to her goals.  Pt remains cooperative and motivated, performing HEP consistently.      Physical Therapy Goals:  From 4/10/2024 to 7/9/2024  - Created with patient input during initial evaluation  1. Pt will be independent in beginning level of HEP for stretching, posture and strengthening.   2. Pt will be able to don/doff shirt/jacket without increased symptoms.  3. Pt will be able to pull pants up without increased symptoms.  4. Pt will be able to shampoo hair without increased symptoms.  5. Pt will be able to hold pen and write without increased symptoms.     PLAN OF CARE:      Continue PT per original plan for therapeutic exercises, posture retraining, therapeutic activities, manual treatment, neuromuscular reeducation, therapeutic pain neuroscience education, patient education, self care home management, home exercise program, and modalities as needed.    Charged Units Units Minutes   Ther Ex 1 20   Neuro 1 8   Ther Activity 1 10   Gait     Man Tx 1 15        Total Timed  53   Total Tx Time  53       ____________________________________________________________________________________________    21st Century Cures Act Notice to Patient: Medical documents like this are made available to patients in the interest of  transparency. However, be advised this is a medical document and it is intended as exas-sg-isdm communication between your medical providers. This medical document may contain abbreviations, assessments, medical data, and results or other terms that are unfamiliar. Medical documents are intended to carry relevant information, facts as evident, and the clinical opinion of the practitioner. As such, this medical document may be written in language that appears blunt or direct. You are encouraged to contact your medical provider and/or [facility] Patient Experience if you have any questions about this medical document.   Objective Initial Evaluation Data 4/10/2024:    QuickDASH Outcome Score  Score: 86.36 % (4/9/2024 12:54 PM)        Posture:  B shoulder protraction, min forward head position, B genu valgus R>L    Shldr impingement testing 4/10/2024       Brower R (-)   Brower L (+) severe w min range   Neer's R (-)   Neer's L (+) severe w min range       Dermatomes 4/10/2024       Lat neck(C4) R wnl   Lat neck (C4) L DULL   Ant deltoid (C5) R wnl   Ant deltoid (C5) L DULL   Dorsal prox thumb (C6) R wnl   Dorsal prox thumb (C6) L DULL   Dorsal prox 3 digit (C7) R wnl   Dorsal prox 3 digit (C7) L DULL   Dorsal 4-5 digits (C8) R wnl   Dorsal 4-5 digits (C8) L DULL   Med prox elbow (T1) R wnl   Med prox elbow (T1) L DULL        UE Neural Glides 4/10/2024   ULTT 1 R wnl   ULTT 1 L wnl   ULTT 2 R wnl   ULTT 2 L  wnl   ULTT 3 R wnl   ULTT 3 L wnl       Cervical ROM 4/10/2024   Fwd head 30   Flexion 60 nl 38   Extension 70 nl 15   R SB 45 nl 25   L SB 45 nl 21   R Rotation 80 nl 37   L Rotation 80 nl 34   *pain limiting     Shoulder ROM 4/10/2024 4/25/2024    Flex R 180 nl 80 131   Flex L 180 nl 151 160   Extn R 50 nl 25 40   Extn L 50 nl 59 63   Abd R 180 nl 56 140   Abd L 180 nl 152 180   ER R 90 nl 38 @ 50 abd 45 @ 85 abd   ER L 90 nl 90 90   IR R 80 nl 48 @ 50 abd 70 @ 85 abd   IR L 80 nl 58 80   *pain  limiting    Elbow ROM 4/10/2024   Flex R wnl   Flex L wnl   Extn R wnl   Extn L wnl   Supination R wnl   Supination L wnl   Pronation R wnl   Pronation L wnl       MMT 5/5 4/14/2024   C5 shldr abd R 2*   Shldr abd L 5   C6 biceps R 5-   Biceps L 5   C7 Triceps R 5-*   Triceps L 4+   T1 Interossei R 4   T1 Interossei L 4   ER R 4+*   ER L 5   IR R 4+*   IR L 5   C8 EPL R 5   EPL L 5   *pain limiting     UE flexibility 4/10/2024   UT R mod   UT L mod   Scalenes R mod   Scalenes L mod   Lats R max   Lats L min

## 2024-04-30 ENCOUNTER — OFFICE VISIT (OUTPATIENT)
Dept: PHYSICAL MEDICINE AND REHAB | Facility: CLINIC | Age: 64
End: 2024-04-30
Payer: MEDICAID

## 2024-04-30 VITALS — RESPIRATION RATE: 18 BRPM | BODY MASS INDEX: 48.82 KG/M2 | WEIGHT: 293 LBS | HEIGHT: 65 IN

## 2024-04-30 DIAGNOSIS — M75.31 CALCIFIC TENDONITIS OF RIGHT SHOULDER: Primary | ICD-10-CM

## 2024-04-30 DIAGNOSIS — M54.12 RADICULITIS OF LEFT CERVICAL REGION: ICD-10-CM

## 2024-04-30 DIAGNOSIS — R20.2 NUMBNESS AND TINGLING IN LEFT HAND: ICD-10-CM

## 2024-04-30 DIAGNOSIS — R20.0 NUMBNESS AND TINGLING IN LEFT HAND: ICD-10-CM

## 2024-04-30 PROCEDURE — 99214 OFFICE O/P EST MOD 30 MIN: CPT | Performed by: PHYSICAL MEDICINE & REHABILITATION

## 2024-04-30 RX ORDER — DULOXETIN HYDROCHLORIDE 60 MG/1
60 CAPSULE, DELAYED RELEASE ORAL DAILY
Qty: 90 CAPSULE | Refills: 3 | Status: SHIPPED | OUTPATIENT
Start: 2024-04-30

## 2024-04-30 NOTE — PROGRESS NOTES
Progress note    C/C:   Chief Complaint   Patient presents with    Follow - Up     Returning patient here for follow up. LOV 2/29/24. At last visit, a right shoulder subdeltoid bursal injection done. Reports mild relief. Still with soreness to area and LROM. N/T to b/l hands.  Continues PT along with home exercises. Using tylenol prn and duloxetine. CPL 0/10 but with any movement shoots to a 7/10.       HPI: 63-year-old female presents for follow-up.  Underwent right subacromial bursa steroid injection under ultrasound guidance on 2/29/2024.  No significant benefit from injection.  Dissipating in physical therapy, has seen some improvement in pain and range of motion of the right shoulder, but not much. Continues with duloxetine 60mg qDaily.  Has been seen for follow-up with neurosurgery, has an MRI of the cervical spine with and without contrast pending.    Pertinent allergies:   Allergies   Allergen Reactions    Chlorhexidine RASH        Physical exam:  Resp 18   Ht 65\"   Wt (!) 312 lb (141.5 kg)   LMP 08/10/2021   BMI 51.92 kg/m²      PE right shoulder exam:    Range of motion:  Forward flexion:  degrees active. Passively I can take her to 160 degrees with pain  Abduction:  degrees active; passively I can take her to 160 degrees with pain  Internal rotation: buttocks  External rotation: guarding, no apparent loss of passive external rotation    Provocative test:  Supraspinatus test: +  Hawkin's test: +  Neer's test: +    Imaging: No new imaging to review    Assessment and plan  Right shoulder calcific tendonitis  Rotator cuff impingement  Cervical myelopathy, s/p C4-C7 fusion  DM  BMI 50-59    Recommend MRI of the right shoulder. Continue PT and HEP. Continue duloxetine 60mg qDaily. Depending upon the MRI findings may consider repeat subacromial bursa injection or referral to orthopedics.     F/u to discuss imaging results.     Jaison Russell DO  Physical Medicine and Rehabilitation  [facility]

## 2024-04-30 NOTE — PATIENT INSTRUCTIONS
Have the MRI of your right shoulder and see me for follow up.     I sent duloxetine 60mg capsules to your pharmacy with refills; take 1 capsule daily.

## 2024-05-07 RX ORDER — LISINOPRIL AND HYDROCHLOROTHIAZIDE 12.5; 1 MG/1; MG/1
1 TABLET ORAL
Qty: 90 TABLET | Refills: 3 | Status: SHIPPED | OUTPATIENT
Start: 2024-05-07

## 2024-05-07 NOTE — TELEPHONE ENCOUNTER
Refill passed per [facility] protocol.  Requested Prescriptions   Pending Prescriptions Disp Refills    LISINOPRIL-HYDROCHLOROTHIAZIDE 10-12.5 MG Oral Tab [Pharmacy Med Name: Lisinopril-hydroCHLOROthiazide 10-12.5 MG Oral Tablet] 90 tablet 0     Sig: Take 1 tablet by mouth once daily.       Hypertension Medications Protocol Passed - 5/5/2024  3:28 PM        Passed - CMP or BMP in past 12 months        Passed - Last BP reading less than 140/90     BP Readings from Last 1 Encounters:   04/10/24 126/76               Passed - In person appointment or virtual visit in the past 12 mos or appointment in next 3 mos     Recent Outpatient Visits              1 week ago Calcific tendonitis of right shoulder    [facility] Jaison Russell DO    Office Visit    1 week ago     [facility] Lizette Ware, PT    Office Visit    3 weeks ago     [facility] Lizette Ware, PT    Office Visit    3 weeks ago Left cervical radiculopathy    [facility] Malina Vicente PA-C    Office Visit    3 weeks ago     [facility] Lizette Ware, PT    Office Visit          Future Appointments         Provider Department Appt Notes    In 1 week Lizette Ware PT [facility] 8v auth 04/10/2024-07/08/2024 ([id number])  [facility]    In 2 weeks [facility] 04/10/2024-07/08/2024 ([id number])  [facility]    In 3 weeks Niki Pozo MD [facility] Follow-up, per  request    In 3 weeks [facility] MRI RM1 (1.5T WIDE) [facility] MRI     In 1 month Lizette Ware PT [facility] 8v auth 04/10/2024-07/08/2024 ([id number])  [facility]    In 1 month Lizette Ware PT [facility] 8v auth 04/10/2024-07/08/2024 ([id number])  [facility]    In 1 month Lizette Ware PT [place]  [facility] LAST AUTH VISIT  8v auth 04/10/2024-07/08/2024 ([id number])  [facility]    In 1 month [facility] MRI RM1 (1.5T WIDE) [facility] MRI     In 1 month Lizette Ware, PT [facility] AUTH???  [facility]    In 4 months Chin Mccrary MD [facility]                     Passed - EGFRCR or GFRNAA > 50     GFR Evaluation  EGFRCR: 97 , resulted on 2/24/2024             Recent Outpatient Visits              1 week ago Calcific tendonitis of right shoulder    [facility] Jaison Russell DO    Office Visit    1 week ago     [facility] Lizette Ware, PT    Office Visit    3 weeks ago     [facility] Lizette Ware, PT    Office Visit    3 weeks ago Left cervical radiculopathy    [facility] [place] Malina Vicente PA-C    Office Visit    3 weeks ago     [facility] Lizette Ware, PT    Office Visit          Future Appointments         Provider Department Appt Notes    In 1 week Lizette Ware PT [facility] 8v auth 04/10/2024-07/08/2024 ([id number])  [facility]    In 2 weeks Lizette Ware PT [facility] 8v auth 04/10/2024-07/08/2024 ([id number])  [facility]    In 3 weeks Niki Pozo MD [facility] Follow-up, per Dr request    In 3 weeks [facility] MRI RM1 (1.5T WIDE) [facility] MRI     In 1 month Lizette Ware PT [facility] 8v auth 04/10/2024-07/08/2024 ([id number])  [facility]    In 1 month Lizette Ware PT [facility] 8v auth 04/10/2024-07/08/2024 ([id number])  [facility]    In 1 month Lizette Ware PT [facility] LAST AUTH VISIT  8v auth 04/10/2024-07/08/2024 ([id number])  [facility]    In 1 month [facility] MRI RM1 (1.5T WIDE) [facility] MRI     In 1 month Lizette Ware, PT [place]  [facility] AUTH???  [facility]    In 4 months Chin Mccrary MD [facility]

## 2024-05-16 ENCOUNTER — OFFICE VISIT (OUTPATIENT)
Dept: PHYSICAL THERAPY | Facility: HOSPITAL | Age: 64
End: 2024-05-16
Attending: PHYSICAL MEDICINE & REHABILITATION

## 2024-05-16 PROCEDURE — 97530 THERAPEUTIC ACTIVITIES: CPT

## 2024-05-16 PROCEDURE — 97112 NEUROMUSCULAR REEDUCATION: CPT

## 2024-05-16 PROCEDURE — 97110 THERAPEUTIC EXERCISES: CPT

## 2024-05-16 NOTE — PROGRESS NOTES
Nicolette Nation    8/6/1960  Diagnosis: Calcific tendonitis of right shoulder (M75.31)   Referring Physician:  Jaison Wilson MD visit: none  Initial Evaluation Date: 4/10/2024  Date of Surgery: None for this diagnosis  Insurance: [employer]  Authorized # of visits:  8 visits to 6/9/2024  Plan of care to: 7/9/2024    Precautions/Hx: ACDF C4-7, daily diarrhea, HTN, HLD, IBS severe, OA, c-sec x 2, cholecystecomy, hysterectomy, hysteroscopy, tubal ligation    SUBJECTIVE:     Pt reports that she has a new memory foam mattress that she likes, but has a hard time getting up out of bed.  She states that the R shoulder is feeling better.  She is noticing more pain at the proximal scapular area currently.  She has a cervical and a R shoulder MRI planned.  She notes that she no longer has pain in the front of the shoulder onto her chest, but has pain on top of the shoulder into the lateral neck.      Visit count  1/8 2/8 3/8 4/8   Date 4/10/2024 4/15/2024  4/25/2024  5/16/2024    R shoulder       Pain Range 7-9/10 7-9/10 6-7/10 0 to 9/10   Pain Ave 7/10 7/10 6-7/10 6/10   Pain Current --- --- --- 2/10        OBJECTIVE:     Treatment performed this date:    Therapeutic Exercise:  Visit #   2/8 3/8 4/8   Position Exercise HEP 4/15/2024  4/25/2024  5/16/2024    Supine B shldr flex hands clasped H X 5x      Shldr IR/ER   X 1# 10x2    Sidelying Shldr ER    X 1# 5x2   Sitting Pulleys  H X 3 min X 3 min X 3 min    B shldr flex hands clasped H X 5x      TB shldr ER H X 10x, 5x, yellow  10x R   Standing  Pendulum flex/extn H  5x      Pendulum horiz abd/add H  5x      Pendulum CW/CCW H  5x      B shldr extn hands on elev plinth H  X 5x ea prog elev of table x 3     Towel R shldr IR H  X 5x2     TB shldr IR H    X 10x2 Y, 5x2 R    TB shldr ER H    X 10x2 Y, 5x2 R    TB B shldr extn  H   X 10x red, 5x2 Gr    Wall wash shldr scaption H   X 10x cues to scapula and lat chest wall   Neuro Re-ed  X see assessment  X see assessment  X  see assessment    Ther Act Function   X washing back, don/doff coat X transfer supine <>sit in/out of new bed   Man tx PROM  X R shldr all planes X R shldr all planes     CR/RI  X R shldr IR/ER X R shldr IR/ER     Joint mob  X R post glide, inf glide      STM  X R supraspinatus, UT, pect minor      Mobilization w movement   X Scap upward rotation, R shldr ER    H = HEP. Pt given copies of this exercise for home program.  X = Exercises done this date - pt verbalized understanding and demonstrated competence. All exercises done B unless otherwise indicated.  Pt advised to discontinue exercises that increase pain and to call or return to therapist to discuss.  Each intervention above is specifically prescribed to address the patients identified impairments, activity limitations, and participation restrictions.    ASSESSMENT     Pt verbalizes improvement with relief of pain at rest.  She continues to have severe pain with some movements.  Pt did well with resistance band exercises.  Pt continues to demonstrate impingement pattern of mobility.  She does well with pulleys for flexion w more passive mobility.  Discussed availability for home.  Pt educated on importance of consistent mobility for joint and tissue health - motion is lotion.  Continue to discuss progression to community exercise program for general wellness.  Reviewed best pattern for supine>sit>stand from her bed.     Physical Therapy Goals:  From 4/10/2024 to 7/9/2024  - Created with patient input during initial evaluation  1. Pt will be independent in beginning level of HEP for stretching, posture and strengthening.   2. Pt will be able to don/doff shirt/jacket without increased symptoms.  3. Pt will be able to pull pants up without increased symptoms.  4. Pt will be able to shampoo hair without increased symptoms.  5. Pt will be able to hold pen and write without increased symptoms.     PLAN OF CARE:      Assess response to band exercise. Continue PT per  original plan for therapeutic exercises, posture retraining, therapeutic activities, manual treatment, neuromuscular reeducation, therapeutic pain neuroscience education, patient education, self care home management, home exercise program, and modalities as needed.    Charged Units Units Minutes    Ther Ex 2 23   Neuro 1 14   Ther Activity 1 8   Gait     Man Tx          Total Timed  45   Total Tx Time  45       ____________________________________________________________________________________________    21st Century Cures Act Notice to Patient: Medical documents like this are made available to patients in the interest of transparency. However, be advised this is a medical document and it is intended as taqe-od-bwzc communication between your medical providers. This medical document may contain abbreviations, assessments, medical data, and results or other terms that are unfamiliar. Medical documents are intended to carry relevant information, facts as evident, and the clinical opinion of the practitioner. As such, this medical document may be written in language that appears blunt or direct. You are encouraged to contact your medical provider and/or [facility] Patient Experience if you have any questions about this medical document.   Objective Initial Evaluation Data 4/10/2024:    QuickDASH Outcome Score  Score: 86.36 % (4/9/2024 12:54 PM)        Posture:  B shoulder protraction, min forward head position, B genu valgus R>L    Shldr impingement testing 4/10/2024       Brower R (-)   Brower L (+) severe w min range   Neer's R (-)   Neer's L (+) severe w min range       Dermatomes 4/10/2024       Lat neck(C4) R wnl   Lat neck (C4) L DULL   Ant deltoid (C5) R wnl   Ant deltoid (C5) L DULL   Dorsal prox thumb (C6) R wnl   Dorsal prox thumb (C6) L DULL   Dorsal prox 3 digit (C7) R wnl   Dorsal prox 3 digit (C7) L DULL   Dorsal 4-5 digits (C8) R wnl   Dorsal 4-5 digits (C8) L DULL   Med prox elbow (T1) R  wnl   Med prox elbow (T1) L DULL        UE Neural Glides 4/10/2024   ULTT 1 R wnl   ULTT 1 L wnl   ULTT 2 R wnl   ULTT 2 L  wnl   ULTT 3 R wnl   ULTT 3 L wnl       Cervical ROM 4/10/2024   Fwd head 30   Flexion 60 nl 38   Extension 70 nl 15   R SB 45 nl 25   L SB 45 nl 21   R Rotation 80 nl 37   L Rotation 80 nl 34   *pain limiting     Shoulder ROM 4/10/2024 4/25/2024    Flex R 180 nl 80 131   Flex L 180 nl 151 160   Extn R 50 nl 25 40   Extn L 50 nl 59 63   Abd R 180 nl 56 140   Abd L 180 nl 152 180   ER R 90 nl 38 @ 50 abd 45 @ 85 abd   ER L 90 nl 90 90   IR R 80 nl 48 @ 50 abd 70 @ 85 abd   IR L 80 nl 58 80   *pain limiting    Elbow ROM 4/10/2024   Flex R wnl   Flex L wnl   Extn R wnl   Extn L wnl   Supination R wnl   Supination L wnl   Pronation R wnl   Pronation L wnl       MMT 5/5 4/14/2024   C5 shldr abd R 2*   Shldr abd L 5   C6 biceps R 5-   Biceps L 5   C7 Triceps R 5-*   Triceps L 4+   T1 Interossei R 4   T1 Interossei L 4   ER R 4+*   ER L 5   IR R 4+*   IR L 5   C8 EPL R 5   EPL L 5   *pain limiting     UE flexibility 4/10/2024   UT R mod   UT L mod   Scalenes R mod   Scalenes L mod   Lats R max   Lats L min

## 2024-05-20 ENCOUNTER — TELEPHONE (OUTPATIENT)
Dept: PHYSICAL THERAPY | Facility: HOSPITAL | Age: 64
End: 2024-05-20

## 2024-05-21 ENCOUNTER — TELEPHONE (OUTPATIENT)
Dept: ADMINISTRATIVE | Facility: HOSPITAL | Age: 64
End: 2024-05-21

## 2024-05-21 NOTE — TELEPHONE ENCOUNTER
Good Morning, Please be advise that the MRI SHOULDER, RIGHT (CPT=73221) was Denied by the Patient Health Plan.  According to Winnie Russell  can call initiate a P2P to be done to see if the denial can be overturned.  All clinicals has been sent. Patient is scheduled for Tuesday, May 28.  Case#[id number] Tel#[phone] Option#4    Thanks,  Iza    Denial Reason:  Urgent Action Needed: Information needed in order to approve request for   member DALTON ROJAS.  Date: 5/20/2024 Member number: [id number]  Beneficiary’s name: DALTON ROJAS  This fax is to inform you that our Medical Director has reviewed your request for   DALTON ROJAS, CPT 60812 - Magnetic Resonance Imaging (MRI), a special kind   of picture of your arm joint (wrist, elbow or shoulder) without contrast (dye).  Please note that the service requested for the above listed member has been denied.  For 58713 MRI UPPER EXTREMITY JOINT W/O Your doctor told us that you have   shoulder pain. An imaging study was asked for. We cannot approve this request   because:  Imaging requires six weeks of provider directed treatment to be completed. This must   have been completed in the past three months without improved symptoms. Contact   (via office visit, phone, email, or messaging) must occur after the treatment is   completed. This has not been met because:  Symptoms must be the same or worse after treatment to support imaging.   This finding was based on review of [facility] Musculoskeletal Imaging Guidelines   Section(s): Shoulder (MS 19) and 1.0 General Guidelines.      You are allotted an additional seven calendar days from the notification of adverse   determination to send one set of additional clinical or complete a [id number]   discussion with a Medical Director (by calling [phone], select Option 4).   Reference number [id number] will need to be entered or provided. This must be completed within 7 calendar days from the date of this letter.

## 2024-05-21 NOTE — TELEPHONE ENCOUNTER
Right Shoulder MRI (35375) Denied 5/20/24 Case #[id number]  episode ID [id number]  P2P scheduled 5/22/24 at 4:14p w/ Dr. Gifty May

## 2024-05-22 NOTE — TELEPHONE ENCOUNTER
Right Shoulder MRI (20820) Case #[id number] Approved following P2P w/ authorization #[id number] valid 5/17/24-7/6/24      Patient notified via [employer]

## 2024-05-22 NOTE — TELEPHONE ENCOUNTER
Peer to peer completed.  MRI of the right shoulder approved.  Authorization [id number]  Valid through 7/6/2024

## 2024-05-23 ENCOUNTER — OFFICE VISIT (OUTPATIENT)
Dept: PHYSICAL THERAPY | Facility: HOSPITAL | Age: 64
End: 2024-05-23
Attending: PHYSICAL MEDICINE & REHABILITATION

## 2024-05-23 PROCEDURE — 97140 MANUAL THERAPY 1/> REGIONS: CPT

## 2024-05-23 PROCEDURE — 97110 THERAPEUTIC EXERCISES: CPT

## 2024-05-23 PROCEDURE — 97530 THERAPEUTIC ACTIVITIES: CPT

## 2024-05-23 NOTE — PROGRESS NOTES
Nicolette Nation    8/6/1960  Diagnosis: Calcific tendonitis of right shoulder (M75.31)   Referring Physician:  Jaison Wilson MD visit: none  Initial Evaluation Date: 4/10/2024  Date of Surgery: None for this diagnosis  Insurance: [employer]  Authorized # of visits:  8 visits to 6/9/2024  Plan of care to: 7/9/2024    Precautions/Hx: ACDF C4-7, daily diarrhea, HTN, HLD, IBS severe, OA, c-sec x 2, cholecystecomy, hysterectomy, hysteroscopy, tubal ligation    SUBJECTIVE:     Pt reports that she remains sore in her R shoulder and in her neck.  Pt notes that she had 50% of pain in the R shoulder after her injection, but the pain has now returned.  She mostly falls and stays asleep in her recliner.  She is only able to sleep in bed for 4 hours at the most.  She notes that her neck is bothering her a lot over the last few days and she feels that is bothering both of her shoulders.      Visit count  1/8 2/8 3/8 4/8 5/8   Date 4/10/2024 4/15/2024  4/25/2024  5/16/2024  5/23/2024    R shoulder        Pain Range 7-9/10 7-9/10 6-7/10 0 to 9/10 0 to 9/10   Pain Ave 7/10 7/10 6-7/10 6/10 7/10   Pain Current --- --- --- 2/10 8/10        OBJECTIVE:     Treatment performed this date:    Therapeutic Exercise:  Visit #   3/8 4/8 5/8   Position Exercise HEP 4/25/2024 5/16/2024 5/23/2024    Supine B shldr flex hands clasped H       Shldr IR/ER  X 1# 10x2     Sidelying Shldr ER   X 1# 5x2    Sitting Pulleys  H X 3 min X 3 min X 3 min    B shldr flex hands clasped H   X 10x    TB shldr ER H  10x R 10x, 3x R   Standing  Pendulum flex/extn H        Pendulum horiz abd/add H        Pendulum CW/CCW H        B shldr extn hands on elev plinth H X 5x ea prog elev of table x 3      Towel R shldr IR H X 5x2      TB shldr IR H   X 10x2 Y, 5x2 R     TB shldr ER H   X 10x2 Y, 5x2 R     TB B shldr extn  H  X 10x red, 5x2 Gr     Wall wash shldr scaption H  X 10x cues to scapula and lat chest wall    Neuro Re-ed  X see assessment  X see  assessment     Ther Act Function  X washing back, don/doff coat X transfer supine <>sit in/out of new bed X washing hair   Man tx PROM  X R shldr all planes  X R shldr all planes    CR/RI  X R shldr IR/ER      Joint mob        STM        Mobilization w movement  X Scap upward rotation, R shldr ER      Suboccipital release    X    Manual release    X     MET    X C0-1 FrotLSBR; C1-2 R rot; C7 w complex ERSL; R sternoclavicular joint IR & ER; L sternoclavicular joint abd, IR/ER - very good release and increased ROM for abd and ER.   H = HEP. Pt given copies of this exercise for home program.  X = Exercises done this date - pt verbalized understanding and demonstrated competence. All exercises done B unless otherwise indicated.  Pt advised to discontinue exercises that increase pain and to call or return to therapist to discuss.  Each intervention above is specifically prescribed to address the patients identified impairments, activity limitations, and participation restrictions.    ASSESSMENT     Pt has had recent flare up of pain in the R shoulder which she attributes to flare up of her neck pain.  Pt is sleeping mostly in the recliner and if she goes to her bed she sleeps on the L side, but does not tolerate beyond 4 hours.  Pt has new c/o L shoulder pain and also some tremors w her pain.  Pt demonstrates B increased B suboccipital tension.  She tolerated only mod level of man tx to the area due to sensitivity.  Pt demonstrates segmental mobility limitations as noted above and tolerated manual therapy with >50% improvement in mobility after treatment.  Pt advised that man tx goal is to allow improved and ongoing mobility and strength.  Pt has tenderness to palpation at R AC joint.  Pt assessed for AC and sternoclavicular joint mobility noted in table below.  Pt did well with MET as noted above and had good increase in ROM tolerance post tx.  She remains in an acute flare up.  Discussed including scapular and  therefore sternoclavicular joint mobility w ADL's like doing hair and dressing.  Pt verbalized understanding.      Physical Therapy Goals:  From 4/10/2024 to 7/9/2024  - Created with patient input during initial evaluation  1. Pt will be independent in beginning level of HEP for stretching, posture and strengthening.   2. Pt will be able to don/doff shirt/jacket without increased symptoms.  3. Pt will be able to pull pants up without increased symptoms.  4. Pt will be able to shampoo hair without increased symptoms.  5. Pt will be able to hold pen and write without increased symptoms.     PLAN OF CARE:      Assess response to  man tx to sternoclavicular joint  Continue PT per original plan for therapeutic exercises, posture retraining, therapeutic activities, manual treatment, neuromuscular reeducation, therapeutic pain neuroscience education, patient education, self care home management, home exercise program, and modalities as needed.    Charged Units Units Minutes    Ther Ex 1 12   Neuro     Ther Activity 1 8   Gait     Man Tx 2 25        Total Timed  45   Total Tx Time  45       ____________________________________________________________________________________________    21st Century Cures Act Notice to Patient: Medical documents like this are made available to patients in the interest of transparency. However, be advised this is a medical document and it is intended as kfry-si-qkoq communication between your medical providers. This medical document may contain abbreviations, assessments, medical data, and results or other terms that are unfamiliar. Medical documents are intended to carry relevant information, facts as evident, and the clinical opinion of the practitioner. As such, this medical document may be written in language that appears blunt or direct. You are encouraged to contact your medical provider and/or [facility] Patient Experience if you have any questions about this medical  document.   Objective Initial Evaluation Data 4/10/2024:    QuickDASH Outcome Score  Score: 86.36 % (4/9/2024 12:54 PM)        Posture:  B shoulder protraction, min forward head position, B genu valgus R>L    Shldr impingement testing 4/10/2024       Brower R (-)   Brower L (+) severe w min range   Neer's R (-)   Neer's L (+) severe w min range       Dermatomes 4/10/2024       Lat neck(C4) R wnl   Lat neck (C4) L DULL   Ant deltoid (C5) R wnl   Ant deltoid (C5) L DULL   Dorsal prox thumb (C6) R wnl   Dorsal prox thumb (C6) L DULL   Dorsal prox 3 digit (C7) R wnl   Dorsal prox 3 digit (C7) L DULL   Dorsal 4-5 digits (C8) R wnl   Dorsal 4-5 digits (C8) L DULL   Med prox elbow (T1) R wnl   Med prox elbow (T1) L DULL        UE Neural Glides 4/10/2024   ULTT 1 R wnl   ULTT 1 L wnl   ULTT 2 R wnl   ULTT 2 L  wnl   ULTT 3 R wnl   ULTT 3 L wnl       Cervical ROM 4/10/2024   Fwd head 30   Flexion 60 nl 38   Extension 70 nl 15   R SB 45 nl 25   L SB 45 nl 21   R Rotation 80 nl 37   L Rotation 80 nl 34   *pain limiting     Shoulder ROM 4/10/2024 4/25/2024    Flex R 180 nl 80 131   Flex L 180 nl 151 160   Extn R 50 nl 25 40   Extn L 50 nl 59 63   Abd R 180 nl 56 140   Abd L 180 nl 152 180   ER R 90 nl 38 @ 50 abd 45 @ 85 abd   ER L 90 nl 90 90   IR R 80 nl 48 @ 50 abd 70 @ 85 abd   IR L 80 nl 58 80   *pain limiting    Elbow ROM 4/10/2024   Flex R wnl   Flex L wnl   Extn R wnl   Extn L wnl   Supination R wnl   Supination L wnl   Pronation R wnl   Pronation L wnl       MMT 5/5 4/14/2024   C5 shldr abd R 2*   Shldr abd L 5   C6 biceps R 5-   Biceps L 5   C7 Triceps R 5-*   Triceps L 4+   T1 Interossei R 4   T1 Interossei L 4   ER R 4+*   ER L 5   IR R 4+*   IR L 5   C8 EPL R 5   EPL L 5   *pain limiting     UE flexibility 4/10/2024   UT R mod   UT L mod   Scalenes R mod   Scalenes L mod   Lats R max   Lats L min       AC & StC joints limits 5/23/2024        StC abd R wnl   StC abd L wnl   StC horiz abd R wnl   StC horiz abd L  wnl   AC abd R wnl   AC abd L Mod-max   AC IR R Min*   AC IR L    AC ER R min   AC ER L

## 2024-05-28 ENCOUNTER — HOSPITAL ENCOUNTER (OUTPATIENT)
Dept: MRI IMAGING | Facility: HOSPITAL | Age: 64
Discharge: HOME | End: 2024-05-28
Attending: PHYSICAL MEDICINE & REHABILITATION

## 2024-05-28 ENCOUNTER — LAB ENCOUNTER (OUTPATIENT)
Dept: LAB | Age: 64
End: 2024-05-28
Attending: PHYSICAL MEDICINE & REHABILITATION

## 2024-05-28 ENCOUNTER — OFFICE VISIT (OUTPATIENT)
Dept: FAMILY MEDICINE CLINIC | Facility: CLINIC | Age: 64
End: 2024-05-28

## 2024-05-28 VITALS
TEMPERATURE: 98 F | HEART RATE: 74 BPM | SYSTOLIC BLOOD PRESSURE: 132 MMHG | HEIGHT: 65 IN | DIASTOLIC BLOOD PRESSURE: 82 MMHG | WEIGHT: 293 LBS | BODY MASS INDEX: 48.82 KG/M2

## 2024-05-28 DIAGNOSIS — E66.01 MORBID (SEVERE) OBESITY DUE TO EXCESS CALORIES (HCC): ICD-10-CM

## 2024-05-28 DIAGNOSIS — Z79.4 TYPE 2 DIABETES MELLITUS WITHOUT COMPLICATION, WITH LONG-TERM CURRENT USE OF INSULIN (HCC): ICD-10-CM

## 2024-05-28 DIAGNOSIS — Z79.4 TYPE 2 DIABETES MELLITUS WITHOUT COMPLICATION, WITH LONG-TERM CURRENT USE OF INSULIN (HCC): Primary | ICD-10-CM

## 2024-05-28 DIAGNOSIS — E11.9 TYPE 2 DIABETES MELLITUS WITHOUT COMPLICATION, WITH LONG-TERM CURRENT USE OF INSULIN (HCC): ICD-10-CM

## 2024-05-28 DIAGNOSIS — M50.90 CERVICAL DISC DISEASE: ICD-10-CM

## 2024-05-28 DIAGNOSIS — E11.9 TYPE 2 DIABETES MELLITUS WITHOUT COMPLICATION, WITH LONG-TERM CURRENT USE OF INSULIN (HCC): Primary | ICD-10-CM

## 2024-05-28 DIAGNOSIS — M25.511 CHRONIC RIGHT SHOULDER PAIN: ICD-10-CM

## 2024-05-28 DIAGNOSIS — G89.29 CHRONIC RIGHT SHOULDER PAIN: ICD-10-CM

## 2024-05-28 DIAGNOSIS — M75.31 CALCIFIC TENDONITIS OF RIGHT SHOULDER: ICD-10-CM

## 2024-05-28 DIAGNOSIS — I10 ESSENTIAL HYPERTENSION: ICD-10-CM

## 2024-05-28 LAB
EST. AVERAGE GLUCOSE BLD GHB EST-MCNC: 151 MG/DL (ref 68–126)
HBA1C MFR BLD: 6.9 % (ref ?–5.7)

## 2024-05-28 PROCEDURE — 36415 COLL VENOUS BLD VENIPUNCTURE: CPT

## 2024-05-28 PROCEDURE — 83036 HEMOGLOBIN GLYCOSYLATED A1C: CPT

## 2024-05-28 PROCEDURE — 99214 OFFICE O/P EST MOD 30 MIN: CPT | Performed by: FAMILY MEDICINE

## 2024-05-28 PROCEDURE — 73221 MRI JOINT UPR EXTREM W/O DYE: CPT | Performed by: PHYSICAL MEDICINE & REHABILITATION

## 2024-05-28 RX ORDER — DULAGLUTIDE 3 MG/.5ML
3 INJECTION, SOLUTION SUBCUTANEOUS WEEKLY
Qty: 12 EACH | Refills: 3 | Status: SHIPPED | OUTPATIENT
Start: 2024-05-28

## 2024-05-28 NOTE — PROGRESS NOTES
Subjective:   Patient ID: Nicolette Nation is a 63 year old female.    Diabetes  She presents for her follow-up diabetic visit. She has type 2 diabetes mellitus. Her disease course has been stable. There are no hypoglycemic associated symptoms. There are no diabetic associated symptoms.       History/Other:   Review of Systems  Current Outpatient Medications   Medication Sig Dispense Refill    Dulaglutide (TRULICITY) 3 MG/0.5ML Subcutaneous Solution Pen-injector Inject 3 mg into the skin once a week. 12 each 3    lisinopril-hydroCHLOROthiazide 10-12.5 MG Oral Tab Take 1 tablet by mouth once daily. 90 tablet 3    DULoxetine 60 MG Oral Cap DR Particles Take 1 capsule (60 mg total) by mouth daily. 90 capsule 3    LANTUS SOLOSTAR 100 UNIT/ML Subcutaneous Solution Pen-injector Inject 20 Units into the skin nightly.      metFORMIN 500 MG Oral Tab Take 2 tablets (1,000 mg total) by mouth 2 (two) times daily with meals. 360 tablet 3    atorvastatin 10 MG Oral Tab Take 1 tablet (10 mg total) by mouth nightly. 90 tablet 3    Acetaminophen ER (TYLENOL 8 HOUR ARTHRITIS PAIN) 650 MG Oral Tab CR Take 2 tablets (1,300 mg total) by mouth in the morning and 2 tablets (1,300 mg total) before bedtime. 60 tablet 5    LEVOTHYROXINE 75 MCG Oral Tab TAKE 1 TABLET BY MOUTH BEFORE BREAKFAST 90 tablet 3    PANTOPRAZOLE 40 MG Oral Tab EC TAKE 1 TABLET BY MOUTH ONCE DAILY BEFORE BREAKFAST 90 tablet 3    Multiple Vitamins-Minerals (EQ VISION FORMULA 50+ OR) Take 1 tablet by mouth daily.      loratadine 10 MG Oral Tab Take 1 tablet (10 mg total) by mouth daily.      Insulin Pen Needle (BD PEN NEEDLE MINI U/F) 31G X 5 MM Does not apply Misc Use with Lantus 100 each 2    Glucose Blood (ONETOUCH ULTRA BLUE) In Vitro Strip USE ONCE DAILY 100 each 11    ONETOUCH DELICA LANCETS 33G Does not apply Misc Apply 1 Units topically daily. 90 each 3    Blood Glucose Monitoring Suppl (ONETOUCH ULTRA SYSTEM) W/DEVICE Does not apply Kit test T.I.D        Allergies:  Allergies   Allergen Reactions    Chlorhexidine RASH       Objective:   Physical Exam  Constitutional:       Appearance: Normal appearance.   Cardiovascular:      Rate and Rhythm: Normal rate and regular rhythm.      Pulses: Normal pulses.      Heart sounds: Normal heart sounds.   Pulmonary:      Effort: Pulmonary effort is normal.      Breath sounds: Normal breath sounds.   Musculoskeletal:      Right lower leg: No edema.      Left lower leg: No edema.   Neurological:      Mental Status: She is alert.         Assessment & Plan:   1. Type 2 diabetes mellitus without complication, with long-term current use of insulin (HCC)-Lantus 20 units nightly, Trulicity 1.5 mg weekly.  She has not had any symptoms of hypoglycemia.  Check hemoglobin A1c today.  Plan to increase Trulicity to 3 mg as below for weight loss.  Follow-up with me in 3 months.   2. Essential hypertension-blood pressure controlled on current medication   3. Morbid (severe) obesity due to excess calories (HCC)-weight unchanged over the past several months.  Plan to increase Trulicity to 3 mg daily.  Given written information about healthy diet.  Especially recommend avoiding sugary drinks.   4. Cervical disc disease-with chronic pain and weakness of hands.  Patient was approved for disability.  Awaiting determination of amount.   5. Chronic right shoulder pain-followed by Dr. Russell, had steroid injection, in therapy.  Continued pain and decreased range of motion.  Has MRI scheduled later today.       Orders Placed This Encounter   Procedures    Hemoglobin A1C       Meds This Visit:  Requested Prescriptions     Signed Prescriptions Disp Refills    Dulaglutide (TRULICITY) 3 MG/0.5ML Subcutaneous Solution Pen-injector 12 each 3     Sig: Inject 3 mg into the skin once a week.       Imaging & Referrals:  None

## 2024-05-30 ENCOUNTER — PATIENT MESSAGE (OUTPATIENT)
Dept: PHYSICAL MEDICINE AND REHAB | Facility: CLINIC | Age: 64
End: 2024-05-30

## 2024-05-30 NOTE — TELEPHONE ENCOUNTER
From: Nicolette Nation  To: Jaison Russell  Sent: 5/30/2024 9:57 AM CDT  Subject: MRI    Hi Dr. Russell, I had the MRI of the right shoulder, and received the test results. So, what is going on with my right shoulder? Thanks, Nicolette Nation

## 2024-06-06 ENCOUNTER — OFFICE VISIT (OUTPATIENT)
Dept: PHYSICAL THERAPY | Facility: HOSPITAL | Age: 64
End: 2024-06-06
Attending: PHYSICAL MEDICINE & REHABILITATION
Payer: MEDICAID

## 2024-06-06 ENCOUNTER — HOSPITAL ENCOUNTER (OUTPATIENT)
Dept: GENERAL RADIOLOGY | Facility: HOSPITAL | Age: 64
Discharge: HOME | End: 2024-06-06
Attending: PHYSICIAN ASSISTANT
Payer: MEDICAID

## 2024-06-06 DIAGNOSIS — M50.30 DDD (DEGENERATIVE DISC DISEASE), CERVICAL: ICD-10-CM

## 2024-06-06 DIAGNOSIS — Z98.1 S/P CERVICAL SPINAL FUSION: ICD-10-CM

## 2024-06-06 DIAGNOSIS — R20.2 NUMBNESS AND TINGLING IN LEFT HAND: ICD-10-CM

## 2024-06-06 DIAGNOSIS — R29.898 LEFT HAND WEAKNESS: ICD-10-CM

## 2024-06-06 DIAGNOSIS — R20.0 NUMBNESS AND TINGLING IN LEFT HAND: ICD-10-CM

## 2024-06-06 DIAGNOSIS — M54.12 LEFT CERVICAL RADICULOPATHY: ICD-10-CM

## 2024-06-06 PROCEDURE — 72050 X-RAY EXAM NECK SPINE 4/5VWS: CPT | Performed by: PHYSICIAN ASSISTANT

## 2024-06-06 PROCEDURE — 97112 NEUROMUSCULAR REEDUCATION: CPT

## 2024-06-06 PROCEDURE — 97110 THERAPEUTIC EXERCISES: CPT

## 2024-06-06 PROCEDURE — 97530 THERAPEUTIC ACTIVITIES: CPT

## 2024-06-06 NOTE — PROGRESS NOTES
Nicolette Nation    8/6/1960  Diagnosis: Calcific tendonitis of right shoulder (M75.31)   Referring Physician:  Jaison Wilson MD visit: none  Initial Evaluation Date: 4/10/2024  Date of Surgery: None for this diagnosis  Insurance: [employer]  Authorized # of visits:  8 visits to 6/9/2024  Plan of care to: 7/9/2024    Precautions/Hx: ACDF C4-7, daily diarrhea, HTN, HLD, IBS severe, OA, c-sec x 2, cholecystecomy, hysterectomy, hysteroscopy, tubal ligation    SUBJECTIVE:     Pt reports that she continues to have sharp pain unless the arm is completely still.       Visit count  1/8 2/8 3/8 4/8 5/8 6/8   Date 4/10/2024 4/15/2024  4/25/2024  5/16/2024  5/23/2024  6/6/2024    R shoulder         Pain Range 7-9/10 7-9/10 6-7/10 0 to 9/10 0 to 9/10 0-9/10   Pain Ave 7/10 7/10 6-7/10 6/10 7/10 6/10   Pain Current --- --- --- 2/10 8/10 4/10        OBJECTIVE:     Treatment performed this date:    Therapeutic Exercise:  Visit #   4/8 5/8 6/8   Position Exercise HEP 5/16/2024 5/23/2024 6/6/2024    Supine B shldr flex hands clasped H       Shldr IR/ER       Sidelying Shldr ER  X 1# 5x2     Sitting Pulleys  H X 3 min X 3 min X 3 min    B shldr flex hands clasped H  X 10x 10x    TB shldr ER H 10x R 10x, 3x R 10x2 green   Standing  Pendulum flex/extn H        Pendulum horiz abd/add H        Pendulum CW/CCW H        B shldr extn hands on elev plinth H       Towel R shldr IR H       TB shldr IR H  X 10x2 Y, 5x2 R  10x2 gr    TB shldr ER H  X 10x2 Y, 5x2 R  10x2 gr    TB B shldr extn  H X 10x red, 5x2 Gr  10x2 gr    Wall wash shldr scaption H X 10x cues to scapula and lat chest wall  X 10x2 cues for pattern    Scap control w shldr abd mirror    10x    Empty can    7x 0#   Neuro Re-ed  X see assessment      Ther Act Function  X transfer supine <>sit in/out of new bed X washing hair X further education on condition as noted below    Man tx PROM   X R shldr all planes     CR/RI        Joint mob        STM        Mobilization w  movement        Suboccipital release   X     Manual release   X      MET   X C0-1 FrotLSBR; C1-2 R rot; C7 w complex ERSL; R sternoclavicular joint IR & ER; L sternoclavicular joint abd, IR/ER - very good release and increased ROM for abd and ER.    H = HEP. Pt given copies of this exercise for home program.  X = Exercises done this date - pt verbalized understanding and demonstrated competence. All exercises done B unless otherwise indicated.  Pt advised to discontinue exercises that increase pain and to call or return to therapist to discuss.  Each intervention above is specifically prescribed to address the patients identified impairments, activity limitations, and participation restrictions.    ASSESSMENT     Pt continues to have sharp flare ups of pain in the R shoulder.  Pt had a recent MRI displaying rotator cuff tears.  Anatomy reviewed with use of images and models.  Further discussed scapulohumeral rhythm and importance of scapular control.  Also discussed correct pattern of humeral rotation in the socket to avoid impingement.  Pt educated on loading needed for strengthening without triggering her pain and impingement.      Physical Therapy Goals:  From 4/10/2024 to 7/9/2024  - Created with patient input during initial evaluation  1. Pt will be independent in beginning level of HEP for stretching, posture and strengthening.   2. Pt will be able to don/doff shirt/jacket without increased symptoms.  3. Pt will be able to pull pants up without increased symptoms.  4. Pt will be able to shampoo hair without increased symptoms.  5. Pt will be able to hold pen and write without increased symptoms.     PLAN OF CARE:      Assess response to education on self protection.  Continue PT per original plan for therapeutic exercises, posture retraining, therapeutic activities, manual treatment, neuromuscular reeducation, therapeutic pain neuroscience education, patient education, self care home management, home exercise  program, and modalities as needed.    Charged Units Units Minutes    Ther Ex 2 23   Neuro 1 12   Ther Activity 1 10   Gait     Man Tx          Total Timed  45   Total Tx Time  45       ____________________________________________________________________________________________    21st Century Cures Act Notice to Patient: Medical documents like this are made available to patients in the interest of transparency. However, be advised this is a medical document and it is intended as [id number] communication between your medical providers. This medical document may contain abbreviations, assessments, medical data, and results or other terms that are unfamiliar. Medical documents are intended to carry relevant information, facts as evident, and the clinical opinion of the practitioner. As such, this medical document may be written in language that appears blunt or direct. You are encouraged to contact your medical provider and/or [facility] Patient Experience if you have any questions about this medical document.   Objective Initial Evaluation Data 4/10/2024:    QuickDASH Outcome Score  Score: 86.36 % (4/9/2024 12:54 PM)        Posture:  B shoulder protraction, min forward head position, B genu valgus R>L    Shldr impingement testing 4/10/2024       Brower R (-)   Brower L (+) severe w min range   Neer's R (-)   Neer's L (+) severe w min range       Dermatomes 4/10/2024       Lat neck(C4) R wnl   Lat neck (C4) L DULL   Ant deltoid (C5) R wnl   Ant deltoid (C5) L DULL   Dorsal prox thumb (C6) R wnl   Dorsal prox thumb (C6) L DULL   Dorsal prox 3 digit (C7) R wnl   Dorsal prox 3 digit (C7) L DULL   Dorsal 4-5 digits (C8) R wnl   Dorsal 4-5 digits (C8) L DULL   Med prox elbow (T1) R wnl   Med prox elbow (T1) L DULL        UE Neural Glides 4/10/2024   ULTT 1 R wnl   ULTT 1 L wnl   ULTT 2 R wnl   ULTT 2 L  wnl   ULTT 3 R wnl   ULTT 3 L wnl       Cervical ROM 4/10/2024   Fwd head 30   Flexion 60 nl 38    Extension 70 nl 15   R SB 45 nl 25   L SB 45 nl 21   R Rotation 80 nl 37   L Rotation 80 nl 34   *pain limiting     Shoulder ROM 4/10/2024 4/25/2024    Flex R 180 nl 80 131   Flex L 180 nl 151 160   Extn R 50 nl 25 40   Extn L 50 nl 59 63   Abd R 180 nl 56 140   Abd L 180 nl 152 180   ER R 90 nl 38 @ 50 abd 45 @ 85 abd   ER L 90 nl 90 90   IR R 80 nl 48 @ 50 abd 70 @ 85 abd   IR L 80 nl 58 80   *pain limiting    Elbow ROM 4/10/2024   Flex R wnl   Flex L wnl   Extn R wnl   Extn L wnl   Supination R wnl   Supination L wnl   Pronation R wnl   Pronation L wnl       MMT 5/5 4/14/2024   C5 shldr abd R 2*   Shldr abd L 5   C6 biceps R 5-   Biceps L 5   C7 Triceps R 5-*   Triceps L 4+   T1 Interossei R 4   T1 Interossei L 4   ER R 4+*   ER L 5   IR R 4+*   IR L 5   C8 EPL R 5   EPL L 5   *pain limiting     UE flexibility 4/10/2024   UT R mod   UT L mod   Scalenes R mod   Scalenes L mod   Lats R max   Lats L min       AC & StC joints limits 5/23/2024        StC abd R wnl   StC abd L wnl   StC horiz abd R wnl   StC horiz abd L wnl   AC abd R wnl   AC abd L Mod-max   AC IR R Min*   AC IR L    AC ER R min   AC ER L

## 2024-06-07 ENCOUNTER — OFFICE VISIT (OUTPATIENT)
Dept: PHYSICAL MEDICINE AND REHAB | Facility: CLINIC | Age: 64
End: 2024-06-07
Payer: MEDICAID

## 2024-06-07 VITALS — HEIGHT: 65 IN | BODY MASS INDEX: 48.82 KG/M2 | WEIGHT: 293 LBS

## 2024-06-07 DIAGNOSIS — R20.0 NUMBNESS AND TINGLING IN LEFT HAND: ICD-10-CM

## 2024-06-07 DIAGNOSIS — G95.9 CERVICAL MYELOPATHY (HCC): ICD-10-CM

## 2024-06-07 DIAGNOSIS — M75.111 PARTIAL NONTRAUMATIC TEAR OF RIGHT ROTATOR CUFF: Primary | ICD-10-CM

## 2024-06-07 DIAGNOSIS — R20.2 NUMBNESS AND TINGLING IN LEFT HAND: ICD-10-CM

## 2024-06-07 PROCEDURE — 99214 OFFICE O/P EST MOD 30 MIN: CPT | Performed by: PHYSICAL MEDICINE & REHABILITATION

## 2024-06-07 NOTE — PATIENT INSTRUCTIONS
Complete the physical therapy as scheduled. If Lizette recommends further PT will sign of on it; if she recommends discharge to home exercise program continue with your exercises. If progress seems to stall then we will consider steroid injection on follow up in the end of July.

## 2024-06-07 NOTE — PROGRESS NOTES
Progress note    C/C:   Chief Complaint   Patient presents with    Results     LOV 4/30/24 pt is here after MRI.  MRI of shoulder was completed 5/28/24. Currently in physical therapy. Takes duloxetine daily.        HPI: 63 year old female presents for follow up.  Has had mild improvements and shoulder pain, and mild improvement in range of motion with physical therapy.  She underwent an MRI of the right shoulder and returns to discuss the results.    Pertinent allergies:   Allergies   Allergen Reactions    Chlorhexidine RASH        Physical exam:  Ht 65\"   Wt (!) 312 lb (141.5 kg)   LMP 08/10/2021   BMI 51.92 kg/m²      PE right shoulder exam:    Range of motion:  Forward flexion: 160 degrees  Abduction: 160 degrees  Internal rotation: buttocks  External rotation: mild restriction to PROM    Provocative test:  Supraspinatus test: mildly painful  Hawkin's test: +    Imaging: XR CERVICAL SPINE AP LAT FLEX EXT EM (CPT=72050)    Result Date: 6/6/2024  CONCLUSION:  Postsurgical changes of C4-C7 anterior and interbody cage fusion, without radiographic evidence of hardware failure.  No evidence of dynamic instability.     Dictated by (CST): Marlee Slaughter MD on 6/06/2024 at 11:49 AM     Finalized by (CST): Marlee Slaughter MD on 6/06/2024 at 11:51 AM          MRI SHOULDER, RIGHT (CPT=73221)    Result Date: 5/29/2024  CONCLUSION:   Partial-thickness articular surface tears of the supraspinatus and infraspinatus with subacromial subdeltoid bursitis.  Mild intra-articular long head biceps tendinosis.  Impingement upon the distal musculotendinous junction of the supraspinatus secondary to hypertrophic changes of the distal clavicle.     Dictated by (CST): Patrice Prieto MD on 5/29/2024 at 6:03 PM     Finalized by (CST): Patrice Prieto MD on 5/29/2024 at 6:12 PM            Assessment and plan  Right subacromial impingement syndrome  Partial atraumatic RTC tear  Cervical myelopathy s/p C4-C7 anterior discectomy and  fusion  Obesity  GERD    Recommend she continue with physical therapy as she has seen some improvement in ROM and pain; she I will see her again right around the end of July and if her progress seems to stall or worsens we can consider.  Right subacromial bursa steroid injection under ultrasound guidance. Continue duloxetine as prescribed.     Jaison Russell DO  Physical Medicine and Rehabilitation  [facility]

## 2024-06-13 ENCOUNTER — APPOINTMENT (OUTPATIENT)
Dept: PHYSICAL THERAPY | Facility: HOSPITAL | Age: 64
End: 2024-06-13
Attending: PHYSICAL MEDICINE & REHABILITATION
Payer: MEDICAID

## 2024-06-17 ENCOUNTER — PATIENT MESSAGE (OUTPATIENT)
Dept: FAMILY MEDICINE CLINIC | Facility: CLINIC | Age: 64
End: 2024-06-17

## 2024-06-18 NOTE — TELEPHONE ENCOUNTER
See other mychart encounter 06/17/24    Lokesh Broussard     RD    6/18/24 10:38 AM  Note      Patient called back, discussed accepted insurances for [employer] with Dr. Pozo.

## 2024-06-19 RX ORDER — PANTOPRAZOLE SODIUM 40 MG/1
40 TABLET, DELAYED RELEASE ORAL
Qty: 90 TABLET | Refills: 3 | Status: SHIPPED | OUTPATIENT
Start: 2024-06-19

## 2024-06-19 NOTE — TELEPHONE ENCOUNTER
Refill Per Protocol     Requested Prescriptions   Pending Prescriptions Disp Refills    PANTOPRAZOLE 40 MG Oral Tab EC [Pharmacy Med Name: Pantoprazole Sodium 40 MG Oral Tablet Delayed Release] 90 tablet 0     Sig: TAKE 1 TABLET BY MOUTH ONCE DAILY BEFORE BREAKFAST       Gastrointestional Medication Protocol Passed - 6/17/2024  5:50 AM        Passed - In person appointment or virtual visit in the past 12 mos or appointment in next 3 mos     Recent Outpatient Visits              1 week ago Partial nontraumatic tear of right rotator cuff    [facility] Jaison Russell DO    Office Visit    1 week ago     [facility] Lizette Ware, PT    Office Visit    3 weeks ago Type 2 diabetes mellitus without complication, with long-term current use of insulin ([facility])    [facility] Niki Pozo MD    Office Visit    3 weeks ago     [facility] Lizette Ware, PT    Office Visit    1 month ago     [facility] Lizette Ware, PT    Office Visit          Future Appointments         Provider Department Appt Notes    Tomorrow Lizette Ware, PT [facility] 8v auth 04/10/2024-07/08/2024 ([id number])  [facility]    In 2 days [facility] MRI RM1 (1.5T WIDE) [facility]     In 1 week Lizette Wrae, ROCÍO [facility] LAST AUTH VISIT  8v auth 04/10/2024-07/08/2024 ([id number])  [facility]    In 1 month Jaison Russell DO [facility] f/u    In 2 months Niki Pozo MD [facility]     In 2 months Chin Mccrary MD [facility]                            Future Appointments         Provider Department Appt Notes    [facility] 04/10/2024-07/08/2024 ([id number])  [facility]    In 2 days EM MRI RM1  (1.5T WIDE) [facility] MRI     In 1 week Lizette Ware, PT [facility] LAST AUTH VISIT  8v auth 04/10/2024-07/08/2024 ([id number])  [facility]    In 1 month Jaison Russell DO [facility] f/u    In 2 months Niki Pozo MD [facility]     In 2 months Chin Mccrary MD [facility]           Recent Outpatient Visits              1 week ago Partial nontraumatic tear of right rotator cuff    [facility] Jaison Russell DO    Office Visit    1 week ago     [facility] Lizette Ware, PT    Office Visit    3 weeks ago Type 2 diabetes mellitus without complication, with long-term current use of insulin (HCC)    [facility] Niki Pozo MD    Office Visit    3 weeks ago     [facility] Lizette Ware, PT    Office Visit    1 month ago     [facility] Lizette Ware, PT    Office Visit

## 2024-06-20 ENCOUNTER — OFFICE VISIT (OUTPATIENT)
Dept: PHYSICAL THERAPY | Facility: HOSPITAL | Age: 64
End: 2024-06-20
Attending: PHYSICAL MEDICINE & REHABILITATION
Payer: MEDICAID

## 2024-06-20 PROCEDURE — 97110 THERAPEUTIC EXERCISES: CPT

## 2024-06-20 PROCEDURE — 97112 NEUROMUSCULAR REEDUCATION: CPT

## 2024-06-20 PROCEDURE — 97530 THERAPEUTIC ACTIVITIES: CPT

## 2024-06-20 NOTE — PROGRESS NOTES
Nicolette Nation    8/6/1960  Diagnosis: Calcific tendonitis of right shoulder (M75.31)   Referring Physician:  Jaison Wilson MD visit: none  Initial Evaluation Date: 4/10/2024  Date of Surgery: None for this diagnosis  Insurance: [employer]  Authorized # of visits:  8 visits to 6/9/2024  Plan of care to: 7/9/2024    Precautions/Hx: ACDF C4-7, daily diarrhea, HTN, HLD, IBS severe, OA, c-sec x 2, cholecystecomy, hysterectomy, hysteroscopy, tubal ligation    SUBJECTIVE:     Pt reports that she is doing well to be able to carry some medium wt grocery bags.    She remains sore in the late evening and getting comfortable to sleep.      Visit count  1/8 2/8 3/8 4/8 5/8 6/8 7/8   Date 4/10/2024 4/15/2024  4/25/2024  5/16/2024  5/23/2024  6/6/2024  6/20/2024    R shoulder          Pain Range 7-9/10 7-9/10 6-7/10 0 to 9/10 0 to 9/10 0-9/10 0-5/10   Pain Ave 7/10 7/10 6-7/10 6/10 7/10 6/10 3/10   Pain Current --- --- --- 2/10 8/10 4/10 0/10        OBJECTIVE:     Treatment performed this date:    Therapeutic Exercise:  Visit #   5/8 6/8 7/8   Position Exercise HEP 5/23/2024 6/6/2024 6/20/2024    Supine B shldr flex hands clasped H       Shldr IR/ER       Sidelying Shldr ER       Sitting Pulleys  H X 3 min X 3 min X 3 min cues for scapular and intercostal mobility    B shldr flex hands clasped H X 10x 10x     TB shldr ER H 10x, 3x R 10x2 green X 10x G, 5x3 Blue    Cross body punches    X 10x    TB Horizontal abd H   X 5x G, 5x3 Blue    TB shldr scaption H   X 5x3 green   Standing  Pendulum flex/extn H        Pendulum horiz abd/add H        Pendulum CW/CCW H        B shldr extn hands on elev plinth H       Towel R shldr IR H       TB shldr IR H   10x2 gr     TB shldr ER H   10x2 gr     TB B shldr extn  H  10x2 gr X 10x3 green, cues for scap retraction and depression    Wall wash shldr scaption H  X 10x2 cues for pattern     Scap control w shldr abd mirror   10x     Empty can   7x 0#    Neuro Re-ed    X see assessment     Ther Act Function  X washing hair X further education on condition as noted below  X further education on condition as noted below    Man tx PROM  X R shldr all planes      CR/RI        Joint mob        STM        Mobilization w movement        Suboccipital release  X      Manual release  X       MET  X C0-1 FrotLSBR; C1-2 R rot; C7 w complex ERSL; R sternoclavicular joint IR & ER; L sternoclavicular joint abd, IR/ER - very good release and increased ROM for abd and ER.     H = HEP. Pt given copies of this exercise for home program.  X = Exercises done this date - pt verbalized understanding and demonstrated competence. All exercises done B unless otherwise indicated.  Pt advised to discontinue exercises that increase pain and to call or return to therapist to discuss.  Each intervention above is specifically prescribed to address the patients identified impairments, activity limitations, and participation restrictions.    ASSESSMENT     Pt doing well with lower level of shoulder max and average pain levels.  Pt educated further on positioning of the spine and scapula prior to reach and lift.  Pt doing very well with consistency w her HEP.   Pt doing well with PRE's.  Pt demonstrates fair scapular control.  She required cues to avoid scapular elevation and gain best retraction.  Pt educated further on the importance of gaining both abdominal activation w scapular control.  Discussed glenoid fossa neutral for GH function, and healing.    Physical Therapy Goals:  From 4/10/2024 to 7/9/2024  - Created with patient input during initial evaluation  1. Pt will be independent in beginning level of HEP for stretching, posture and strengthening.   2. Pt will be able to don/doff shirt/jacket without increased symptoms.  3. Pt will be able to pull pants up without increased symptoms.  4. Pt will be able to shampoo hair without increased symptoms.  5. Pt will be able to hold pen and write without increased symptoms.     PLAN  OF CARE:      Assess response to progression of resistance work.  Continue PT per original plan for therapeutic exercises, posture retraining, therapeutic activities, manual treatment, neuromuscular reeducation, therapeutic pain neuroscience education, patient education, self care home management, home exercise program, and modalities as needed.    Charged Units Units Minutes    Ther Ex 2 23   Neuro 1 12   Ther Activity 1 10   Gait     Man Tx          Total Timed  45   Total Tx Time  45       ____________________________________________________________________________________________    21st Century Cures Act Notice to Patient: Medical documents like this are made available to patients in the interest of transparency. However, be advised this is a medical document and it is intended as [id number] communication between your medical providers. This medical document may contain abbreviations, assessments, medical data, and results or other terms that are unfamiliar. Medical documents are intended to carry relevant information, facts as evident, and the clinical opinion of the practitioner. As such, this medical document may be written in language that appears blunt or direct. You are encouraged to contact your medical provider and/or [facility] Patient Experience if you have any questions about this medical document.   Objective Initial Evaluation Data 4/10/2024:    QuickDASH Outcome Score  Score: 86.36 % (4/9/2024 12:54 PM)        Posture:  B shoulder protraction, min forward head position, B genu valgus R>L    Shldr impingement testing 4/10/2024       Brower R (-)   Brower L (+) severe w min range   Neer's R (-)   Neer's L (+) severe w min range       Dermatomes 4/10/2024       Lat neck(C4) R wnl   Lat neck (C4) L DULL   Ant deltoid (C5) R wnl   Ant deltoid (C5) L DULL   Dorsal prox thumb (C6) R wnl   Dorsal prox thumb (C6) L DULL   Dorsal prox 3 digit (C7) R wnl   Dorsal prox 3 digit (C7) L DULL    Dorsal 4-5 digits (C8) R wnl   Dorsal 4-5 digits (C8) L DULL   Med prox elbow (T1) R wnl   Med prox elbow (T1) L DULL        UE Neural Glides 4/10/2024   ULTT 1 R wnl   ULTT 1 L wnl   ULTT 2 R wnl   ULTT 2 L  wnl   ULTT 3 R wnl   ULTT 3 L wnl       Cervical ROM 4/10/2024   Fwd head 30   Flexion 60 nl 38   Extension 70 nl 15   R SB 45 nl 25   L SB 45 nl 21   R Rotation 80 nl 37   L Rotation 80 nl 34   *pain limiting     Shoulder ROM 4/10/2024 4/25/2024    Flex R 180 nl 80 131   Flex L 180 nl 151 160   Extn R 50 nl 25 40   Extn L 50 nl 59 63   Abd R 180 nl 56 140   Abd L 180 nl 152 180   ER R 90 nl 38 @ 50 abd 45 @ 85 abd   ER L 90 nl 90 90   IR R 80 nl 48 @ 50 abd 70 @ 85 abd   IR L 80 nl 58 80   *pain limiting    Elbow ROM 4/10/2024   Flex R wnl   Flex L wnl   Extn R wnl   Extn L wnl   Supination R wnl   Supination L wnl   Pronation R wnl   Pronation L wnl       MMT 5/5 4/14/2024   C5 shldr abd R 2*   Shldr abd L 5   C6 biceps R 5-   Biceps L 5   C7 Triceps R 5-*   Triceps L 4+   T1 Interossei R 4   T1 Interossei L 4   ER R 4+*   ER L 5   IR R 4+*   IR L 5   C8 EPL R 5   EPL L 5   *pain limiting     UE flexibility 4/10/2024   UT R mod   UT L mod   Scalenes R mod   Scalenes L mod   Lats R max   Lats L min       AC & StC joints limits 5/23/2024        StC abd R wnl   StC abd L wnl   StC horiz abd R wnl   StC horiz abd L wnl   AC abd R wnl   AC abd L Mod-max   AC IR R Min*   AC IR L    AC ER R min   AC ER L

## 2024-06-21 ENCOUNTER — HOSPITAL ENCOUNTER (OUTPATIENT)
Dept: MRI IMAGING | Facility: HOSPITAL | Age: 64
Discharge: HOME | End: 2024-06-21
Attending: PHYSICIAN ASSISTANT

## 2024-06-21 DIAGNOSIS — Z98.1 S/P CERVICAL SPINAL FUSION: ICD-10-CM

## 2024-06-21 DIAGNOSIS — M54.12 LEFT CERVICAL RADICULOPATHY: ICD-10-CM

## 2024-06-21 DIAGNOSIS — R20.0 NUMBNESS AND TINGLING IN LEFT HAND: ICD-10-CM

## 2024-06-21 DIAGNOSIS — R20.2 NUMBNESS AND TINGLING IN LEFT HAND: ICD-10-CM

## 2024-06-21 DIAGNOSIS — R29.898 LEFT HAND WEAKNESS: ICD-10-CM

## 2024-06-21 DIAGNOSIS — M50.30 DDD (DEGENERATIVE DISC DISEASE), CERVICAL: ICD-10-CM

## 2024-06-21 PROCEDURE — A9575 INJ GADOTERATE MEGLUMI 0.1ML: HCPCS | Performed by: PHYSICIAN ASSISTANT

## 2024-06-21 PROCEDURE — 72156 MRI NECK SPINE W/O & W/DYE: CPT | Performed by: PHYSICIAN ASSISTANT

## 2024-06-21 RX ORDER — GADOTERATE MEGLUMINE 376.9 MG/ML
20 INJECTION INTRAVENOUS
Status: COMPLETED | OUTPATIENT
Start: 2024-06-21 | End: 2024-06-21

## 2024-06-21 RX ADMIN — GADOTERATE MEGLUMINE 20 ML: 376.9 INJECTION INTRAVENOUS at 19:07:00

## 2024-06-27 ENCOUNTER — OFFICE VISIT (OUTPATIENT)
Dept: PHYSICAL THERAPY | Facility: HOSPITAL | Age: 64
End: 2024-06-27
Attending: PHYSICAL MEDICINE & REHABILITATION
Payer: MEDICAID

## 2024-06-27 PROCEDURE — 97110 THERAPEUTIC EXERCISES: CPT

## 2024-06-27 PROCEDURE — 97530 THERAPEUTIC ACTIVITIES: CPT

## 2024-06-27 PROCEDURE — 97112 NEUROMUSCULAR REEDUCATION: CPT

## 2024-06-27 NOTE — PROGRESS NOTES
Discharge Summary    Pt has attended 7, cancelled 0, and no shown 0 visits in Physical Therapy.   Nicolette Nation    8/6/1960  Diagnosis: Calcific tendonitis of right shoulder (M75.31)   Referring Physician:  Jaison Wilson MD visit: none  Initial Evaluation Date: 4/10/2024  Date of Surgery: None for this diagnosis  Insurance: [employer]  Authorized # of visits:  8 visits to 6/9/2024  Plan of care to: 7/9/2024    Precautions/Hx: ACDF C4-7, daily diarrhea, HTN, HLD, IBS severe, OA, c-sec x 2, cholecystecomy, hysterectomy, hysteroscopy, tubal ligation    SUBJECTIVE:     Pt reports that she has had increased soreness in the R shoulder since her MRI.  She was experiencing burning pain in the arms during the procedure.   Pt notes that overall she continues to be improved.  She was able to shampoo her carpets, wash the bathroom walls w a scrubber and mop floors.      Visit count  1/8 2/8 3/8 4/8 5/8 6/8 7/8 8/10   Date 4/10/2024 4/15/2024  4/25/2024  5/16/2024  5/23/2024  6/6/2024  6/20/2024  6/27/2024    R shoulder           Pain Range 7-9/10 7-9/10 6-7/10 0 to 9/10 0 to 9/10 0-9/10 0-5/10 0-9/10   Pain Ave 7/10 7/10 6-7/10 6/10 7/10 6/10 3/10 1/10   Pain Current --- --- --- 2/10 8/10 4/10 0/10 1/10     Pt has completed skilled physical therapy intervention with good decrease of pain complaints. Pt displays improved: reduction of impingement signs; B shoulder ROM; B UE strength and upper quadrant flexibility.      Pt has done very well in PT with decreased pain and very good return to function.  She is motivated and plans to join a [employer] community exercise program.  Pt educated on the importance of continuing exercise for wt management and core strengthening to aid in cervical and upper quadrant issues. She continues to have ongoing L UE sensory loss from prior to her cervical surgery.     Pt demonstrates improved function as noted as progression towards goals and improved functional score. See objective  discharge data below in tables with initial evaluation data. Pt has met goals, is independent with HEP and ready to be discharged to [facility].     Post QuickDASH Outcome Score  Post Score: 11.36 % (6/27/2024 12:50 PM)    75 % improvement       OBJECTIVE:     Treatment performed this date:    Therapeutic Exercise:  Visit #   6/8 7/8 8/10   Position Exercise HEP 6/6/2024 6/20/2024 6/27/2024    Supine B shldr flex hands clasped H       Shldr IR/ER       Sidelying Shldr ER        Sleeper stretch H   X 3x30s   Sitting Pulleys  H X 3 min X 3 min cues for scapular and intercostal mobility 3 min    B shldr flex hands clasped H 10x  X 10x    TB shldr ER H 10x2 green X 10x G, 5x3 Blue 5x3 blue    Cross body punches   X 10x     TB Horizontal abd H  X 5x G, 5x3 Blue 5x3 blue    TB shldr scaption H  X 5x3 green 5x3 blue    Scap retract depress H   X 10x   Standing  Pendulum flex/extn H        Pendulum horiz abd/add H        Pendulum CW/CCW H        B shldr extn hands on elev plinth H       Towel R shldr IR H       TB shldr IR H  10x2 gr  X 5x3 blue    TB shldr ER H  10x2 gr  X 5x3 blue    TB B shldr extn  H 10x2 gr X 10x3 green, cues for scap retraction and depression X 5x3 blue    Wall wash shldr scaption H X 10x2 cues for pattern  10x 2    Scap control w shldr abd mirror  10x      Empty can  7x 0#     Many ROM and strength for assessment    X    Neuro Re-ed   X see assessment  X cues for scapular repositioning to gain neutral GH position for function V/T cues   Ther Act Function  X further education on condition as noted below  X further education on condition as noted below  X importance of continued loading to gain recover from rotator cuff tears   Man tx PROM        CR/RI        Joint mob        STM        Mobilization w movement        Suboccipital release        Manual release        MET       H = HEP. Pt given copies of this exercise for home program.  X = Exercises done this date - pt verbalized understanding and  demonstrated competence. All exercises done B unless otherwise indicated.  Pt advised to discontinue exercises that increase pain and to call or return to therapist to discuss.  Each intervention above is specifically prescribed to address the patients identified impairments, activity limitations, and participation restrictions.    ASSESSMENT     Physical Therapy Goals:  From 4/10/2024 to 7/9/2024  - Created with patient input during initial evaluation  1. Pt will be independent in beginning level of HEP for stretching, posture and strengthening.   2. Pt will be able to don/doff shirt/jacket without increased symptoms.  3. Pt will be able to pull pants up without increased symptoms.  4. Pt will be able to shampoo hair without increased symptoms.  5. Pt will be able to hold pen and write without increased symptoms.     PLAN OF CARE:      Plan:  Discharge to [facility]    Thank you for your referral. If you have any questions, please contact me at Dept: [phone].    Sincerely,  Electronically signed by therapist: Lizette Ware PT        Charged Units Units Minutes    Ther Ex 2 23   Neuro 1 12   Ther Activity 1 10   Gait     Man Tx          Total Timed  45   Total Tx Time  45       ____________________________________________________________________________________________    21st Century Cures Act Notice to Patient: Medical documents like this are made available to patients in the interest of transparency. However, be advised this is a medical document and it is intended as [id number] communication between your medical providers. This medical document may contain abbreviations, assessments, medical data, and results or other terms that are unfamiliar. Medical documents are intended to carry relevant information, facts as evident, and the clinical opinion of the practitioner. As such, this medical document may be written in language that appears blunt or direct. You are encouraged to contact your medical provider and/or  [facility] Patient Experience if you have any questions about this medical document.   Objective Initial Evaluation Data 4/10/2024:    QuickDASH Outcome Score  Score: 86.36 % (4/9/2024 12:54 PM)        Posture:  B shoulder protraction, min forward head position, B genu valgus R>L    Shldr impingement testing 4/10/2024       Brower R (-)   Brower L (+) severe w min range   Neer's R (-)   Neer's L (+) severe w min range       Dermatomes 4/10/2024 6/27/2024         Lat neck(C4) R wnl wnl   Lat neck (C4) L DULL DULL   Ant deltoid (C5) R wnl wnl   Ant deltoid (C5) L DULL DULL   Dorsal prox thumb (C6) R wnl wnl   Dorsal prox thumb (C6) L DULL DULL   Dorsal prox 3 digit (C7) R wnl wnl   Dorsal prox 3 digit (C7) L DULL DULL   Dorsal 4-5 digits (C8) R wnl wnl   Dorsal 4-5 digits (C8) L DULL DULL   Med prox elbow (T1) R wnl wnl   Med prox elbow (T1) L DULL DULL        UE Neural Glides 4/10/2024   ULTT 1 R wnl   ULTT 1 L wnl   ULTT 2 R wnl   ULTT 2 L  wnl   ULTT 3 R wnl   ULTT 3 L wnl       Cervical ROM 4/10/2024 6/27/2024   Fwd head 30 23   Flexion 60 nl 38 38   Extension 70 nl 15 32   R SB 45 nl 25 25   L SB 45 nl 21 23   R Rotation 80 nl 37 45   L Rotation 80 nl 34 38   *pain limiting     Shoulder ROM 4/10/2024 4/25/2024  6/27/2024    Flex R 180 nl 80 131 155   Flex L 180 nl 151 160 163   Extn R 50 nl 25 40 48   Extn L 50 nl 59 63 58   Abd R 180 nl 56 140 162   Abd L 180 nl 152 180 180   ER R 90 nl 38 @ 50 abd 45 @ 85 abd 86 @ 90 abd   ER L 90 nl 90 90 92   IR R 80 nl 48 @ 50 abd 70 @ 85 abd 70 @ 90 abd   IR L 80 nl 58 80 80   *pain limiting    Elbow ROM 4/10/2024   Flex R wnl   Flex L wnl   Extn R wnl   Extn L wnl   Supination R wnl   Supination L wnl   Pronation R wnl   Pronation L wnl       MMT 5/5 4/14/2024 6/27/2024   C5 shldr abd R 2* 5-   Shldr abd L 5 5   C6 biceps R 5- 5   Biceps L 5 5   C7 Triceps R 5-* 5   Triceps L 4+ 5-   T1 Interossei R 4 4+   T1 Interossei L 4 4+   ER R 4+* 5-   ER L 5 5   IR R  4+* 5   IR L 5 5   C8 EPL R 5 5   EPL L 5 5   *pain limiting     UE flexibility 4/10/2024 6/27/2024    UT R mod mod   UT L mod mod   Scalenes R mod mod   Scalenes L mod mod   Lats R max min   Lats L min Min (-)       AC & StC joints limits 5/23/2024        StC abd R wnl   StC abd L wnl   StC horiz abd R wnl   StC horiz abd L wnl   AC abd R wnl   AC abd L Mod-max   AC IR R Min*   AC IR L ---   AC ER R min   AC ER L ---

## 2024-07-07 ENCOUNTER — PATIENT MESSAGE (OUTPATIENT)
Dept: FAMILY MEDICINE CLINIC | Facility: CLINIC | Age: 64
End: 2024-07-07

## 2024-07-07 DIAGNOSIS — R20.2 FORMICATION: ICD-10-CM

## 2024-07-07 DIAGNOSIS — R29.898 WEAKNESS OF LEFT HAND: ICD-10-CM

## 2024-07-07 DIAGNOSIS — Z98.1 STATUS POST CERVICAL SPINAL FUSION: Primary | ICD-10-CM

## 2024-07-07 RX ORDER — ATORVASTATIN CALCIUM 10 MG/1
10 TABLET, FILM COATED ORAL NIGHTLY
Qty: 90 TABLET | Refills: 1 | Status: SHIPPED | OUTPATIENT
Start: 2024-07-07

## 2024-07-07 NOTE — TELEPHONE ENCOUNTER
Refill passed per [facility] protocol.     Requested Prescriptions   Pending Prescriptions Disp Refills    ATORVASTATIN 10 MG Oral Tab [Pharmacy Med Name: Atorvastatin Calcium 10 MG Oral Tablet] 90 tablet 0     Sig: Take 1 tablet by mouth nightly       Cholesterol Medication Protocol Passed - 7/2/2024  4:10 PM        Passed - ALT < 80     Lab Results   Component Value Date    ALT 46 08/05/2023             Passed - ALT resulted within past year        Passed - Lipid panel within past 12 months     Lab Results   Component Value Date    CHOLEST 118 02/24/2024    TRIG 102 02/24/2024    HDL 44 02/24/2024    LDL 55 02/24/2024    VLDL 15 02/24/2024    TCHDLRATIO 2.9 04/28/2015    NONHDLC 74 02/24/2024             Passed - In person appointment or virtual visit in the past 12 mos or appointment in next 3 mos     Recent Outpatient Visits              1 week ago     [facility] Lizette Ware, PT    Office Visit    2 weeks ago     [facility] Lizette Ware, PT    Office Visit    1 month ago Partial nontraumatic tear of right rotator cuff    [facility] Jaison Russell DO    Office Visit    1 month ago     [facility] Lizette Ware, PT    Office Visit    1 month ago Type 2 diabetes mellitus without complication, with long-term current use of insulin ([facility])    [facility] Niki Pozo MD    Office Visit          Future Appointments         Provider Department Appt Notes    In 2 weeks Jaison Russell DO [facility] f/u    In 1 month Niki Pozo MD [facility]     In 1 month Chin Mccrary MD [facility]                           Recent Outpatient Visits              1 week ago     [facility] Lizette Ware, PT    Office Visit    2 weeks  ago     [facility] Lizette Ware, PT    Office Visit    1 month ago Partial nontraumatic tear of right rotator cuff    [facility] Jaison Russell DO    Office Visit    1 month ago     [facility] Lizette Ware, PT    Office Visit    1 month ago Type 2 diabetes mellitus without complication, with long-term current use of insulin (HCC)    [facility] Niki Pozo MD    Office Visit             Future Appointments         Provider Department Appt Notes    In 2 weeks Jaison Russell DO [facility] f/u    In 1 month Niki Pozo MD [facility]     In 1 month Chin Mccrary MD [facility]

## 2024-07-10 ENCOUNTER — PATIENT MESSAGE (OUTPATIENT)
Dept: FAMILY MEDICINE CLINIC | Facility: CLINIC | Age: 64
End: 2024-07-10

## 2024-07-10 DIAGNOSIS — M75.111 PARTIAL NONTRAUMATIC TEAR OF ROTATOR CUFF, RIGHT: ICD-10-CM

## 2024-07-10 DIAGNOSIS — G95.9 CERVICAL MYELOPATHY (HCC): Primary | ICD-10-CM

## 2024-07-10 DIAGNOSIS — R20.0 NUMBNESS AND TINGLING IN LEFT HAND: ICD-10-CM

## 2024-07-10 DIAGNOSIS — R20.2 NUMBNESS AND TINGLING IN LEFT HAND: ICD-10-CM

## 2024-07-10 NOTE — TELEPHONE ENCOUNTER
From: Nicolette Nation  To: Niki Pozo  Sent: 7/10/2024 11:14 AM CDT  Subject: Referral    Hi Dr Pozo, I need a referral to see . The appointment is scheduled for July 23. Thanks, Nicolette

## 2024-07-11 RX ORDER — LEVOTHYROXINE SODIUM 0.07 MG/1
75 TABLET ORAL
Qty: 90 TABLET | Refills: 3 | Status: SHIPPED | OUTPATIENT
Start: 2024-07-11

## 2024-07-11 NOTE — TELEPHONE ENCOUNTER
Refill passed per [facility] protocol.  Requested Prescriptions   Pending Prescriptions Disp Refills    LEVOTHYROXINE 75 MCG Oral Tab [Pharmacy Med Name: Levothyroxine Sodium 75 MCG Oral Tablet] 90 tablet 0     Sig: TAKE 1 TABLET BY MOUTH BEFORE BREAKFAST       Thyroid Medication Protocol Passed - 7/8/2024  3:25 PM        Passed - TSH in past 12 months        Passed - Last TSH value is normal     Lab Results   Component Value Date    TSH 1.695 02/24/2024                 Passed - In person appointment or virtual visit in the past 12 mos or appointment in next 3 mos     Recent Outpatient Visits              2 weeks ago     [facility] Lizette Ware, PT    Office Visit    3 weeks ago     [facility] Lizette Ware, PT    Office Visit    1 month ago Partial nontraumatic tear of right rotator cuff    [facility] Jaison Russell DO    Office Visit    1 month ago     [facility] Lizette Ware, PT    Office Visit    1 month ago Type 2 diabetes mellitus without complication, with long-term current use of insulin (HCC)    [facility] Niki Pozo MD    Office Visit          Future Appointments         Provider Department Appt Notes    In 5 days Piotr Kerns MD [facility] exam review imaging    In 1 week Jaison Russell DO [facility] f/u    In 1 month Niki Pozo MD [facility]     In 1 month Chin Mccrary MD [facility]                        Recent Outpatient Visits              2 weeks ago     [facility] Lizette Ware, PT    Office Visit    3 weeks [facility]    Office Visit    1 month ago Partial nontraumatic tear of right  rotator cuff    [facility] Jaison Russell DO    Office Visit    1 month ago     [facility] Lizette Ware, [facility]    Office Visit    1 month ago Type 2 diabetes mellitus without complication, with long-term current use of insulin (HCC)    [facility] Niki Pozo MD    Office Visit          Future Appointments         Provider Department Appt Notes    In 5 days Piotr Kerns MD [facility] exam review imaging    In 1 week [facility]    In 1 month Niki Pozo MD [facility]     In 1 month Chin Mccrary MD [facility]

## 2024-07-16 ENCOUNTER — OFFICE VISIT (OUTPATIENT)
Dept: SURGERY | Facility: CLINIC | Age: 64
End: 2024-07-16
Payer: MEDICARE

## 2024-07-16 VITALS
BODY MASS INDEX: 48.82 KG/M2 | SYSTOLIC BLOOD PRESSURE: 127 MMHG | HEART RATE: 90 BPM | WEIGHT: 293 LBS | DIASTOLIC BLOOD PRESSURE: 70 MMHG | HEIGHT: 65 IN

## 2024-07-16 DIAGNOSIS — G95.9 CERVICAL MYELOPATHY (HCC): Primary | ICD-10-CM

## 2024-07-16 PROCEDURE — 3074F SYST BP LT 130 MM HG: CPT | Performed by: NEUROLOGICAL SURGERY

## 2024-07-16 PROCEDURE — 3008F BODY MASS INDEX DOCD: CPT | Performed by: NEUROLOGICAL SURGERY

## 2024-07-16 PROCEDURE — 3078F DIAST BP <80 MM HG: CPT | Performed by: NEUROLOGICAL SURGERY

## 2024-07-16 PROCEDURE — 99214 OFFICE O/P EST MOD 30 MIN: CPT | Performed by: NEUROLOGICAL SURGERY

## 2024-07-16 NOTE — PROGRESS NOTES
Last procedure: 8/29/22  Last office visit: 4/10/24  Most recent imaging dated on: 06/2024   Pain Level: 6/10. Patient states that they are having pain located on their right neck, left arm and in between shoulder blades.   Numbness / Tingling: Patient reports numbness and tingling on left hand and left arm.

## 2024-07-16 NOTE — PATIENT INSTRUCTIONS
Refill policies:    Allow 2-3 business days for refills; controlled substances may take longer.  Contact your pharmacy at least 5 days prior to running out of medication and have them send an electronic request or submit request through the “request refill” option in your [employer] account.  Refills are not addressed on weekends; covering physicians do not authorize routine medications on weekends.  No narcotics or controlled substances are refilled after noon on Fridays or by on call physicians.  By law, narcotics must be electronically prescribed.  A 30 day supply with no refills is the maximum allowed.  If your prescription is due for a refill, you may be due for a follow up appointment.  To best provide you care, patients receiving routine medications need to be seen at least once a year.  Patients receiving narcotic/controlled substance medications need to be seen at least once every 3 months.  In the event that your preferred pharmacy does not have the requested medication in stock (e.g. Backordered), it is your responsibility to find another pharmacy that has the requested medication available.  We will gladly send a new prescription to that pharmacy at your request.    Scheduling Tests:    If your physician has ordered radiology tests such as MRI or CT scans, please contact Central Scheduling at [phone] right away to schedule the test.  Once scheduled, the [facility] Centralized Referral Team will work with your insurance carrier to obtain pre-certification or prior authorization.  Depending on your insurance carrier, approval may take 3-10 days.  It is highly recommended patients assure they have received an authorization before having a test performed.  If test is done without insurance authorization, patient may be responsible for the entire amount billed.      Precertification and Prior Authorizations:  If your physician has recommended that you have a procedure or additional testing performed the [facility]  Centralized Referral Team will contact your insurance carrier to obtain pre-certification or prior authorization.    You are strongly encouraged to contact your insurance carrier to verify that your procedure/test has been approved and is a COVERED benefit.  Although the [facility] Centralized Referral Team does its due diligence, the insurance carrier gives the disclaimer that \"Although the procedure is authorized, this does not guarantee payment.\"    Ultimately the patient is responsible for payment.   Thank you for your understanding in this matter.  Paperwork Completion:  If you require FMLA or disability paperwork for your recovery, please make sure to either drop it off or have it faxed to our office at [phone]. Be sure the form has your name and date of birth on it.  The form will be faxed to our Forms Department and they will complete it for you.  There is a 25$ fee for all forms that need to be filled out.  Please be aware there is a 10-14 day turnaround time.  You will need to sign a release of information (RAMOS) form if your paperwork does not come with one.  You may call the Forms Department with any questions at [phone].  Their fax number is [phone].

## 2024-07-16 NOTE — PROGRESS NOTES
Neurosurgery Clinic Visit  2024    Nioclette Nation PCP:  Niki Pozo MD    1960 MRN [id number]     HISTORY OF PRESENT ILLNESS:  Nicolette Nation is a(n) 63 year old female who presents today in postoperative follow-up.  On 2022, I performed a C5-6 corpectomy with C4-7 anterior fusion.  Last office visit was April 10, 2024, with Malina.    Patient continues to have some left upper extremity numbness and tingling.  She has some intermittent pain to the dorsal aspect of the left hand, which may reflect a C7 distribution.  She has been started on duloxetine, which has helped significantly.  Her symptoms were constant, but are now intermittent.  She notes occasional injuries to the left hand related to hypoesthesia, but is attentive to these.  Overall, this is not really limiting her activities of daily living.    She has a right-sided rotator cuff tear.  She has started physical therapy.      PHYSICAL EXAMINATION:  Vital Signs:  /70   Pulse 90   Ht 65\"   Wt (!) 304 lb (137.9 kg)   LMP 08/10/2021   BMI 50.59 kg/m²   On examination, strength is 5/5 throughout, with the exception of decreased strength in right deltoid and biceps, due to the rotator cuff tear.  She also has diminished strength in the right iliopsoas and quadriceps secondary to right knee osteoarthritis.  She said she is due for knee replacement.  Reflexes are 2+ and symmetric.  No Consuelo's.      REVIEW OF STUDIES:    MRI scan of the cervical spine was performed 2024.  I personally reviewed these images.    This demonstrates some residual/recurrent stenosis at C6-7 resulting in moderate central stenosis.  There is a left-sided disc protrusion at C5-6 resulting in some foraminal stenosis.    X-rays demonstrate good positioning of graft and hardware without evidence of complication.    Previous MRI scan from last January has been archived and is unavailable for review.      ASSESSMENT and PLAN:  1.  Cervical  myelopathy, status post corpectomy and fusion    2.  Residual left upper extremity tingling with intermittent pain    We had a conversation regarding her symptoms.  She has derive significant benefit from the duloxetine.  She takes Tylenol on an as-needed basis for the pain.  Given her improvement, and the fact that her activities of daily living are relatively unimpeded, I do not recommend further workup or consideration of surgery.  The neck step would be to obtain a CT myelogram.    I would like to see her back in 3 months, to check on her progress.  No new imaging is required prior to that visit.        Time spent on counseling/coordination of care:  30 Minutes    Total time spent with patient:  15 Minutes        7/16/2024  10:39 AM     This report was dictated using voice recognition technology.  Errors in syntax or recognition may occur, and should not be construed to change the meaning of a sentence.

## 2024-07-23 ENCOUNTER — OFFICE VISIT (OUTPATIENT)
Dept: PHYSICAL MEDICINE AND REHAB | Facility: CLINIC | Age: 64
End: 2024-07-23
Payer: MEDICARE

## 2024-07-23 VITALS — OXYGEN SATURATION: 98 % | WEIGHT: 293 LBS | HEART RATE: 77 BPM | BODY MASS INDEX: 51 KG/M2

## 2024-07-23 DIAGNOSIS — M75.111 PARTIAL NONTRAUMATIC TEAR OF RIGHT ROTATOR CUFF: Primary | ICD-10-CM

## 2024-07-23 PROCEDURE — 20611 DRAIN/INJ JOINT/BURSA W/US: CPT | Performed by: PHYSICAL MEDICINE & REHABILITATION

## 2024-07-23 RX ORDER — TRIAMCINOLONE ACETONIDE 40 MG/ML
40 INJECTION, SUSPENSION INTRA-ARTICULAR; INTRAMUSCULAR ONCE
Status: COMPLETED | OUTPATIENT
Start: 2024-07-23 | End: 2024-07-23

## 2024-07-23 RX ORDER — LIDOCAINE HYDROCHLORIDE 10 MG/ML
3 INJECTION, SOLUTION INFILTRATION; PERINEURAL ONCE
Status: COMPLETED | OUTPATIENT
Start: 2024-07-23 | End: 2024-07-23

## 2024-07-23 NOTE — PROGRESS NOTES
Progress note    C/C:   Chief Complaint   Patient presents with    Follow - Up     06/07/24 LOV. She completed PT and states 60% improvement. She is doing HEP. T/n in L hand. Tylenol prn. Duloxetine daily. Pain 4/10.       HPI:     Pertinent allergies:   Allergies   Allergen Reactions    Chlorhexidine RASH        Physical exam:  Pulse 77   Wt (!) 305 lb (138.3 kg)   LMP 08/10/2021   SpO2 98%   BMI 50.75 kg/m²      ***    Imaging: ***    Assessment and plan  ***

## 2024-07-23 NOTE — PROCEDURES
63 year old female presents for follow up. She has seen further improvement in primarily right anterior shoulder pain, but continues to have increased pain and difficulty sleeping most, if not all nights of the week. She has seen improvement in ability to raise the arm, but still has difficulty with activity that requires internal rotation of the arm. She continues to have + Supraspinatus, Hawkin's, Speed's tests.    She was encouraged to continue her home exercise program.  We briefly discussed orthopedic surgery referral, patient defers as she has seen some improvement significant improvements in right shoulder symptoms status post physical therapy and would rather continue nonsurgical treatment.  We went ahead and repeated right subacromial bursa steroid injection under ultrasound guidance.  This was done today, see separate note for details.  If she continues to have anterior shoulder pain can consider biceps tendon sheath injection in the future.  I will see her for follow-up in 2 to 3 months if she continues to be symptomatic.    Dx: Partial right rotator cuff tear  Procedure: Right subacromial bursa steroid injection under ultrasound guidance    The patient is here for a right shoulder subdeltoid bursal injection done under ultrasound guidance.  Under ultrasound guidance the right lateral subdeltoid bursa was identified and a chante was placed on the patient's skin.The skin was cleaned with alcohol swabs x 3 and anesthetized with ethyl chloride spray.  Then a 25 gauge needle was inserted under ultrasound guidance into the right lateral subdeltoid bursa.  Aspiration was performed and no blood, fluid, or air was aspirated.  The patient was then injected with 4 ml of 1.0 ml of 40 mg of Kenalog/ml and 3.0 ml of 1% lidocaine without epinephrine.  The needle was removed and a band aid was applied.      Jaiosn Russell DO  Physical Medicine and Rehabilitation  [facility]

## 2024-07-24 ENCOUNTER — TELEPHONE (OUTPATIENT)
Dept: PHYSICAL MEDICINE AND REHAB | Facility: CLINIC | Age: 64
End: 2024-07-24

## 2024-07-24 NOTE — TELEPHONE ENCOUNTER
Initiated authorization for Right subacromial bursa steroid injection under US guidance  CPT/HCPCS 70917,  dx:M75.111 with [employer]    Status: Approved - auth not required  Tracking #[id number]  Valid: 7/23/24-10/21/24

## 2024-08-28 ENCOUNTER — OFFICE VISIT (OUTPATIENT)
Dept: FAMILY MEDICINE CLINIC | Facility: CLINIC | Age: 64
End: 2024-08-28
Payer: MEDICARE

## 2024-08-28 ENCOUNTER — LAB ENCOUNTER (OUTPATIENT)
Dept: LAB | Age: 64
End: 2024-08-28
Attending: FAMILY MEDICINE
Payer: MEDICARE

## 2024-08-28 ENCOUNTER — TELEPHONE (OUTPATIENT)
Dept: FAMILY MEDICINE CLINIC | Facility: CLINIC | Age: 64
End: 2024-08-28

## 2024-08-28 VITALS
OXYGEN SATURATION: 95 % | BODY MASS INDEX: 53.92 KG/M2 | HEIGHT: 62 IN | HEART RATE: 83 BPM | SYSTOLIC BLOOD PRESSURE: 115 MMHG | TEMPERATURE: 99 F | DIASTOLIC BLOOD PRESSURE: 80 MMHG | WEIGHT: 293 LBS

## 2024-08-28 DIAGNOSIS — E11.9 TYPE 2 DIABETES MELLITUS WITHOUT COMPLICATION, WITH LONG-TERM CURRENT USE OF INSULIN (HCC): Primary | ICD-10-CM

## 2024-08-28 DIAGNOSIS — L30.4 INTERTRIGO: ICD-10-CM

## 2024-08-28 DIAGNOSIS — Z79.4 TYPE 2 DIABETES MELLITUS WITHOUT COMPLICATION, WITH LONG-TERM CURRENT USE OF INSULIN (HCC): Primary | ICD-10-CM

## 2024-08-28 DIAGNOSIS — Z79.4 TYPE 2 DIABETES MELLITUS WITHOUT COMPLICATION, WITH LONG-TERM CURRENT USE OF INSULIN (HCC): ICD-10-CM

## 2024-08-28 DIAGNOSIS — Z86.010 HISTORY OF COLON POLYPS: ICD-10-CM

## 2024-08-28 DIAGNOSIS — E66.01 MORBID OBESITY (HCC): ICD-10-CM

## 2024-08-28 DIAGNOSIS — E11.9 TYPE 2 DIABETES MELLITUS WITHOUT COMPLICATION, WITH LONG-TERM CURRENT USE OF INSULIN (HCC): ICD-10-CM

## 2024-08-28 LAB
EST. AVERAGE GLUCOSE BLD GHB EST-MCNC: 140 MG/DL (ref 68–126)
HBA1C MFR BLD: 6.5 % (ref ?–5.7)

## 2024-08-28 PROCEDURE — 3044F HG A1C LEVEL LT 7.0%: CPT | Performed by: FAMILY MEDICINE

## 2024-08-28 PROCEDURE — 3079F DIAST BP 80-89 MM HG: CPT | Performed by: FAMILY MEDICINE

## 2024-08-28 PROCEDURE — 3008F BODY MASS INDEX DOCD: CPT | Performed by: FAMILY MEDICINE

## 2024-08-28 PROCEDURE — 99214 OFFICE O/P EST MOD 30 MIN: CPT | Performed by: FAMILY MEDICINE

## 2024-08-28 PROCEDURE — 3074F SYST BP LT 130 MM HG: CPT | Performed by: FAMILY MEDICINE

## 2024-08-28 PROCEDURE — 83036 HEMOGLOBIN GLYCOSYLATED A1C: CPT

## 2024-08-28 PROCEDURE — 3061F NEG MICROALBUMINURIA REV: CPT | Performed by: FAMILY MEDICINE

## 2024-08-28 PROCEDURE — 36415 COLL VENOUS BLD VENIPUNCTURE: CPT

## 2024-08-28 PROCEDURE — 3051F HG A1C>EQUAL 7.0%<8.0%: CPT | Performed by: FAMILY MEDICINE

## 2024-08-28 RX ORDER — NYSTATIN AND TRIAMCINOLONE ACETONIDE 100000; 1 [USP'U]/G; MG/G
CREAM TOPICAL
Qty: 60 G | Refills: 0 | Status: SHIPPED | OUTPATIENT
Start: 2024-08-28

## 2024-08-28 RX ORDER — INSULIN GLARGINE 100 [IU]/ML
25 INJECTION, SOLUTION SUBCUTANEOUS NIGHTLY
Qty: 12 EACH | Refills: 1 | Status: SHIPPED | OUTPATIENT
Start: 2024-08-28

## 2024-08-28 RX ORDER — DULAGLUTIDE 4.5 MG/.5ML
4.5 INJECTION, SOLUTION SUBCUTANEOUS WEEKLY
Qty: 12 PEN | Refills: 1 | Status: CANCELLED | OUTPATIENT
Start: 2024-08-28

## 2024-08-28 RX ORDER — DULAGLUTIDE 4.5 MG/.5ML
4.5 INJECTION, SOLUTION SUBCUTANEOUS WEEKLY
Qty: 12 PEN | Refills: 1 | Status: SHIPPED
Start: 2024-08-28 | End: 2024-08-30

## 2024-08-28 NOTE — TELEPHONE ENCOUNTER
[facility] pharmacy would like to clarify Lantus dosage. The note to pharmacy said 15 units but the sig says 25 units.       LANTUS SOLOSTAR 100 UNIT/ML Subcutaneous Solution Pen-injector 12 each 1 8/28/2024 --   Sig:   Inject 25 Units into the skin nightly.     Route:   Subcutaneous     Note to Pharmacy:   Thank you for clarifying.  Dose should be 15 units.

## 2024-08-28 NOTE — PROGRESS NOTES
Subjective:   Patient ID: Nicolette Nation is a 64 year old female.    Diabetes  She presents for her follow-up diabetic visit. She has type 2 diabetes mellitus. Her disease course has been stable. There are no hypoglycemic associated symptoms. Pertinent negatives for diabetes include no foot ulcerations. There are no hypoglycemic complications. Symptoms are stable. Current diabetic treatment includes insulin injections and oral agent (dual therapy).       History/Other:   Review of Systems  Current Outpatient Medications   Medication Sig Dispense Refill   • Dulaglutide (TRULICITY) 4.5 MG/0.5ML Subcutaneous Solution Pen-injector Inject 4.5 mg into the skin once a week. 12 Pen 1   • LANTUS SOLOSTAR 100 UNIT/ML Subcutaneous Solution Pen-injector Inject 25 Units into the skin nightly. 12 each 1   • nystatin-triamcinolone 100,000-0.1 Units/g-% External Cream Apply top 2 times daily for 14 days 60 g 0   • levothyroxine 75 MCG Oral Tab Take 1 tablet (75 mcg total) by mouth before breakfast. 90 tablet 3   • atorvastatin 10 MG Oral Tab Take 1 tablet (10 mg total) by mouth nightly. 90 tablet 1   • pantoprazole 40 MG Oral Tab EC Take 1 tablet (40 mg total) by mouth before breakfast. 90 tablet 3   • lisinopril-hydroCHLOROthiazide 10-12.5 MG Oral Tab Take 1 tablet by mouth once daily. 90 tablet 3   • DULoxetine 60 MG Oral Cap DR Particles Take 1 capsule (60 mg total) by mouth daily. 90 capsule 3   • metFORMIN 500 MG Oral Tab Take 2 tablets (1,000 mg total) by mouth 2 (two) times daily with meals. 360 tablet 3   • Acetaminophen ER (TYLENOL 8 HOUR ARTHRITIS PAIN) 650 MG Oral Tab CR Take 2 tablets (1,300 mg total) by mouth in the morning and 2 tablets (1,300 mg total) before bedtime. 60 tablet 5   • Multiple Vitamins-Minerals (EQ VISION FORMULA 50+ OR) Take 1 tablet by mouth daily.     • Insulin Pen Needle (BD PEN NEEDLE MINI U/F) 31G X 5 MM Does not apply Misc Use with Lantus 100 each 2   • Glucose Blood (ONETOUCH ULTRA BLUE) In  Vitro Strip USE ONCE DAILY 100 each 11   • ONETOUCH DELICA LANCETS 33G Does not apply Misc Apply 1 Units topically daily. 90 each 3   • Blood Glucose Monitoring Suppl (ONETOUCH ULTRA SYSTEM) W/DEVICE Does not apply Kit test T.I.D       Allergies:  Allergies   Allergen Reactions   • Chlorhexidine RASH       Objective:   Physical Exam  Constitutional:       Appearance: Normal appearance.   Cardiovascular:      Rate and Rhythm: Normal rate and regular rhythm.      Pulses: Normal pulses.      Heart sounds: Normal heart sounds.   Pulmonary:      Effort: Pulmonary effort is normal.      Breath sounds: Normal breath sounds.   Musculoskeletal:      Right lower leg: Edema present.      Left lower leg: Edema present.      Comments: 1+ nonpitting edema bilateral ankles   Skin:     Findings: Rash present.      Comments: Venous stasis dermatitis improved   Neurological:      Mental Status: She is alert.       Assessment & Plan:   1. Type 2 diabetes mellitus without complication, with long-term current use of insulin (HCC)-continuing Lantus 20 units nightly although recently increased to 30 units as she noted increased morning fasting blood sugars.  Trulicity 3 mg weekly.  Metformin.  Tolerating well.  In fact since starting Trulicity her diarrhea has significantly improved.  Plan to check hemoglobin A1c today.  Follow-up with me for routine physical in 3 months.   2. Intertrigo-right inguinal region.  Mycolog-II as directed.  Discussed skin care instructions keeping area dry, Goldbond  powder when she completes Rx.   3. History of colon polyps-scheduled for repeat colonoscopy this fall   4. Morbid obesity (HCC)-maximum weight 333 pounds.  Recommend continuing with healthy diet, she is considering transitioning to his vegetarian diet.  Plan to increase Trulicity to 4.5 mg weekly.       Orders Placed This Encounter   Procedures   • Hemoglobin A1C       Meds This Visit:  Requested Prescriptions     Signed Prescriptions Disp Refills    • Dulaglutide (TRULICITY) 4.5 MG/0.5ML Subcutaneous Solution Pen-injector 12 Pen 1     Sig: Inject 4.5 mg into the skin once a week.   • LANTUS SOLOSTAR 100 UNIT/ML Subcutaneous Solution Pen-injector 12 each 1     Sig: Inject 25 Units into the skin nightly.   • nystatin-triamcinolone 100,000-0.1 Units/g-% External Cream 60 g 0     Sig: Apply top 2 times daily for 14 days       Imaging & Referrals:  None

## 2024-08-29 ENCOUNTER — PATIENT MESSAGE (OUTPATIENT)
Dept: FAMILY MEDICINE CLINIC | Facility: CLINIC | Age: 64
End: 2024-08-29

## 2024-08-29 ENCOUNTER — TELEPHONE (OUTPATIENT)
Facility: CLINIC | Age: 64
End: 2024-08-29

## 2024-08-29 DIAGNOSIS — E66.01 MORBID (SEVERE) OBESITY DUE TO EXCESS CALORIES (HCC): Primary | ICD-10-CM

## 2024-08-29 NOTE — TELEPHONE ENCOUNTER
Dr Shah,    Pt's Health maintenance is telling her she is due for a colonoscopy (3 year recal)    Your report from 11/29/2021 states 5 year recall    Can you confirm and I will update patient's records & send out a new recall if it is 5 years

## 2024-08-30 RX ORDER — INSULIN GLARGINE 100 [IU]/ML
25 INJECTION, SOLUTION SUBCUTANEOUS NIGHTLY
Qty: 12 EACH | Refills: 1 | Status: CANCELLED | OUTPATIENT
Start: 2024-08-30

## 2024-08-30 RX ORDER — DULAGLUTIDE 4.5 MG/.5ML
4.5 INJECTION, SOLUTION SUBCUTANEOUS WEEKLY
Qty: 12 PEN | Refills: 1 | Status: SHIPPED
Start: 2024-08-30

## 2024-08-30 NOTE — TELEPHONE ENCOUNTER
Michelle Tinsley, RN 8/30/2024 10:30 AM CDT        ----- Message -----  From: Nicolette Nation  Sent: 8/29/2024 12:34 PM CDT  To: Em Rn Triage  Subject: Bariatric Doctor     Quang Pozo I need a referral to see Dr. Chin Mccrary on Sept 4th. Thanks, Nicolette

## 2024-08-30 NOTE — TELEPHONE ENCOUNTER
Please call [facility] pharmacy to call in Trulicity prescription. Keeps failing to the pharmacy. Phone number to pharmacy is [phone]          Called [facility] pharmacy spoke to pharmacist they stated they did not receive the Trulicity. It does keep failing when sent to pharmacy     Lantus is ready for patient.

## 2024-09-04 ENCOUNTER — LAB ENCOUNTER (OUTPATIENT)
Dept: LAB | Facility: HOSPITAL | Age: 64
End: 2024-09-04
Attending: INTERNAL MEDICINE
Payer: MEDICARE

## 2024-09-04 ENCOUNTER — OFFICE VISIT (OUTPATIENT)
Dept: SURGERY | Facility: CLINIC | Age: 64
End: 2024-09-04
Payer: MEDICARE

## 2024-09-04 VITALS
HEIGHT: 65.2 IN | BODY MASS INDEX: 48.23 KG/M2 | OXYGEN SATURATION: 95 % | SYSTOLIC BLOOD PRESSURE: 124 MMHG | DIASTOLIC BLOOD PRESSURE: 78 MMHG | WEIGHT: 293 LBS | HEART RATE: 93 BPM

## 2024-09-04 DIAGNOSIS — I10 PRIMARY HYPERTENSION: ICD-10-CM

## 2024-09-04 DIAGNOSIS — E11.9 TYPE 2 DIABETES MELLITUS WITHOUT COMPLICATION, WITH LONG-TERM CURRENT USE OF INSULIN (HCC): Primary | ICD-10-CM

## 2024-09-04 DIAGNOSIS — Z79.4 TYPE 2 DIABETES MELLITUS WITHOUT COMPLICATION, WITH LONG-TERM CURRENT USE OF INSULIN (HCC): ICD-10-CM

## 2024-09-04 DIAGNOSIS — E66.01 MORBID OBESITY WITH BMI OF 50.0-59.9, ADULT (HCC): ICD-10-CM

## 2024-09-04 DIAGNOSIS — E78.5 DYSLIPIDEMIA: ICD-10-CM

## 2024-09-04 DIAGNOSIS — E11.9 TYPE 2 DIABETES MELLITUS WITHOUT COMPLICATION, WITH LONG-TERM CURRENT USE OF INSULIN (HCC): ICD-10-CM

## 2024-09-04 DIAGNOSIS — Z79.4 TYPE 2 DIABETES MELLITUS WITHOUT COMPLICATION, WITH LONG-TERM CURRENT USE OF INSULIN (HCC): Primary | ICD-10-CM

## 2024-09-04 LAB
ATRIAL RATE: 68 BPM
P AXIS: 67 DEGREES
P-R INTERVAL: 130 MS
Q-T INTERVAL: 390 MS
QRS DURATION: 88 MS
QTC CALCULATION (BEZET): 414 MS
R AXIS: -37 DEGREES
T AXIS: 15 DEGREES
VENTRICULAR RATE: 68 BPM

## 2024-09-04 PROCEDURE — 99214 OFFICE O/P EST MOD 30 MIN: CPT | Performed by: INTERNAL MEDICINE

## 2024-09-04 PROCEDURE — 3074F SYST BP LT 130 MM HG: CPT | Performed by: INTERNAL MEDICINE

## 2024-09-04 PROCEDURE — 3078F DIAST BP <80 MM HG: CPT | Performed by: INTERNAL MEDICINE

## 2024-09-04 PROCEDURE — 93010 ELECTROCARDIOGRAM REPORT: CPT | Performed by: INTERNAL MEDICINE

## 2024-09-04 PROCEDURE — 3008F BODY MASS INDEX DOCD: CPT | Performed by: INTERNAL MEDICINE

## 2024-09-04 PROCEDURE — 93005 ELECTROCARDIOGRAM TRACING: CPT

## 2024-09-04 RX ORDER — DIETHYLPROPION HYDROCHLORIDE 25 MG/1
1 TABLET ORAL DAILY
Qty: 30 TABLET | Refills: 5 | Status: SHIPPED | OUTPATIENT
Start: 2024-09-04 | End: 2024-10-04

## 2024-09-04 NOTE — PROGRESS NOTES
[facility]  [address] [facility] [address] [place] [id number]  Dept: [phone]       Patient:  Nicolette Nation  :      1960  MRN:      [id number]    Chief Complaint:    Chief Complaint   Patient presents with    Follow - Up    Weight Management       SUBJECTIVE     History of Present Illness:  Nicolette is being seen today for a follow-up for non surgical weight loss    Past Medical History:   Past Medical History:    Back problem    previous disc problem    Cervical disc herniation    involving C6-C7    Cervical polyp    Chronic diarrhea    Colon polyp    COVID    Fever chills nausea. No hospitalization or residual    Diabetes (HCC)    Diverticular disease    Essential hypertension    Hemorrhoids    High blood pressure    High cholesterol    History of COVID-19    Hyperlipidemia    IBS (irritable bowel syndrome)    Incontinence    stool    Morbid obesity with BMI of 50.0-59.9, adult (HCC)    Myopia of both eyes    Osteoarthritis    Postmenopause    at age 50-54    Subclinical hypothyroidism    subclinical hypothyroidism, primary.  3/2010 trial of Synthroid, no improvement sx, d/c'd    Varicella    chicken pox    Visual impairment    glasses        Comorbidities:  Back pain-Improvement?  yes, Joint pain-Improvement?  yes, Diabetes-Improvement?  yes, Hypertension-Improvement?  yes, ANTHONY-Improvement?  yes, and Snoring-Improvement?  yes    OBJECTIVE     Vitals: /78 (BP Location: Left arm, Patient Position: Sitting, Cuff Size: adult)   Pulse 93   Ht 5' 5.2\" (1.656 m)   Wt (!) 308 lb (139.7 kg)   LMP 08/10/2021   SpO2 95%   BMI 50.94 kg/m²     Initial weight loss: -04   Total weight loss: -23   Start weight: 331    Wt Readings from Last 3 Encounters:   24 (!) 308 lb (139.7 kg)   24 (!) 307 lb (139.3 kg)   24 (!) 305 lb (138.3 kg)       Patient Medications:    Current Outpatient Medications   Medication Sig Dispense Refill     Dulaglutide (TRULICITY) 4.5 MG/0.5ML Subcutaneous Solution Pen-injector Inject 4.5 mg into the skin once a week. 12 Pen 1    LANTUS SOLOSTAR 100 UNIT/ML Subcutaneous Solution Pen-injector Inject 25 Units into the skin nightly. 12 each 1    nystatin-triamcinolone 100,000-0.1 Units/g-% External Cream Apply top 2 times daily for 14 days 60 g 0    levothyroxine 75 MCG Oral Tab Take 1 tablet (75 mcg total) by mouth before breakfast. 90 tablet 3    atorvastatin 10 MG Oral Tab Take 1 tablet (10 mg total) by mouth nightly. 90 tablet 1    pantoprazole 40 MG Oral Tab EC Take 1 tablet (40 mg total) by mouth before breakfast. 90 tablet 3    lisinopril-hydroCHLOROthiazide 10-12.5 MG Oral Tab Take 1 tablet by mouth once daily. 90 tablet 3    DULoxetine 60 MG Oral Cap DR Particles Take 1 capsule (60 mg total) by mouth daily. 90 capsule 3    metFORMIN 500 MG Oral Tab Take 2 tablets (1,000 mg total) by mouth 2 (two) times daily with meals. 360 tablet 3    Acetaminophen ER (TYLENOL 8 HOUR ARTHRITIS PAIN) 650 MG Oral Tab CR Take 2 tablets (1,300 mg total) by mouth in the morning and 2 tablets (1,300 mg total) before bedtime. 60 tablet 5    Multiple Vitamins-Minerals (EQ VISION FORMULA 50+ OR) Take 1 tablet by mouth daily.      Insulin Pen Needle (BD PEN NEEDLE MINI U/F) 31G X 5 MM Does not apply Misc Use with Lantus 100 each 2    Glucose Blood (ONETOUCH ULTRA BLUE) In Vitro Strip USE ONCE DAILY 100 each 11    ONETOUCH DELICA LANCETS 33G Does not apply Misc Apply 1 Units topically daily. 90 each 3    Blood Glucose Monitoring Suppl (ONETOUCH ULTRA SYSTEM) W/DEVICE Does not apply Kit test T.I.D       Allergies:  Chlorhexidine     Social History:    Social History     Socioeconomic History    Marital status:      Spouse name: Not on file    Number of children: Not on file    Years of education: Not on file    Highest education level: Not on file   Occupational History    Not on file   Tobacco Use    Smoking status: Former      Current packs/day: 0.00     Average packs/day: 0.5 packs/day for 2.0 years (1.0 ttl pk-yrs)     Types: Cigarettes     Start date: 1983     Quit date: 1985     Years since quittin.7    Smokeless tobacco: Never   Vaping Use    Vaping status: Never Used   Substance and Sexual Activity    Alcohol use: No    Drug use: No    Sexual activity: Not Currently     Comment: last active in    Other Topics Concern     Service Not Asked    Blood Transfusions Not Asked    Caffeine Concern Yes     Comment: soda    Occupational Exposure Not Asked    Hobby Hazards Not Asked    Sleep Concern Not Asked    Stress Concern Not Asked    Weight Concern Not Asked    Special Diet Not Asked    Back Care Not Asked    Exercise Not Asked    Bike Helmet Not Asked    Seat Belt Not Asked    Self-Exams Not Asked   Social History Narrative    Nicolette is . She has 2 adult children. Patient is unemployed,but formerly worked in [employer]. She lives with her son in [facility] Determinants of Health     Financial Resource Strain: Not on file   Food Insecurity: Not on file   Transportation Needs: Not on file   Physical Activity: Not on file   Stress: Not on file   Social Connections: Not on file   Housing Stability: Low Risk  (2021)    [facility]    Housing Stability     Mortgage Payment Concerns?: Not on file     Number of Places Lived in the Last Year: Not on file     Unstable Housing?: Not on file     Surgical History:    Past Surgical History:   Procedure Laterality Date      , 1991    x2     Cholecystectomy      Colonoscopy N/A 2019    Procedure: COLONOSCOPY;  Surgeon: Marcus Shah MD;  Location: [facility] ENDOSCOPY    Colonoscopy N/A 2021    Procedure: COLONOSCOPY;  Surgeon: Marcus Shah MD;  Location: [facility] ENDOSCOPY    Endometrial biopsy - jar(s): 2  2020    Hysterectomy   10/2021    no associated bleeding complication    Hysteroscopy      PROCEDURE:  Cervical polypectomy, hysteroscopy, MyoSure resection of endometrial polyp, and dilation and curettage.     Other  08/29/2022    Cervical 5-cervical 6 anterior corpectomy with cervical 4-cervical 5, cervical 5-cervical 6, cervical 6-cervical 7 anterior fusion, placement of cage and plate    Tubal ligation      s/p BTL     Family History:    Family History   Problem Relation Age of Onset    Stroke Mother         TIA    Cancer Father         lung    Other (smoker) Father     Diabetes Brother     Diabetes Paternal Grandmother         Fathers side of family    Other (auto immune) Paternal Grandmother     Other (hepatitis) Paternal Grandmother     Renal Disease Paternal Grandmother     Heart Disease Paternal Grandfather     Diabetes Other         Grandmother [SIDE NOT STATED]    Thyroid disease Other         family h/o    Glaucoma Neg         family h/o    Bleeding Disorders Neg     DVT/VTE Neg        Food Journal  Reviewed and Discussed:       Patient has a Food Journal?: yes   Patient is reading nutrition labels?  yes  Average Caloric Intake:     Average CHO Intake: 130  Is patient exercising? no  Type of exercise?     Eating Habits  Patient states the following:  Eats 1 meal(s) per day  Length of time it takes to consume a meal:  20  # of snacks per day: 1 Type of snacks:  fruit  Amount of soda consumption per day:  diet  Amount of water (in ounces) per day:  48  Drinking between meals only:  yes  Toughest challenge:  exercise     Nutritional Goals  Limit carbohydrates to 100 gms per day, Eat 100-200 calories within 1 hour of waking , and Eat 3-4 cups of fresh fruits or vegetables daily    Behavior Modifications Reviewed and Discussed  Eat breakfast, Eat 3 meals per day, Plan meals in advance, Read nutrition labels, Drink 64 oz of water per day, Maintain a daily food journal, No drinking 30 minutes before or after meals, Utlize portion  control strategies to reduce calorie intake, Identify triggers for eating and manage cues, and Eat slowly and take 20 to 30 minutes to complete each meal    Exercise Goals Reviewed and Discussed    Increase as tolerated    ROS:    Constitutional: negative  Respiratory: negative  Cardiovascular: negative  Gastrointestinal: positive for diarrhea  Musculoskeletal:positive for arthralgias and back pain  Neurological: negative  Behavioral/Psych: negative  Endocrine: negative  All other systems were reviewed and are negative    Physical Exam:   General appearance: alert, appears stated age, cooperative, and morbidly obese  Head: Normocephalic, without obvious abnormality, atraumatic  Neck: no adenopathy, no carotid bruit, no JVD, supple, symmetrical, trachea midline, and thyroid not enlarged, symmetric, no tenderness/mass/nodules  Back: symmetric, no curvature. ROM normal. No CVA tenderness.  Lungs: clear to auscultation bilaterally  Heart: S1, S2 normal, no murmur, click, rub or gallop, regular rate and rhythm  Abdomen: soft, non-tender; bowel sounds normal; no masses,  no organomegaly  Extremities: extremities normal, atraumatic, no cyanosis or edema  Pulses: 2+ and symmetric  Skin: Skin color, texture, turgor normal. No rashes or lesions  Neurologic: Grossly normal    ASSESSMENT     DIABETES:    The patient denies any episodes of hypoglycemia since her last clinic visit.  she denies any lower extremity skin breakdown or foot ulcers.    HYPERCHOLESTEROLEMIA:  The patient states that her cholesterol has been well controlled on her current medication.    Lab Results   Component Value Date/Time    CHOLEST 118 02/24/2024 11:42 AM    LDL 55 02/24/2024 11:42 AM    HDL 44 02/24/2024 11:42 AM    TRIG 102 02/24/2024 11:42 AM    VLDL 15 02/24/2024 11:42 AM    TCHDLRATIO 2.9 04/28/2015 06:51 PM       HYPERTENSION:  The patient's blood pressure has been well controlled.  she has been checking it as instructed and has remained in  relatively good control.    Encounter Diagnosis(ses):   Encounter Diagnoses   Name Primary?    Type 2 diabetes mellitus without complication, with long-term current use of insulin (HCC) [E11.9, Z79.4] Yes    Primary hypertension [I10]     Dyslipidemia [E78.5]     Morbid obesity with BMI of 50.0-59.9, adult (HCC) [E66.01, Z68.43]        PLAN     Patient is not interested in bariatric surgery. Patient desires to pursue traditional weight loss at this time.      HYPERTENSION: Blood pressure stable on the above medications. No interval change in antihypertensive medication.     DYSLIPIDEMIA: Stable on the above prescribed meal plan and medication. Liver function stable.    Lab Results   Component Value Date/Time    CHOLEST 118 02/24/2024 11:42 AM    LDL 55 02/24/2024 11:42 AM    HDL 44 02/24/2024 11:42 AM    TRIG 102 02/24/2024 11:42 AM    VLDL 15 02/24/2024 11:42 AM    TCHDLRATIO 2.9 04/28/2015 06:51 PM       DEGENERATIVE JOINT DISEASE: Of the knees is stable. Encourage upper body exercises if unable to perform weight-bearing exercises.      DIABETES: Continue current medications.      Goals for next month:  1. Keep a food log.  2. Drink 48-64 ounces of non-caloric beverages per day. No fruit juices or regular soda.  3. Increase activity-upper body exercises, walk 10 minutes per day.  4. Increase fruit and vegetable servings to 5-6 per day.      Tolerating Trulicity per PCP    Feels better    May benefit from lowering Lantus as sugars improved    Diethylpropion: Since the patient would like to try Diethylpropion, and is aware of the potential side effects (hypertension, palpitations, tachycardia, and anxiety), I will give a prescription today to be used in conjunction with the above diet and exercise program. The patient will check  heart rate and blood pressure on a regular basis. They will call me if her BP goes over 140/90 or has palpitations or racing heart rate. Patient understands that I will not call in the  prescription they must have an appointment to have the medication refilled.   Needs ekg      Diagnoses and all orders for this visit:    Type 2 diabetes mellitus without complication, with long-term current use of insulin (HCC) [E11.9, Z79.4]    Primary hypertension [I10]    Dyslipidemia [E78.5]    Morbid obesity with BMI of 50.0-59.9, adult (HCC) [E66.01, Z68.43]          Chin Mccrary MD

## 2024-09-05 NOTE — TELEPHONE ENCOUNTER
THANKS MACI !!!!    Sorry about the delay in answering this.    Recently diagnosed endometrial cancer 3 years ago August 2021.    My colonoscopy examination of 11/29/2021 was performed to evaluate chronic diarrhea, abnormal CRP inflammatory marker.  Fair prep quality exam but we washed her out very aggressively.    Colonoscopy showed moderate severity colonic diverticulosis, one 6 mm adenomatous colon polyp.  Random colonic mucosal biopsies were normal, no microscopic colitis.    During email exchange the next day 11/30/2021, I recommended 5-year follow-up colonoscopy examination.  5-year follow-up exam recommended (November 2026)    Please reassure Ms. Nation and cancel this popup reminder for 3-year follow-up exam.    - cb

## 2024-09-05 NOTE — TELEPHONE ENCOUNTER
I spoke to the pt    I reviewed Dr Shah's recommendations below with her and she voices understanding    Recall colon in 5 years per Dr Shah . Colon done 11/29/2021    Health maintenance updated and message sent to pt outreach to repeat colonoscopy in 5 years

## 2024-09-06 ENCOUNTER — PATIENT MESSAGE (OUTPATIENT)
Dept: FAMILY MEDICINE CLINIC | Facility: CLINIC | Age: 64
End: 2024-09-06

## 2024-09-06 ENCOUNTER — PATIENT MESSAGE (OUTPATIENT)
Dept: SURGERY | Facility: CLINIC | Age: 64
End: 2024-09-06

## 2024-09-06 NOTE — TELEPHONE ENCOUNTER
From: Nicolette Nation  To: Niki Pozo  Sent: 9/6/2024 7:57 AM CDT  Subject: Colonoscopy     Hi Dr Pozo, I have spoke with Dr Shah nurse Fernando yesterday. Dr Shah let me know I do not need this test done until 2026. They never up dated my file to five years. Thought I would let you know. Thanks for everything you do for me. Thanks, Nicolette

## 2024-10-29 NOTE — PATIENT INSTRUCTIONS
Refill policies:    Allow 2-3 business days for refills; controlled substances may take longer.  Contact your pharmacy at least 5 days prior to running out of medication and have them send an electronic request or submit request through the “request refill” option in your [facility] account.  Refills are not addressed on weekends; covering physicians do not authorize routine medications on weekends.  No narcotics or controlled substances are refilled after noon on Fridays or by on call physicians.  By law, narcotics must be electronically prescribed.  A 30 day supply with no refills is the maximum allowed.  If your prescription is due for a refill, you may be due for a follow up appointment.  To best provide you care, patients receiving routine medications need to be seen at least once a year.  Patients receiving narcotic/controlled substance medications need to be seen at least once every 3 months.  In the event that your preferred pharmacy does not have the requested medication in stock (e.g. Backordered), it is your responsibility to find another pharmacy that has the requested medication available.  We will gladly send a new prescription to that pharmacy at your request.    Scheduling Tests:    If your physician has ordered radiology tests such as MRI or CT scans, please contact Central Scheduling at [phone] right away to schedule the test.  Once scheduled, the [facility] Centralized Referral Team will work with your insurance carrier to obtain pre-certification or prior authorization.  Depending on your insurance carrier, approval may take 3-10 days.  It is highly recommended patients assure they have received an authorization before having a test performed.  If test is done without insurance authorization, patient may be responsible for the entire amount billed.      Precertification and Prior Authorizations:  If your physician has recommended that you have a procedure or additional testing performed the [facility]  Centralized Referral Team will contact your insurance carrier to obtain pre-certification or prior authorization.    You are strongly encouraged to contact your insurance carrier to verify that your procedure/test has been approved and is a COVERED benefit.  Although the [facility] Centralized Referral Team does its due diligence, the insurance carrier gives the disclaimer that \"Although the procedure is authorized, this does not guarantee payment.\"    Ultimately the patient is responsible for payment.   Thank you for your understanding in this matter.  Paperwork Completion:  If you require FMLA or disability paperwork for your recovery, please make sure to either drop it off or have it faxed to our office at [phone]. Be sure the form has your name and date of birth on it.  The form will be faxed to our Forms Department and they will complete it for you.  There is a 25$ fee for all forms that need to be filled out.  Please be aware there is a 10-14 day turnaround time.  You will need to sign a release of information (RAMOS) form if your paperwork does not come with one.  You may call the Forms Department with any questions at [phone].  Their fax number is [phone].

## 2024-10-30 ENCOUNTER — OFFICE VISIT (OUTPATIENT)
Dept: SURGERY | Facility: CLINIC | Age: 64
End: 2024-10-30
Payer: MEDICARE

## 2024-10-30 VITALS
SYSTOLIC BLOOD PRESSURE: 116 MMHG | BODY MASS INDEX: 48.23 KG/M2 | WEIGHT: 293 LBS | DIASTOLIC BLOOD PRESSURE: 76 MMHG | HEART RATE: 69 BPM | OXYGEN SATURATION: 98 % | HEIGHT: 65.2 IN

## 2024-10-30 DIAGNOSIS — G95.9 CERVICAL MYELOPATHY (HCC): Primary | ICD-10-CM

## 2024-10-30 PROCEDURE — 3074F SYST BP LT 130 MM HG: CPT | Performed by: NEUROLOGICAL SURGERY

## 2024-10-30 PROCEDURE — 3078F DIAST BP <80 MM HG: CPT | Performed by: NEUROLOGICAL SURGERY

## 2024-10-30 PROCEDURE — 99213 OFFICE O/P EST LOW 20 MIN: CPT | Performed by: NEUROLOGICAL SURGERY

## 2024-10-30 PROCEDURE — 3008F BODY MASS INDEX DOCD: CPT | Performed by: NEUROLOGICAL SURGERY

## 2024-10-30 NOTE — PROGRESS NOTES
Last Office Visit: 07/16/2024 with Dr. Kerns    Established patient presents to clinic for a 3 month follow up to reassess progress regarding left upper extremity tingling and intermittent pain. Patient is taking Duloxetine and Tylenol for pain relief. Her left arm pain depends on her neck movements. If she turns her neck to the left then she feels a pulling sensation in her left arm. She states that when she does not taking Duloxetine her pain begins.

## 2024-10-30 NOTE — PROGRESS NOTES
Neurosurgery Clinic Visit  10/30/2024    Nicolette Nation PCP:  Niki Pozo MD    1960 MRN [id number]     HISTORY OF PRESENT ILLNESS:  Nicolette Nation is a(n) 64 year old female today for follow-up.  On 2022, I performed C5 and C6 corpectomy with C4-7 anterior fusion.  Last office visit was 2024.    Overall, the patient is stable.  She continues to have some left upper extremity numbness and paresthesias.  However, the symptoms have improved significantly with duloxetine.  She does still have some tingling.  However, her overall functional level is much better than it was prior to the operation.      PHYSICAL EXAMINATION:  Vital Signs:  /76 (BP Location: Right arm, Patient Position: Sitting, Cuff Size: large)   Pulse 69   Ht 65.2\"   Wt (!) 308 lb (139.7 kg)   LMP 08/10/2021   SpO2 98%   BMI 50.94 kg/m²   On examination, strength remains 5/5.  The incision is well-healed.  No hyperreflexia or Antonio's.      REVIEW OF STUDIES:    No new imaging is available for review      ASSESSMENT and PLAN:  1.  Cervical myelopathy, status post corpectomy.  At this point, the patient is likely achieved maximal medical improvement.  She has gotten additional benefit from duloxetine.  Overall, she reports a good quality of life, with manageable symptoms.    Given her stability over time, I will see her back on an as-needed basis.  However, I would be happy to see her back at any point, if she has any concerns, or if any further questions arise.        Time spent on counseling/coordination of care:  15 Minutes    Total time spent with patient:  15 Minutes        10/30/2024  12:09 PM     This report was dictated using voice recognition technology.  Errors in syntax or recognition may occur, and should not be construed to change the meaning of a sentence.

## 2024-11-14 ENCOUNTER — PATIENT MESSAGE (OUTPATIENT)
Dept: FAMILY MEDICINE CLINIC | Facility: CLINIC | Age: 64
End: 2024-11-14

## 2024-11-14 DIAGNOSIS — M25.561 RIGHT KNEE PAIN, UNSPECIFIED CHRONICITY: Primary | ICD-10-CM

## 2024-11-15 NOTE — TELEPHONE ENCOUNTER
Dr. Pozo please advise, patient seeing ortho for right knee pain, referral placed and pended for your approval

## 2024-11-22 ENCOUNTER — PATIENT MESSAGE (OUTPATIENT)
Dept: FAMILY MEDICINE CLINIC | Facility: CLINIC | Age: 64
End: 2024-11-22

## 2024-11-29 ENCOUNTER — LAB ENCOUNTER (OUTPATIENT)
Dept: LAB | Age: 64
End: 2024-11-29
Attending: FAMILY MEDICINE
Payer: MEDICARE

## 2024-11-29 ENCOUNTER — OFFICE VISIT (OUTPATIENT)
Dept: FAMILY MEDICINE CLINIC | Facility: CLINIC | Age: 64
End: 2024-11-29

## 2024-11-29 VITALS
SYSTOLIC BLOOD PRESSURE: 115 MMHG | TEMPERATURE: 98 F | OXYGEN SATURATION: 98 % | DIASTOLIC BLOOD PRESSURE: 76 MMHG | HEART RATE: 76 BPM | HEIGHT: 65 IN | WEIGHT: 293 LBS | BODY MASS INDEX: 48.82 KG/M2

## 2024-11-29 DIAGNOSIS — M17.0 PRIMARY OSTEOARTHRITIS OF BOTH KNEES: ICD-10-CM

## 2024-11-29 DIAGNOSIS — E03.8 SUBCLINICAL HYPOTHYROIDISM: ICD-10-CM

## 2024-11-29 DIAGNOSIS — K58.0 IRRITABLE BOWEL SYNDROME WITH DIARRHEA: ICD-10-CM

## 2024-11-29 DIAGNOSIS — E11.9 TYPE 2 DIABETES MELLITUS WITHOUT COMPLICATION, WITH LONG-TERM CURRENT USE OF INSULIN (HCC): ICD-10-CM

## 2024-11-29 DIAGNOSIS — I10 PRIMARY HYPERTENSION: ICD-10-CM

## 2024-11-29 DIAGNOSIS — Z86.0100 HISTORY OF COLON POLYPS: ICD-10-CM

## 2024-11-29 DIAGNOSIS — I87.2 VENOUS STASIS DERMATITIS OF BOTH LOWER EXTREMITIES: ICD-10-CM

## 2024-11-29 DIAGNOSIS — E78.5 DYSLIPIDEMIA: ICD-10-CM

## 2024-11-29 DIAGNOSIS — G95.9 CERVICAL MYELOPATHY (HCC): ICD-10-CM

## 2024-11-29 DIAGNOSIS — M15.0 PRIMARY OSTEOARTHRITIS INVOLVING MULTIPLE JOINTS: ICD-10-CM

## 2024-11-29 DIAGNOSIS — Z79.4 TYPE 2 DIABETES MELLITUS WITHOUT COMPLICATION, WITH LONG-TERM CURRENT USE OF INSULIN (HCC): ICD-10-CM

## 2024-11-29 DIAGNOSIS — E66.01 MORBID (SEVERE) OBESITY DUE TO EXCESS CALORIES (HCC): ICD-10-CM

## 2024-11-29 DIAGNOSIS — K21.9 GASTROESOPHAGEAL REFLUX DISEASE, UNSPECIFIED WHETHER ESOPHAGITIS PRESENT: ICD-10-CM

## 2024-11-29 DIAGNOSIS — Z00.00 ENCOUNTER FOR ANNUAL HEALTH EXAMINATION: Primary | ICD-10-CM

## 2024-11-29 DIAGNOSIS — N85.02 COMPLEX ENDOMETRIAL HYPERPLASIA WITH ATYPIA: ICD-10-CM

## 2024-11-29 DIAGNOSIS — H91.93 BILATERAL HEARING LOSS, UNSPECIFIED HEARING LOSS TYPE: ICD-10-CM

## 2024-11-29 PROBLEM — M50.90 CERVICAL DISC DISEASE: Status: RESOLVED | Noted: 2022-03-31 | Resolved: 2024-11-29

## 2024-11-29 LAB
ALBUMIN SERPL-MCNC: 4.8 G/DL (ref 3.2–4.8)
ALBUMIN/GLOB SERPL: 1.7 {RATIO} (ref 1–2)
ALP LIVER SERPL-CCNC: 56 U/L
ALT SERPL-CCNC: 24 U/L
ANION GAP SERPL CALC-SCNC: 10 MMOL/L (ref 0–18)
AST SERPL-CCNC: 28 U/L (ref ?–34)
BILIRUB SERPL-MCNC: 0.6 MG/DL (ref 0.2–1.1)
BUN BLD-MCNC: 18 MG/DL (ref 9–23)
BUN/CREAT SERPL: 25 (ref 10–20)
CALCIUM BLD-MCNC: 9.6 MG/DL (ref 8.7–10.4)
CHLORIDE SERPL-SCNC: 104 MMOL/L (ref 98–112)
CHOLEST SERPL-MCNC: 138 MG/DL (ref ?–200)
CO2 SERPL-SCNC: 26 MMOL/L (ref 21–32)
CREAT BLD-MCNC: 0.72 MG/DL
DEPRECATED RDW RBC AUTO: 42.3 FL (ref 35.1–46.3)
EGFRCR SERPLBLD CKD-EPI 2021: 93 ML/MIN/1.73M2 (ref 60–?)
ERYTHROCYTE [DISTWIDTH] IN BLOOD BY AUTOMATED COUNT: 12.8 % (ref 11–15)
EST. AVERAGE GLUCOSE BLD GHB EST-MCNC: 134 MG/DL (ref 68–126)
FASTING PATIENT LIPID ANSWER: YES
FASTING STATUS PATIENT QL REPORTED: YES
GLOBULIN PLAS-MCNC: 2.9 G/DL (ref 2–3.5)
GLUCOSE BLD-MCNC: 135 MG/DL (ref 70–99)
HBA1C MFR BLD: 6.3 % (ref ?–5.7)
HCT VFR BLD AUTO: 40.5 %
HDLC SERPL-MCNC: 51 MG/DL (ref 40–59)
HGB BLD-MCNC: 13.6 G/DL
LDLC SERPL CALC-MCNC: 66 MG/DL (ref ?–100)
MCH RBC QN AUTO: 30.2 PG (ref 26–34)
MCHC RBC AUTO-ENTMCNC: 33.6 G/DL (ref 31–37)
MCV RBC AUTO: 89.8 FL
NONHDLC SERPL-MCNC: 87 MG/DL (ref ?–130)
OSMOLALITY SERPL CALC.SUM OF ELEC: 294 MOSM/KG (ref 275–295)
PLATELET # BLD AUTO: 290 10(3)UL (ref 150–450)
POTASSIUM SERPL-SCNC: 3.6 MMOL/L (ref 3.5–5.1)
PROT SERPL-MCNC: 7.7 G/DL (ref 5.7–8.2)
RBC # BLD AUTO: 4.51 X10(6)UL
SODIUM SERPL-SCNC: 140 MMOL/L (ref 136–145)
TRIGL SERPL-MCNC: 116 MG/DL (ref 30–149)
VLDLC SERPL CALC-MCNC: 17 MG/DL (ref 0–30)
WBC # BLD AUTO: 10.2 X10(3) UL (ref 4–11)

## 2024-11-29 PROCEDURE — 85027 COMPLETE CBC AUTOMATED: CPT

## 2024-11-29 PROCEDURE — 83036 HEMOGLOBIN GLYCOSYLATED A1C: CPT

## 2024-11-29 PROCEDURE — 80053 COMPREHEN METABOLIC PANEL: CPT

## 2024-11-29 PROCEDURE — 36415 COLL VENOUS BLD VENIPUNCTURE: CPT

## 2024-11-29 PROCEDURE — 80061 LIPID PANEL: CPT

## 2024-11-29 RX ORDER — INSULIN GLARGINE 100 [IU]/ML
20 INJECTION, SOLUTION SUBCUTANEOUS NIGHTLY
COMMUNITY
Start: 2024-11-29

## 2024-11-29 NOTE — PROGRESS NOTES
Subjective:   Nicolette Nation is a 64 year old female who presents for a MA AHA (Medicare Advantage Annual Health Assessment) and Initial Annual Wellness Visit (outside the first 12 months of Medicare eligibility, no prior AWV) and scheduled follow up of multiple significant but stable problems.   Generally feeling well except for ongoing issues of bilateral hand numbness and pain    History/Other:   Fall Risk Assessment:   She has been screened for Falls and is High Risk. Fall Prevention information provided to patient in After Visit Summary.    Do you feel unsteady when standing or walking?: (Patient-Rptd) Yes  Do you worry about falling?: (Patient-Rptd) No  Have you fallen in the past year?: (Patient-Rptd) No     Cognitive Assessment:   Abnormal  What day of the week is this?: Correct  What month is it?: Correct  What year is it?: Correct  Recall \"Ball\": Incorrect  Recall \"Flag\": Correct  Recall \"Tree\": Correct    Functional Ability/Status:   Nicolette Nation has some abnormal functions as listed below:  She has difficulties Shopping for Groceries based on screening of functional status. She has Hearing problems based on screening of functional status.She has Walking problems based on screening of functional status. She has problems with Daily Activities based on screening of functional status.       Depression Screening (PHQ):  PHQ-2 SCORE: 0  , done 11/29/2024             Advanced Directives:   She does NOT have a Living Will. [Do you have a living will?: (Patient-Rptd) No]  She does NOT have a Power of  for Health Care. [Do you have a healthcare power of ?: (Patient-Rptd) No]  Discussed Advance Care Planning with patient (and family/surrogate if present). Standard forms made available to patient in After Visit Summary.      Patient Active Problem List   Diagnosis    Diabetes mellitus, type 2 (HCC)    Hypertension    History of colon polyps    Venous stasis dermatitis of both lower extremities     Subclinical hypothyroidism    Gastroesophageal reflux disease    Complex endometrial hyperplasia with atypia    Irritable bowel syndrome with diarrhea    Cervical myelopathy (HCC)    Morbid (severe) obesity due to excess calories (HCC)    Primary osteoarthritis involving multiple joints    Primary osteoarthritis of both knees    Dyslipidemia     Allergies:  She is allergic to chlorhexidine.    Current Medications:  Outpatient Medications Marked as Taking for the 11/29/24 encounter (Office Visit) with Niki Pozo MD   Medication Sig    LANTUS SOLOSTAR 100 UNIT/ML Subcutaneous Solution Pen-injector Inject 20 Units into the skin nightly.    Multiple Vitamins-Minerals (EQ VISION FORMULA 50+) Oral Cap Take by mouth daily.    Diethylpropion HCl 25 MG Oral Tab Take 1 tablet by mouth daily.    Dulaglutide (TRULICITY) 4.5 MG/0.5ML Subcutaneous Solution Pen-injector Inject 4.5 mg into the skin once a week.    nystatin-triamcinolone 100,000-0.1 Units/g-% External Cream Apply top 2 times daily for 14 days    levothyroxine 75 MCG Oral Tab Take 1 tablet (75 mcg total) by mouth before breakfast.    atorvastatin 10 MG Oral Tab Take 1 tablet (10 mg total) by mouth nightly.    pantoprazole 40 MG Oral Tab EC Take 1 tablet (40 mg total) by mouth before breakfast.    lisinopril-hydroCHLOROthiazide 10-12.5 MG Oral Tab Take 1 tablet by mouth once daily.    DULoxetine 60 MG Oral Cap DR Particles Take 1 capsule (60 mg total) by mouth daily.    metFORMIN 500 MG Oral Tab Take 2 tablets (1,000 mg total) by mouth 2 (two) times daily with meals.    Acetaminophen ER (TYLENOL 8 HOUR ARTHRITIS PAIN) 650 MG Oral Tab CR Take 2 tablets (1,300 mg total) by mouth in the morning and 2 tablets (1,300 mg total) before bedtime.    Insulin Pen Needle (BD PEN NEEDLE MINI U/F) 31G X 5 MM Does not apply Misc Use with Lantus    Glucose Blood (ONETOUCH ULTRA BLUE) In Vitro Strip USE ONCE DAILY    ONETOUCH DELICA LANCETS 33G Does not apply Misc Apply 1  Units topically daily.    Blood Glucose Monitoring Suppl (ONETOUCH ULTRA SYSTEM) W/DEVICE Does not apply Kit test T.I.D       Medical History:  She  has a past medical history of Back problem, Cervical disc disease (2022), Cervical disc herniation (), Cervical polyp (2021), Chronic diarrhea (2020), Colon polyp (), COVID (2022), Diabetes (HCC) (2014), Diverticular disease, Drusen (2015), Essential hypertension, Hemorrhoids, High blood pressure, High cholesterol, History of COVID-19 (2022), Hyperlipidemia, IBS (irritable bowel syndrome), Incontinence, Morbid obesity with BMI of 50.0-59.9, adult (), Myopia of both eyes (2015), Osteoarthritis, Postmenopause, Subclinical hypothyroidism (), Varicella, and Visual impairment.  Surgical History:  She  has a past surgical history that includes tubal ligation; cholecystectomy ();  (, ); colonoscopy (N/A, 2019); endometrial biopsy - jar(s): 2 (2020); hysteroscopy; colonoscopy (N/A, 2021); hysterectomy (10/2021); and other (2022).   Family History:  Her family history includes Cancer in her father; Diabetes in her brother, paternal grandmother, and another family member; Heart Disease in her paternal grandfather; Renal Disease in her paternal grandmother; Stroke in her mother; Thyroid disease in an other family member; auto immune in her paternal grandmother; hepatitis in her paternal grandmother; smoker in her father.  Social History:  She  reports that she quit smoking about 39 years ago. Her smoking use included cigarettes. She started smoking about 41 years ago. She has a 1 pack-year smoking history. She has never used smokeless tobacco. She reports that she does not drink alcohol and does not use drugs.    Tobacco:  She smoked tobacco in the past but quit greater than 12 months ago.  Social History     Tobacco Use   Smoking Status Former    Current packs/day: 0.00     Average packs/day: 0.5 packs/day for 2.0 years (1.0 ttl pk-yrs)    Types: Cigarettes    Start date: 1983    Quit date: 1985    Years since quittin.9   Smokeless Tobacco Never          CAGE Alcohol Screen:   CAGE screening score of 0 on 2024, showing low risk of alcohol abuse.      Patient Care Team:  Niki Pozo MD as PCP - General (Family Medicine)  Katrin Vallecillo, PT as Physical Therapist (Physical Therapy)  Chepe Brown MD (NEUROLOGY)  Sherly Peters, PT as Physical Therapist (Physical Therapy)  Piotr Kerns MD as Consulting Physician (NEUROSURGERY)  Malina Vicente PA-C as Consulting Physician (Physician Assistant)  Bardy Conn MD (SURGERY, ORTHOPEDIC)  Jayesh Jaimes, PT as Physical Therapist (Physical Therapy)  Chin Mccrary MD as Consulting Physician (BARIATRICS)  Lizette Ware, PT (Physical Therapy)  Luciana De La Rosa, PT (Physical Therapy)    Review of Systems     Negative except see below    Objective:   Physical Exam  General Appearance:  Alert, cooperative, obese   Head:  Normocephalic, without obvious abnormality, atraumatic   Eyes:  PERRL, conjunctiva/corneas clear, EOM's intact both eyes   Ears:  Normal TM's and external ear canals, both ears   Nose: Nares normal, septum midline,mucosa normal, no drainage or sinus tenderness   Throat: Lips, mucosa, and tongue normal; teeth and gums normal   Neck: Supple, symmetrical, trachea midline, no adenopathy;  thyroid: not enlarged, symmetric, no tenderness/mass/nodules; no carotid bruit or JVD   Back:   Symmetric, no curvature, ROM normal, no CVA tenderness   Lungs:   Clear to auscultation bilaterally, respirations unlabored   Heart:  Regular rate and rhythm, S1 and S2 normal, no murmur, rub, or gallop   Abdomen:   Soft, non-tender, bowel sounds active all four quadrants,  no masses, no organomegaly   Pelvic: Deferred   Extremities: Extremities normal, atraumatic, no cyanosis or edema   Pulses: 2+ and symmetric    Skin: Skin color, texture, turgor normal, seborrheic keratoses and cherry angiomas present.   Lymph nodes: Cervical, supraclavicular, and axillary nodes normal   Neurologic: Normal    and Breasts:  normal appearance, no masses or tenderness     Bilateral barefoot skin diabetic exam is normal, visualized feet and the appearance is normal.  Bilateral monofilament/sensation of both feet is normal.  Pulsation pedal pulse exam of both lower legs/feet is normal as well.     /76   Pulse 76   Temp 97.9 °F (36.6 °C) (Oral)   Ht 5' 5\" (1.651 m)   Wt 298 lb (135.2 kg)   LMP 08/10/2021   SpO2 98%   BMI 49.59 kg/m²  Estimated body mass index is 49.59 kg/m² as calculated from the following:    Height as of this encounter: 5' 5\" (1.651 m).    Weight as of this encounter: 298 lb (135.2 kg).    Medicare Hearing Assessment:   Hearing Screening    Time taken: 11/29/2024 10:46 AM  Entry User: [employer]  Screening Method: Finger Rub  Finger Rub Result: Fail               Assessment & Plan:   Nicolette Nation is a 64 year old female who presents for a Medicare Assessment.     1. Encounter for annual health examination (Primary)-patient is single, her son lives with her, she is on disability due to hand and arm symptoms.  Encourage regular exercise  Labs as ordered    2. Bilateral hearing loss, unspecified hearing loss type-patient reports bilateral hearing loss  -     OP REFERRAL TO AUDIOLOGY  3. Cervical myelopathy (HCC)-with chronic hand pain.  Had recent follow-up appointment with Dr. Kerns.  Overview:  surgery Dr. Kerns 8/2022, Repeat EMG no CTS or radiculopathy.  4. Type 2 diabetes mellitus without complication, with long-term current use of insulin (HCC)-continuing metformin, Trulicity, Lantus 25 units.  Plan to decrease Lantus to 20 units nightly.  She will follow-up here in 2 months with list of home blood pressures.  In the meantime call if readings below 75.  She was instructed to check blood sugar if  she has episodes of scalp sweating which she reports intermittently.  Otherwise no signs or symptoms of hypoglycemia.  Reviewed importance of eating meals on schedule.  -     Hemoglobin A1C; Future; Expected date: 11/29/2024  5. Morbid (severe) obesity due to excess calories (HCC)-congratulated on weight loss, about 35 pounds so far.  Diet and exercise changes, Trulicity working with Dr. Mccrary.  6. Dyslipidemia--check labs on atorvastatin  -     Lipid Panel; Future; Expected date: 11/29/2024  -     CBC, Platelet; No Differential; Future; Expected date: 11/29/2024  -     Comp Metabolic Panel (14); Future; Expected date: 11/29/2024  7. Primary hypertension-blood pressure controlled with lisinopril/HCT  -     Lipid Panel; Future; Expected date: 11/29/2024  -     CBC, Platelet; No Differential; Future; Expected date: 11/29/2024  -     Comp Metabolic Panel (14); Future; Expected date: 11/29/2024  8. Venous stasis dermatitis of both lower extremities-weight loss, compression hose, periodic elevation moisturizers.  9. Subclinical hypothyroidism-continuing levothyroxine  Overview:  3/2021-treatment started as patient symptomatic with severe fatigue  10. Gastroesophageal reflux disease, unspecified whether esophagitis present-continuing pantoprazole  11. History of colon polyps  Overview:  5/2016, 8/2019, 2023 repeat in 3 years  12. Irritable bowel syndrome with diarrhea-much improved with better diet.  Overview:  Dr. Shah  13. Primary osteoarthritis involving multiple joints-Tylenol as needed.  14. Primary osteoarthritis of both knees as seen orthopedics.  Continuing with weight loss.  15. Complex endometrial hyperplasia with atypia  Overview:  2021 [facility]    The patient indicates understanding of these issues and agrees to the plan.  Lab work ordered.  Reinforced healthy diet, lifestyle, and exercise.      Return in about 2 months (around 1/29/2025) for diabetes. will discuss cutting insulin sooner. CALL SOONER ANY  SUGAR LESS THAN 75.     Niki Pozo MD, 11/29/2024     Supplementary Documentation:   General Health:  In the past six months, have you lost more than 10 pounds without trying?: (Patient-Rptd) 2 - No  Has your appetite been poor?: (Patient-Rptd) Yes  Type of Diet: (Patient-Rptd) Diabetic;Low Salt;Low Carb  How does the patient maintain a good energy level?: (Patient-Rptd) Daily Walks  How would you describe your daily physical activity?: (Patient-Rptd) Light  How would you describe your current health state?: (Patient-Rptd) Fair  How do you maintain positive mental well-being?: (Patient-Rptd) Puzzles;Visiting Friends;Visiting Family  On a scale of 0 to 10, with 0 being no pain and 10 being severe pain, what is your pain level?: (Patient-Rptd) 7 - (Severe)  In the past six months, have you experienced urine leakage?: (Patient-Rptd) 0-No  At any time do you feel concerned for the safety/well-being of yourself and/or your children, in your home or elsewhere?: No  Have you had any immunizations at another office such as Influenza, Hepatitis B, Tetanus, or Pneumococcal?: (Patient-Rptd) Yes    Health Maintenance   Topic Date Due    MA Annual Health Assessment  Never done    COVID-19 Vaccine (5 - 2024-25 season) 09/01/2024    Influenza Vaccine (1) 10/01/2024    Diabetes Care Foot Exam  11/14/2024    Diabetes Care: GFR  02/24/2025    Diabetes Care: Microalb/Creat Ratio  02/24/2025    Diabetes Care A1C  02/28/2025    Mammogram  04/24/2025    Diabetes Care Dilated Eye Exam  11/18/2025    Colorectal Cancer Screening  11/29/2026    DTaP,Tdap,and Td Vaccines (3 - Td or Tdap) 05/05/2032    Annual Depression Screening  Completed    Pneumococcal Vaccine: Birth to 64yrs  Completed    Zoster Vaccines  Completed

## 2024-11-30 ENCOUNTER — PATIENT MESSAGE (OUTPATIENT)
Dept: FAMILY MEDICINE CLINIC | Facility: CLINIC | Age: 64
End: 2024-11-30

## 2024-12-02 ENCOUNTER — OFFICE VISIT (OUTPATIENT)
Dept: ORTHOPEDICS CLINIC | Facility: CLINIC | Age: 64
End: 2024-12-02
Payer: MEDICARE

## 2024-12-02 ENCOUNTER — HOSPITAL ENCOUNTER (OUTPATIENT)
Dept: GENERAL RADIOLOGY | Facility: HOSPITAL | Age: 64
Discharge: HOME | End: 2024-12-02
Attending: ORTHOPAEDIC SURGERY
Payer: MEDICARE

## 2024-12-02 VITALS
WEIGHT: 293 LBS | BODY MASS INDEX: 48.82 KG/M2 | HEIGHT: 65 IN | DIASTOLIC BLOOD PRESSURE: 77 MMHG | SYSTOLIC BLOOD PRESSURE: 124 MMHG

## 2024-12-02 DIAGNOSIS — M17.11 PRIMARY OSTEOARTHRITIS OF RIGHT KNEE: Primary | ICD-10-CM

## 2024-12-02 DIAGNOSIS — M25.561 RIGHT KNEE PAIN, UNSPECIFIED CHRONICITY: ICD-10-CM

## 2024-12-02 DIAGNOSIS — E66.01 CLASS 3 SEVERE OBESITY DUE TO EXCESS CALORIES WITH SERIOUS COMORBIDITY AND BODY MASS INDEX (BMI) OF 45.0 TO 49.9 IN ADULT (HCC): ICD-10-CM

## 2024-12-02 DIAGNOSIS — E66.813 CLASS 3 SEVERE OBESITY DUE TO EXCESS CALORIES WITH SERIOUS COMORBIDITY AND BODY MASS INDEX (BMI) OF 45.0 TO 49.9 IN ADULT (HCC): ICD-10-CM

## 2024-12-02 PROCEDURE — 73562 X-RAY EXAM OF KNEE 3: CPT | Performed by: ORTHOPAEDIC SURGERY

## 2024-12-02 RX ORDER — TRIAMCINOLONE ACETONIDE 40 MG/ML
40 INJECTION, SUSPENSION INTRA-ARTICULAR; INTRAMUSCULAR ONCE
Status: COMPLETED | OUTPATIENT
Start: 2024-12-02 | End: 2024-12-02

## 2024-12-02 RX ADMIN — TRIAMCINOLONE ACETONIDE 40 MG: 40 INJECTION, SUSPENSION INTRA-ARTICULAR; INTRAMUSCULAR at 18:23:00

## 2024-12-02 NOTE — PROGRESS NOTES
NURSING INTAKE COMMENTS:   Chief Complaint   Patient presents with    Knee Pain     Right knee pain, not able to walk much, using a walker to walk, pain is 4/10, takes Advil when needed. Would like cortisone injection.       HPI: This 64 year old female presents today with persistent right knee pain.  The cortisone injection from  lasted all the way until 2024.  She has known bone-on-bone osteoarthritis with some varus deformity of both knees right worse than left.  Her BMI is now down from 51-49.  She is attending the bariatric center with Dr. Mccrary and has her next appointment in 2025.  Occasionally she uses a walker when the pain is really bad but it was not present today.    Past Medical History:    Back problem    previous disc problem    Cervical disc disease    Cervical disc herniation    involving C6-C7    Cervical polyp    Chronic diarrhea    Colon polyp    COVID    Fever chills nausea. No hospitalization or residual    Diabetes (HCC)    Diverticular disease    Drusen    Essential hypertension    Hemorrhoids    High blood pressure    High cholesterol    History of COVID-19    Hyperlipidemia    IBS (irritable bowel syndrome)    Incontinence    stool    Morbid obesity with BMI of 50.0-59.9, adult (HCC)    Myopia of both eyes    Osteoarthritis    Postmenopause    at age 50-54    Subclinical hypothyroidism    subclinical hypothyroidism, primary.  3/2010 trial of Synthroid, no improvement sx, d/c'd    Varicella    chicken pox    Visual impairment    glasses     Past Surgical History:   Procedure Laterality Date      , 1991    x2     Cholecystectomy      Colonoscopy N/A 2019    Procedure: COLONOSCOPY;  Surgeon: Marcus Shah MD;  Location: [facility] ENDOSCOPY    Colonoscopy N/A 2021    Procedure: COLONOSCOPY;  Surgeon: Marcus Shah MD;  Location: [facility] ENDOSCOPY    Endometrial biopsy - jar(s): 2  2020    Hysterectomy  10/2021    no  associated bleeding complication    Hysteroscopy      PROCEDURE:  Cervical polypectomy, hysteroscopy, MyoSure resection of endometrial polyp, and dilation and curettage.     Other  08/29/2022    Cervical 5-cervical 6 anterior corpectomy with cervical 4-cervical 5, cervical 5-cervical 6, cervical 6-cervical 7 anterior fusion, placement of cage and plate    Tubal ligation      s/p BTL     Current Outpatient Medications   Medication Sig Dispense Refill    LANTUS SOLOSTAR 100 UNIT/ML Subcutaneous Solution Pen-injector Inject 20 Units into the skin nightly.      Multiple Vitamins-Minerals (EQ VISION FORMULA 50+) Oral Cap Take by mouth daily.      Diethylpropion HCl 25 MG Oral Tab Take 1 tablet by mouth daily.      Dulaglutide (TRULICITY) 4.5 MG/0.5ML Subcutaneous Solution Pen-injector Inject 4.5 mg into the skin once a week. 12 Pen 1    nystatin-triamcinolone 100,000-0.1 Units/g-% External Cream Apply top 2 times daily for 14 days 60 g 0    levothyroxine 75 MCG Oral Tab Take 1 tablet (75 mcg total) by mouth before breakfast. 90 tablet 3    atorvastatin 10 MG Oral Tab Take 1 tablet (10 mg total) by mouth nightly. 90 tablet 1    pantoprazole 40 MG Oral Tab EC Take 1 tablet (40 mg total) by mouth before breakfast. 90 tablet 3    lisinopril-hydroCHLOROthiazide 10-12.5 MG Oral Tab Take 1 tablet by mouth once daily. 90 tablet 3    DULoxetine 60 MG Oral Cap DR Particles Take 1 capsule (60 mg total) by mouth daily. 90 capsule 3    metFORMIN 500 MG Oral Tab Take 2 tablets (1,000 mg total) by mouth 2 (two) times daily with meals. 360 tablet 3    Acetaminophen ER (TYLENOL 8 HOUR ARTHRITIS PAIN) 650 MG Oral Tab CR Take 2 tablets (1,300 mg total) by mouth in the morning and 2 tablets (1,300 mg total) before bedtime. 60 tablet 5    Insulin Pen Needle (BD PEN NEEDLE MINI U/F) 31G X 5 MM Does not apply Misc Use with Lantus 100 each 2    Glucose Blood (ONETOUCH ULTRA BLUE) In Vitro Strip USE ONCE DAILY 100 each 11    ONETOUCH DELICA  LANCETS 33G Does not apply Misc Apply 1 Units topically daily. 90 each 3    Blood Glucose Monitoring Suppl (ONETOUCH ULTRA SYSTEM) W/DEVICE Does not apply Kit test T.I.D       Allergies[1]  Family History   Problem Relation Age of Onset    Stroke Mother         TIA    Cancer Father         lung    Other (smoker) Father     Diabetes Brother     Diabetes Paternal Grandmother         Fathers side of family    Other (auto immune) Paternal Grandmother     Other (hepatitis) Paternal Grandmother     Renal Disease Paternal Grandmother     Heart Disease Paternal Grandfather     Diabetes Other         Grandmother [SIDE NOT STATED]    Thyroid disease Other         family h/o    Glaucoma Neg         family h/o    Bleeding Disorders Neg     DVT/VTE Neg      No family Hx of DVT/PE    Social History     Occupational History    Not on file   Tobacco Use    Smoking status: Former     Current packs/day: 0.00     Average packs/day: 0.5 packs/day for 2.0 years (1.0 ttl pk-yrs)     Types: Cigarettes     Start date: 1983     Quit date: 1985     Years since quittin.9    Smokeless tobacco: Never   Vaping Use    Vaping status: Never Used   Substance and Sexual Activity    Alcohol use: No    Drug use: No    Sexual activity: Not Currently     Comment: last active in         Review of Systems:  GENERAL: feels generally well, no significant weight loss or weight gain  SKIN: no ulcerated or worrisome skin lesions  EYES:denies blurred vision or double vision  HEENT: denies new nasal congestion, sinus pain or ST  LUNGS: denies shortness of breath  CARDIOVASCULAR: denies chest pain  GI: no hematemesis, no worsening heartburn, no diarrhea  : no dysuria, no blood in urine, no difficulty urinating, no incontinence  MUSCULOSKELETAL: no other musculoskeletal complaints other than in HPI  NEURO: no numbness or tingling, no weakness or balance disorder  PSYCHE: no depression or anxiety  HEMATOLOGIC: no hx of blood dyscrasia, no Hx  DVT/PE  ENDOCRINE: no thyroid or diabetes issues  ALL/ASTHMA: no new hx of severe allergy or asthma    Physical Examination:    /77   Ht 5' 5\" (1.651 m)   Wt 298 lb (135.2 kg)   LMP 08/10/2021   BMI 49.59 kg/m²   Constitutional: appears well hydrated, alert and responsive, no acute distress noted  Extremities: A little pink skin toward the end of the legs but no reddened skin or cellulitis.  No pitting edema or calf tenderness and no effusion of the knee.  Musculoskeletal: Motion of both knees 0 to 80 degrees.  Mostly medial joint line pain but somewhat diffuse pain right worse than left.  Neurological: Normal motor function bilateral lower extremities.    Imaging: X-rays show end-stage osteoarthritis bilateral knees.  Bone-on-bone bilaterally.      XR KNEE (3 VIEWS), RIGHT (CPT=73562)    Result Date: 12/2/2024  PROCEDURE: XR KNEE ROUTINE (3 VIEWS), RIGHT (CPT=73562)  COMPARISON: None.  INDICATIONS: chronic right knee pain. no trauma  TECHNIQUE: 3 views were obtained.   FINDINGS:   Bone mineralization is slightly diminished.  There is no acute fracture/dislocation.  There are severe symmetric degenerative changes within both knees manifested by osteophyte formation, bony hypertrophy, articular space narrowing, subarticular sclerosis, and tibial spine sharpening.         CONCLUSION: Severe symmetric degenerative changes within both knees.    Dictated by (CST): Amor Oliveira MD on 12/02/2024 at 4:29 PM     Finalized by (CST): Amor Oliveira MD on 12/02/2024 at 4:29 PM             Lab Results   Component Value Date    WBC 10.2 11/29/2024    HGB 13.6 11/29/2024    .0 11/29/2024      Lab Results   Component Value Date     (H) 11/29/2024    BUN 18 11/29/2024    CREATSERUM 0.72 11/29/2024    GFRNAA 99 01/27/2022    GFRAA 114 01/27/2022        Assessment and Plan:  Diagnoses and all orders for this visit:    Primary osteoarthritis of right knee  -     Arthrocentesis aspiration and injection  major Right joint bursa w/o US  -     triamcinolone acetonide (Kenalog-40) 40 MG/ML injection 40 mg    Right knee pain, unspecified chronicity  -     XR KNEE (3 VIEWS), RIGHT (CPT=73562); Future  -     Arthrocentesis aspiration and injection major Right joint bursa w/o US  -     triamcinolone acetonide (Kenalog-40) 40 MG/ML injection 40 mg    Class 3 severe obesity due to excess calories with serious comorbidity and body mass index (BMI) of 45.0 to 49.9 in adult ([facility])        Assessment: Above diagnoses.    Plan: She desired cortisone injection.  Aspiration of the right knee was negative and she tolerated the injection bupivacaine 0.5% 5 cc in, 1 cc without issue.    She will continue to lose weight.  When we get the BMI much lower, we can start talking on knee replacement.  She can have another cortisone injection to the right knee and April 2025 should she desire.  She could also discuss hyaluronic acid injections in the interim.  For now I will see her as needed.    Follow Up: No follow-ups on file.    Donavon Grewal MD         [1]   Allergies  Allergen Reactions    Chlorhexidine RASH

## 2024-12-03 NOTE — PROGRESS NOTES
Per verbal order from Dr. Grewal, draw up 5ml of 0.5% Marcaine and 1ml of Kenalog 40 for cortisone injection to right  knee. Arlene MARINO MA  Patient provided education handout for cortisone injection.

## 2024-12-26 NOTE — TELEPHONE ENCOUNTER
Refill passed per [facility] protocol.   Requested Prescriptions   Pending Prescriptions Disp Refills    metFORMIN 500 MG Oral Tab [Pharmacy Med Name: metFORMIN HCl 500 MG Oral Tablet] 360 tablet 3     Sig: TAKE 2 TABLETS BY MOUTH TWICE DAILY WITH MEALS       Diabetes Medication Protocol Passed - 12/26/2024 10:37 AM        Passed - Last A1C < 7.5 and within past 6 months     Lab Results   Component Value Date    A1C 6.3 (H) 11/29/2024             Passed - In person appointment or virtual visit in the past 6 mos or appointment in next 3 mos     Recent Outpatient Visits              3 weeks ago Primary osteoarthritis of right knee    [facility] Donavon Grewal MD    Office Visit    3 weeks ago Encounter for annual health examination    [facility] Niki Pozo MD    Office Visit    1 month ago Cervical myelopathy (HCC)    [facility] Piotr Kerns MD    Office Visit    3 months ago Type 2 diabetes mellitus without complication, with long-term current use of insulin (HCC) [E11.9, Z79.4]    [facility] Chin Mccrary MD    Office Visit    4 months ago Type 2 diabetes mellitus without complication, with long-term current use of insulin ([facility])    [facility] Niki Pozo MD    Office Visit          Future Appointments         Provider Department Appt Notes    In 2 weeks Neena Veras Au.D [facility] Hearing test    In 1 month Niki Pozo MD [facility] 2 month f/u    In 1 month Chin Mccrary MD [facility]                     Passed - Microalbumin procedure in past 12 months or taking ACE/ARB        Passed - EGFRCR or GFRNAA > 50     GFR Evaluation  EGFRCR: 93 ,  resulted on 11/29/2024          Passed - GFR in the past 12 months            Recent Outpatient Visits              3 weeks ago [facility]    [facility] Visit    3 weeks ago Encounter for annual health examination    [facility] Niki Pozo MD    Office Visit    1 month ago [facility]    [facility] Visit    3 months ago Type 2 diabetes mellitus without complication, with long-term current use of insulin ([facility]) [E11.9, Z79.4]    [facility]    Office Visit    4 months ago Type 2 diabetes mellitus without complication, with long-term current use of insulin ([facility])    [facility] Niki Pozo MD    Office Visit           Future Appointments         Provider Department Appt Notes    In 2 weeks [facility]    In 1 month Niki Pozo MD [facility] 2 month f/u    In 1 month Chin Mccrary MD [facility] [place]

## 2025-01-06 RX ORDER — ATORVASTATIN CALCIUM 10 MG/1
10 TABLET, FILM COATED ORAL NIGHTLY
Qty: 90 TABLET | Refills: 3 | Status: SHIPPED | OUTPATIENT
Start: 2025-01-06

## 2025-01-07 NOTE — TELEPHONE ENCOUNTER
REFILL PASSED PER [facility] PROTOCOLS    Requested Prescriptions   Pending Prescriptions Disp Refills    ATORVASTATIN 10 MG Oral Tab [Pharmacy Med Name: Atorvastatin Calcium 10 MG Oral Tablet] 90 tablet 0     Sig: Take 1 tablet by mouth nightly       Cholesterol Medication Protocol Passed - 1/6/2025  7:16 PM        Passed - ALT < 80     Lab Results   Component Value Date    ALT 24 11/29/2024             Passed - ALT resulted within past year        Passed - Lipid panel within past 12 months     Lab Results   Component Value Date    CHOLEST 138 11/29/2024    TRIG 116 11/29/2024    HDL 51 11/29/2024    LDL 66 11/29/2024    VLDL 17 11/29/2024    TCHDLRATIO 2.9 04/28/2015    NONHDLC 87 11/29/2024             Passed - In person appointment or virtual visit in the past 12 mos or appointment in next 3 mos     Recent Outpatient Visits              1 month ago Primary osteoarthritis of right knee    [facility]    Office Visit    1 month ago Encounter for annual health examination    [facility] Niki Pozo MD    Office Visit    2 months ago Cervical myelopathy (HCC)    [facility] [place] [facility]    Office Visit    4 months ago Type 2 diabetes mellitus without complication, with long-term current use of insulin (HCC) [E11.9, Z79.4]    [facility] Chin [facility]    Office Visit    4 months ago Type 2 diabetes mellitus without complication, with long-term current use of insulin (HCC)    [facility] Niki Pozo MD    Office Visit          Future Appointments         Provider Department Appt Notes    In 3 days Neena Veras Au.D [facility] Hearing test    In 3 weeks Niki Pozo MD [facility]  [place] 2 month f/u    In 1 month Chin Mccrary MD [facility]                          Future Appointments         Provider Department Appt Notes    In 3 days Neena Veras Au.D [facility] Hearing test    In 3 weeks Niki Pozo MD [facility] 2 month f/u    In 1 month Chin Mccrary MD [facility]           Recent Outpatient Visits              1 month ago Primary osteoarthritis of right knee    [facility] Donavon Grewal MD    Office Visit    1 month ago Encounter for annual health examination    [facility] Niki Pozo MD    Office Visit    2 months ago Cervical myelopathy (HCC)    [facility] Piotr Kerns MD    Office Visit    4 months ago Type 2 diabetes mellitus without complication, with long-term current use of insulin (HCC) [E11.9, Z79.4]    [facility] Chin Mccrary MD    Office Visit    4 months ago Type 2 diabetes mellitus without complication, with long-term current use of insulin (HCC)    [facility] Niki Pozo MD    Office Visit

## 2025-01-09 ENCOUNTER — OFFICE VISIT (OUTPATIENT)
Dept: AUDIOLOGY | Facility: CLINIC | Age: 65
End: 2025-01-09
Payer: MEDICARE

## 2025-01-09 DIAGNOSIS — H90.3 SENSORINEURAL HEARING LOSS (SNHL) OF BOTH EARS: Primary | ICD-10-CM

## 2025-01-09 PROCEDURE — 92567 TYMPANOMETRY: CPT | Performed by: AUDIOLOGIST

## 2025-01-09 PROCEDURE — 92557 COMPREHENSIVE HEARING TEST: CPT | Performed by: AUDIOLOGIST

## 2025-01-29 ENCOUNTER — OFFICE VISIT (OUTPATIENT)
Dept: FAMILY MEDICINE CLINIC | Facility: CLINIC | Age: 65
End: 2025-01-29
Payer: MEDICARE

## 2025-01-29 ENCOUNTER — TELEPHONE (OUTPATIENT)
Dept: FAMILY MEDICINE CLINIC | Facility: CLINIC | Age: 65
End: 2025-01-29

## 2025-01-29 VITALS
SYSTOLIC BLOOD PRESSURE: 138 MMHG | HEART RATE: 99 BPM | WEIGHT: 281.19 LBS | BODY MASS INDEX: 46.85 KG/M2 | HEIGHT: 65 IN | DIASTOLIC BLOOD PRESSURE: 80 MMHG

## 2025-01-29 DIAGNOSIS — E11.9 TYPE 2 DIABETES MELLITUS WITHOUT COMPLICATION, WITH LONG-TERM CURRENT USE OF INSULIN (HCC): Primary | ICD-10-CM

## 2025-01-29 DIAGNOSIS — Z79.4 TYPE 2 DIABETES MELLITUS WITHOUT COMPLICATION, WITH LONG-TERM CURRENT USE OF INSULIN (HCC): Primary | ICD-10-CM

## 2025-01-29 DIAGNOSIS — E66.01 MORBID (SEVERE) OBESITY DUE TO EXCESS CALORIES (HCC): ICD-10-CM

## 2025-01-29 PROCEDURE — 3008F BODY MASS INDEX DOCD: CPT | Performed by: FAMILY MEDICINE

## 2025-01-29 PROCEDURE — 3075F SYST BP GE 130 - 139MM HG: CPT | Performed by: FAMILY MEDICINE

## 2025-01-29 PROCEDURE — 3079F DIAST BP 80-89 MM HG: CPT | Performed by: FAMILY MEDICINE

## 2025-01-29 PROCEDURE — G2211 COMPLEX E/M VISIT ADD ON: HCPCS | Performed by: FAMILY MEDICINE

## 2025-01-29 PROCEDURE — 99214 OFFICE O/P EST MOD 30 MIN: CPT | Performed by: FAMILY MEDICINE

## 2025-01-29 RX ORDER — LANCETS 30 GAUGE
EACH MISCELLANEOUS
Qty: 100 EACH | Refills: 3 | Status: SHIPPED | OUTPATIENT
Start: 2025-01-29

## 2025-01-29 NOTE — PROGRESS NOTES
Subjective:   Patient ID: Nicolette Nation is a 64 year old female.    Diabetes  She presents for her follow-up diabetic visit. She has type 2 diabetes mellitus. Her disease course has been stable. There are no hypoglycemic associated symptoms. There are no diabetic associated symptoms. Risk factors for coronary artery disease include obesity, hypertension and diabetes mellitus.       History/Other:   Review of Systems  Current Outpatient Medications   Medication Sig Dispense Refill    atorvastatin 10 MG Oral Tab Take 1 tablet (10 mg total) by mouth nightly. 90 tablet 3    METFORMIN 500 MG Oral Tab TAKE 2 TABLETS BY MOUTH TWICE DAILY WITH MEALS 360 tablet 3    LANTUS SOLOSTAR 100 UNIT/ML Subcutaneous Solution Pen-injector Inject 20 Units into the skin nightly.      Multiple Vitamins-Minerals (EQ VISION FORMULA 50+) Oral Cap Take by mouth daily.      Diethylpropion HCl 25 MG Oral Tab Take 1 tablet by mouth daily.      Dulaglutide (TRULICITY) 4.5 MG/0.5ML Subcutaneous Solution Pen-injector Inject 4.5 mg into the skin once a week. 12 Pen 1    levothyroxine 75 MCG Oral Tab Take 1 tablet (75 mcg total) by mouth before breakfast. 90 tablet 3    pantoprazole 40 MG Oral Tab EC Take 1 tablet (40 mg total) by mouth before breakfast. 90 tablet 3    lisinopril-hydroCHLOROthiazide 10-12.5 MG Oral Tab Take 1 tablet by mouth once daily. 90 tablet 3    DULoxetine 60 MG Oral Cap DR Particles Take 1 capsule (60 mg total) by mouth daily. 90 capsule 3    Acetaminophen ER (TYLENOL 8 HOUR ARTHRITIS PAIN) 650 MG Oral Tab CR Take 2 tablets (1,300 mg total) by mouth in the morning and 2 tablets (1,300 mg total) before bedtime. 60 tablet 5    Insulin Pen Needle (BD PEN NEEDLE MINI U/F) 31G X 5 MM Does not apply Misc Use with Lantus 100 each 2    Glucose Blood (ONETOUCH ULTRA BLUE) In Vitro Strip USE ONCE DAILY 100 each 11    ONETOUCH DELICA LANCETS 33G Does not apply Misc Apply 1 Units topically daily. 90 each 3    Blood Glucose Monitoring  Suppl (ONETOUCH ULTRA SYSTEM) W/DEVICE Does not apply Kit test T.I.D       Allergies:Allergies[1]    Objective:   Physical Exam  Constitutional:       Appearance: Normal appearance.   Cardiovascular:      Rate and Rhythm: Normal rate and regular rhythm.      Pulses: Normal pulses.      Heart sounds: Normal heart sounds.   Pulmonary:      Effort: Pulmonary effort is normal.      Breath sounds: Normal breath sounds.   Musculoskeletal:      Right lower leg: No edema.      Left lower leg: No edema.   Neurological:      Mental Status: She is alert.       Bilateral barefoot skin diabetic exam is normal, visualized feet and the appearance is normal.  Bilateral monofilament/sensation of both feet is normal.  Pulsation pedal pulse exam of both lower legs/feet is normal as well.       Assessment & Plan:   1. Type 2 diabetes mellitus without complication, with long-term current use of insulin (HCC)-continuing Trulicity, metformin, Lantus 20 units at bedtime when blood sugars generally between 125 and 140.  Last hemoglobin A1c 2 months ago 6.5.  She denies any episodes of hypoglycemia.  Blood sugar this morning was higher, 180, she had snack cake last night.  Reviewed importance of regular exercise, recommend exercise bicycle with knee arthritis.  Discussed healthy diet.  Plan to continue current medications and follow-up.  3 months.   2. Morbid (severe) obesity due to excess calories (HCC) - CONTINUING-Trulicity 4.5 mg weekly, diethylpropion.  Reviewed healthy weight loss diet and exercise.       Orders Placed This Encounter   Procedures    POC Glycohemoglobin [29220]       Meds This Visit:  Requested Prescriptions      No prescriptions requested or ordered in this encounter       Imaging & Referrals:  None         [1]   Allergies  Allergen Reactions    Chlorhexidine RASH

## 2025-02-10 ENCOUNTER — PATIENT MESSAGE (OUTPATIENT)
Dept: FAMILY MEDICINE CLINIC | Facility: CLINIC | Age: 65
End: 2025-02-10

## 2025-02-10 DIAGNOSIS — Z79.4 TYPE 2 DIABETES MELLITUS WITHOUT COMPLICATION, WITH LONG-TERM CURRENT USE OF INSULIN (HCC): Primary | ICD-10-CM

## 2025-02-10 DIAGNOSIS — E11.9 TYPE 2 DIABETES MELLITUS WITHOUT COMPLICATION, WITH LONG-TERM CURRENT USE OF INSULIN (HCC): Primary | ICD-10-CM

## 2025-02-12 RX ORDER — DULAGLUTIDE 4.5 MG/.5ML
4.5 INJECTION, SOLUTION SUBCUTANEOUS WEEKLY
Qty: 12 ML | Refills: 0 | Status: SHIPPED | OUTPATIENT
Start: 2025-02-12

## 2025-02-12 NOTE — TELEPHONE ENCOUNTER
Please review. Protocol Failed; No Protocol    Requested Prescriptions   Pending Prescriptions Disp Refills    TRULICITY 4.5 MG/0.5ML Subcutaneous Solution Auto-injector [Pharmacy Med Name: Trulicity 4.5 MG/0.5ML Subcutaneous Solution Pen-injector] 12 mL 0     Sig: INJECT 4.5 MG SUBCUTANEOUSLY (INTO THE SKIN) ONCE A WEEK       Diabetes Medication Protocol Failed - 2/12/2025 10:22 AM        Failed - Medication is active on med list        Passed - Last A1C < 7.5 and within past 6 months     Lab Results   Component Value Date    A1C 6.3 (H) 11/29/2024             Passed - In person appointment or virtual visit in the past 6 mos or appointment in next 3 mos     Recent Outpatient Visits              2 weeks ago Type 2 diabetes mellitus without complication, with long-term current use of insulin ([facility])    [facility] Niki Pozo MD    Office Visit    1 month ago Sensorineural hearing loss (SNHL) of both ears    [facility] [place]    Office Visit    2 months ago Primary osteoarthritis of right knee    [facility]    Office Visit    2 months ago Encounter for annual health examination    [facility] Niki Pozo MD    Office Visit    3 months ago Cervical myelopathy (HCC)    [facility]    [facility] Visit          Future Appointments         Provider Department Appt Notes    In 6 days Chin Mccrary MD [facility] [place]     In 2 months Niki Pozo MD [facility] 3 month f/u                    Passed - Microalbumin procedure in past 12 months or taking ACE/ARB        Passed - EGFRCR or GFRNAA > 50     GFR Evaluation  EGFRCR: 93 , resulted on 11/29/2024          Passed - GFR in  the past 12 months               Future Appointments         Provider Department Appt Notes    In 6 days [facility]     In 2 months Niki Pozo MD [facility] 3 month f/u          Recent Outpatient Visits              2 weeks ago Type 2 diabetes mellitus without complication, with long-term current use of insulin ([facility])    [facility] Niki Pozo MD    Office Visit    1 month ago Sensorineural hearing loss (SNHL) of both ears    [facility] Neena Lu Au.D    Office Visit    2 months ago Primary osteoarthritis of right knee    [facility] Donavon Hughes MD    Office Visit    2 months ago Encounter for annual health examination    [facility] Niki Pozo MD    Office Visit    3 months ago Cervical myelopathy (HCC)    [facility]    [facility] Visit

## 2025-02-18 ENCOUNTER — OFFICE VISIT (OUTPATIENT)
Dept: SURGERY | Facility: CLINIC | Age: 65
End: 2025-02-18
Payer: MEDICARE

## 2025-02-18 VITALS
HEIGHT: 65.2 IN | DIASTOLIC BLOOD PRESSURE: 70 MMHG | HEART RATE: 80 BPM | SYSTOLIC BLOOD PRESSURE: 112 MMHG | RESPIRATION RATE: 16 BRPM | WEIGHT: 282 LBS | BODY MASS INDEX: 46.42 KG/M2

## 2025-02-18 DIAGNOSIS — I10 PRIMARY HYPERTENSION: Primary | ICD-10-CM

## 2025-02-18 DIAGNOSIS — Z79.4 TYPE 2 DIABETES MELLITUS WITHOUT COMPLICATION, WITH LONG-TERM CURRENT USE OF INSULIN (HCC): ICD-10-CM

## 2025-02-18 DIAGNOSIS — E66.01 MORBID OBESITY WITH BMI OF 45.0-49.9, ADULT (HCC): ICD-10-CM

## 2025-02-18 DIAGNOSIS — E78.5 DYSLIPIDEMIA: ICD-10-CM

## 2025-02-18 DIAGNOSIS — E11.9 TYPE 2 DIABETES MELLITUS WITHOUT COMPLICATION, WITH LONG-TERM CURRENT USE OF INSULIN (HCC): ICD-10-CM

## 2025-02-18 PROCEDURE — 99214 OFFICE O/P EST MOD 30 MIN: CPT | Performed by: INTERNAL MEDICINE

## 2025-02-18 PROCEDURE — 3078F DIAST BP <80 MM HG: CPT | Performed by: INTERNAL MEDICINE

## 2025-02-18 PROCEDURE — 3008F BODY MASS INDEX DOCD: CPT | Performed by: INTERNAL MEDICINE

## 2025-02-18 PROCEDURE — 3074F SYST BP LT 130 MM HG: CPT | Performed by: INTERNAL MEDICINE

## 2025-02-18 RX ORDER — DIETHYLPROPION HYDROCHLORIDE 25 MG/1
1 TABLET ORAL DAILY
Qty: 90 TABLET | Refills: 1 | Status: SHIPPED | OUTPATIENT
Start: 2025-02-18 | End: 2025-05-19

## 2025-02-18 NOTE — PROGRESS NOTES
[facility]  [address] [facility] [address] [place] [id number]  Dept: [phone]       Patient:  Nicolette Nation  :      1960  MRN:      [id number]    Chief Complaint:    Chief Complaint   Patient presents with    Follow - Up     Down 26       SUBJECTIVE     History of Present Illness:  Nicolette is being seen today for a follow-up for non surgical weight loss    Past Medical History:   Past Medical History:    Back problem    previous disc problem    Cervical disc disease    Cervical disc herniation    involving C6-C7    Cervical polyp    Chronic diarrhea    Colon polyp    COVID    Fever chills nausea. No hospitalization or residual    Diabetes (HCC)    Diverticular disease    Drusen    Essential hypertension    Hemorrhoids    High blood pressure    High cholesterol    History of COVID-19    Hyperlipidemia    IBS (irritable bowel syndrome)    Incontinence    stool    Morbid obesity with BMI of 50.0-59.9, adult (HCC)    Myopia of both eyes    Osteoarthritis    Postmenopause    at age 50-54    Subclinical hypothyroidism    subclinical hypothyroidism, primary.  3/2010 trial of Synthroid, no improvement sx, d/c'd    Varicella    chicken pox    Visual impairment    glasses        Comorbidities:  Back pain-Improvement?  yes, Joint pain-Improvement?  yes, Diabetes-Improvement?  yes, Hypertension-Improvement?  yes, ANTHONY-Improvement?  yes, and Snoring-Improvement?  yes    OBJECTIVE     Vitals: /70   Pulse 80   Resp 16   Ht 5' 5.2\" (1.656 m)   Wt 282 lb (127.9 kg)   LMP 08/10/2021   BMI 46.64 kg/m²     Initial weight loss: -26   Total weight loss: -49   Start weight: 331    Wt Readings from Last 3 Encounters:   25 282 lb (127.9 kg)   25 281 lb 3.2 oz (127.6 kg)   24 298 lb (135.2 kg)       Patient Medications:    Current Outpatient Medications   Medication Sig Dispense Refill    Dulaglutide (TRULICITY) 4.5 MG/0.5ML Subcutaneous  Solution Auto-injector Inject 4.5 mg into the skin once a week. 12 mL 0    Glucose Blood In Vitro Strip USE ONCE DAILY 100 each 3    Blood Glucose Monitoring Suppl Does not apply Kit Check blood sugar daily Dispense per Insurance coverage 1 kit 0    Lancets Does not apply Misc Check blood sugar daily 100 each 3    atorvastatin 10 MG Oral Tab Take 1 tablet (10 mg total) by mouth nightly. 90 tablet 3    METFORMIN 500 MG Oral Tab TAKE 2 TABLETS BY MOUTH TWICE DAILY WITH MEALS 360 tablet 3    LANTUS SOLOSTAR 100 UNIT/ML Subcutaneous Solution Pen-injector Inject 20 Units into the skin nightly.      Multiple Vitamins-Minerals (EQ VISION FORMULA 50+) Oral Cap Take by mouth daily.      Diethylpropion HCl 25 MG Oral Tab Take 1 tablet by mouth daily.      Dulaglutide (TRULICITY) 4.5 MG/0.5ML Subcutaneous Solution Pen-injector Inject 4.5 mg into the skin once a week. 12 Pen 1    levothyroxine 75 MCG Oral Tab Take 1 tablet (75 mcg total) by mouth before breakfast. 90 tablet 3    pantoprazole 40 MG Oral Tab EC Take 1 tablet (40 mg total) by mouth before breakfast. 90 tablet 3    lisinopril-hydroCHLOROthiazide 10-12.5 MG Oral Tab Take 1 tablet by mouth once daily. 90 tablet 3    DULoxetine 60 MG Oral Cap DR Particles Take 1 capsule (60 mg total) by mouth daily. 90 capsule 3    Acetaminophen ER (TYLENOL 8 HOUR ARTHRITIS PAIN) 650 MG Oral Tab CR Take 2 tablets (1,300 mg total) by mouth in the morning and 2 tablets (1,300 mg total) before bedtime. 60 tablet 5    Insulin Pen Needle (BD PEN NEEDLE MINI U/F) 31G X 5 MM Does not apply Misc Use with Lantus 100 each 2    ONETOUCH DELICA LANCETS 33G Does not apply Misc Apply 1 Units topically daily. 90 each 3    Blood Glucose Monitoring Suppl (ONETOUCH ULTRA SYSTEM) W/DEVICE Does not apply Kit test T.I.D       Allergies:  Chlorhexidine     Social History:    Social History     Socioeconomic History    Marital status:      Spouse name: Not on file    Number of children: Not on file     Years of education: Not on file    Highest education level: Not on file   Occupational History    Not on file   Tobacco Use    Smoking status: Former     Current packs/day: 0.00     Average packs/day: 0.5 packs/day for 2.0 years (1.0 ttl pk-yrs)     Types: Cigarettes     Start date: 1983     Quit date: 1985     Years since quittin.1    Smokeless tobacco: Never   Vaping Use    Vaping status: Never Used   Substance and Sexual Activity    Alcohol use: No    Drug use: No    Sexual activity: Not Currently     Comment: last active in    Other Topics Concern     Service Not Asked    Blood Transfusions Not Asked    Caffeine Concern Yes     Comment: soda    Occupational Exposure Not Asked    Hobby Hazards Not Asked    Sleep Concern Not Asked    Stress Concern Not Asked    Weight Concern Not Asked    Special Diet Not Asked    Back Care Not Asked    Exercise Not Asked    Bike Helmet Not Asked    Seat Belt Not Asked    Self-Exams Not Asked   Social History Narrative    Nicolette is . She has 2 adult children. Patient is unemployed,but formerly worked in [employer]. She lives with her son in [place], [place]     Social Drivers of Health     Food Insecurity: Not on file   Transportation Needs: Not on file   Stress: Not on file   Housing Stability: Low Risk  (2021)    [facility]    Housing Stability     Mortgage Payment Concerns?: Not on file     Number of Places Lived in the Last Year: Not on file     Unstable Housing?: Not on file     Surgical History:    Past Surgical History:   Procedure Laterality Date      , 1991    x2     Cholecystectomy      Colonoscopy N/A 2019    Procedure: COLONOSCOPY;  Surgeon: Marcus Shah MD;  Location: [facility] ENDOSCOPY    Colonoscopy N/A 2021    Procedure: COLONOSCOPY;  Surgeon: Marcus Shah MD;  Location: [facility] ENDOSCOPY    Endometrial biopsy  - jar(s): 2  08/27/2020    Hysterectomy  10/2021    no associated bleeding complication    Hysteroscopy      PROCEDURE:  Cervical polypectomy, hysteroscopy, MyoSure resection of endometrial polyp, and dilation and curettage.     Other  08/29/2022    Cervical 5-cervical 6 anterior corpectomy with cervical 4-cervical 5, cervical 5-cervical 6, cervical 6-cervical 7 anterior fusion, placement of cage and plate    Tubal ligation      s/p BTL     Family History:    Family History   Problem Relation Age of Onset    Stroke Mother         TIA    Cancer Father         lung    Other (smoker) Father     Diabetes Brother     Diabetes Paternal Grandmother         Fathers side of family    Other (auto immune) Paternal Grandmother     Other (hepatitis) Paternal Grandmother     Renal Disease Paternal Grandmother     Heart Disease Paternal Grandfather     Diabetes Other         Grandmother [SIDE NOT STATED]    Thyroid disease Other         family h/o    Glaucoma Neg         family h/o    Bleeding Disorders Neg     DVT/VTE Neg        Food Journal  Reviewed and Discussed:       Patient has a Food Journal?: yes   Patient is reading nutrition labels?  yes  Average Caloric Intake:     Average CHO Intake: 130  Is patient exercising? no  Type of exercise?     Eating Habits  Patient states the following:  Eats 1 meal(s) per day  Length of time it takes to consume a meal:  20  # of snacks per day: 1 Type of snacks:  fruit  Amount of soda consumption per day:  diet  Amount of water (in ounces) per day:  48  Drinking between meals only:  yes  Toughest challenge:  exercise     Nutritional Goals  Limit carbohydrates to 100 gms per day, Eat 100-200 calories within 1 hour of waking , and Eat 3-4 cups of fresh fruits or vegetables daily    Behavior Modifications Reviewed and Discussed  Eat breakfast, Eat 3 meals per day, Plan meals in advance, Read nutrition labels, Drink 64 oz of water per day, Maintain a daily food journal, No drinking 30 minutes  before or after meals, Utlize portion control strategies to reduce calorie intake, Identify triggers for eating and manage cues, and Eat slowly and take 20 to 30 minutes to complete each meal    Exercise Goals Reviewed and Discussed    Increase as tolerated    ROS:    Constitutional: negative  Respiratory: negative  Cardiovascular: negative  Gastrointestinal: positive for diarrhea  Musculoskeletal:positive for arthralgias and back pain  Neurological: negative  Behavioral/Psych: negative  Endocrine: negative  All other systems were reviewed and are negative    Physical Exam:   General appearance: alert, appears stated age, cooperative, and morbidly obese  Head: Normocephalic, without obvious abnormality, atraumatic  Neck: no adenopathy, no carotid bruit, no JVD, supple, symmetrical, trachea midline, and thyroid not enlarged, symmetric, no tenderness/mass/nodules  Back: symmetric, no curvature. ROM normal. No CVA tenderness.  Lungs: clear to auscultation bilaterally  Heart: S1, S2 normal, no murmur, click, rub or gallop, regular rate and rhythm  Abdomen: soft, non-tender; bowel sounds normal; no masses,  no organomegaly  Extremities: extremities normal, atraumatic, no cyanosis or edema  Pulses: 2+ and symmetric  Skin: Skin color, texture, turgor normal. No rashes or lesions  Neurologic: Grossly normal    ASSESSMENT     DIABETES:    The patient denies any episodes of hypoglycemia since her last clinic visit.  she denies any lower extremity skin breakdown or foot ulcers.    HYPERCHOLESTEROLEMIA:  The patient states that her cholesterol has been well controlled on her current medication.    Lab Results   Component Value Date/Time    CHOLEST 138 11/29/2024 12:07 PM    LDL 66 11/29/2024 12:07 PM    HDL 51 11/29/2024 12:07 PM    TRIG 116 11/29/2024 12:07 PM    VLDL 17 11/29/2024 12:07 PM    TCHDLRATIO 2.9 04/28/2015 06:51 PM       HYPERTENSION:  The patient's blood pressure has been well controlled.  she has been checking  it as instructed and has remained in relatively good control.    Encounter Diagnosis(ses):   Encounter Diagnoses   Name Primary?    Type 2 diabetes mellitus without complication, with long-term current use of insulin ([facility]) [E11.9, Z79.4] Yes    Primary hypertension [I10]     Dyslipidemia [E78.5]     Morbid obesity with BMI of 45.0-49.9, adult (HCC)        PLAN     Patient is not interested in bariatric surgery. Patient desires to pursue traditional weight loss at this time.      HYPERTENSION: Blood pressure stable on the above medications. No interval change in antihypertensive medication.     DYSLIPIDEMIA: Stable on the above prescribed meal plan and medication. Liver function stable.    Lab Results   Component Value Date/Time    CHOLEST 138 11/29/2024 12:07 PM    LDL 66 11/29/2024 12:07 PM    HDL 51 11/29/2024 12:07 PM    TRIG 116 11/29/2024 12:07 PM    VLDL 17 11/29/2024 12:07 PM    TCHDLRATIO 2.9 04/28/2015 06:51 PM       DEGENERATIVE JOINT DISEASE: Of the knees is stable. Encourage upper body exercises if unable to perform weight-bearing exercises.      DIABETES: Continue current medications.      Goals for next month:  1. Keep a food log.  2. Drink 48-64 ounces of non-caloric beverages per day. No fruit juices or regular soda.  3. Increase activity-upper body exercises, walk 10 minutes per day.  4. Increase fruit and vegetable servings to 5-6 per day.      Tolerating Trulicity per PCP    Feels better    May benefit from lowering Lantus as sugars improve      Diethylpropion:   Tolerating well  Refill at current dose    Diagnoses and all orders for this visit:    Type 2 diabetes mellitus without complication, with long-term current use of insulin (HCC) [E11.9, Z79.4]    Primary hypertension [I10]    Dyslipidemia [E78.5]    Morbid obesity with BMI of 45.0-49.9, adult ([facility])          Chin Mccrary MD

## 2025-02-24 ENCOUNTER — MED REC SCAN ONLY (OUTPATIENT)
Dept: FAMILY MEDICINE CLINIC | Facility: CLINIC | Age: 65
End: 2025-02-24

## 2025-02-28 RX ORDER — BLOOD-GLUCOSE METER
EACH MISCELLANEOUS
Refills: 0 | OUTPATIENT
Start: 2025-02-28

## 2025-03-21 ENCOUNTER — TELEPHONE (OUTPATIENT)
Dept: FAMILY MEDICINE CLINIC | Facility: CLINIC | Age: 65
End: 2025-03-21

## 2025-03-21 NOTE — TELEPHONE ENCOUNTER
Patient has an upcoming appointment in the Orthopedics Department with Dr. RADHIKA Goodman. They are currently in need of a referral, please send including multiple visits.    Future Appointments   Date Time Provider Department Center   3/25/2025  1:30 PM Isha Goodman MD [facility] OR[facility]   4/29/2025 11:00 AM [facility]   8/28/2025  2:15 PM [id number]        Thank you

## 2025-03-24 NOTE — TELEPHONE ENCOUNTER
No Referral found for patient to see Dr. RADHIKA Goodman in Orthopedics. Appointment is for 03/25/2025. Referral still needed for this provider.     Future Appointments   Date Time Provider Department Center   3/25/2025  1:30 PM Isha Goodman MD [facility]   4/29/2025 11:00 AM Niki Pozo MD [facility]   8/28/2025  2:15 PM [id number]

## 2025-03-25 ENCOUNTER — OFFICE VISIT (OUTPATIENT)
Age: 65
End: 2025-03-25
Payer: MEDICARE

## 2025-03-25 ENCOUNTER — TELEPHONE (OUTPATIENT)
Dept: FAMILY MEDICINE CLINIC | Facility: CLINIC | Age: 65
End: 2025-03-25

## 2025-03-25 ENCOUNTER — TELEPHONE (OUTPATIENT)
Dept: ORTHOPEDICS CLINIC | Facility: CLINIC | Age: 65
End: 2025-03-25

## 2025-03-25 VITALS — WEIGHT: 280 LBS | HEIGHT: 65.2 IN | BODY MASS INDEX: 46.09 KG/M2

## 2025-03-25 DIAGNOSIS — M79.606 PAIN AND SWELLING OF LOWER EXTREMITY, UNSPECIFIED LATERALITY: Primary | ICD-10-CM

## 2025-03-25 DIAGNOSIS — E66.01 MORBID OBESITY WITH BMI OF 45.0-49.9, ADULT (HCC): ICD-10-CM

## 2025-03-25 DIAGNOSIS — M79.89 PAIN AND SWELLING OF LOWER EXTREMITY, UNSPECIFIED LATERALITY: Primary | ICD-10-CM

## 2025-03-25 DIAGNOSIS — M17.11 PRIMARY OSTEOARTHRITIS OF RIGHT KNEE: Primary | ICD-10-CM

## 2025-03-25 DIAGNOSIS — Z79.4 TYPE 2 DIABETES MELLITUS WITHOUT COMPLICATION, WITH LONG-TERM CURRENT USE OF INSULIN (HCC): ICD-10-CM

## 2025-03-25 DIAGNOSIS — E11.9 TYPE 2 DIABETES MELLITUS WITHOUT COMPLICATION, WITH LONG-TERM CURRENT USE OF INSULIN (HCC): ICD-10-CM

## 2025-03-25 DIAGNOSIS — I87.2 VENOUS STASIS DERMATITIS OF BOTH LOWER EXTREMITIES: ICD-10-CM

## 2025-03-25 PROCEDURE — 99214 OFFICE O/P EST MOD 30 MIN: CPT | Performed by: ORTHOPAEDIC SURGERY

## 2025-03-25 PROCEDURE — 3008F BODY MASS INDEX DOCD: CPT | Performed by: ORTHOPAEDIC SURGERY

## 2025-03-25 NOTE — TELEPHONE ENCOUNTER
----- Message from Isha Goodman sent at 3/25/2025  1:59 PM CDT -----  Hi Dr. Pozo,     I saw Nicolette today for evaluation for her knee OA.  She was inquiring about surgery.  While she does need to lose weight, I think her biggest issue is her venous stasis in the lower extremities causing significant swelling in the knee and pretibial region.  My worry is that even if she is able to get down to a more reasonable BMI, as long as her leg stays swollen, surgery would be contraindicated.  I am reaching out to see if any evaluation with a cardiac or vascular surgery team may be of benefit.  Let me know what you think.    Isha Goodman MD  Orthopedics

## 2025-03-25 NOTE — TELEPHONE ENCOUNTER
Please contact patient and let her know I agree with recommendation with vascular surgeon to assess leg swelling.  Please give information on referral.

## 2025-03-25 NOTE — TELEPHONE ENCOUNTER
Can we please schedule patient for bilateral zilretta appointment with Dr Goodman at [place] or [place] locations

## 2025-03-25 NOTE — TELEPHONE ENCOUNTER
Left message to patient to call back.    Also [employer] message with MD recommendation sent to patient.

## 2025-03-25 NOTE — PROGRESS NOTES
Chief Complaint: Knee Pain (R knee  - Pain onset  about 1 month after tumble walking on sidewalk .  Pain is constant. Rates pain 8/10. Swelling in leg. Pain in back of knee. )      HPI  Ms. Nation is referred by No ref. provider found. Nicolette Nation is a 64 year old female being seen in the office today for Knee Pain (R knee  - Pain onset  about 1 month after tumble walking on sidewalk .  Pain is constant. Rates pain 8/10. Swelling in leg. Pain in back of knee. )    The patient is a 64-year-old female presented with complaints of right knee pain.  She states that this is a chronic issue and she has been treated by Dr. Grewal in the past.  She has been diagnosed with osteoarthritis.  She had a cortisone injection back in December.  This gave her good relief but about a month ago she sustained a fall and since then the knee has been flared up.  The pain localizes to the lateral aspect of the knee.  She notes that with weightbearing activities but also at rest or any movement.  She has night symptoms as well.  Denies any mechanical symptoms but does note subjective instability.  She does note a limp as well.  Denies any numbness or tingling.  In the past she has been treated with cortisone injections, at she takes routine anti-inflammatories and Tylenol.  She has been recommended a knee replacement surgery but she would need to lose significant weight prior to this.  She is currently working on this and has lost approximately 50 pounds ready.  She is here with her son today.      History:  Past Medical History:    Back problem    previous disc problem    Cervical disc disease    Cervical disc herniation    involving C6-C7    Cervical polyp    Chronic diarrhea    Colon polyp    COVID    Fever chills nausea. No hospitalization or residual    Diabetes (HCC)    Diverticular disease    Drcorin    Essential hypertension    Hemorrhoids    High blood pressure    High cholesterol    History of COVID-19    Hyperlipidemia     IBS (irritable bowel syndrome)    Incontinence    stool    Morbid obesity with BMI of 50.0-59.9, adult (HCC)    Myopia of both eyes    Osteoarthritis    Postmenopause    at age 50-54    Subclinical hypothyroidism    subclinical hypothyroidism, primary.  3/2010 trial of Synthroid, no improvement sx, d/c'd    Varicella    chicken pox    Visual impairment    glasses     Past Surgical History:   Procedure Laterality Date      , 1991    x2     Cholecystectomy      Colonoscopy N/A 2019    Procedure: COLONOSCOPY;  Surgeon: Marcus Shah MD;  Location: [facility] ENDOSCOPY    Colonoscopy N/A 2021    Procedure: COLONOSCOPY;  Surgeon: Marcus Shah MD;  Location: [facility] ENDOSCOPY    Endometrial biopsy - jar(s): 2  2020    Hysterectomy  10/2021    no associated bleeding complication    Hysteroscopy      PROCEDURE:  Cervical polypectomy, hysteroscopy, MyoSure resection of endometrial polyp, and dilation and curettage.     Other  2022    Cervical 5-cervical 6 anterior corpectomy with cervical 4-cervical 5, cervical 5-cervical 6, cervical 6-cervical 7 anterior fusion, placement of cage and plate    Tubal ligation      s/p BTL     Family History   Problem Relation Age of Onset    Stroke Mother         TIA    Cancer Father         lung    Other (smoker) Father     Diabetes Brother     Diabetes Paternal Grandmother         Fathers side of family    Other (auto immune) Paternal Grandmother     Other (hepatitis) Paternal Grandmother     Renal Disease Paternal Grandmother     Heart Disease Paternal Grandfather     Diabetes Other         Grandmother [SIDE NOT STATED]    Thyroid disease Other         family h/o    Glaucoma Neg         family h/o    Bleeding Disorders Neg     DVT/VTE Neg      Family Status   Relation Status    Mo Alive    Fa     Bro     PGMA (Not Specified)    PGFA (Not Specified)    Other (Not Specified)    NEG (Not Specified)     Social History      Occupational History    Not on file   Tobacco Use    Smoking status: Former     Current packs/day: 0.00     Average packs/day: 0.5 packs/day for 2.0 years (1.0 ttl pk-yrs)     Types: Cigarettes     Start date: 1983     Quit date: 1985     Years since quittin.2    Smokeless tobacco: Never   Vaping Use    Vaping status: Never Used   Substance and Sexual Activity    Alcohol use: No    Drug use: No    Sexual activity: Not Currently     Comment: last active in        Medications:  Current Outpatient Medications   Medication Sig Dispense Refill    Diethylpropion HCl 25 MG Oral Tab Take 1 tablet by mouth daily. 90 tablet 1    Dulaglutide (TRULICITY) 4.5 MG/0.5ML Subcutaneous Solution Auto-injector Inject 4.5 mg into the skin once a week. 12 mL 0    Glucose Blood In Vitro Strip USE ONCE DAILY 100 each 3    Blood Glucose Monitoring Suppl Does not apply Kit Check blood sugar daily Dispense per Insurance coverage 1 kit 0    Lancets Does not apply Misc Check blood sugar daily 100 each 3    atorvastatin 10 MG Oral Tab Take 1 tablet (10 mg total) by mouth nightly. 90 tablet 3    METFORMIN 500 MG Oral Tab TAKE 2 TABLETS BY MOUTH TWICE DAILY WITH MEALS 360 tablet 3    LANTUS SOLOSTAR 100 UNIT/ML Subcutaneous Solution Pen-injector Inject 20 Units into the skin nightly.      Multiple Vitamins-Minerals (EQ VISION FORMULA 50+) Oral Cap Take by mouth daily.      Dulaglutide (TRULICITY) 4.5 MG/0.5ML Subcutaneous Solution Pen-injector Inject 4.5 mg into the skin once a week. 12 Pen 1    levothyroxine 75 MCG Oral Tab Take 1 tablet (75 mcg total) by mouth before breakfast. 90 tablet 3    pantoprazole 40 MG Oral Tab EC Take 1 tablet (40 mg total) by mouth before breakfast. 90 tablet 3    lisinopril-hydroCHLOROthiazide 10-12.5 MG Oral Tab Take 1 tablet by mouth once daily. 90 tablet 3    DULoxetine 60 MG Oral Cap DR Particles Take 1 capsule (60 mg total) by mouth daily. 90 capsule 3    Acetaminophen ER (TYLENOL 8 HOUR  ARTHRITIS PAIN) 650 MG Oral Tab CR Take 2 tablets (1,300 mg total) by mouth in the morning and 2 tablets (1,300 mg total) before bedtime. 60 tablet 5    Insulin Pen Needle (BD PEN NEEDLE MINI U/F) 31G X 5 MM Does not apply Misc Use with Lantus 100 each 2    ONETOUCH DELICA LANCETS 33G Does not apply Misc Apply 1 Units topically daily. 90 each 3    Blood Glucose Monitoring Suppl (ONETOUCH ULTRA SYSTEM) W/DEVICE Does not apply Kit test T.I.D         Allergies:  Allergies[1]    Review of Systems  A comprehensive review of systems was completed and is negative unless noted above or in the HPI.    Physical Exam  Ht 5' 5.2\" (1.656 m)   Wt 280 lb (127 kg)   LMP 08/10/2021   BMI 46.31 kg/m²   Body mass index is 46.31 kg/m².    Constitutional: The patient appears well-developed and well-nourished, in no apparent distress.   Psychiatric: The patient demonstrates good comprehension, judgment and decision making. Normal mood and affect.  Eyes: PER and EOM are normal.  ENT: Hearing appropriate for normal conversation.  Cardiovascular: The patient has symmetric pulses, 2+.  Distal extremity is warm and well perfused with good capillary refill.  Respiratory:  The patient is breathing comfortably without increased respiratory effort or use of accessory muscles.  Hematologic/Lymphatic: No lymphangitis. There is no appreciable enlargement of lymph nodes. No calf swelling, calf non-tender, negative Calzada's sign. No edema.  Skin: No wounds or ulcers. No hypertrophic scarring.  Neck: FROM without pain.  MSK:  Gait: Antalgic to the right    Right knee        Alignment: Valgus, correctable       Skin: Intact at the knee       Swelling: None at the knee, seated gently below the knee with venous stasis changes bilaterally   Effusion: Negative   Tenderness: Lateral joint line and Patella       Range of Motion:      Extension: 10     Flexion: 60     Flexion Contracture: 10     Extensor La           McMurrays: Negative   Lachmann:  Negative   Anterior Drawer: Negative   Posterior Drawer: Negative   Varus Stress Test: stable   Valgus Stress Test: stable       Patellar Tracking: Centrally   Patellar Crepitus: No   Extensor Mechanism: Not intact       Hip ROM:   Intact       Sensation: intact   Motor: intact   Vascular: intact     Imaging:  I independently reviewed the patient's relevant imaging studies today.    4 weightbearing views of the affected knee were obtained today.  They demonstrate no acute fracture or dislocation.  They show tricompartmental joint space narrowing, osteophyte formation, subchondral process consistent with advanced osteoarthritis, Kellgren-Jorge grade 3-4.      Labs:  Lab Results   Component Value Date    WBC 10.2 11/29/2024    HGB 13.6 11/29/2024    HCT 40.5 11/29/2024    .0 11/29/2024     Lab Results   Component Value Date    PTT 30.3 08/17/2022    PTT 30.9 08/17/2022    ALB 4.8 11/29/2024    A1C 6.3 (H) 11/29/2024     Lab Results   Component Value Date     (H) 11/29/2024     (H) 02/24/2024     (H) 08/05/2023       Assessment and Plan  Assessment & Plan  Primary osteoarthritis of right knee         Venous stasis dermatitis of both lower extremities         Morbid obesity with BMI of 45.0-49.9, adult ([facility])         Type 2 diabetes mellitus without complication, with long-term current use of insulin ([facility])         I had a lengthy discussion with the patient today about the patient's knee OA.  Nonoperative and operative options for knee OA were discussed with the patient at length. Nonoperative measures include activity modification, anti-inflammatories, weight loss if applicable, physical therapy or a home exercise program, bracing, cortisone injections and viscosupplementation injections.  Risks and benefits to injections were reviewed including but not limited to infection, skin discoloration/changes, temporary elevations in blood sugars, joint inflammatory reaction, persistent pain or  increased pain at the injection site, and the chance that the injection may not help. If they fail these measures, and continue to suffer from functionally limiting pain that is negatively impacting their quality of life, a knee replacement can be considered.  However, she would need to lose significant amount of weight.  Discussed that she also would need to get her venous stasis changes and swelling in the lower extremities under control prior to surgical intervention.  Specifically, I recommend the following:    Understanding all options, the patient elected for Zilretta injections.  They were recommended low impact aerobic exercises such as biking, elliptical, and swimming/water therapy. They were recommended against high impact activities such as running on hard surfaces and deep squatting activities.  They were instructed on the benefits of weight loss, based on their BMI which is high today.  There are no structural abnormalities to their knee, so the patient can be WBAT and activities as tolerated.  Follow up: For Zilretta injections once approval is met  The patient understands and agrees with this plan. All of the patient's questions were answered today.      BMI is is above average; BMI management plan is completed    Isha Goodman MD         [1]   Allergies  Allergen Reactions    Chlorhexidine RASH

## 2025-03-26 ENCOUNTER — MED REC SCAN ONLY (OUTPATIENT)
Dept: FAMILY MEDICINE CLINIC | Facility: CLINIC | Age: 65
End: 2025-03-26

## 2025-03-26 NOTE — TELEPHONE ENCOUNTER
Patient called and scheduled   Future Appointments   Date Time Provider Department Center   3/27/2025 11:45 AM Isha Goodman MD [facility]   4/29/2025 11:00 AM Niki Pozo MD [facility]   8/28/2025  2:15 PM [id number]

## 2025-03-27 ENCOUNTER — OFFICE VISIT (OUTPATIENT)
Age: 65
End: 2025-03-27
Payer: MEDICARE

## 2025-03-27 DIAGNOSIS — M17.11 PRIMARY OSTEOARTHRITIS OF RIGHT KNEE: Primary | ICD-10-CM

## 2025-03-27 PROCEDURE — 20610 DRAIN/INJ JOINT/BURSA W/O US: CPT | Performed by: ORTHOPAEDIC SURGERY

## 2025-03-27 NOTE — PROGRESS NOTES
We discussed the diagnosis and treatment options and the patient felt they would benefit from the injection. I described the injection in detail including the risks, which include but are not limited to risk of infection, failure of treatment, local skin discoloration, temporary elevations in blood sugars, inflammatory reaction, persistent pain or increased pain at the injection site. Specifically we cautioned the patient regarding signs of infection and the need for immediate evaluation in this case. The patient verbalized an understanding of these risks and verbally consented to the injection. Allergies were reviewed.     Description of Procedure: An appropriate procedural time-out was performed confirming the patient’s name, date of birth, and the procedure to be performed. Following sterile prep, 5mL of 32mg/5mL Zilretta was injection into the right knee using the inferolateral approach. There were no complications or difficulties. Hemostasis was achieved. A small sterile band aid dressing was applied. The patient tolerated the procedure well.     Post Injection Instructions: The patient was encouraged to ice the area immediately following the injection and to avoid strenuous activity with the involved extremity for remainder of the day. The patient verbalized an understanding and all of their questions and concerns were addressed.

## 2025-03-28 NOTE — TELEPHONE ENCOUNTER
Patient was called and inform of  message below and she verbalized understanding.    Future Appointments   Date Time Provider Department Center   4/17/2025 11:00 AM Najjar, Samer F, MD [facility]

## 2025-03-28 NOTE — TELEPHONE ENCOUNTER
Please let her know it is because the orthopedic doctor wants to be sure that vascular issues are not playing a role in her knee pain before he recommends treatment plan.

## 2025-04-03 ENCOUNTER — PATIENT MESSAGE (OUTPATIENT)
Dept: FAMILY MEDICINE CLINIC | Facility: CLINIC | Age: 65
End: 2025-04-03

## 2025-04-04 DIAGNOSIS — M75.31 CALCIFIC TENDONITIS OF RIGHT SHOULDER: ICD-10-CM

## 2025-04-04 DIAGNOSIS — M54.12 RADICULITIS OF LEFT CERVICAL REGION: ICD-10-CM

## 2025-04-04 DIAGNOSIS — R20.0 NUMBNESS AND TINGLING IN LEFT HAND: ICD-10-CM

## 2025-04-04 DIAGNOSIS — R20.2 NUMBNESS AND TINGLING IN LEFT HAND: ICD-10-CM

## 2025-04-04 RX ORDER — DULOXETIN HYDROCHLORIDE 60 MG/1
60 CAPSULE, DELAYED RELEASE ORAL DAILY
Qty: 90 CAPSULE | Refills: 0 | Status: SHIPPED | OUTPATIENT
Start: 2025-04-04

## 2025-04-04 NOTE — TELEPHONE ENCOUNTER
Refill Request    Last GFR 11/29/24 - 93 protocol passed    Medication request: DULoxetine 60 MG Oral Cap DR Particles Take 1 capsule (60 mg total) by mouth daily.     LOV:7/23/2024-injection, 6/7/24- LOV Jaison Russell,    Due back to clinic per last office note:  \"I will see her for follow-up in 2 to 3 months if she continues to be symptomatic. \"  NOV: Visit date not found      [place] 1/6/25 #90 - 90 day supply    Urine drug screen (if applicable): n/a  Pain contract: n/a    LOV plan (if weaning or changing medications): Per  at last office visit: \"Continue duloxetine as prescribed. \"        Message sent to patient via [employer] reminding her to schedule a follow up appointment.

## 2025-04-17 ENCOUNTER — OFFICE VISIT (OUTPATIENT)
Facility: CLINIC | Age: 65
End: 2025-04-17
Payer: MEDICARE

## 2025-04-17 VITALS
HEIGHT: 65.2 IN | SYSTOLIC BLOOD PRESSURE: 118 MMHG | WEIGHT: 275 LBS | DIASTOLIC BLOOD PRESSURE: 74 MMHG | BODY MASS INDEX: 45.27 KG/M2

## 2025-04-17 DIAGNOSIS — R60.0 LIPEDEMA OF LOWER EXTREMITY: Primary | ICD-10-CM

## 2025-04-17 RX ORDER — IBUPROFEN 600 MG/1
600 TABLET, FILM COATED ORAL AS NEEDED
COMMUNITY

## 2025-04-17 NOTE — PROGRESS NOTES
VASCULAR SURGERY CONSULT NOTE      Nicolette Nation   :  1960  MR#  [id number]    REFERRING PHYSICIAN:  Niki Pozo  PRIMARY CARE PHYSICIAN:  Niki Pozo MD    Chief Complaint   Patient presents with    Consult    Edema     Patient presents here c/o bilateral leg       HPI:    The patient is a 64 year old female who has been referred to the clinic today for an evaluation of her bilateral lower extremity heaviness, tiredness, discoloration, edema, and pain after prolonged standing. The symptoms are more prevalent from her knee down to her ankle area bilaterally, not involving her toes. She has not worn compression stockings in the recent past.  She also endorses a medical history of type 2 diabetes and morbid obesity. Follows up regularly with bariatric specialist Dr. Mccrary and has lost around 60 lbs in the past five months.   PAST MEDICAL HISTORY:   Past Medical History[1]    PAST SURGICAL HISTORY:   Past Surgical History[2]     MEDICATIONS:   Current Medications[3]    ALLERGIES:   Allergies[4]    SOCIAL HISTORY:   Short Social Hx on File[5]     FAMILY HISTORY:   Family History[6]    ROS:   A 12 point review of systems with pertinent positives and negatives listed in the HPI.    PHYSICAL EXAM:   /74 (BP Location: Right arm)   Ht 5' 5.2\" (1.656 m)   Wt 275 lb (124.7 kg)   LMP 08/10/2021   BMI 45.48 kg/m²   GENERAL: alert and orientated X 3, well developed, well nourished, in no apparent distress  HEENT: ears and throat are clear  NECK: supple, no lymphadenopathy, thyroid wnl  CAROTID: No bruits  RESPIRATORY: no rales, rhonchi, or wheezes B  CARDIO: RRR without murmur, no murmur, no gallop   ABDOMEN: soft, non-tender with no palpable aneurysm or masses  BACK: normal, no tenderness  SKIN: no rashes, warm and dry  NEURO/PSYCH: orientated x3, normal mood and affect, no sensory or motor deficit  EXTREMITIES: full range of motion, no tenderness or edema in either leg.   VASCULAR  Pulse exam right:  femoral 2+, DP  2+, PT  2+  Pulse exam left: femoral  2+, DP  2+, PT  2+      Vein Assessment:      Fatty columnar deposits noted in both lower extremities, particularly from knees down to ankles area.       IMPRESSION:   Bilateral  lower extremity lipedema.     PLAN:     The patient was educated in the benign and chronic condition of lipedema.   I have given the patient a prescription for Grade II compression stockings (20-30 mmHg, waist-highs). We reviewed the importance of wearing these daily and consistently. We also reviewed other types of conservative measures, such as periodic leg elevation and walking for exercise.  Weight loss has also been strongly encouraged to improve the lipedema condition.   The patient was explained she might benefit from liposuction with a plastic surgeon if the initial conservative management fails.   The patient understood and agreed to proceed with this treatment plan, all of her questions were answered during this clinic visit.       Thank you for allowing to participate in the care of your patient.    MIKE Bhardwaj  Division of Vascular Surgery.          [1]   Past Medical History:   Back problem    previous disc problem    Cervical disc disease    Cervical disc herniation    involving C6-C7    Cervical polyp    Chronic diarrhea    Colon polyp    COVID    Fever chills nausea. No hospitalization or residual    Diabetes (HCC)    Diverticular disease    [place]    Essential hypertension    Hemorrhoids    High blood pressure    High cholesterol    History of COVID-19    Hyperlipidemia    IBS (irritable bowel syndrome)    Incontinence    stool    Morbid obesity with BMI of 50.0-59.9, adult (HCC)    Myopia of both eyes    Osteoarthritis    Postmenopause    at age 50-54    Subclinical hypothyroidism    subclinical hypothyroidism, primary.  3/2010 trial of Synthroid, no improvement sx, d/c'd    Varicella    chicken pox    Visual impairment    glasses   [2]   Past Surgical  History:  Procedure Laterality Date      , 1991    x2     Cholecystectomy      Colonoscopy N/A 2019    Procedure: COLONOSCOPY;  Surgeon: Marcus Shah MD;  Location: [facility] ENDOSCOPY    Colonoscopy N/A 2021    Procedure: COLONOSCOPY;  Surgeon: Marcus Shah MD;  Location: [facility] ENDOSCOPY    Endometrial biopsy - jar(s): 2  2020    Hysterectomy  10/2021    no associated bleeding complication    Hysteroscopy      PROCEDURE:  Cervical polypectomy, hysteroscopy, MyoSure resection of endometrial polyp, and dilation and curettage.     Other  2022    Cervical 5-cervical 6 anterior corpectomy with cervical 4-cervical 5, cervical 5-cervical 6, cervical 6-cervical 7 anterior fusion, placement of cage and plate    Tubal ligation      s/p BTL   [3]   Current Outpatient Medications   Medication Sig Dispense Refill    ibuprofen 600 MG Oral Tab Take 1 tablet (600 mg total) by mouth as needed for Pain.      DULoxetine 60 MG Oral Cap DR Particles Take 1 capsule by mouth once daily 90 capsule 0    Diethylpropion HCl 25 MG Oral Tab Take 1 tablet by mouth daily. 90 tablet 1    Dulaglutide (TRULICITY) 4.5 MG/0.5ML Subcutaneous Solution Auto-injector Inject 4.5 mg into the skin once a week. 12 mL 0    Glucose Blood In Vitro Strip USE ONCE DAILY 100 each 3    Blood Glucose Monitoring Suppl Does not apply Kit Check blood sugar daily Dispense per Insurance coverage 1 kit 0    Lancets Does not apply Misc Check blood sugar daily 100 each 3    atorvastatin 10 MG Oral Tab Take 1 tablet (10 mg total) by mouth nightly. 90 tablet 3    METFORMIN 500 MG Oral Tab TAKE 2 TABLETS BY MOUTH TWICE DAILY WITH MEALS 360 tablet 3    LANTUS SOLOSTAR 100 UNIT/ML Subcutaneous Solution Pen-injector Inject 20 Units into the skin nightly.      Multiple Vitamins-Minerals (EQ VISION FORMULA 50+) Oral Cap Take by mouth daily.      Dulaglutide (TRULICITY) 4.5 MG/0.5ML Subcutaneous Solution Pen-injector Inject  4.5 mg into the skin once a week. 12 Pen 1    levothyroxine 75 MCG Oral Tab Take 1 tablet (75 mcg total) by mouth before breakfast. 90 tablet 3    pantoprazole 40 MG Oral Tab EC Take 1 tablet (40 mg total) by mouth before breakfast. 90 tablet 3    lisinopril-hydroCHLOROthiazide 10-12.5 MG Oral Tab Take 1 tablet by mouth once daily. 90 tablet 3    Acetaminophen ER (TYLENOL 8 HOUR ARTHRITIS PAIN) 650 MG Oral Tab CR Take 2 tablets (1,300 mg total) by mouth in the morning and 2 tablets (1,300 mg total) before bedtime. 60 tablet 5    Insulin Pen Needle (BD PEN NEEDLE MINI U/F) 31G X 5 MM Does not apply Misc Use with Lantus 100 each 2    ONETOUCH DELICA LANCETS 33G Does not apply Misc Apply 1 Units topically daily. 90 each 3    Blood Glucose Monitoring Suppl (ONETOUCH ULTRA SYSTEM) W/DEVICE Does not apply Kit test T.I.D     [4]   Allergies  Allergen Reactions    Chlorhexidine RASH   [5]   Social History  Socioeconomic History    Marital status:    Tobacco Use    Smoking status: Former     Current packs/day: 0.00     Average packs/day: 0.5 packs/day for 2.0 years (1.0 ttl pk-yrs)     Types: Cigarettes     Start date: 1983     Quit date: 1985     Years since quittin.3    Smokeless tobacco: Never   Vaping Use    Vaping status: Never Used   Substance and Sexual Activity    Alcohol use: No    Drug use: No    Sexual activity: Not Currently     Comment: last active in    Other Topics Concern    Caffeine Concern Yes     Comment: soda   Social History Narrative    Nicolette is . She has 2 adult children. Patient is unemployed,but formerly worked in [employer]. She lives with her son in [place], [place]     Social Drivers of Health      Received from [facility]    Housing Stability   [6]   Family History  Problem Relation Age of Onset    Stroke Mother         TIA    Cancer Father         lung    Other (smoker) Father     Diabetes Brother     Diabetes Paternal Grandmother          Fathers side of family    Other (auto immune) Paternal Grandmother     Other (hepatitis) Paternal Grandmother     Renal Disease Paternal Grandmother     Heart Disease Paternal Grandfather     Diabetes Other         Grandmother [SIDE NOT STATED]    Thyroid disease Other         family h/o    Glaucoma Neg         family h/o    Bleeding Disorders Neg     DVT/VTE Neg

## 2025-04-24 RX ORDER — LISINOPRIL AND HYDROCHLOROTHIAZIDE 10; 12.5 MG/1; MG/1
1 TABLET ORAL
Qty: 90 TABLET | Refills: 3 | Status: SHIPPED | OUTPATIENT
Start: 2025-04-24

## 2025-04-24 NOTE — TELEPHONE ENCOUNTER
REFILL PASSED PER [facility] PROTOCOLS

## 2025-04-29 ENCOUNTER — OFFICE VISIT (OUTPATIENT)
Dept: FAMILY MEDICINE CLINIC | Facility: CLINIC | Age: 65
End: 2025-04-29
Payer: MEDICARE

## 2025-04-29 VITALS
WEIGHT: 276 LBS | DIASTOLIC BLOOD PRESSURE: 68 MMHG | HEIGHT: 65 IN | HEART RATE: 88 BPM | OXYGEN SATURATION: 97 % | BODY MASS INDEX: 45.98 KG/M2 | SYSTOLIC BLOOD PRESSURE: 106 MMHG

## 2025-04-29 DIAGNOSIS — I87.2 VENOUS STASIS DERMATITIS OF BOTH LOWER EXTREMITIES: ICD-10-CM

## 2025-04-29 DIAGNOSIS — Z79.4 TYPE 2 DIABETES MELLITUS WITHOUT COMPLICATION, WITH LONG-TERM CURRENT USE OF INSULIN (HCC): ICD-10-CM

## 2025-04-29 DIAGNOSIS — I10 PRIMARY HYPERTENSION: ICD-10-CM

## 2025-04-29 DIAGNOSIS — E03.8 SUBCLINICAL HYPOTHYROIDISM: ICD-10-CM

## 2025-04-29 DIAGNOSIS — E78.5 DYSLIPIDEMIA: ICD-10-CM

## 2025-04-29 DIAGNOSIS — E11.9 TYPE 2 DIABETES MELLITUS WITHOUT COMPLICATION, WITH LONG-TERM CURRENT USE OF INSULIN (HCC): ICD-10-CM

## 2025-04-29 DIAGNOSIS — Z86.0100 HISTORY OF COLON POLYPS: ICD-10-CM

## 2025-04-29 DIAGNOSIS — K21.9 GASTROESOPHAGEAL REFLUX DISEASE, UNSPECIFIED WHETHER ESOPHAGITIS PRESENT: ICD-10-CM

## 2025-04-29 DIAGNOSIS — M17.0 PRIMARY OSTEOARTHRITIS OF BOTH KNEES: ICD-10-CM

## 2025-04-29 DIAGNOSIS — N85.02 COMPLEX ENDOMETRIAL HYPERPLASIA WITH ATYPIA: ICD-10-CM

## 2025-04-29 DIAGNOSIS — K58.0 IRRITABLE BOWEL SYNDROME WITH DIARRHEA: ICD-10-CM

## 2025-04-29 DIAGNOSIS — G95.9 CERVICAL MYELOPATHY (HCC): ICD-10-CM

## 2025-04-29 DIAGNOSIS — E66.01 MORBID OBESITY WITH BMI OF 45.0-49.9, ADULT (HCC): ICD-10-CM

## 2025-04-29 DIAGNOSIS — Z00.00 ENCOUNTER FOR ANNUAL HEALTH EXAMINATION: Primary | ICD-10-CM

## 2025-04-29 DIAGNOSIS — Z12.31 VISIT FOR SCREENING MAMMOGRAM: ICD-10-CM

## 2025-04-29 PROBLEM — M17.11 PRIMARY OSTEOARTHRITIS OF RIGHT KNEE: Status: RESOLVED | Noted: 2025-03-25 | Resolved: 2025-04-29

## 2025-04-29 PROBLEM — M15.0 PRIMARY OSTEOARTHRITIS INVOLVING MULTIPLE JOINTS: Status: RESOLVED | Noted: 2023-10-06 | Resolved: 2025-04-29

## 2025-04-29 LAB
CREAT UR-SCNC: 329.6 MG/DL
HEMOGLOBIN A1C: 5.8 % (ref 4.3–5.6)
MICROALBUMIN UR-MCNC: 3 MG/DL
MICROALBUMIN/CREAT 24H UR-RTO: 9.1 UG/MG (ref ?–30)

## 2025-04-29 PROCEDURE — 3074F SYST BP LT 130 MM HG: CPT | Performed by: FAMILY MEDICINE

## 2025-04-29 PROCEDURE — 3044F HG A1C LEVEL LT 7.0%: CPT | Performed by: FAMILY MEDICINE

## 2025-04-29 PROCEDURE — G0438 PPPS, INITIAL VISIT: HCPCS | Performed by: FAMILY MEDICINE

## 2025-04-29 PROCEDURE — 3008F BODY MASS INDEX DOCD: CPT | Performed by: FAMILY MEDICINE

## 2025-04-29 PROCEDURE — 96160 PT-FOCUSED HLTH RISK ASSMT: CPT | Performed by: FAMILY MEDICINE

## 2025-04-29 PROCEDURE — 99499 UNLISTED E&M SERVICE: CPT | Performed by: FAMILY MEDICINE

## 2025-04-29 PROCEDURE — 3078F DIAST BP <80 MM HG: CPT | Performed by: FAMILY MEDICINE

## 2025-04-29 PROCEDURE — 3061F NEG MICROALBUMINURIA REV: CPT | Performed by: FAMILY MEDICINE

## 2025-04-29 PROCEDURE — 83036 HEMOGLOBIN GLYCOSYLATED A1C: CPT | Performed by: FAMILY MEDICINE

## 2025-04-29 RX ORDER — IBUPROFEN 600 MG/1
600 TABLET, FILM COATED ORAL
Qty: 90 TABLET | Refills: 1 | Status: SHIPPED | OUTPATIENT
Start: 2025-04-29

## 2025-04-29 NOTE — PATIENT INSTRUCTIONS
Decrease Lantus to 10 U nightly. In 2 weeks check home BS fasting and 2 hrs after dinner for 3 days and send or call me with results.

## 2025-04-29 NOTE — PROGRESS NOTES
The following individual(s) verbally consented to be recorded using ambient AI listening technology and understand that they can each withdraw their consent to this listening technology at any point by asking the clinician to turn off or pause the recording:    Patient name: Nicolette Cashm  Additional names:

## 2025-04-29 NOTE — PROGRESS NOTES
Subjective:   Nicolette Nation is a 64 year old female who presents for a MA AHA (Medicare Advantage Annual Health Assessment) and  issues as below .   History of Present Illness  Nicolette Nation is a 64 year old female who presents for a routine physical exam.    She has experienced significant weight loss, decreasing from 333 pounds to 276 pounds, and her clothing size has reduced . Despite this, her knee pain has worsened. She attributes the worsening pain to a previous injection that did not alleviate her symptoms. She is considering knee replacement surgery but is concerned about potential complications. Recent evaluations have ruled out blood vessel issues.    She has a history of lipoedema, which she mentions is hereditary. She is currently using compression stockings and is inquiring about insurance coverage for custom-made Velcro stockings. She is also managing venous stasis dermatitis with support hose and moisturizing.    Her diabetes management includes metformin 2000 mg daily, Trulicity, and Lantus, which has been reduced from 20 units to 10 units due to an improved A1c of 5.8. She occasionally checks her blood sugar at home. She is also on diethylpropion for weight management.    She takes ibuprofen and Tylenol Arthritis for pain management but is concerned about the potential damage from frequent use. She also takes lisinopril and hydrochlorothiazide for hypertension, atorvastatin for cholesterol, duloxetine for chronic pain, and pantoprazole for acid reflux.    She has a history of a hysterectomy in 2021 and does not take hormone replacement therapy. She reports occasional diarrhea, which she associates with dietary choices, and her arthritis symptoms are managed with current medications.    Her knee pain limits her ability to use an exercise bicycle, and she has been engaging in water exercises. Her BMI has decreased from 46 to 45, and she continues to focus on weight loss through diet and  exercise.      History/Other:   Fall Risk Assessment:   She has been screened for Falls and is High Risk. Fall Prevention information provided to patient in After Visit Summary.    Do you feel unsteady when standing or walking?: (Patient-Rptd) Yes  Do you worry about falling?: (Patient-Rptd) Yes  Have you fallen in the past year?: (Patient-Rptd) No     Cognitive Assessment:   She had a completely normal cognitive assessment - see flowsheet entries     Functional Ability/Status:   Nicolette Nation has some abnormal functions as listed below:  She has Hearing problems based on screening of functional status.She has Vision problems based on screening of functional status. She has Walking problems based on screening of functional status.       Depression Screening (PHQ):  PHQ-2 SCORE: 0  , done 4/29/2025   Last [place] Suicide Screening on 4/29/2025 was No Risk.          Advanced Directives:   She does NOT have a Living Will. [Do you have a living will?: (Patient-Rptd) No]  She does NOT have a Power of  for Health Care. [Do you have a healthcare power of ?: (Patient-Rptd) No]  Discussed Advance Care Planning with patient (and family/surrogate if present). Standard forms made available to patient in After Visit Summary.      Patient Active Problem List   Diagnosis    Diabetes mellitus, type 2 (HCC)    Hypertension    History of colon polyps    Venous stasis dermatitis of both lower extremities    Subclinical hypothyroidism    Gastroesophageal reflux disease    Complex endometrial hyperplasia with atypia    Irritable bowel syndrome with diarrhea    Cervical myelopathy (HCC)    Morbid obesity with BMI of 45.0-49.9, adult (HCC)    Primary osteoarthritis of both knees    Dyslipidemia     Allergies:  She is allergic to chlorhexidine.    Current Medications:  Outpatient Medications Marked as Taking for the 4/29/25 encounter (Office Visit) with Niki Pozo MD   Medication Sig     lisinopril-hydroCHLOROthiazide 10-12.5 MG Oral Tab Take 1 tablet by mouth in the morning.    ibuprofen 600 MG Oral Tab Take 1 tablet (600 mg total) by mouth as needed for Pain.    DULoxetine 60 MG Oral Cap DR Particles Take 1 capsule by mouth once daily    Diethylpropion HCl 25 MG Oral Tab Take 1 tablet by mouth daily.    Glucose Blood In Vitro Strip USE ONCE DAILY    Blood Glucose Monitoring Suppl Does not apply Kit Check blood sugar daily Dispense per Insurance coverage    Lancets Does not apply Misc Check blood sugar daily    atorvastatin 10 MG Oral Tab Take 1 tablet (10 mg total) by mouth nightly.    METFORMIN 500 MG Oral Tab TAKE 2 TABLETS BY MOUTH TWICE DAILY WITH MEALS    LANTUS SOLOSTAR 100 UNIT/ML Subcutaneous Solution Pen-injector Inject 10 Units into the skin nightly.    Multiple Vitamins-Minerals (EQ VISION FORMULA 50+) Oral Cap Take by mouth daily.    Dulaglutide (TRULICITY) 4.5 MG/0.5ML Subcutaneous Solution Pen-injector Inject 4.5 mg into the skin once a week.    levothyroxine 75 MCG Oral Tab Take 1 tablet (75 mcg total) by mouth before breakfast.    pantoprazole 40 MG Oral Tab EC Take 1 tablet (40 mg total) by mouth before breakfast.    Acetaminophen ER (TYLENOL 8 HOUR ARTHRITIS PAIN) 650 MG Oral Tab CR Take 2 tablets (1,300 mg total) by mouth in the morning and 2 tablets (1,300 mg total) before bedtime.    Insulin Pen Needle (BD PEN NEEDLE MINI U/F) 31G X 5 MM Does not apply Misc Use with Lantus    ONETOUCH DELICA LANCETS 33G Does not apply Misc Apply 1 Units topically daily.    Blood Glucose Monitoring Suppl (ONETOUCH ULTRA SYSTEM) W/DEVICE Does not apply Kit test T.I.D       Medical History:  She  has a past medical history of Back problem, Cervical disc disease (03/31/2022), Cervical disc herniation (1995), Cervical polyp (07/01/2021), Chronic diarrhea (06/17/2020), Colon polyp (2019), COVID (06/09/2022), Diabetes (HCC) (05/2014), Diverticular disease, [place] (07/07/2015), Essential hypertension,  Hemorrhoids, High blood pressure, High cholesterol, History of COVID-19 (2022), Hyperlipidemia, IBS (irritable bowel syndrome), Incontinence, Morbid obesity with BMI of 50.0-59.9, adult (HCC), Myopia of both eyes (2015), Osteoarthritis, Postmenopause, Subclinical hypothyroidism (), Varicella, and Visual impairment.  Surgical History:  She  has a past surgical history that includes tubal ligation; cholecystectomy ();  (, ); colonoscopy (N/A, 2019); endometrial biopsy - jar(s): 2 (2020); hysteroscopy; colonoscopy (N/A, 2021); hysterectomy (10/2021); and other (2022).   Family History:  Her family history includes Cancer in her father; Diabetes in her brother, paternal grandmother, and another family member; Heart Disease in her paternal grandfather; Renal Disease in her paternal grandmother; Stroke in her mother; Thyroid disease in an other family member; auto immune in her paternal grandmother; hepatitis in her paternal grandmother; smoker in her father.  Social History:  She  reports that she quit smoking about 40 years ago. Her smoking use included cigarettes. She started smoking about 42 years ago. She has a 1 pack-year smoking history. She has never used smokeless tobacco. She reports that she does not drink alcohol and does not use drugs.    Tobacco:  She smoked tobacco in the past but quit greater than 12 months ago.  Social History     Tobacco Use   Smoking Status Former    Current packs/day: 0.00    Average packs/day: 0.5 packs/day for 2.0 years (1.0 ttl pk-yrs)    Types: Cigarettes    Start date: 1983    Quit date: 1985    Years since quittin.3   Smokeless Tobacco Never        CAGE Alcohol Screen:   CAGE screening score of 0 on 2025, showing low risk of alcohol abuse.      Patient Care Team:  Niki Pozo MD as PCP - General (Family Medicine)  Katrin Vallecillo PT as Physical Therapist (Physical Therapy)  Chepe Brown MD  (NEUROLOGY)  Sherly Peters, PT as Physical Therapist (Physical Therapy)  Piotr Kerns MD as Consulting Physician (NEUROSURGERY)  Malina Vicente PA-C as Consulting Physician (Physician Assistant)  Brady Conn MD (SURGERY, ORTHOPEDIC)  Jayesh Jaimes, PT as Physical Therapist (Physical Therapy)  Chin Mccrary MD as Consulting Physician (BARIATRICS)  Lizette Ware, PT (Physical Therapy)  Luciana De La Rosa, PT (Physical Therapy)  Stefany Michael APRN (Nurse Practitioner Family)    Review of Systems     Negative except see below    Objective:   Physical Exam  General Appearance:  Alert, cooperative, no distress, appears stated age.  Obese   Head:  Normocephalic, without obvious abnormality, atraumatic   Eyes:  PERRL, conjunctiva/corneas clear, EOM's intact both eyes   Ears:  Normal TM's and external ear canals, both ears.  Has hearing aids   Nose: Nares normal, septum midline,mucosa normal, no drainage or sinus tenderness   Throat: Lips, mucosa, and tongue normal; teeth and gums normal   Neck: Supple, symmetrical, trachea midline, no adenopathy;  thyroid: not enlarged, symmetric, no tenderness/mass/nodules; no carotid bruit or JVD   Back:   Symmetric, no curvature, ROM normal, no CVA tenderness   Lungs:   Clear to auscultation bilaterally, respirations unlabored   Heart:  Regular rate and rhythm, S1 and S2 normal, no murmur, rub, or gallop   Abdomen:   Soft, non-tender, bowel sounds active all four quadrants,  no masses, no organomegaly   Pelvic: Deferred   Extremities: Extremities normal, atraumatic.  Nonpitting edema bilateral ankles   Pulses: 2+ and symmetric   Skin: Skin color, texture, turgor normal, intertrigo groin creases.  Stasis dermatitis changes lower legs bilaterally   Lymph nodes: Cervical, supraclavicular, and axillary nodes normal   Neurologic: Normal    and Breasts:  normal appearance, no masses or tenderness    /68   Pulse 88   Ht 5' 5\" (1.651 m)   Wt 276 lb (125.2 kg)    LMP 08/10/2021   SpO2 97%   BMI 45.93 kg/m²  Estimated body mass index is 45.93 kg/m² as calculated from the following:    Height as of this encounter: 5' 5\" (1.651 m).    Weight as of this encounter: 276 lb (125.2 kg).    Medicare Hearing Assessment:   Hearing Screening    Time taken: 4/29/2025 11:09 AM  Entry User: Consumer Agent Portal (CAP)  Screening Method: Finger Rub  Finger Rub Result: Pass         Visual Acuity:   Right Eye Visual Acuity: Corrected Right Eye Chart Acuity: 20/20   Left Eye Visual Acuity: Corrected Left Eye Chart Acuity: 20/20   Both Eyes Visual Acuity: Corrected Both Eyes Chart Acuity: 20/20   Able To Tolerate Visual Acuity: Yes        Assessment & Plan:   Nicolette Nation is a 64 year old female who presents for a Medicare Assessment.     1. Encounter for annual health examination (Primary)  2. Visit for screening mammogram  -     [facility] CAROL 2D+3D SCREENING BILAT (CPT=77067/96058); Future; Expected date: 04/29/2025  3. Cervical myelopathy (HCC)  Overview:  surgery Dr. Kerns 8/2022, Repeat EMG no CTS or radiculopathy.  4. Type 2 diabetes mellitus without complication, with long-term current use of insulin (HCC)  -     POC Hemoglobin A1C  5. Morbid obesity with BMI of 45.0-49.9, adult ([facility])  6. Dyslipidemia  7. Primary hypertension  8. Venous stasis dermatitis of both lower extremities  9. Subclinical hypothyroidism  Overview:  3/2021-treatment started as patient symptomatic with severe fatigue  10. Gastroesophageal reflux disease, unspecified whether esophagitis present  11. History of colon polyps  Overview:  5/2016, 8/2019, 2023 repeat in 3 years  12. Irritable bowel syndrome with diarrhea  Overview:  Dr. Shah  13. Primary osteoarthritis of both knees  14. Complex endometrial hyperplasia with atypia  Overview:  2021 [facility]    Assessment & Plan  Wellness Visit  Routine physical examination. Discussed weight loss progress and its impact on overall health. Encouraged continuation of healthy  lifestyle changes.  - Continue healthy diet with emphasis on fruits, vegetables, lean proteins, and whole grains  - Avoid junk food, soda, and high-sugar snacks  - Encourage water exercises and gym workouts as tolerated    Morbid obesity with BMI 45  BMI decreased from 46 to 45, with current weight at 276 lbs, down from 333 lbs. Continued weight loss is encouraged to improve overall health and alleviate symptoms of other conditions.  - Continue current weight loss regimen including diet and exercise  - Continue diethylpropion for weight management  - Encourage water exercises and gym workouts as tolerated    Type 2 diabetes mellitus with improved A1c  A1c improved to 5.8, indicating excellent glycemic control. Weight loss and increased Trulicity dose contributed to this improvement. Plan to reduce Lantus insulin dosage with potential discontinuation if blood sugar levels remain stable.  - Decrease Lantus insulin to 10 units  - Check home blood sugar fasting and 2 hours postprandial for 3 days in 2 weeks  - Contact provider with results  - Consider stopping Lantus if blood sugar levels remain stable    Primary osteoarthritis of both knees  Persistent knee pain despite weight loss and previous injection. Discussed potential knee replacement surgery, which she is considering. Current pain management with ibuprofen is not fully effective. Discussed knee replacement surgery longevity and risks, including infection.  - Continue ibuprofen as needed with food  - Consider knee replacement surgery if pain persists and other treatments are ineffective    Venous stasis dermatitis  Venous stasis dermatitis present in lower extremities. Compression stockings recommended to alleviate symptoms and improve circulation. Discussed contacting insurance for coverage of Velcro compression stockings from waist to ankles.  - Contact insurance to check coverage for Velcro compression stockings from waist to ankles  - Wear compression  stockings as recommended    Hypertension, controlled  Blood pressure is well-controlled on current medication regimen.    Hyperlipidemia, stable on statin  Hyperlipidemia is well-managed with atorvastatin therapy.    Gastroesophageal reflux disease (GERD)  GERD is controlled with pantoprazole therapy.    Irritable bowel syndrome with diarrhea  Diarrhea occurs infrequently and is diet-dependent. Symptoms are much improved compared to previous visits.    The patient indicates understanding of these issues and agrees to the plan.  Lab work ordered.  Reinforced healthy diet, lifestyle, and exercise.      Return in about 6 months (around 10/29/2025) for diabetes, hypertension.     Niki Pozo MD, 4/29/2025     Supplementary Documentation:   General Health:  In the past six months, have you lost more than 10 pounds without trying?: (Patient-Rptd) 2 - No  Has your appetite been poor?: (Patient-Rptd) Yes  Type of Diet: (Patient-Rptd) Diabetic, Low Salt  How does the patient maintain a good energy level?: (Patient-Rptd) Stretching  How would you describe your daily physical activity?: (Patient-Rptd) Light  How would you describe your current health state?: (Patient-Rptd) Fair  How do you maintain positive mental well-being?: (Patient-Rptd) Social Interaction, Games, Visiting Family  On a scale of 0 to 10, with 0 being no pain and 10 being severe pain, what is your pain level?: (Patient-Rptd) 6 - (Moderate)  In the past six months, have you experienced urine leakage?: (Patient-Rptd) 0-No  At any time do you feel concerned for the safety/well-being of yourself and/or your children, in your home or elsewhere?: No  Have you had any immunizations at another office such as Influenza, Hepatitis B, Tetanus, or Pneumococcal?: (Patient-Rptd) Yes    Health Maintenance   Topic Date Due    COVID-19 Vaccine (5 - 2024-25 season) 09/01/2024    Annual Well Visit  01/01/2025    Diabetes Care: Microalb/Creat Ratio (Annual)  01/01/2025     Mammogram  04/24/2025    Diabetes Care A1C  05/29/2025    Diabetes Care Dilated Eye Exam  11/18/2025    Diabetes Care: GFR  11/29/2025    Colorectal Cancer Screening  11/29/2026    DTaP,Tdap,and Td Vaccines (3 - Td or Tdap) 05/05/2032    Influenza Vaccine  Completed    Annual Depression Screening  Completed    Diabetes Care: Foot Exam (Annual)  Completed    Pneumococcal Vaccine: 50+ Years  Completed    Zoster Vaccines  Completed    Meningococcal B Vaccine  Aged Out

## 2025-06-17 RX ORDER — PANTOPRAZOLE SODIUM 40 MG/1
40 TABLET, DELAYED RELEASE ORAL
Qty: 90 TABLET | Refills: 3 | Status: SHIPPED | OUTPATIENT
Start: 2025-06-17

## 2025-06-17 NOTE — TELEPHONE ENCOUNTER
Refill passes per [facility] protocol.    No future appointments with primary care medicine

## 2025-06-18 ENCOUNTER — HOSPITAL ENCOUNTER (OUTPATIENT)
Dept: MAMMOGRAPHY | Facility: HOSPITAL | Age: 65
Discharge: HOME | End: 2025-06-18
Attending: FAMILY MEDICINE
Payer: MEDICARE

## 2025-06-18 DIAGNOSIS — Z12.31 VISIT FOR SCREENING MAMMOGRAM: ICD-10-CM

## 2025-06-18 PROCEDURE — 77067 SCR MAMMO BI INCL CAD: CPT | Performed by: FAMILY MEDICINE

## 2025-06-18 PROCEDURE — 77063 BREAST TOMOSYNTHESIS BI: CPT | Performed by: FAMILY MEDICINE

## 2025-07-04 DIAGNOSIS — M75.31 CALCIFIC TENDONITIS OF RIGHT SHOULDER: ICD-10-CM

## 2025-07-04 DIAGNOSIS — R20.0 NUMBNESS AND TINGLING IN LEFT HAND: ICD-10-CM

## 2025-07-04 DIAGNOSIS — R20.2 NUMBNESS AND TINGLING IN LEFT HAND: ICD-10-CM

## 2025-07-04 DIAGNOSIS — M54.12 RADICULITIS OF LEFT CERVICAL REGION: ICD-10-CM

## 2025-07-07 RX ORDER — DULOXETIN HYDROCHLORIDE 60 MG/1
60 CAPSULE, DELAYED RELEASE ORAL DAILY
Qty: 90 CAPSULE | Refills: 0 | Status: SHIPPED | OUTPATIENT
Start: 2025-07-07

## 2025-07-07 NOTE — TELEPHONE ENCOUNTER
"15  Answers for HPI/ROS submitted by the patient on 4/4/2019   Annual Exam:  In general, how would you rate your overall physical health?: fair  Frequency of exercise:: 4-5 days/week  Do you usually eat at least 4 servings of fruit and vegetables a day, include whole grains & fiber, and avoid regularly eating high fat or \"junk\" foods? : Yes  Taking medications regularly:: Yes  Medication side effects:: Muscle aches  Activities of Daily Living: no assistance needed  Home safety: no safety concerns identified  Hearing Impairment:: no hearing concerns  In the past 6 months, have you been bothered by leaking of urine?: No  abdominal pain: No  Blood in stool: No  Blood in urine: No  chest pain: No  chills: No  congestion: No  constipation: No  cough: No  diarrhea: No  dizziness: No  ear pain: No  eye pain: No  nervous/anxious: No  fever: No  frequency: No  genital sores: No  headaches: No  hearing loss: No  heartburn: No  arthralgias: Yes  joint swelling: No  peripheral edema: Yes  mood changes: No  myalgias: Yes  nausea: No  dysuria: No  palpitations: No  Skin sensation changes: No  sore throat: No  urgency: No  rash: No  shortness of breath: No  visual disturbance: Yes  weakness: Yes  pelvic pain: No  vaginal bleeding: No  vaginal discharge: No  tenderness: No  breast mass: No  breast discharge: No  In general, how would you rate your overall mental or emotional health?: excellent  Duration of exercise:: 15-30 minutes    " Will give 90 day supply; if she does not follow up within the next 90 days I will not be able to continue going forwards.

## 2025-07-08 RX ORDER — LEVOTHYROXINE SODIUM 75 UG/1
75 TABLET ORAL
Qty: 90 TABLET | Refills: 3 | Status: SHIPPED | OUTPATIENT
Start: 2025-07-08

## 2025-07-30 RX ORDER — DULAGLUTIDE 4.5 MG/.5ML
4.5 INJECTION, SOLUTION SUBCUTANEOUS WEEKLY
Qty: 12 ML | Refills: 1 | Status: SHIPPED | OUTPATIENT
Start: 2025-07-30

## 2025-08-25 ENCOUNTER — PATIENT MESSAGE (OUTPATIENT)
Dept: FAMILY MEDICINE CLINIC | Facility: CLINIC | Age: 65
End: 2025-08-25

## (undated) DIAGNOSIS — R76.8 ELEVATED SERUM IMMUNOGLOBULIN FREE LIGHT CHAIN LEVEL: Primary | ICD-10-CM

## (undated) DEVICE — C-ARMOR C-ARM EQUIPMENT COVERS CLEAR STERILE UNIVERSAL FIT 12 PER CASE: Brand: C-ARMOR

## (undated) DEVICE — DEVICE SPEC RTRVL MYOSURE

## (undated) DEVICE — Device: Brand: DEFENDO AIR/WATER/SUCTION AND BIOPSY VALVE

## (undated) DEVICE — STERILE POLYISOPRENE POWDER-FREE SURGICAL GLOVES: Brand: PROTEXIS

## (undated) DEVICE — DRILL BIT 7080513 STERILE 13MM

## (undated) DEVICE — PAD SACRAL PREMIUM 12X12X1

## (undated) DEVICE — GOWN,SIRUS,FABRIC-REINFORCED,X-LARGE: Brand: MEDLINE

## (undated) DEVICE — STERILE LATEX POWDER-FREE SURGICAL GLOVESWITH NITRILE COATING: Brand: PROTEXIS

## (undated) DEVICE — LAMINECTOMY CDS: Brand: MEDLINE INDUSTRIES, INC.

## (undated) DEVICE — 3M(TM) MEDIPORE(TM) +PAD SOFT CLOTH ADHESIVE WOUND DRESSING 3569: Brand: 3M™ MEDIPORE™

## (undated) DEVICE — SUT VICRYL 3-0 SH J416H

## (undated) DEVICE — SCREW 3120514 4.0 X 14 SELF DRILL VAR
Type: IMPLANTABLE DEVICE | Site: SPINE CERVICAL | Status: NON-FUNCTIONAL
Brand: ATLANTIS® ANTERIOR CERVICAL PLATE SYSTEM
Removed: 2022-08-29

## (undated) DEVICE — SOL  .9 3000ML

## (undated) DEVICE — 35 ML SYRINGE REGULAR TIP: Brand: MONOJECT

## (undated) DEVICE — SET TUBI Y FL CNTRL INFL/OTFL

## (undated) DEVICE — HYSTEROSCOPY: Brand: MEDLINE INDUSTRIES, INC.

## (undated) DEVICE — VIOLET BRAIDED (POLYGLACTIN 910), SYNTHETIC ABSORBABLE SUTURE: Brand: COATED VICRYL

## (undated) DEVICE — DRILL SRG OIL CRTDG MAESTRO

## (undated) DEVICE — SOLUTION SURG DURAPREP 26ML

## (undated) DEVICE — COVER,TABLE,44X90,STERILE: Brand: MEDLINE

## (undated) DEVICE — GAUZE SPONGES,12 PLY: Brand: CURITY

## (undated) DEVICE — LINE MNTR ADLT SET O2 INTMD

## (undated) DEVICE — Device: Brand: CUSTOM PROCEDURE KIT

## (undated) DEVICE — SNARE CAPTIFLEX MICRO-OVL OLY

## (undated) DEVICE — LIGACLIP EXTRA LIGATING CLIP CARTRIDGES: 6 TITANIUM CLIPS/ CARTRIDGE (SMALL): Brand: LIGACLIP

## (undated) DEVICE — #15 STERILE STAINLESS BLADE: Brand: STERILE STAINLESS BLADES

## (undated) DEVICE — SUCTION CANISTER, 3000CC,SAFELINER: Brand: DEROYAL

## (undated) DEVICE — FORCEP RADIAL JAW 4

## (undated) DEVICE — KIT DRN 1/8IN PVC 3 SPRG EVAC

## (undated) DEVICE — SOLUTION  .9 1000ML BTL

## (undated) DEVICE — ISOPROPYL ALCOHOL 70% 4OZ BTL

## (undated) DEVICE — SNARE ENDOSCOPIC 10MM ROUND

## (undated) DEVICE — PROCTO SWABS: Brand: DEROYAL

## (undated) DEVICE — DRAPE,THYROID,SOFT,STERILE: Brand: MEDLINE

## (undated) DEVICE — KIT ENDO ORCAPOD 160/180/190

## (undated) DEVICE — ENCORE® LATEX MICRO SIZE 6.5, STERILE LATEX POWDER-FREE SURGICAL GLOVE: Brand: ENCORE

## (undated) DEVICE — FLOSEAL HEMOSTATIC MATRIX, 5ML: Brand: FLOSEAL HEMOSTATIC MATRIX

## (undated) DEVICE — LIGHT HANDLE

## (undated) DEVICE — MEGADYNE E-Z CLEAN BLADE 2.75"

## (undated) DEVICE — DRAPE SHEET LG

## (undated) DEVICE — 3.0MM PRECISION NEURO (MATCH HEAD)

## (undated) DEVICE — KIT CLEAN ENDOKIT 1.1OZ GOWNX2

## (undated) DEVICE — MYOSURE SINGLE USE SEAL SET

## (undated) DEVICE — SNARE OPTMZ PLPCTM TRP

## (undated) DEVICE — PEN SKIN MARKING REG TIP VIOLT

## (undated) DEVICE — MEDI-VAC NON-CONDUCTIVE SUCTION TUBING 6MM X 1.8M (6FT.) L: Brand: CARDINAL HEALTH

## (undated) DEVICE — SUT SILK 2-0 A305H

## (undated) DEVICE — C-ARM: Brand: UNBRANDED

## (undated) DEVICE — Device: Brand: INTELLICART™

## (undated) DEVICE — HEMOCLIP HORIZON MED 002200

## (undated) DEVICE — CODMAN® SURGICAL PATTIES 1/2" X 3" (1.27CM X 7.62CM): Brand: CODMAN®

## (undated) DEVICE — SOCK CNSTR 4IN TNPSL UNV SPEC

## (undated) DEVICE — SPONGE: SPECIALTY PEANUT XR 100/CS: Brand: MEDICAL ACTION INDUSTRIES

## (undated) NOTE — LETTER
[facility]  Administration of Anesthesia  1. Willian Mixon, or _________________________________ acting on her behalf, (Patient) (Dependent/Representative) request to receive anesthesia for my pending procedure/operation/treatment. bleeding, seizure, cardiac arrest and death. 7. AWARENESS: I understand that it is possible (but unlikely) to have explicit memory of events from the operating room while under general anesthesia.   8. ELECTROCONVULSIVE THERAPY PATIENTS: This consent serve below affirms that prior to the time of the procedure, I have explained to the patient and/or his/her guardian, the risks and benefits of undergoing anesthesia, as well as any reasonable alternatives.     ___________________________________________________

## (undated) NOTE — LETTER
2/26/2019              Maura Sauceda        [address]         [address] [place] [address]         To Whom It May Concern,    Maura Sauceda is under my medical care and was seen in the office today. Please excuse any absence.     Sincerely,

## (undated) NOTE — MR AVS SNAPSHOT
Demetra Mishra [address]               Thank you for choosing us for your health care visit with Anna Ordonez PT. We are glad to serve you and happy to provide you with this summary of your visit. [address], [address] [place] [address]   [phone]              MyChart     Visit [employer]  You can access your MyChart to more actively manage your health care and view more details from this visit by going to [email or web address].

## (undated) NOTE — LETTER
AUTHORIZATION FOR SURGICAL OPERATION OR OTHER PROCEDURE    1. I hereby authorize Dr. Grewal/Sulma Osuna PA-C, and [facility] staff assigned to my case to perform the following operation and/or procedure at the [facility] site:    Right knee cortisone injection   _______________________________________________________________________________________________      _______________________________________________________________________________________________    2.  My physician has explained the nature and purpose of the operation or other procedure, possible alternative methods of treatment, the risks involved, and the possibility of complication to me.  I acknowledge that no guarantee has been made as to the result that may be obtained.  3.  I recognize that, during the course of this operation, or other procedure, unforseen conditions may necessitate additional or different procedure than those listed above.  I, therefore, further authorize and request that the above named physician, his/her physician assistants or designees perform such procedures as are, in his/her professional opinion, necessary and desirable.  4.  Any tissue or organs removed in the operation or other procedure may be disposed of by and at the discretion of the [facility] and [facility].  5.  I understand that in the event of a medical emergency, I will be transported by local paramedics to [facility] or other hospital emergency department.  6.  I certify that I have read and fully understand the above consent to operation and/or other procedure.    7.  I acknowledge that my physician has explained sedation/analgesia administration to me including the risks and benefits.  I consent to the administration of sedation/analgesia as may be necessary or desirable in the judgement of my physician.    Witness signature: ___________________________________________________ Date:   ______/______/_____                    Time:  ________ A.M.  P.M.       Patient Name:  ______________________________________________________  (please print)      Patient signature:  ___________________________________________________             Relationship to Patient:           []  Parent    Responsible person                          []  Spouse  In case of minor or                    [] Other  _____________   Incompetent name:  __________________________________________________                               (please print)      _____________      Responsible person  In case of minor or  Incompetent signature:  _______________________________________________    Statement of Physician  My signature below affirms that prior to the time of the procedure, I have explained to the patient and/or his/her guardian, the risks and benefits involved in the proposed treatment and any reasonable alternative to the proposed treatment.  I have also explained the risks and benefits involved in the refusal of the proposed treatment and have answered the patient's questions.                        Date:  ______/______/_______  Provider                      Signature:  __________________________________________________________       Time:  ___________ A.M    P.M.

## (undated) NOTE — LETTER
Patient Name: Christel Martinez CSN: [id number]  -Age / Sex: 1960-A: 58 y  female Medical Records: [id number]    The above patient had a positive COVID test on: __2022__________      Per [facility] Infection Control guidelines this patient will NOT be retested for COVID  prior to their surgery/procedure on: ___2022___________. Thank you. Virginia TAVAREZ

## (undated) NOTE — LETTER
[facility]   Date:   2/29/2024     Name:   Nicolette Nation    YOB: 1960   MRN:   [id number]       WHERE IS YOUR PAIN NOW?  Chante the areas on your body where you feel the described sensations.  Use the appropriate symbol.  Chante the areas of radiation.  Include all affected areas.  Just to complete the picture, please draw in the face.     ACHE:  ^ ^ ^   NUMBNESS:  0000   PINS & NEEDLES:  = = = =                              ^ ^ ^                       0000              = = = =                                    ^ ^ ^                       0000            = = = =      BURNING:  XXXX   STABBING: ////                  XXXX                ////                         XXXX          ////     Please chante the line below indicating your degree of pain right now  with 0 being no pain 10 being the worst pain possible.                                         0             1             2              3             4              5              6              7             8             9             10         Patient Signature:

## (undated) NOTE — MR AVS SNAPSHOT
Demetra Mishra [address]               Thank you for choosing us for your health care visit with Jw Lepe PT. We are glad to serve you and happy to provide you with this summary of your visit. [place] [facility] Visit By Therapist with Elva Ta, PT   [facility] in [facility])    [address] [place] [address]   [phone]           Please arrive at your scheduled appointment time.   Wear

## (undated) NOTE — MR AVS SNAPSHOT
Demetra Mishra [address]               Thank you for choosing us for your health care visit with Abel Shipley PT. We are glad to serve you and happy to provide you with this summary of your visit. Physical - Established with Delfina Avitia MD   [facility] [place], [facility], [address], [facility])    [address], [address] [place] [address]   [phone]            Mar 02, 2017  [facility] Physical Therapy Visit By Poncho Gonzalez

## (undated) NOTE — LETTER
AUTHORIZATION FOR SURGICAL OPERATION OR OTHER PROCEDURE    1.  I hereby authorize Dr. Josefina Hopkins , and [facility] staff assigned to my case to perform the following operation and/or procedure at the [facility]:    Endometrial Biopsy      ___ (please print)      Patient signature:  ___________________________________________________             Relationship to Patient:           []  Parent    Responsible person                          []  Spouse  In case of minor or                    [] Other

## (undated) NOTE — MR AVS SNAPSHOT
[facility]  [address], [address]  [phone]               Thank you for choosing us for your health care visit with Daniel Brock.  Morgan Valentin MD.  We are glad to serve you and happy to provide you with this summary Scheduling Instructions     Thursday January 05, 2017     Imaging:  MRI KNEE, LEFT ([id number])    Instructions:   To schedule a test at any [facility] Scheduling at [phone], Monday through Friday be numbers can create reimbursement difficulties for you.       Referral Information     Referral Order Referred to [address] Phone Visits Status Diagnosis           Acute pain of left knee [[id number]]    MRI KNEE, LEFT ([id number]) [[id number]]     1 Open [2] Acut

## (undated) NOTE — LETTER
AUTHORIZATION FOR SURGICAL OPERATION OR OTHER PROCEDURE    1. I hereby authorize Dr. Jaison Russell and the [facility] staff assigned to my case to perform the following operation and/or procedure at the [facility]:    Right subacromial injection under US guidance       2.  My physician has explained the nature and purpose of the operation or other procedure, possible alternative methods of treatment, the risks involved, and the possibility of complication to me.  I acknowledge that no guarantee has been made as to the result that may be obtained.  3.  I recognize that, during the course of this operation, or other procedure, unforseen conditions may necessitate additional or different procedure than those listed above.  I, therefore, further authorize and request that the above named physician, his/her physician assistants or designees perform such procedures as are, in his/her professional opinion, necessary and desirable.  4.  Any tissue or organs removed in the operation or other procedure may be disposed of by and at the discretion of the [facility] staff and [facility].  5.  I understand that in the event of a medical emergency, I will be transported by local paramedics to [facility] or other hospital emergency department.  6.  I certify that I have read and fully understand the above consent to operation and/or other procedure.    7.  I acknowledge that my physician has explained sedation/analgesia administration to me including the risks and benefits.  I consent to the administration of sedation/analgesia as may be necessary or desirable in the judgement of my physician.    Witness signature: ___________________________________________________ Date:  ______/______/_____                    Time:  ________ A.M.  P.M.       Patient Name:  Nicolette Nation  8/6/1960  [id number]         Patient signature:  ___________________________________________________  Statement of  Physician  My signature below affirms that prior to the time of the procedure, I have explained to the patient and/or his/her guardian, the risks and benefits involved in the proposed treatment and any reasonable alternative to the proposed treatment.  I have also explained the risks and benefits involved in the refusal of the proposed treatment and have answered the patient's questions.                        Date:  ______/______/_______  Provider                      Signature:  __________________________________________________________       Time:  ___________ A.M    P.M.

## (undated) NOTE — MR AVS SNAPSHOT
Demetra Mishra [address]               Thank you for choosing us for your health care visit with Neda Johnson PT. We are glad to serve you and happy to provide you with this summary of your visit. [place] [facility] Therapy Visit By Therapist with Catalino Trinidad, ROCÍO Valles in [facility] ([facility])    [address] [place] [address]   [phone]           Please arrive at your scheduled appointment time.   Wear

## (undated) NOTE — LETTER
8/25/2019              [place] [address] [facility] Jett Page         Dear Ms.  Chapis,    Your recent colonoscopy exam at [facility] on 8/6/2019 showed diverticulosis of the colon and 3 small colon po

## (undated) NOTE — LETTER
AUTHORIZATION FOR SURGICAL OPERATION OR OTHER PROCEDURE    1. I hereby authorize Dr. Nan Jones, and [facility] staff assigned to my case to perform the following operation and/or procedure at the [facility]:    _______________________________________________________________________________________________    Right knee cortisone injection  _______________________________________________________________________________________________    2. My physician has explained the nature and purpose of the operation or other procedure, possible alternative methods of treatment, the risks involved, and the possibility of complication to me. I acknowledge that no guarantee has been made as to the result that may be obtained. 3.  I recognize that, during the course of this operation, or other procedure, unforseen conditions may necessitate additional or different procedure than those listed above. I, therefore, further authorize and request that the above named physician, his/her physician assistants or designees perform such procedures as are, in his/her professional opinion, necessary and desirable. 4.  Any tissue or organs removed in the operation or other procedure may be disposed of by and at the discretion of the [facility], [facility] and [facility]. 5.  I understand that in the event of a medical emergency, I will be transported by local paramedics to [facility] or other hospital emergency department. 6.  I certify that I have read and fully understand the above consent to operation and/or other procedure. 7.  I acknowledge that my physician has explained sedation/analgesia administration to me including the risks and benefits. I consent to the administration of sedation/analgesia as may be necessary or desirable in the judgement of my physician.     Witness signature: ___________________________________________________ Date:  ______/______/_____                    Time: ________ A. M.  P.M. Patient Name:  ______________________________________________________  (please print)      Patient signature:  ___________________________________________________             Relationship to Patient:           []  Parent    Responsible person                          []  Spouse  In case of minor or                    [] Other  _____________   Incompetent name:  __________________________________________________                               (please print)      _____________      Responsible person  In case of minor or  Incompetent signature:  _______________________________________________    Statement of Physician  My signature below affirms that prior to the time of the procedure, I have explained to the patient and/or his/her guardian, the risks and benefits involved in the proposed treatment and any reasonable alternative to the proposed treatment. I have also explained the risks and benefits involved in the refusal of the proposed treatment and have answered the patient's questions.                         Date:  ______/______/_______  Provider                      Signature:  __________________________________________________________       Time:  ___________ A.M    P.M.

## (undated) NOTE — MR AVS SNAPSHOT
Demetra Mishra [address]               Thank you for choosing us for your health care visit with Theodore Smith PT. We are glad to serve you and happy to provide you with this summary of your visit. [place] DIGITAL SCREENING W/CAD with [facility] OF THE [facility] ([address])    [address]   [phone]           Do NOT use deodorant, talcum powder, lotions, or creams on your

## (undated) NOTE — MR AVS SNAPSHOT
Demetra Mishra [address]               Thank you for choosing us for your health care visit with Jac Murdock PT. We are glad to serve you and happy to provide you with this summary of your visit. [address]           Please arrive at your scheduled appointment time. Wear comfortable, loose fitting clothing.               MyChart     Visit [email or web address]  You can access your MyChart to more actively manage your health c

## (undated) NOTE — LETTER
5/28/2024          To Whom It May Concern:    Nicolette Nation is currently under my medical care. Ross, please help your Mom follow diabetic diet and no sugary drinks.    If you require additional information please contact our office.        Sincerely,     Niki Pozo MD          Document generated by:  Niki Pozo MD

## (undated) NOTE — LETTER
AUTHORIZATION FOR SURGICAL OPERATION OR OTHER PROCEDURE    1. I hereby authorize Dr. Goodman, and [facility] staff assigned to my case to perform the following operation and/or procedure at the [facility] site:    Right knee Zilrettta injection   _______________________________________________________________________________________________      _______________________________________________________________________________________________    2.  My physician has explained the nature and purpose of the operation or other procedure, possible alternative methods of treatment, the risks involved, and the possibility of complication to me.  I acknowledge that no guarantee has been made as to the result that may be obtained.  3.  I recognize that, during the course of this operation, or other procedure, unforseen conditions may necessitate additional or different procedure than those listed above.  I, therefore, further authorize and request that the above named physician, his/her physician assistants or designees perform such procedures as are, in his/her professional opinion, necessary and desirable.  4.  Any tissue or organs removed in the operation or other procedure may be disposed of by and at the discretion of the [facility] and [facility].  5.  I understand that in the event of a medical emergency, I will be transported by local paramedics to [facility] or other hospital emergency department.  6.  I certify that I have read and fully understand the above consent to operation and/or other procedure.    7.  I acknowledge that my physician has explained sedation/analgesia administration to me including the risks and benefits.  I consent to the administration of sedation/analgesia as may be necessary or desirable in the judgement of my physician.    Witness signature: ___________________________________________________ Date:  ______/______/_____                     Time:  ________ A.M.  P.M.       Patient Name:  ______________________________________________________  (please print)      Patient signature:  ___________________________________________________             Relationship to Patient:           []  Parent    Responsible person                          []  Spouse  In case of minor or                    [] Other  _____________   Incompetent name:  __________________________________________________                               (please print)      _____________      Responsible person  In case of minor or  Incompetent signature:  _______________________________________________    Statement of Physician  My signature below affirms that prior to the time of the procedure, I have explained to the patient and/or his/her guardian, the risks and benefits involved in the proposed treatment and any reasonable alternative to the proposed treatment.  I have also explained the risks and benefits involved in the refusal of the proposed treatment and have answered the patient's questions.                        Date:  ______/______/_______  Provider                      Signature:  __________________________________________________________       Time:  ___________ A.M    P.M.

## (undated) NOTE — LETTER
AUTHORIZATION FOR SURGICAL OPERATION OR OTHER PROCEDURE    1.  I hereby [facility] Dr. Krishna Flores, and [facility] staff assigned to my case to perform the following operation and/or procedure at the [facility]:    _________________________ Time:  ________ A. M.  P.M.        Patient Name:  ______________________________________________________  (please print)      Patient signature:  ___________________________________________________             Relationship to Patient:

## (undated) NOTE — LETTER
[facility] [address] , [place]   Date:   11/30/2023     Name:   Angelica Bonilla    YOB: 1960   MRN:   [id number]       WHERE IS YOUR PAIN NOW? Chante the areas on your body where you feel the described sensations. Use the appropriate symbol. Adeline Canal the areas of radiation. Include all affected areas. Just to complete the picture, please draw in the face. ACHE:  ^ ^ ^   NUMBNESS:  0000   PINS & NEEDLES:  = = = =                              ^ ^ ^                       0000              = = = =                                    ^ ^ ^                       0000            = = = =      BURNING:  XXXX   STABBING: ////                  XXXX                ////                         XXXX          ////     Please chante the line below indicating your degree of pain right now  with 0 being no pain 10 being the worst pain possible.                                          0             1             2              3             4              5              6              7             8             9             10         Patient Signature:

## (undated) NOTE — MR AVS SNAPSHOT
[place]  [address]  [phone]               Thank you for choosing us for your health care visit with Eric Feldman MD.  We are glad to serve you and happy to provide you with this lebron Today's Orders     PHYSICAL THERAPY - at [facility]    Complete by:  As directed    Assoc Dx:  Primary osteoarthritis of left knee [M17.12]                 Referral Details     Referred By    Referred To    Mark Crane MD   [address] [facility] on quad, hamstring, and IT band stretching/strengthening, core and pelvic stabilization, and increasing ROM. Modalities prn.   HEP    Assoc Dx:  Primary osteoarthritis of left knee [M17.12]          MyChart     Visit MyChart  You can access your MyCha

## (undated) NOTE — LETTER
Notifier: Nelbee       Patient Name: Nicolette Nation       Identification Number: [id number]                          Advance Beneficiary Notice of Noncoverage (ABN)   NOTE:  If Medicare doesn’t pay for D. Items/service(s) below, you may have to pay.  Medicare does not pay for everything, even some care that you or your health care provider have good reason to think you need. We expect Medicare may not pay for the D. items/service(s) below.  Items or Services  Right subacromial bursa steroid injection under ultrasound guidance  Reason Medicare May Not Pay: Estimated Cost   __EKG ($129.00)  __Pap smear ($48.23) __Pelvic/Breast ($65.00)  __ Ear Irrigation ($149)  _X_ Injection(s)  ___ Tdap ($70)       ___ Meningitis ($206)   __Prevnar ($285)  ___ Td ($51)            ___Shingrix ($215)        __Prevnar 20 ($309)  ___ Hep A ($156)   ___Prolia ($1827.00)     __ Xiaflex ($              )   ___ Hep B ($167)      __Pneumovax ($155)                                            ___ Vaccine Administration ($31)   __ Medicare does not cover this service      __ Medicare may not pay for this   item/service for your condition     __ Medicare may not pay for this item/service as often as this        WHAT YOU NEED TO DO NOW:  Read this notice, so you can make an informed decision about your care.  Ask us any questions that you may have after you finish reading.  Choose an option below about whether to receive the D. item/service(s)  listed above.  Note: If you choose Option 1 or 2, we may help you to use any other insurance that you might have, but Medicare cannot require us to do this.  OPTIONS: Check only one box.  We cannot choose a box for you.   OPTION 1. I want the D. item/service(s) listed above. You may ask to be paid now, but I also want Medicare billed for an official decision on payment, which is sent to me on a Medicare Summary Notice (MSN). I understand that if Medicare doesn’t pay, I am responsible for  payment, but I can appeal to Medicare by following the directions on the MSN. If Medicare does pay, you will refund any payments I made to you, less co-pays or deductibles.  OPTION 2. I want the D. item/service(s) listed above, but do not bill Medicare. You may ask to be paid now as I am responsible for payment. I cannot appeal if Medicare is not billed.  OPTION 3. I don't want the D. item/service(s) listed above. I understand with this choice I am not responsible for payment, and I cannot appeal to see if Medicare would pay.    H. Additional Information:    This notice gives our opinion, not an official Medicare decision. If you have other questions on this notice or Medicare billing, call 1-800-MEDICARE (1-576.349.8615/TTY: 1-348.561.2608). Signing below means that you have received and understand this notice. You also receive a copy.  Signature: Date:       You have the right to get Medicare information in an accessible format, like large print, Braille, or audio. You also have the right to file a complaint if you feel you’ve been discriminated against. Visit Medicare.gov/about- us/tommmmbzrhnpw-zamzihdzzdrhuvmzd-terlgt.  According to the Paperwork Reduction Act of 1995, no persons are required to respond to a collection of information unless it displays a valid OMB control number. The valid OMB control number for this information collection is 6240-2005. The time required to complete this information collection is estimated to average 7 minutes per response, including the time to review instructions, search existing data resources, gather the data needed, and complete and review the information collection. If you have comments concerning the accuracy of the time estimate or suggestions for improving this form, please write to: CMS, [facility]     Eleanor Attn: CASTILLO Reports Clearance Officer, [place], [place] [address].  Form CMS-R-131 (Exp. 1/31/2026) Form Approved OMB No. [id number]

## (undated) NOTE — LETTER
[address], [place]  Authorization for Invasive Procedures  1.  I hereby authorize Dr. Mariano Edwards , my physician and whomever may be designated as the doctor's assistant, to perform the following operation and/or procedure:  [place]on[place] allergic reactions, hemolytic reactions, transmission of disease such as hepatitis, AIDS, cytomegalovirus (CMV), and flluid overload.  In the event that I wish to have autologous transfusions of my own blood, or a directed donor transfusion, I will discuss Patient:  ________________________________________________ Date: _________Time: _________    Responsible person in case of minor or unconscious: _____________________________Relationship: ____________     Witness Signature: ________________________________

## (undated) NOTE — LETTER
AUTHORIZATION FOR SURGICAL OPERATION OR OTHER PROCEDURE    1. I hereby authorize Dr. Jaison Russell and the [facility] staff assigned to my case to perform the following operation and/or procedure at the [facility]:    Right subacromial bursa steroid injection under ultrasound guidance     2.  My physician has explained the nature and purpose of the operation or other procedure, possible alternative methods of treatment, the risks involved, and the possibility of complication to me.  I acknowledge that no guarantee has been made as to the result that may be obtained.  3.  I recognize that, during the course of this operation, or other procedure, unforseen conditions may necessitate additional or different procedure than those listed above.  I, therefore, further authorize and request that the above named physician, his/her physician assistants or designees perform such procedures as are, in his/her professional opinion, necessary and desirable.  4.  Any tissue or organs removed in the operation or other procedure may be disposed of by and at the discretion of the [facility] staff and [facility].  5.  I understand that in the event of a medical emergency, I will be transported by local paramedics to [facility] or other hospital emergency department.  6.  I certify that I have read and fully understand the above consent to operation and/or other procedure.    7.  I acknowledge that my physician has explained sedation/analgesia administration to me including the risks and benefits.  I consent to the administration of sedation/analgesia as may be necessary or desirable in the judgement of my physician.    Witness signature: ___________________________________________________ Date:  ______/______/_____                    Time:  ________ A.M.  P.M.       Patient Name:  Nicolette Nation  8/6/1960  [id number]         Patient signature:   ___________________________________________________                 Statement of Physician  My signature below affirms that prior to the time of the procedure, I have explained to the patient and/or his/her guardian, the risks and benefits involved in the proposed treatment and any reasonable alternative to the proposed treatment.  I have also explained the risks and benefits involved in the refusal of the proposed treatment and have answered the patient's questions.                        Date:  ______/______/_______  Provider                      Signature:  __________________________________________________________       Time:  ___________ ASARAH KEATING

## (undated) NOTE — LETTER
WHERE IS YOUR PAIN NOW?  Chante the areas on your body where you feel the described sensations.  Use the appropriate symbol.  Chante the areas of radiation.  Include all affected areas.  Just to complete the picture, please draw in the face.     ACHE:  ^ ^ ^   NUMBNESS:  0000   PINS & NEEDLES:  = = = =                              ^ ^ ^                       0000              = = = =                                    ^ ^ ^                       0000            = = = =      BURNING:  XXXX   STABBING: ////                  XXXX                ////                         XXXX          ////     Please chatne the line below indicating your degree of pain right now  with 0 being no pain 10 being the worst pain possible.                                         0             1             2              3             4              5              6              7             8             9             10         Patient Signature:

## (undated) NOTE — MR AVS SNAPSHOT
[facility]  [address], [address] [place] [address]  [phone]               Thank you for choosing us for your health care visit with Rafaela Oppenheim.  Faye Linton MD.  We are glad to serve you and happy to provide you with this summary Visit [employer]  You can access your MyChart to more actively manage your health care and view more details from this visit by going to [email or web address].   If you've recently had a stay at the Hospital you can access your discharge instructions i

## (undated) NOTE — Clinical Note
Spoke with patient today--HFU appt made for 09/15/2022--patient declines sooner appt--prefers to see neurosurgeon 9/14/2022, then PCP after--thank you.

## (undated) NOTE — LETTER
[facility] [address] , [place]   Date:   10/12/2023     Name:   Johnan Spivey    YOB: 1960   MRN:   [id number]       WHERE IS YOUR PAIN NOW? Chante the areas on your body where you feel the described sensations. Use the appropriate symbol. Alcy Bunkers the areas of radiation. Include all affected areas. Just to complete the picture, please draw in the face. ACHE:  ^ ^ ^   NUMBNESS:  0000   PINS & NEEDLES:  = = = =                              ^ ^ ^                       0000              = = = =                                    ^ ^ ^                       0000            = = = =      BURNING:  XXXX   STABBING: ////                  XXXX                ////                         XXXX          ////     Please chante the line below indicating your degree of pain right now  with 0 being no pain 10 being the worst pain possible.                                          0             1             2              3             4              5              6              7             8             9             10         Patient Signature:

## (undated) NOTE — LETTER
[address], [place]  Authorization for Invasive Procedures  1.  I hereby authorize Dr. Jaxon Vinson , my physician and whomever may be designated as the doctor's assistant, to perform the following operation and/or procedure:  Ju Michael allergic reactions, hemolytic reactions, transmission of disease such as hepatitis, AIDS, cytomegalovirus (CMV), and flluid overload.  In the event that I wish to have autologous transfusions of my own blood, or a directed donor transfusion, I will discuss Patient:  ________________________________________________ Date: _________Time: _________    Responsible person in case of minor or unconscious: _____________________________Relationship: ____________     Witness Signature: ________________________________

## (undated) NOTE — LETTER
Medical Grade Compression Hose     Patient: Nicolette Nation     YOB: 1960             Diagnosis: Lipedema  bilateral lower extremity leg E88.28     Compression: 20-30mmHg- Moderate Waist High  Style: Velcro Wrap        Amount: X 2              Physician Signature: _____________________  Date: 4/17/2025     NATALI Bhardwaj

## (undated) NOTE — IP AVS SNAPSHOT
[address]            (For Outpatient Use Only) Initial Admit Date: 2022   Inpt/Obs Admit Date: Inpt: 22 / Obs: N/A   Discharge Date:    Levell Lease:  [de-identified]   MRN: [de-identified]   CSN: [id number]   CEID: [id number]        ENCOUNTER  Patient Class: Inpatient Admitting Provider: Germaine Chávez MD Unit: [facility] 3SW-A   Hospital Service: Ortho/Spine Attending Provider: Germaine Chávez MD   Bed: 364-A   Visit Type:   Referring Physician: No ref. provider found Billing Flag:    Admit Diagnosis: Cervical myelopathy [facility]) [G95.9]      PATIENT  Legal Name:   Demarco Martinez   Legal Sex: Female  Gender ID: Female             Pref Name:    PCP:  Devan Torres MD Home: [phone]   Address:  [facility] : 1960 (62 yrs) Mobile: [phone]         City/State/Zip: [place]  Marital:  Language: [place]mi park: Zana [place]: [id number] [place]: [address] Not [place]*     Race: White Ethnicity: Non  Or [address] [place] [address] Street   Name Relationship Legal Guardian? Home Phone Work Phone Mobile Phone   1. Joanie Vieira  2.  Yadiel Wall Daughter  Son    [phone]      [phone]     GUARANTOR  Guarantor: Sally Annalise : 1960 Home Phone: [phone]   Address: [address]   Sex: Female Work Phone:    City/State/Zip: [place]    Rel. to Patient: Self Guarantor ID: [id number]   [address]   Employer:  Status: NOT EMPLO*     COVERAGE  PRIMARY INSURANCE   Payor: BLUE CROSS MEDICAID Plan: BLUE CROSS [facility]*   Group Number: [id number] Insurance Type: [address] Name: Casimiro Fan : 1960   Subscriber ID: [id number] Pt Rel to Subscriber: Self   SECONDARY INSURANCE   Payor:  Plan:    Group Number:  Insurance Type:    Subscriber Name:  Subscriber :    Subscriber ID:  Pt Rel to Subscriber:    TERTIARY INSURANCE   Payor:  Plan: Group Number:  Insurance Type:    Subscriber Name:  Subscriber :    Subscriber ID:  Pt Rel to Subscriber:    Hospital Account Financial Class: Medicaid Advantage    2022

## (undated) NOTE — LETTER
22  RE: Vidya Shirley     : 1960    Dear Dr. Christina Villeda,    This letter is to inform you that your patient is being scheduled for surgery with Dr. Vincent Solorzano on 22 at [facility]. We have asked the patient to contact your office to schedule a pre-operative exam/visit. Diagnosis: cervical myelopathy  Procedure:  Cervical 5-cervical 6 anterior corpectomy with cervical 4-cervical 7 anterior fusion; placement of cage and plate     A Pre-operative History & Physical is needed for medical clearance. Please address patient's active problems (i.e high blood pressure, breathing issues etc.)  Pre-op labs are scheduled through the [facility] 56.. If any labs/testing are being done through the PCP office, then results should be faxed to the pre-admission testing department at [facility] [phone]. Our pre-operative lab orders are located in our surgery order, if the patient would like these done through your office, you will need to place separate orders. Please fax clearance letter/office visit note to the Pre-Admission department or our office at fax #: [phone]. Your office note must clearly indicate if the patient is medically cleared for surgery or not. The following orders will be placed by pre-admission testing:  CBC  Comprehensive Metabolic Panel  Type and Screen (if applicable)  PT/PTT/INR  MRSA/MSSA Nasal Swab  Covid-19 testing  (*And any other pertinent testing based on patient's current clinical condition.)    If you have any questions, you may contact our office at [phone], option # 3.     Thank you,  Patricia Baltazar RN, BSN  Clinical Staff Nurse  NIYAH FISH

## (undated) NOTE — ED AVS SNAPSHOT
[facility] in [address] [address] Grzegorz Wolff    Phone:  [phone]    Fax:  [facility]   MRN: [id number]    Department:  [facility] in [address] [place] [address]   Date of Visit: Bring a paper prescription for each of these medications    - TraMADol HCl 50 MG Tabs            Discharge References/Attachments     KNEE PAIN WITH POSSIBLE TORN MENISCUS (ENGLISH)      Disclosure     Insurance plans vary and the physician(s) referred by Registration line at [phone] or find a doctor online by visiting [email or web address].    IF THERE IS ANY CHANGE OR WORSENING OF YOUR CONDITION, CALL YOUR PRIMARY CARE PHYSICIAN AT ONCE OR GO TO [address] [place] [address].     If you have been prescribed a - If you have concerns related to behavioral health issues or thoughts of harming yourself, contact [address] [place] [address] at [phone].     - If you don’t have insurance, Martha Philippe has partnered with Patient [facility] Fadia

## (undated) NOTE — Clinical Note
1/5/2017          To Whom It May Concern:    Tari Malcolm is currently under my medical care and may not return to work at this time.     Please excuse Jose Tavarez until reevaluation in 1 week    If you require additional information please contact our offic

## (undated) NOTE — LETTER
[facility]   Date:   1/30/2024     Name:   Nicolette Nation    YOB: 1960   MRN:   [id number]       WHERE IS YOUR PAIN NOW?  Chante the areas on your body where you feel the described sensations.  Use the appropriate symbol.  Chante the areas of radiation.  Include all affected areas.  Just to complete the picture, please draw in the face.     ACHE:  ^ ^ ^   NUMBNESS:  0000   PINS & NEEDLES:  = = = =                              ^ ^ ^                       0000              = = = =                                    ^ ^ ^                       0000            = = = =      BURNING:  XXXX   STABBING: ////                  XXXX                ////                         XXXX          ////     Please chante the line below indicating your degree of pain right now  with 0 being no pain 10 being the worst pain possible.                                         0             1             2              3             4              5              6              7             8             9             10         Patient Signature:

## (undated) NOTE — MR AVS SNAPSHOT
[facility]  [address], [address] [facility] [address]  [phone]               Thank you for choosing us for your health care visit with Damaso Sofia.  Amaury Prado MD.  We are glad to serve you and happy to provide you with this summary you may be pregnant, please consult with your physician prior to your exam.  To ENSURE YOUR SAFETY: If you have a medical implant device it is extremely important to present documentation of the implant information at time of your appointment.   FOR FEMALES Follow-up Instructions     Return in about 2 weeks (around 1/24/2017). Scheduling Instructions     Tuesday January 10, 2017     Imaging:  US VENOUS DOPPLER LEG LEFT - DIAG IM ([id number])    Instructions:   To schedule a test at [facility]

## (undated) NOTE — MR AVS SNAPSHOT
[address]               Thank you for choosing us for your health care visit with Lidya De Jesus PT. We are glad to serve you and happy to provide you with this summary of your visit. thereby distorting the mammogram.  Wear a two piece outfit the day of the exam. This allows you to be more comfortable during the exam.  There are no eating or activity restrictions for this exam.            May 27, 2017  1:30 PM   Follow Up Visit with Mar

## (undated) NOTE — Clinical Note
4/17/2017    [address]            Dear Alonso Tracey,      Our records indicate that you are due for an appointment for a Colonoscopy on or about May 2017 with Vimal Velazquez MD.

## (undated) NOTE — LETTER
Notifier: [employer]       Patient Name: Nicolette Nation       Identification Number: [id number]                          Advance Beneficiary Notice of Noncoverage (ABN)   NOTE:  If Medicare doesn’t pay for D. Items/service(s) below, you may have to pay.  Medicare does not pay for everything, even some care that you or your health care provider have good reason to think you need. We expect Medicare may not pay for the D. items/service(s) below.  Items or Services  Right subacromincal injection under US guidance Reason Medicare May Not Pay: Estimated Cost   __EKG ($129.00)  __Pap smear ($48.23) __Pelvic/Breast ($65.00)  __ Ear Irrigation ($149)  _x_ Injection(s)  ___ Tdap ($70)       ___ Meningitis ($206)   __Prevnar ($285)  ___ Td ($51)            ___Shingrix ($215)        __Prevnar 20 ($309)  ___ Hep A ($156)   ___Prolia ($1827.00)     __ Xiaflex ($              )   ___ Hep B ($167)      __Pneumovax ($155)                                            ___ Vaccine Administration ($31)   __ Medicare does not cover this service      __ Medicare may not pay for this   item/service for your condition     __ Medicare may not pay for this item/service as often as this        WHAT YOU NEED TO DO NOW:  Read this notice, so you can make an informed decision about your care.  Ask us any questions that you may have after you finish reading.  Choose an option below about whether to receive the D. item/service(s)  listed above.  Note: If you choose Option 1 or 2, we may help you to use any other insurance that you might have, but Medicare cannot require us to do this.  OPTIONS: Check only one box.  We cannot choose a box for you.   OPTION 1. I want the D. item/service(s) listed above. You may ask to be paid now, but I also want Medicare billed for an official decision on payment, which is sent to me on a Medicare Summary Notice (MSN). I understand that if Medicare doesn’t pay, I am responsible for payment, but I can appeal  to Medicare by following the directions on the MSN. If Medicare does pay, you will refund any payments I made to you, less co-pays or deductibles.  OPTION 2. I want the D. item/service(s) listed above, but do not bill Medicare. You may ask to be paid now as I am responsible for payment. I cannot appeal if Medicare is not billed.  OPTION 3. I don't want the D. item/service(s) listed above. I understand with this choice I am not responsible for payment, and I cannot appeal to see if Medicare would pay.    H. Additional Information:    This notice gives our opinion, not an official Medicare decision. If you have other questions on this notice or Medicare billing, call 1-800-MEDICARE (1-782.789.5686/TTY: 1-765.491.1071). Signing below means that you have received and understand this notice. You also receive a copy.  Signature: Date:       You have the right to get Medicare information in an accessible format, like large print, Braille, or audio. You also have the right to file a complaint if you feel you’ve been discriminated against. Visit Medicare.gov/about- us/vhmayedohpfhl-qymsyvwhsnkiutfnq-plfame.  According to the Paperwork Reduction Act of 1995, no persons are required to respond to a collection of information unless it displays a valid OMB control number. The valid OMB control number for this information collection is 4463-2350. The time required to complete this information collection is estimated to average 7 minutes per response, including the time to review instructions, search existing data resources, gather the data needed, and complete and review the information collection. If you have comments concerning the accuracy of the time estimate or suggestions for improving this form, please write to: CMS, [facility] Security     Eleanor Attn: CASTILLO Reports Clearance Officer, [place], [place] [address].  Form CMS-R-131 (Exp. 1/31/2026) Form Approved OMB No. 5723-4887

## (undated) NOTE — MR AVS SNAPSHOT
[facility]  [address], [address] [place] [address]  [phone]               Thank you for choosing us for your health care visit with Leslee Duverney.  Mary Serna MD.  We are glad to serve you and happy to provide you with this summary Instructions: To schedule a test at any [facility] Scheduling at   [phone].          Reason for Today's Visit     Routine Physical           Medical Issues Discussed Today     History of colon polyps    Encoun These medications were sent to [facility] [address], [address] [phone], [phone]  [address] [place] [address]     Phone:  [phone]    - glimepiride 2 MG Tabs  - Insulin Pen Needle 31G X 5 MM Misc  - Lir

## (undated) NOTE — MR AVS SNAPSHOT
[address]               Thank you for choosing us for your health care visit with Obie Solano PT. We are glad to serve you and happy to provide you with this summary of your visit. [facility] Visit By Therapist with Merrill Dickinson, PT   [facility])    [address] [place] [address]   [phone]           Please arrive at your scheduled appointment time.   Wear

## (undated) NOTE — LETTER
[facility]  Administration of Anesthesia  1. Anice Erp, or _________________________________ acting on her behalf, (Patient) (Dependent/Representative) request to receive anesthesia for my pending procedure/operation/treatment. bleeding, seizure, cardiac arrest and death. 7. AWARENESS: I understand that it is possible (but unlikely) to have explicit memory of events from the operating room while under general anesthesia.   8. ELECTROCONVULSIVE THERAPY PATIENTS: This consent serve below affirms that prior to the time of the procedure, I have explained to the patient and/or his/her guardian, the risks and benefits of undergoing anesthesia, as well as any reasonable alternatives.     ___________________________________________________

## (undated) NOTE — MR AVS SNAPSHOT
[facility]  [address], [address] [place] [address]  [phone]               Thank you for choosing us for your health care visit with Macrina Miller.  Andrew Grant MD.  We are glad to serve you and happy to provide you with this summary Your physician has referred you to a specialist.  Your physician or the clinic staff will provide you with the phone number you should call to schedule your appointment.      If you are confident that your benefit plan will not require a referral or authori No Known Allergies                Today's Vital Signs     BP Pulse Temp Height Weight BMI    118/79 mmHg 76 98.8 °F (37.1 °C) (Oral) 5' 5.51\" (1.664 m) 309 lb 6.4 oz (140.343 kg) 50.69 kg/m2         Current Medications          This list is accurate as o extension strength necessary to negotiate stairs without UE assistance      3) The patient will demonstrate 4-5 deg increase in L knee extension ROM necessary for proper gait mechanics, including terminal knee extension at heel strike    4) The patient lydia are inactive.      HOW TO GET STARTED: HOW TO STAY MOTIVATED:   Start activities slowly and build up over time Do what you like   Get your heart pumping – brisk walking, biking, swimming Even 10 minute increments are effective and add up over the week   2 ½

## (undated) NOTE — LETTER
8/23/2019       Dear Larry Reid, Jhony Goddard,    I have asked several people about a very serious problem in the Epic software with messages and tasks circulating through multiple users’ Epic inboxes and yet it has not yet been corrected.   Many doctors discussing urgent consultation circulating between 2 subspecialty offices) vanished and later in the day we had to go through our past week's schedules desperately looking for that patient to find and reopen their chart.   In this case, when the staff of th